# Patient Record
Sex: MALE | Race: WHITE | URBAN - METROPOLITAN AREA
[De-identification: names, ages, dates, MRNs, and addresses within clinical notes are randomized per-mention and may not be internally consistent; named-entity substitution may affect disease eponyms.]

---

## 2017-09-08 ENCOUNTER — APPOINTMENT (RX ONLY)
Dept: URBAN - METROPOLITAN AREA CLINIC 23 | Facility: CLINIC | Age: 72
Setting detail: DERMATOLOGY
End: 2017-09-08

## 2017-09-08 DIAGNOSIS — L81.4 OTHER MELANIN HYPERPIGMENTATION: ICD-10-CM

## 2017-09-08 DIAGNOSIS — L82.1 OTHER SEBORRHEIC KERATOSIS: ICD-10-CM

## 2017-09-08 DIAGNOSIS — L57.0 ACTINIC KERATOSIS: ICD-10-CM

## 2017-09-08 DIAGNOSIS — D22 MELANOCYTIC NEVI: ICD-10-CM

## 2017-09-08 PROBLEM — D22.5 MELANOCYTIC NEVI OF TRUNK: Status: ACTIVE | Noted: 2017-09-08

## 2017-09-08 PROCEDURE — ? LIQUID NITROGEN

## 2017-09-08 PROCEDURE — 17003 DESTRUCT PREMALG LES 2-14: CPT

## 2017-09-08 PROCEDURE — ? COUNSELING

## 2017-09-08 PROCEDURE — 99213 OFFICE O/P EST LOW 20 MIN: CPT | Mod: 25

## 2017-09-08 PROCEDURE — 17000 DESTRUCT PREMALG LESION: CPT

## 2017-09-08 ASSESSMENT — LOCATION SIMPLE DESCRIPTION DERM
LOCATION SIMPLE: RIGHT UPPER BACK
LOCATION SIMPLE: RIGHT EAR
LOCATION SIMPLE: RIGHT HAND
LOCATION SIMPLE: CHEST
LOCATION SIMPLE: ABDOMEN
LOCATION SIMPLE: LEFT HAND
LOCATION SIMPLE: RIGHT ANTERIOR NECK
LOCATION SIMPLE: LEFT UPPER BACK

## 2017-09-08 ASSESSMENT — LOCATION ZONE DERM
LOCATION ZONE: NECK
LOCATION ZONE: HAND
LOCATION ZONE: EAR
LOCATION ZONE: TRUNK

## 2017-09-08 ASSESSMENT — LOCATION DETAILED DESCRIPTION DERM
LOCATION DETAILED: LEFT ULNAR DORSAL HAND
LOCATION DETAILED: RIGHT MEDIAL INFERIOR CHEST
LOCATION DETAILED: PERIUMBILICAL SKIN
LOCATION DETAILED: RIGHT CLAVICULAR NECK
LOCATION DETAILED: RIGHT MID-UPPER BACK
LOCATION DETAILED: RIGHT SCAPHA
LOCATION DETAILED: LEFT SUPERIOR UPPER BACK
LOCATION DETAILED: RIGHT RADIAL DORSAL HAND
LOCATION DETAILED: RIGHT SUPERIOR UPPER BACK
LOCATION DETAILED: LEFT RADIAL DORSAL HAND

## 2018-04-22 PROBLEM — I35.0 NONRHEUMATIC AORTIC VALVE STENOSIS: Status: ACTIVE | Noted: 2018-04-22

## 2018-07-18 ENCOUNTER — APPOINTMENT (RX ONLY)
Dept: URBAN - METROPOLITAN AREA CLINIC 23 | Facility: CLINIC | Age: 73
Setting detail: DERMATOLOGY
End: 2018-07-18

## 2018-07-18 DIAGNOSIS — D485 NEOPLASM OF UNCERTAIN BEHAVIOR OF SKIN: ICD-10-CM

## 2018-07-18 PROBLEM — D48.5 NEOPLASM OF UNCERTAIN BEHAVIOR OF SKIN: Status: ACTIVE | Noted: 2018-07-18

## 2018-07-18 PROCEDURE — ? BIOPSY BY SHAVE METHOD

## 2018-07-18 PROCEDURE — ? OTHER

## 2018-07-18 PROCEDURE — 11100: CPT

## 2018-07-18 ASSESSMENT — LOCATION ZONE DERM: LOCATION ZONE: HAND

## 2018-07-18 ASSESSMENT — LOCATION DETAILED DESCRIPTION DERM: LOCATION DETAILED: RIGHT RADIAL DORSAL HAND

## 2018-07-18 ASSESSMENT — LOCATION SIMPLE DESCRIPTION DERM: LOCATION SIMPLE: RIGHT HAND

## 2018-07-18 NOTE — PROCEDURE: BIOPSY BY SHAVE METHOD
Was A Bandage Applied: Yes
Depth Of Biopsy: dermis
Electrodesiccation Text: The wound bed was treated with electrodesiccation after the biopsy was performed.
Biopsy Method: Dermablade
Wound Care: Vaseline
Bill For Surgical Tray: no
Electrodesiccation And Curettage Text: The wound bed was treated with electrodesiccation and curettage after the biopsy was performed.
Anesthesia Type: 1% lidocaine with epinephrine
Accession #: [id number]
Notification Instructions: Patient will be notified of biopsy results. However, patient instructed to call the office if not contacted within 2 weeks.
Type Of Destruction Used: Curettage
Size Of Lesion In Cm: 0
Detail Level: Detailed
Anesthesia Volume In Cc: 0.3
Hemostasis: Aluminum Chloride
Biopsy Type: H and E
Consent: Written consent was obtained and risks were reviewed including but not limited to scarring, infection, bleeding, scabbing, incomplete removal, and allergy to anesthesia.
Path Notes (To The Dermatopathologist): Excise if positive
Billing Type: Third-Party Bill
Cryotherapy Text: The wound bed was treated with cryotherapy after the biopsy was performed.
Dressing: bandage
Silver Nitrate Text: The wound bed was treated with silver nitrate after the biopsy was performed.
Post-care instructions were reviewed in detail and written instructions are provided. Patient is to keep the biopsy site dry overnight, and then apply bacitracin twice daily until healed. Patient may apply hydrogen peroxide soaks to remove any crusting.

## 2018-07-18 NOTE — PROCEDURE: OTHER
Note Text (......Xxx Chief Complaint.): This diagnosis correlates with the
Other (Free Text): Specimen(s) sent to [facility] for testing.
Detail Level: Simple

## 2018-08-06 ENCOUNTER — RX ONLY (OUTPATIENT)
Age: 73
Setting detail: RX ONLY
End: 2018-08-06

## 2018-08-06 RX ORDER — AMOXICILLIN 500 MG/1
CAPSULE ORAL
Qty: 6 | Refills: 0 | Status: ERX | COMMUNITY
Start: 2018-08-06 | End: 2023-05-24

## 2018-08-15 ENCOUNTER — APPOINTMENT (RX ONLY)
Dept: URBAN - METROPOLITAN AREA CLINIC 23 | Facility: CLINIC | Age: 73
Setting detail: DERMATOLOGY
End: 2018-08-15

## 2018-08-15 PROBLEM — C44.622 SQUAMOUS CELL CARCINOMA OF SKIN OF RIGHT UPPER LIMB, INCLUDING SHOULDER: Status: ACTIVE | Noted: 2018-08-15

## 2018-08-15 PROCEDURE — 11622 EXC S/N/H/F/G MAL+MRG 1.1-2: CPT

## 2018-08-15 PROCEDURE — ? EXCISION

## 2018-08-15 PROCEDURE — 13132 CMPLX RPR F/C/C/M/N/AX/G/H/F: CPT

## 2018-08-15 PROCEDURE — ? OTHER

## 2018-08-15 NOTE — PROCEDURE: EXCISION
Lip Wedge Excision Repair Text: Given the location of the defect and the proximity to free margins a full thickness wedge repair was deemed most appropriate.  Using a sterile surgical marker, the appropriate repair was drawn incorporating the defect and placing the expected incisions perpendicular to the vermillion border.  The vermillion border was also meticulously outlined to ensure appropriate reapproximation during the repair.  The area thus outlined was incised through and through with a #15 scalpel blade.  The muscularis and dermis were reaproximated with deep sutures following hemostasis. Care was taken to realign the vermillion border before proceeding with the superficial closure.  Once the vermillion was realigned the superfical and mucosal closure was finished.
Show Date Of Previous Biopsy Variable: Yes
Cheek-To-Nose Interpolation Flap Text: A decision was made to reconstruct the defect utilizing an interpolation axial flap and a staged reconstruction.  A telfa template was made of the defect.  This telfa template was then used to outline the Cheek-To-Nose Interpolation flap.  The donor area for the pedicle flap was then injected with anesthesia.  The flap was excised through the skin and subcutaneous tissue down to the layer of the underlying musculature.  The interpolation flap was carefully excised within this deep plane to maintain its blood supply.  The edges of the donor site were undermined.   The donor site was closed in a primary fashion.  The pedicle was then rotated into position and sutured.  Once the tube was sutured into place, adequate blood supply was confirmed with blanching and refill.  The pedicle was then wrapped with xeroform gauze and dressed appropriately with a telfa and gauze bandage to ensure continued blood supply and protect the attached pedicle.
Z Plasty Text: The lesion was extirpated to the level of the fat with a #15 scalpel blade.  Given the location of the defect, shape of the defect and the proximity to free margins a Z-plasty was deemed most appropriate for repair.  Using a sterile surgical marker, the appropriate transposition arms of the Z-plasty were drawn incorporating the defect and placing the expected incisions within the relaxed skin tension lines where possible.    The area thus outlined was incised deep to adipose tissue with a #15 scalpel blade.  The skin margins were undermined to an appropriate distance in all directions utilizing iris scissors.  The opposing transposition arms were then transposed into place in opposite direction and anchored with interrupted buried subcutaneous sutures.
Dermal Autograft Text: The defect edges were debeveled with a #15 scalpel blade.  Given the location of the defect, shape of the defect and the proximity to free margins a dermal autograft was deemed most appropriate.  Using a sterile surgical marker, the primary defect shape was transferred to the donor site. The area thus outlined was incised deep to adipose tissue with a #15 scalpel blade.  The harvested graft was then trimmed of adipose and epidermal tissue until only dermis was left.  The skin graft was then placed in the primary defect and oriented appropriately.
Star Wedge Flap Text: The defect edges were debeveled with a #15 scalpel blade.  Given the location of the defect, shape of the defect and the proximity to free margins a star wedge flap was deemed most appropriate.  Using a sterile surgical marker, an appropriate rotation flap was drawn incorporating the defect and placing the expected incisions within the relaxed skin tension lines where possible. The area thus outlined was incised deep to adipose tissue with a #15 scalpel blade.  The skin margins were undermined to an appropriate distance in all directions utilizing iris scissors.
W Plasty Text: The lesion was extirpated to the level of the fat with a #15 scalpel blade.  Given the location of the defect, shape of the defect and the proximity to free margins a W-plasty was deemed most appropriate for repair.  Using a sterile surgical marker, the appropriate transposition arms of the W-plasty were drawn incorporating the defect and placing the expected incisions within the relaxed skin tension lines where possible.    The area thus outlined was incised deep to adipose tissue with a #15 scalpel blade.  The skin margins were undermined to an appropriate distance in all directions utilizing iris scissors.  The opposing transposition arms were then transposed into place in opposite direction and anchored with interrupted buried subcutaneous sutures.
Paramedian Forehead Flap Text: A decision was made to reconstruct the defect utilizing an interpolation axial flap and a staged reconstruction.  A telfa template was made of the defect.  This telfa template was then used to outline the paramedian forehead pedicle flap.  The donor area for the pedicle flap was then injected with anesthesia.  The flap was excised through the skin and subcutaneous tissue down to the layer of the underlying musculature.  The pedicle flap was carefully excised within this deep plane to maintain its blood supply.  The edges of the donor site were undermined.   The donor site was closed in a primary fashion.  The pedicle was then rotated into position and sutured.  Once the tube was sutured into place, adequate blood supply was confirmed with blanching and refill.  The pedicle was then wrapped with xeroform gauze and dressed appropriately with a telfa and gauze bandage to ensure continued blood supply and protect the attached pedicle.
Bill For Surgical Tray: no
O-T Advancement Flap Text: The defect edges were debeveled with a #15 scalpel blade.  Given the location of the defect, shape of the defect and the proximity to free margins an O-T advancement flap was deemed most appropriate.  Using a sterile surgical marker, an appropriate advancement flap was drawn incorporating the defect and placing the expected incisions within the relaxed skin tension lines where possible.    The area thus outlined was incised deep to adipose tissue with a #15 scalpel blade.  The skin margins were undermined to an appropriate distance in all directions utilizing iris scissors.
Complex Repair Preamble Text (Leave Blank If You Do Not Want): Extensive wide undermining was performed.
Complex Repair And Melolabial Flap Text: The defect edges were debeveled with a #15 scalpel blade.  The primary defect was closed partially with a complex linear closure.  Given the location of the remaining defect, shape of the defect and the proximity to free margins a melolabial flap was deemed most appropriate for complete closure of the defect.  Using a sterile surgical marker, an appropriate advancement flap was drawn incorporating the defect and placing the expected incisions within the relaxed skin tension lines where possible.    The area thus outlined was incised deep to adipose tissue with a #15 scalpel blade.  The skin margins were undermined to an appropriate distance in all directions utilizing iris scissors.
Partial Purse String (Simple) Text: Given the location of the defect and the characteristics of the surrounding skin a simple purse string closure was deemed most appropriate.  Undermining was performed circumferentially around the surgical defect.  A purse string suture was then placed and tightened. Wound tension of the circular defect prevented complete closure of the wound.
Complex Repair And Transposition Flap Text: The defect edges were debeveled with a #15 scalpel blade.  The primary defect was closed partially with a complex linear closure.  Given the location of the remaining defect, shape of the defect and the proximity to free margins a transposition flap was deemed most appropriate for complete closure of the defect.  Using a sterile surgical marker, an appropriate advancement flap was drawn incorporating the defect and placing the expected incisions within the relaxed skin tension lines where possible.    The area thus outlined was incised deep to adipose tissue with a #15 scalpel blade.  The skin margins were undermined to an appropriate distance in all directions utilizing iris scissors.
Complex Repair And Xenograft Text: The defect edges were debeveled with a #15 scalpel blade.  The primary defect was closed partially with a complex linear closure.  Given the location of the defect, shape of the defect and the proximity to free margins an tissue cultured epidermal autograft was deemed most appropriate to repair the remaining defect.  The graft was trimmed to fit the size of the remaining defect.  The graft was then placed in the primary defect, oriented appropriately, and sutured into place.
Cheek Interpolation Flap Text: A decision was made to reconstruct the defect utilizing an interpolation axial flap and a staged reconstruction.  A telfa template was made of the defect.  This telfa template was then used to outline the Cheek Interpolation flap.  The donor area for the pedicle flap was then injected with anesthesia.  The flap was excised through the skin and subcutaneous tissue down to the layer of the underlying musculature.  The interpolation flap was carefully excised within this deep plane to maintain its blood supply.  The edges of the donor site were undermined.   The donor site was closed in a primary fashion.  The pedicle was then rotated into position and sutured.  Once the tube was sutured into place, adequate blood supply was confirmed with blanching and refill.  The pedicle was then wrapped with xeroform gauze and dressed appropriately with a telfa and gauze bandage to ensure continued blood supply and protect the attached pedicle.
Anesthesia Volume In Cc: 5
Graft Donor Site Bandage (Optional-Leave Blank If You Don't Want In Note): Steri-strips and a pressure bandage were applied to the donor site.
Split-Thickness Skin Graft Text: The defect edges were debeveled with a #15 scalpel blade.  Given the location of the defect, shape of the defect and the proximity to free margins a split thickness skin graft was deemed most appropriate.  Using a sterile surgical marker, the primary defect shape was transferred to the donor site. The split thickness graft was then harvested.  The skin graft was then placed in the primary defect and oriented appropriately.
Deep Sutures: 4-0 Vicryl
Scalpel Size: 15 blade
Perilesional Excision Additional Text (Leave Blank If You Do Not Want): The margin was drawn around the clinically apparent lesion. Incisions were then made along these lines to the appropriate tissue plane and the lesion was extirpated.
Skin Substitute Text: The defect edges were debeveled with a #15 scalpel blade.  Given the location of the defect, shape of the defect and the proximity to free margins a skin substitute graft was deemed most appropriate.  The graft material was trimmed to fit the size of the defect. The graft was then placed in the primary defect and oriented appropriately.
Consent was obtained from the patient. The risks and benefits to therapy were discussed in detail. Specifically, the risks of infection, scarring, bleeding, prolonged wound healing, incomplete removal, allergy to anesthesia, nerve injury and recurrence were addressed. Prior to the procedure, the treatment site was clearly identified and confirmed by the patient. All components of Universal Protocol/PAUSE Rule completed.
Complex Repair And Modified Advancement Flap Text: The defect edges were debeveled with a #15 scalpel blade.  The primary defect was closed partially with a complex linear closure.  Given the location of the remaining defect, shape of the defect and the proximity to free margins a modified advancement flap was deemed most appropriate for complete closure of the defect.  Using a sterile surgical marker, an appropriate advancement flap was drawn incorporating the defect and placing the expected incisions within the relaxed skin tension lines where possible.    The area thus outlined was incised deep to adipose tissue with a #15 scalpel blade.  The skin margins were undermined to an appropriate distance in all directions utilizing iris scissors.
Complex Repair And O-T Advancement Flap Text: The defect edges were debeveled with a #15 scalpel blade.  The primary defect was closed partially with a complex linear closure.  Given the location of the remaining defect, shape of the defect and the proximity to free margins an O-T advancement flap was deemed most appropriate for complete closure of the defect.  Using a sterile surgical marker, an appropriate advancement flap was drawn incorporating the defect and placing the expected incisions within the relaxed skin tension lines where possible.    The area thus outlined was incised deep to adipose tissue with a #15 scalpel blade.  The skin margins were undermined to an appropriate distance in all directions utilizing iris scissors.
Transposition Flap Text: The defect edges were debeveled with a #15 scalpel blade.  Given the location of the defect and the proximity to free margins a transposition flap was deemed most appropriate.  Using a sterile surgical marker, an appropriate transposition flap was drawn incorporating the defect.    The area thus outlined was incised deep to adipose tissue with a #15 scalpel blade.  The skin margins were undermined to an appropriate distance in all directions utilizing iris scissors.
Epidermal Closure Graft Donor Site (Optional): simple interrupted
Fusiform Excision Additional Text (Leave Blank If You Do Not Want): The margin was drawn around the clinically apparent lesion.  A fusiform shape was then drawn on the skin incorporating the lesion and margins.  Incisions were then made along these lines to the appropriate tissue plane and the lesion was extirpated.
Rhombic Flap Text: The defect edges were debeveled with a #15 scalpel blade.  Given the location of the defect and the proximity to free margins a rhombic flap was deemed most appropriate.  Using a sterile surgical marker, an appropriate rhombic flap was drawn incorporating the defect.    The area thus outlined was incised deep to adipose tissue with a #15 scalpel blade.  The skin margins were undermined to an appropriate distance in all directions utilizing iris scissors.
Complex Repair And Split-Thickness Skin Graft Text: The defect edges were debeveled with a #15 scalpel blade.  The primary defect was closed partially with a complex linear closure.  Given the location of the defect, shape of the defect and the proximity to free margins a split thickness skin graft was deemed most appropriate to repair the remaining defect.  The graft was trimmed to fit the size of the remaining defect.  The graft was then placed in the primary defect, oriented appropriately, and sutured into place.
Complex Repair And A-T Advancement Flap Text: The defect edges were debeveled with a #15 scalpel blade.  The primary defect was closed partially with a complex linear closure.  Given the location of the remaining defect, shape of the defect and the proximity to free margins an A-T advancement flap was deemed most appropriate for complete closure of the defect.  Using a sterile surgical marker, an appropriate advancement flap was drawn incorporating the defect and placing the expected incisions within the relaxed skin tension lines where possible.    The area thus outlined was incised deep to adipose tissue with a #15 scalpel blade.  The skin margins were undermined to an appropriate distance in all directions utilizing iris scissors.
Curvilinear Excision Additional Text (Leave Blank If You Do Not Want): The margin was drawn around the clinically apparent lesion.  A curvilinear shape was then drawn on the skin incorporating the lesion and margins.  Incisions were then made along these lines to the appropriate tissue plane and the lesion was extirpated.
Ear Star Wedge Flap Text: The defect edges were debeveled with a #15 blade scalpel.  Given the location of the defect and the proximity to free margins (helical rim) an ear star wedge flap was deemed most appropriate.  Using a sterile surgical marker, the appropriate flap was drawn incorporating the defect and placing the expected incisions between the helical rim and antihelix where possible.  The area thus outlined was incised through and through with a #15 scalpel blade.
Complex Repair And Double Advancement Flap Text: The defect edges were debeveled with a #15 scalpel blade.  The primary defect was closed partially with a complex linear closure.  Given the location of the remaining defect, shape of the defect and the proximity to free margins a double advancement flap was deemed most appropriate for complete closure of the defect.  Using a sterile surgical marker, an appropriate advancement flap was drawn incorporating the defect and placing the expected incisions within the relaxed skin tension lines where possible.    The area thus outlined was incised deep to adipose tissue with a #15 scalpel blade.  The skin margins were undermined to an appropriate distance in all directions utilizing iris scissors.
Advancement Flap (Single) Text: The defect edges were debeveled with a #15 scalpel blade.  Given the location of the defect and the proximity to free margins a single advancement flap was deemed most appropriate.  Using a sterile surgical marker, an appropriate advancement flap was drawn incorporating the defect and placing the expected incisions within the relaxed skin tension lines where possible.    The area thus outlined was incised deep to adipose tissue with a #15 scalpel blade.  The skin margins were undermined to an appropriate distance in all directions utilizing iris scissors.
V-Y Plasty Text: The defect edges were debeveled with a #15 scalpel blade.  Given the location of the defect, shape of the defect and the proximity to free margins an V-Y advancement flap was deemed most appropriate.  Using a sterile surgical marker, an appropriate advancement flap was drawn incorporating the defect and placing the expected incisions within the relaxed skin tension lines where possible.    The area thus outlined was incised deep to adipose tissue with a #15 scalpel blade.  The skin margins were undermined to an appropriate distance in all directions utilizing iris scissors.
Accession #: [id number]
Interpolation Flap Text: A decision was made to reconstruct the defect utilizing an interpolation axial flap and a staged reconstruction.  A telfa template was made of the defect.  This telfa template was then used to outline the interpolation flap.  The donor area for the pedicle flap was then injected with anesthesia.  The flap was excised through the skin and subcutaneous tissue down to the layer of the underlying musculature.  The interpolation flap was carefully excised within this deep plane to maintain its blood supply.  The edges of the donor site were undermined.   The donor site was closed in a primary fashion.  The pedicle was then rotated into position and sutured.  Once the tube was sutured into place, adequate blood supply was confirmed with blanching and refill.  The pedicle was then wrapped with xeroform gauze and dressed appropriately with a telfa and gauze bandage to ensure continued blood supply and protect the attached pedicle.
O-T Plasty Text: The defect edges were debeveled with a #15 scalpel blade.  Given the location of the defect, shape of the defect and the proximity to free margins an O-T plasty was deemed most appropriate.  Using a sterile surgical marker, an appropriate O-T plasty was drawn incorporating the defect and placing the expected incisions within the relaxed skin tension lines where possible.    The area thus outlined was incised deep to adipose tissue with a #15 scalpel blade.  The skin margins were undermined to an appropriate distance in all directions utilizing iris scissors.
Trilobed Flap Text: The defect edges were debeveled with a #15 scalpel blade.  Given the location of the defect and the proximity to free margins a trilobed flap was deemed most appropriate.  Using a sterile surgical marker, an appropriate trilobed flap drawn around the defect.    The area thus outlined was incised deep to adipose tissue with a #15 scalpel blade.  The skin margins were undermined to an appropriate distance in all directions utilizing iris scissors.
Detail Level: Detailed
Rotation Flap Text: The defect edges were debeveled with a #15 scalpel blade.  Given the location of the defect, shape of the defect and the proximity to free margins a rotation flap was deemed most appropriate.  Using a sterile surgical marker, an appropriate rotation flap was drawn incorporating the defect and placing the expected incisions within the relaxed skin tension lines where possible.    The area thus outlined was incised deep to adipose tissue with a #15 scalpel blade.  The skin margins were undermined to an appropriate distance in all directions utilizing iris scissors.
Intermediate Repair Preamble Text (Leave Blank If You Do Not Want): Undermining was performed with blunt dissection.
Island Pedicle Flap Text: The defect edges were debeveled with a #15 scalpel blade.  Given the location of the defect, shape of the defect and the proximity to free margins an island pedicle advancement flap was deemed most appropriate.  Using a sterile surgical marker, an appropriate advancement flap was drawn incorporating the defect, outlining the appropriate donor tissue and placing the expected incisions within the relaxed skin tension lines where possible.    The area thus outlined was incised deep to adipose tissue with a #15 scalpel blade.  The skin margins were undermined to an appropriate distance in all directions around the primary defect and laterally outward around the island pedicle utilizing iris scissors.  There was minimal undermining beneath the pedicle flap.
Hatchet Flap Text: The defect edges were debeveled with a #15 scalpel blade.  Given the location of the defect, shape of the defect and the proximity to free margins a hatchet flap was deemed most appropriate.  Using a sterile surgical marker, an appropriate hatchet flap was drawn incorporating the defect and placing the expected incisions within the relaxed skin tension lines where possible.    The area thus outlined was incised deep to adipose tissue with a #15 scalpel blade.  The skin margins were undermined to an appropriate distance in all directions utilizing iris scissors.
Melolabial Transposition Flap Text: The defect edges were debeveled with a #15 scalpel blade.  Given the location of the defect and the proximity to free margins a melolabial flap was deemed most appropriate.  Using a sterile surgical marker, an appropriate melolabial transposition flap was drawn incorporating the defect.    The area thus outlined was incised deep to adipose tissue with a #15 scalpel blade.  The skin margins were undermined to an appropriate distance in all directions utilizing iris scissors.
Second Skin Substitute Units (Will Override Primary Defect Units If Greater Than 0): 0
Complex Repair And Single Advancement Flap Text: The defect edges were debeveled with a #15 scalpel blade.  The primary defect was closed partially with a complex linear closure.  Given the location of the remaining defect, shape of the defect and the proximity to free margins a single advancement flap was deemed most appropriate for complete closure of the defect.  Using a sterile surgical marker, an appropriate advancement flap was drawn incorporating the defect and placing the expected incisions within the relaxed skin tension lines where possible.    The area thus outlined was incised deep to adipose tissue with a #15 scalpel blade.  The skin margins were undermined to an appropriate distance in all directions utilizing iris scissors.
Estimated Blood Loss (Cc): minimal
Muscle Hinge Flap Text: The defect edges were debeveled with a #15 scalpel blade.  Given the size, depth and location of the defect and the proximity to free margins a muscle hinge flap was deemed most appropriate.  Using a sterile surgical marker, an appropriate hinge flap was drawn incorporating the defect. The area thus outlined was incised with a #15 scalpel blade.  The skin margins were undermined to an appropriate distance in all directions utilizing iris scissors.
Helical Rim Advancement Flap Text: The defect edges were debeveled with a #15 blade scalpel.  Given the location of the defect and the proximity to free margins (helical rim) a double helical rim advancement flap was deemed most appropriate.  Using a sterile surgical marker, the appropriate advancement flaps were drawn incorporating the defect and placing the expected incisions between the helical rim and antihelix where possible.  The area thus outlined was incised through and through with a #15 scalpel blade.  With a skin hook and iris scissors, the flaps were gently and sharply undermined and freed up.
Posterior Auricular Interpolation Flap Text: A decision was made to reconstruct the defect utilizing an interpolation axial flap and a staged reconstruction.  A telfa template was made of the defect.  This telfa template was then used to outline the posterior auricular interpolation flap.  The donor area for the pedicle flap was then injected with anesthesia.  The flap was excised through the skin and subcutaneous tissue down to the layer of the underlying musculature.  The pedicle flap was carefully excised within this deep plane to maintain its blood supply.  The edges of the donor site were undermined.   The donor site was closed in a primary fashion.  The pedicle was then rotated into position and sutured.  Once the tube was sutured into place, adequate blood supply was confirmed with blanching and refill.  The pedicle was then wrapped with xeroform gauze and dressed appropriately with a telfa and gauze bandage to ensure continued blood supply and protect the attached pedicle.
Path Notes (To The Dermatopathologist): Please check margins
H Plasty Text: Given the location of the defect, shape of the defect and the proximity to free margins a H-plasty was deemed most appropriate for repair.  Using a sterile surgical marker, the appropriate advancement arms of the H-plasty were drawn incorporating the defect and placing the expected incisions within the relaxed skin tension lines where possible. The area thus outlined was incised deep to adipose tissue with a #15 scalpel blade. The skin margins were undermined to an appropriate distance in all directions utilizing iris scissors.  The opposing advancement arms were then advanced into place in opposite direction and anchored with interrupted buried subcutaneous sutures.
Epidermal Autograft Text: The defect edges were debeveled with a #15 scalpel blade.  Given the location of the defect, shape of the defect and the proximity to free margins an epidermal autograft was deemed most appropriate.  Using a sterile surgical marker, the primary defect shape was transferred to the donor site. The epidermal graft was then harvested.  The skin graft was then placed in the primary defect and oriented appropriately.
Pre-Excision Curettage Text (Leave Blank If You Do Not Want): Prior to drawing the surgical margin the visible lesion was removed with electrodesiccation and curettage to clearly define the lesion size.
Melolabial Interpolation Flap Text: A decision was made to reconstruct the defect utilizing an interpolation axial flap and a staged reconstruction.  A telfa template was made of the defect.  This telfa template was then used to outline the melolabial interpolation flap.  The donor area for the pedicle flap was then injected with anesthesia.  The flap was excised through the skin and subcutaneous tissue down to the layer of the underlying musculature.  The pedicle flap was carefully excised within this deep plane to maintain its blood supply.  The edges of the donor site were undermined.   The donor site was closed in a primary fashion.  The pedicle was then rotated into position and sutured.  Once the tube was sutured into place, adequate blood supply was confirmed with blanching and refill.  The pedicle was then wrapped with xeroform gauze and dressed appropriately with a telfa and gauze bandage to ensure continued blood supply and protect the attached pedicle.
Excision Method: Elliptical
Post-Care Instructions: I reviewed with the patient in detail post-care instructions. Patient is not to engage in any heavy lifting, exercise, or swimming for the next 7 days. Should the patient develop any fevers, chills, bleeding, severe pain patient will contact the office immediately.
Dorsal Nasal Flap Text: The defect edges were debeveled with a #15 scalpel blade.  Given the location of the defect and the proximity to free margins a dorsal nasal flap was deemed most appropriate.  Using a sterile surgical marker, an appropriate dorsal nasal flap was drawn around the defect.    The area thus outlined was incised deep to adipose tissue with a #15 scalpel blade.  The skin margins were undermined to an appropriate distance in all directions utilizing iris scissors.
Xenograft Text: The defect edges were debeveled with a #15 scalpel blade.  Given the location of the defect, shape of the defect and the proximity to free margins a xenograft was deemed most appropriate.  The graft was then trimmed to fit the size of the defect.  The graft was then placed in the primary defect and oriented appropriately.
Island Pedicle Flap With Canthal Suspension Text: The defect edges were debeveled with a #15 scalpel blade.  Given the location of the defect, shape of the defect and the proximity to free margins an island pedicle advancement flap was deemed most appropriate.  Using a sterile surgical marker, an appropriate advancement flap was drawn incorporating the defect, outlining the appropriate donor tissue and placing the expected incisions within the relaxed skin tension lines where possible. The area thus outlined was incised deep to adipose tissue with a #15 scalpel blade.  The skin margins were undermined to an appropriate distance in all directions around the primary defect and laterally outward around the island pedicle utilizing iris scissors.  There was minimal undermining beneath the pedicle flap. A suspension suture was placed in the canthal tendon to prevent tension and prevent ectropion.
Complex Repair And Skin Substitute Graft Text: The defect edges were debeveled with a #15 scalpel blade.  The primary defect was closed partially with a complex linear closure.  Given the location of the remaining defect, shape of the defect and the proximity to free margins a skin substitute graft was deemed most appropriate to repair the remaining defect.  The graft was trimmed to fit the size of the remaining defect.  The graft was then placed in the primary defect, oriented appropriately, and sutured into place.
Burow's Advancement Flap Text: The defect edges were debeveled with a #15 scalpel blade.  Given the location of the defect and the proximity to free margins a Burow's advancement flap was deemed most appropriate.  Using a sterile surgical marker, the appropriate advancement flap was drawn incorporating the defect and placing the expected incisions within the relaxed skin tension lines where possible.    The area thus outlined was incised deep to adipose tissue with a #15 scalpel blade.  The skin margins were undermined to an appropriate distance in all directions utilizing iris scissors.
No Repair - Repaired With Adjacent Surgical Defect Text (Leave Blank If You Do Not Want): After the excision the defect was repaired concurrently with another surgical defect which was in close approximation.
Anesthesia Type: 1% lidocaine with epinephrine
Bi-Rhombic Flap Text: The defect edges were debeveled with a #15 scalpel blade.  Given the location of the defect and the proximity to free margins a bi-rhombic flap was deemed most appropriate.  Using a sterile surgical marker, an appropriate rhombic flap was drawn incorporating the defect. The area thus outlined was incised deep to adipose tissue with a #15 scalpel blade.  The skin margins were undermined to an appropriate distance in all directions utilizing iris scissors.
Positioning (Leave Blank If You Do Not Want): The patient was placed in a comfortable position exposing the surgical site.
Elliptical Excision Additional Text (Leave Blank If You Do Not Want): The margin was drawn around the clinically apparent lesion.  An elliptical shape was then drawn on the skin incorporating the lesion and margins.  Incisions were then made along these lines to the appropriate tissue plane and the lesion was extirpated.
Complex Repair And W Plasty Text: The defect edges were debeveled with a #15 scalpel blade.  The primary defect was closed partially with a complex linear closure.  Given the location of the remaining defect, shape of the defect and the proximity to free margins a W plasty was deemed most appropriate for complete closure of the defect.  Using a sterile surgical marker, an appropriate advancement flap was drawn incorporating the defect and placing the expected incisions within the relaxed skin tension lines where possible.    The area thus outlined was incised deep to adipose tissue with a #15 scalpel blade.  The skin margins were undermined to an appropriate distance in all directions utilizing iris scissors.
Size Of Margin In Cm: 0.3
Complex Repair And M Plasty Text: The defect edges were debeveled with a #15 scalpel blade.  The primary defect was closed partially with a complex linear closure.  Given the location of the remaining defect, shape of the defect and the proximity to free margins an M plasty was deemed most appropriate for complete closure of the defect.  Using a sterile surgical marker, an appropriate advancement flap was drawn incorporating the defect and placing the expected incisions within the relaxed skin tension lines where possible.    The area thus outlined was incised deep to adipose tissue with a #15 scalpel blade.  The skin margins were undermined to an appropriate distance in all directions utilizing iris scissors.
Complex Repair And Dermal Autograft Text: The defect edges were debeveled with a #15 scalpel blade.  The primary defect was closed partially with a complex linear closure.  Given the location of the defect, shape of the defect and the proximity to free margins an dermal autograft was deemed most appropriate to repair the remaining defect.  The graft was trimmed to fit the size of the remaining defect.  The graft was then placed in the primary defect, oriented appropriately, and sutured into place.
Composite Graft Text: The defect edges were debeveled with a #15 scalpel blade.  Given the location of the defect, shape of the defect, the proximity to free margins and the fact the defect was full thickness a composite graft was deemed most appropriate.  The defect was outline and then transferred to the donor site.  A full thickness graft was then excised from the donor site. The graft was then placed in the primary defect, oriented appropriately and then sutured into place.  The secondary defect was then repaired using a primary closure.
Banner Transposition Flap Text: The defect edges were debeveled with a #15 scalpel blade.  Given the location of the defect and the proximity to free margins a Banner transposition flap was deemed most appropriate.  Using a sterile surgical marker, an appropriate flap drawn around the defect. The area thus outlined was incised deep to adipose tissue with a #15 scalpel blade.  The skin margins were undermined to an appropriate distance in all directions utilizing iris scissors.
Intermediate / Complex Repair - Final Wound Length In Cm: 5.2
O-Z Plasty Text: The defect edges were debeveled with a #15 scalpel blade.  Given the location of the defect, shape of the defect and the proximity to free margins an O-Z plasty (double transposition flap) was deemed most appropriate.  Using a sterile surgical marker, the appropriate transposition flaps were drawn incorporating the defect and placing the expected incisions within the relaxed skin tension lines where possible.    The area thus outlined was incised deep to adipose tissue with a #15 scalpel blade.  The skin margins were undermined to an appropriate distance in all directions utilizing iris scissors.  Hemostasis was achieved with electrocautery.  The flaps were then transposed into place, one clockwise and the other counterclockwise, and anchored with interrupted buried subcutaneous sutures.
Mucosal Advancement Flap Text: Given the location of the defect, shape of the defect and the proximity to free margins a mucosal advancement flap was deemed most appropriate. Incisions were made with a 15 blade scalpel in the appropriate fashion along the cutaneous vermillion border and the mucosal lip. The remaining actinically damaged mucosal tissue was excised.  The mucosal advancement flap was then elevated to the gingival sulcus with care taken to preserve the neurovascular structures and advanced into the primary defect. Care was taken to ensure that precise realignment of the vermillion border was achieved.
Hemostasis: Electrocautery
Double Island Pedicle Flap Text: The defect edges were debeveled with a #15 scalpel blade.  Given the location of the defect, shape of the defect and the proximity to free margins a double island pedicle advancement flap was deemed most appropriate.  Using a sterile surgical marker, an appropriate advancement flap was drawn incorporating the defect, outlining the appropriate donor tissue and placing the expected incisions within the relaxed skin tension lines where possible.    The area thus outlined was incised deep to adipose tissue with a #15 scalpel blade.  The skin margins were undermined to an appropriate distance in all directions around the primary defect and laterally outward around the island pedicle utilizing iris scissors.  There was minimal undermining beneath the pedicle flap.
V-Y Flap Text: The defect edges were debeveled with a #15 scalpel blade.  Given the location of the defect, shape of the defect and the proximity to free margins a V-Y flap was deemed most appropriate.  Using a sterile surgical marker, an appropriate advancement flap was drawn incorporating the defect and placing the expected incisions within the relaxed skin tension lines where possible.    The area thus outlined was incised deep to adipose tissue with a #15 scalpel blade.  The skin margins were undermined to an appropriate distance in all directions utilizing iris scissors.
Home Suture Removal Text: Patient was provided a home suture removal kit and will remove their sutures at home.  If they have any questions or difficulties they will call the office.
Wound Care: Vaseline
Epidermal Closure: running
Purse String (Simple) Text: Given the location of the defect and the characteristics of the surrounding skin a purse string simple closure was deemed most appropriate.  Undermining was performed circumferentially around the surgical defect.  A purse string suture was then placed and tightened.
Epidermal Sutures: 4-0 Prolene
Ftsg Text: The defect edges were debeveled with a #15 scalpel blade.  Given the location of the defect, shape of the defect and the proximity to free margins a full thickness skin graft was deemed most appropriate.  Using a sterile surgical marker, the primary defect shape was transferred to the donor site. The area thus outlined was incised deep to adipose tissue with a #15 scalpel blade.  The harvested graft was then trimmed of adipose tissue until only dermis and epidermis was left.  The skin margins of the secondary defect were undermined to an appropriate distance in all directions utilizing iris scissors.  The secondary defect was closed with interrupted buried subcutaneous sutures.  The skin edges were then re-apposed with running  sutures.  The skin graft was then placed in the primary defect and oriented appropriately.
Advancement Flap (Double) Text: The defect edges were debeveled with a #15 scalpel blade.  Given the location of the defect and the proximity to free margins a double advancement flap was deemed most appropriate.  Using a sterile surgical marker, the appropriate advancement flaps were drawn incorporating the defect and placing the expected incisions within the relaxed skin tension lines where possible.    The area thus outlined was incised deep to adipose tissue with a #15 scalpel blade.  The skin margins were undermined to an appropriate distance in all directions utilizing iris scissors.
Size Of Lesion In Cm: 1.2
Complex Repair And Dorsal Nasal Flap Text: The defect edges were debeveled with a #15 scalpel blade.  The primary defect was closed partially with a complex linear closure.  Given the location of the remaining defect, shape of the defect and the proximity to free margins a dorsal nasal flap was deemed most appropriate for complete closure of the defect.  Using a sterile surgical marker, an appropriate flap was drawn incorporating the defect and placing the expected incisions within the relaxed skin tension lines where possible.    The area thus outlined was incised deep to adipose tissue with a #15 scalpel blade.  The skin margins were undermined to an appropriate distance in all directions utilizing iris scissors.
Complex Repair And Epidermal Autograft Text: The defect edges were debeveled with a #15 scalpel blade.  The primary defect was closed partially with a complex linear closure.  Given the location of the defect, shape of the defect and the proximity to free margins an epidermal autograft was deemed most appropriate to repair the remaining defect.  The graft was trimmed to fit the size of the remaining defect.  The graft was then placed in the primary defect, oriented appropriately, and sutured into place.
Complex Repair And Double M Plasty Text: The defect edges were debeveled with a #15 scalpel blade.  The primary defect was closed partially with a complex linear closure.  Given the location of the remaining defect, shape of the defect and the proximity to free margins a double M plasty was deemed most appropriate for complete closure of the defect.  Using a sterile surgical marker, an appropriate advancement flap was drawn incorporating the defect and placing the expected incisions within the relaxed skin tension lines where possible.    The area thus outlined was incised deep to adipose tissue with a #15 scalpel blade.  The skin margins were undermined to an appropriate distance in all directions utilizing iris scissors.
Repair Type: Complex
Complex Repair And V-Y Plasty Text: The defect edges were debeveled with a #15 scalpel blade.  The primary defect was closed partially with a complex linear closure.  Given the location of the remaining defect, shape of the defect and the proximity to free margins a V-Y plasty was deemed most appropriate for complete closure of the defect.  Using a sterile surgical marker, an appropriate advancement flap was drawn incorporating the defect and placing the expected incisions within the relaxed skin tension lines where possible.    The area thus outlined was incised deep to adipose tissue with a #15 scalpel blade.  The skin margins were undermined to an appropriate distance in all directions utilizing iris scissors.
Advancement-Rotation Flap Text: The defect edges were debeveled with a #15 scalpel blade.  Given the location of the defect, shape of the defect and the proximity to free margins an advancement-rotation flap was deemed most appropriate.  Using a sterile surgical marker, an appropriate flap was drawn incorporating the defect and placing the expected incisions within the relaxed skin tension lines where possible. The area thus outlined was incised deep to adipose tissue with a #15 scalpel blade.  The skin margins were undermined to an appropriate distance in all directions utilizing iris scissors.
Complex Repair And Z Plasty Text: The defect edges were debeveled with a #15 scalpel blade.  The primary defect was closed partially with a complex linear closure.  Given the location of the remaining defect, shape of the defect and the proximity to free margins a Z plasty was deemed most appropriate for complete closure of the defect.  Using a sterile surgical marker, an appropriate advancement flap was drawn incorporating the defect and placing the expected incisions within the relaxed skin tension lines where possible.    The area thus outlined was incised deep to adipose tissue with a #15 scalpel blade.  The skin margins were undermined to an appropriate distance in all directions utilizing iris scissors.
Mastoid Interpolation Flap Text: A decision was made to reconstruct the defect utilizing an interpolation axial flap and a staged reconstruction.  A telfa template was made of the defect.  This telfa template was then used to outline the mastoid interpolation flap.  The donor area for the pedicle flap was then injected with anesthesia.  The flap was excised through the skin and subcutaneous tissue down to the layer of the underlying musculature.  The pedicle flap was carefully excised within this deep plane to maintain its blood supply.  The edges of the donor site were undermined.   The donor site was closed in a primary fashion.  The pedicle was then rotated into position and sutured.  Once the tube was sutured into place, adequate blood supply was confirmed with blanching and refill.  The pedicle was then wrapped with xeroform gauze and dressed appropriately with a telfa and gauze bandage to ensure continued blood supply and protect the attached pedicle.
Complex Repair And O-L Flap Text: The defect edges were debeveled with a #15 scalpel blade.  The primary defect was closed partially with a complex linear closure.  Given the location of the remaining defect, shape of the defect and the proximity to free margins an O-L flap was deemed most appropriate for complete closure of the defect.  Using a sterile surgical marker, an appropriate flap was drawn incorporating the defect and placing the expected incisions within the relaxed skin tension lines where possible.    The area thus outlined was incised deep to adipose tissue with a #15 scalpel blade.  The skin margins were undermined to an appropriate distance in all directions utilizing iris scissors.
Dressing: pressure dressing
Bilateral Helical Rim Advancement Flap Text: The defect edges were debeveled with a #15 blade scalpel.  Given the location of the defect and the proximity to free margins (helical rim) a bilateral helical rim advancement flap was deemed most appropriate.  Using a sterile surgical marker, the appropriate advancement flaps were drawn incorporating the defect and placing the expected incisions between the helical rim and antihelix where possible.  The area thus outlined was incised through and through with a #15 scalpel blade.  With a skin hook and iris scissors, the flaps were gently and sharply undermined and freed up.
Island Pedicle Flap-Requiring Vessel Identification Text: The defect edges were debeveled with a #15 scalpel blade.  Given the location of the defect, shape of the defect and the proximity to free margins an island pedicle advancement flap was deemed most appropriate.  Using a sterile surgical marker, an appropriate advancement flap was drawn, based on the axial vessel mentioned above, incorporating the defect, outlining the appropriate donor tissue and placing the expected incisions within the relaxed skin tension lines where possible.    The area thus outlined was incised deep to adipose tissue with a #15 scalpel blade.  The skin margins were undermined to an appropriate distance in all directions around the primary defect and laterally outward around the island pedicle utilizing iris scissors.  There was minimal undermining beneath the pedicle flap.
Bilobed Flap Text: The defect edges were debeveled with a #15 scalpel blade.  Given the location of the defect and the proximity to free margins a bilobe flap was deemed most appropriate.  Using a sterile surgical marker, an appropriate bilobe flap drawn around the defect.    The area thus outlined was incised deep to adipose tissue with a #15 scalpel blade.  The skin margins were undermined to an appropriate distance in all directions utilizing iris scissors.
Billing Type: Third-Party Bill
Spiral Flap Text: The defect edges were debeveled with a #15 scalpel blade.  Given the location of the defect, shape of the defect and the proximity to free margins a spiral flap was deemed most appropriate.  Using a sterile surgical marker, an appropriate rotation flap was drawn incorporating the defect and placing the expected incisions within the relaxed skin tension lines where possible. The area thus outlined was incised deep to adipose tissue with a #15 scalpel blade.  The skin margins were undermined to an appropriate distance in all directions utilizing iris scissors.
Complex Repair And Bilobe Flap Text: The defect edges were debeveled with a #15 scalpel blade.  The primary defect was closed partially with a complex linear closure.  Given the location of the remaining defect, shape of the defect and the proximity to free margins a bilobe flap was deemed most appropriate for complete closure of the defect.  Using a sterile surgical marker, an appropriate advancement flap was drawn incorporating the defect and placing the expected incisions within the relaxed skin tension lines where possible.    The area thus outlined was incised deep to adipose tissue with a #15 scalpel blade.  The skin margins were undermined to an appropriate distance in all directions utilizing iris scissors.
Saucerization Excision Additional Text (Leave Blank If You Do Not Want): The margin was drawn around the clinically apparent lesion.  Incisions were then made along these lines, in a tangential fashion, to the appropriate tissue plane and the lesion was extirpated.
Complex Repair And Rhombic Flap Text: The defect edges were debeveled with a #15 scalpel blade.  The primary defect was closed partially with a complex linear closure.  Given the location of the remaining defect, shape of the defect and the proximity to free margins a rhombic flap was deemed most appropriate for complete closure of the defect.  Using a sterile surgical marker, an appropriate advancement flap was drawn incorporating the defect and placing the expected incisions within the relaxed skin tension lines where possible.    The area thus outlined was incised deep to adipose tissue with a #15 scalpel blade.  The skin margins were undermined to an appropriate distance in all directions utilizing iris scissors.
Alar Island Pedicle Flap Text: The defect edges were debeveled with a #15 scalpel blade.  Given the location of the defect, shape of the defect and the proximity to the alar rim an island pedicle advancement flap was deemed most appropriate.  Using a sterile surgical marker, an appropriate advancement flap was drawn incorporating the defect, outlining the appropriate donor tissue and placing the expected incisions within the nasal ala running parallel to the alar rim. The area thus outlined was incised with a #15 scalpel blade.  The skin margins were undermined minimally to an appropriate distance in all directions around the primary defect and laterally outward around the island pedicle utilizing iris scissors.  There was minimal undermining beneath the pedicle flap.
Modified Advancement Flap Text: The defect edges were debeveled with a #15 scalpel blade.  Given the location of the defect, shape of the defect and the proximity to free margins a modified advancement flap was deemed most appropriate.  Using a sterile surgical marker, an appropriate advancement flap was drawn incorporating the defect and placing the expected incisions within the relaxed skin tension lines where possible.    The area thus outlined was incised deep to adipose tissue with a #15 scalpel blade.  The skin margins were undermined to an appropriate distance in all directions utilizing iris scissors.
Slit Excision Additional Text (Leave Blank If You Do Not Want): A linear line was drawn on the skin overlying the lesion. An incision was made slowly until the lesion was visualized.  Once visualized, the lesion was removed with blunt dissection.
Crescentic Advancement Flap Text: The defect edges were debeveled with a #15 scalpel blade.  Given the location of the defect and the proximity to free margins a crescentic advancement flap was deemed most appropriate.  Using a sterile surgical marker, the appropriate advancement flap was drawn incorporating the defect and placing the expected incisions within the relaxed skin tension lines where possible.    The area thus outlined was incised deep to adipose tissue with a #15 scalpel blade.  The skin margins were undermined to an appropriate distance in all directions utilizing iris scissors.
S Plasty Text: Given the location and shape of the defect, and the orientation of relaxed skin tension lines, an S-plasty was deemed most appropriate for repair.  Using a sterile surgical marker, the appropriate outline of the S-plasty was drawn, incorporating the defect and placing the expected incisions within the relaxed skin tension lines where possible.  The area thus outlined was incised deep to adipose tissue with a #15 scalpel blade.  The skin margins were undermined to an appropriate distance in all directions utilizing iris scissors. The skin flaps were advanced over the defect.  The opposing margins were then approximated with interrupted buried subcutaneous sutures.
Cartilage Graft Text: The defect edges were debeveled with a #15 scalpel blade.  Given the location of the defect, shape of the defect, the fact the defect involved a full thickness cartilage defect a cartilage graft was deemed most appropriate.  An appropriate donor site was identified, cleansed, and anesthetized. The cartilage graft was then harvested and transferred to the recipient site, oriented appropriately and then sutured into place.  The secondary defect was then repaired using a primary closure.
Complex Repair And Ftsg Text: The defect edges were debeveled with a #15 scalpel blade.  The primary defect was closed partially with a complex linear closure.  Given the location of the defect, shape of the defect and the proximity to free margins a full thickness skin graft was deemed most appropriate to repair the remaining defect.  The graft was trimmed to fit the size of the remaining defect.  The graft was then placed in the primary defect, oriented appropriately, and sutured into place.
Tissue Cultured Epidermal Autograft Text: The defect edges were debeveled with a #15 scalpel blade.  Given the location of the defect, shape of the defect and the proximity to free margins a tissue cultured epidermal autograft was deemed most appropriate.  The graft was then trimmed to fit the size of the defect.  The graft was then placed in the primary defect and oriented appropriately.
Bilobed Transposition Flap Text: The defect edges were debeveled with a #15 scalpel blade.  Given the location of the defect and the proximity to free margins a bilobed transposition flap was deemed most appropriate.  Using a sterile surgical marker, an appropriate bilobe flap drawn around the defect.    The area thus outlined was incised deep to adipose tissue with a #15 scalpel blade.  The skin margins were undermined to an appropriate distance in all directions utilizing iris scissors.
Keystone Flap Text: The defect edges were debeveled with a #15 scalpel blade.  Given the location of the defect, shape of the defect a keystone flap was deemed most appropriate.  Using a sterile surgical marker, an appropriate keystone flap was drawn incorporating the defect, outlining the appropriate donor tissue and placing the expected incisions within the relaxed skin tension lines where possible. The area thus outlined was incised deep to adipose tissue with a #15 scalpel blade.  The skin margins were undermined to an appropriate distance in all directions around the primary defect and laterally outward around the flap utilizing iris scissors.
Complex Repair And Rotation Flap Text: The defect edges were debeveled with a #15 scalpel blade.  The primary defect was closed partially with a complex linear closure.  Given the location of the remaining defect, shape of the defect and the proximity to free margins a rotation flap was deemed most appropriate for complete closure of the defect.  Using a sterile surgical marker, an appropriate advancement flap was drawn incorporating the defect and placing the expected incisions within the relaxed skin tension lines where possible.    The area thus outlined was incised deep to adipose tissue with a #15 scalpel blade.  The skin margins were undermined to an appropriate distance in all directions utilizing iris scissors.
Suture Removal: 14 days
Purse String (Intermediate) Text: Given the location of the defect and the characteristics of the surrounding skin a pursestring intermediate closure was deemed most appropriate.  Undermining was performed circumfirentially around the surgical defect.  A purstring suture was then placed and tightened.
Repair Performed By Another Provider Text (Leave Blank If You Do Not Want): After the tissue was excised the defect was repaired by another provider.
Mercedes Flap Text: The defect edges were debeveled with a #15 scalpel blade.  Given the location of the defect, shape of the defect and the proximity to free margins a Mercedes flap was deemed most appropriate.  Using a sterile surgical marker, an appropriate advancement flap was drawn incorporating the defect and placing the expected incisions within the relaxed skin tension lines where possible. The area thus outlined was incised deep to adipose tissue with a #15 scalpel blade.  The skin margins were undermined to an appropriate distance in all directions utilizing iris scissors.
Partial Purse String (Intermediate) Text: Given the location of the defect and the characteristics of the surrounding skin an intermediate purse string closure was deemed most appropriate.  Undermining was performed circumferentially around the surgical defect.  A purse string suture was then placed and tightened. Wound tension of the circular defect prevented complete closure of the wound.
Excision Depth: adipose tissue
A-T Advancement Flap Text: The defect edges were debeveled with a #15 scalpel blade.  Given the location of the defect, shape of the defect and the proximity to free margins an A-T advancement flap was deemed most appropriate.  Using a sterile surgical marker, an appropriate advancement flap was drawn incorporating the defect and placing the expected incisions within the relaxed skin tension lines where possible.    The area thus outlined was incised deep to adipose tissue with a #15 scalpel blade.  The skin margins were undermined to an appropriate distance in all directions utilizing iris scissors.
O-L Flap Text: The defect edges were debeveled with a #15 scalpel blade.  Given the location of the defect, shape of the defect and the proximity to free margins an O-L flap was deemed most appropriate.  Using a sterile surgical marker, an appropriate advancement flap was drawn incorporating the defect and placing the expected incisions within the relaxed skin tension lines where possible.    The area thus outlined was incised deep to adipose tissue with a #15 scalpel blade.  The skin margins were undermined to an appropriate distance in all directions utilizing iris scissors.

## 2018-08-15 NOTE — PROCEDURE: OTHER
Detail Level: Simple
Other (Free Text): Diagnosis and biopsy site were confirmed pre-operatively by both physician and medical assisitant by using pathology report and photograph taken at time of biopsy.\\n\\nSpecimen(s) sent to [facility] for testing.
Note Text (......Xxx Chief Complaint.): This diagnosis correlates with the

## 2018-08-21 ENCOUNTER — APPOINTMENT (RX ONLY)
Dept: URBAN - METROPOLITAN AREA CLINIC 24 | Facility: CLINIC | Age: 73
Setting detail: DERMATOLOGY
End: 2018-08-21

## 2018-08-21 DIAGNOSIS — Z48.817 ENCOUNTER FOR SURGICAL AFTERCARE FOLLOWING SURGERY ON THE SKIN AND SUBCUTANEOUS TISSUE: ICD-10-CM

## 2018-08-21 PROCEDURE — ? POST-OP WOUND CHECK

## 2018-08-21 PROCEDURE — ? PRESCRIPTION

## 2018-08-21 RX ORDER — CEPHALEXIN 500 MG/1
CAPSULE ORAL
Qty: 20 | Refills: 0 | Status: ERX | COMMUNITY
Start: 2018-08-21 | End: 2023-05-24

## 2018-08-21 RX ADMIN — CEPHALEXIN: 500 CAPSULE ORAL at 00:00

## 2018-08-21 ASSESSMENT — LOCATION DETAILED DESCRIPTION DERM: LOCATION DETAILED: RIGHT RADIAL DORSAL HAND

## 2018-08-21 ASSESSMENT — LOCATION ZONE DERM: LOCATION ZONE: HAND

## 2018-08-21 ASSESSMENT — LOCATION SIMPLE DESCRIPTION DERM: LOCATION SIMPLE: RIGHT HAND

## 2018-08-21 NOTE — PROCEDURE: POST-OP WOUND CHECK
Wound Evaluated By: Dr. Brandin Crawford
Detail Level: Detailed
Add 84696 Cpt? (Important Note: In 2017 The Use Of 06400 Is Being Tracked By Cms To Determine Future Global Period Reimbursement For Global Periods): no

## 2018-08-29 ENCOUNTER — APPOINTMENT (RX ONLY)
Dept: URBAN - METROPOLITAN AREA CLINIC 23 | Facility: CLINIC | Age: 73
Setting detail: DERMATOLOGY
End: 2018-08-29

## 2018-08-29 DIAGNOSIS — Z48.01 ENCOUNTER FOR CHANGE OR REMOVAL OF SURGICAL WOUND DRESSING: ICD-10-CM

## 2018-08-29 PROCEDURE — ? SUTURE REMOVAL (GLOBAL PERIOD)

## 2018-08-29 ASSESSMENT — LOCATION ZONE DERM: LOCATION ZONE: HAND

## 2018-08-29 ASSESSMENT — LOCATION DETAILED DESCRIPTION DERM: LOCATION DETAILED: RIGHT RADIAL DORSAL HAND

## 2018-08-29 ASSESSMENT — LOCATION SIMPLE DESCRIPTION DERM: LOCATION SIMPLE: RIGHT HAND

## 2018-08-29 NOTE — PROCEDURE: SUTURE REMOVAL (GLOBAL PERIOD)
Detail Level: Simple
Add 66281 Cpt? (Important Note: In 2017 The Use Of 78452 Is Being Tracked By Cms To Determine Future Global Period Reimbursement For Global Periods): no

## 2018-09-06 ENCOUNTER — APPOINTMENT (RX ONLY)
Dept: URBAN - METROPOLITAN AREA CLINIC 23 | Facility: CLINIC | Age: 73
Setting detail: DERMATOLOGY
End: 2018-09-06

## 2018-09-06 DIAGNOSIS — Z48.02 ENCOUNTER FOR REMOVAL OF SUTURES: ICD-10-CM

## 2018-09-06 PROCEDURE — ? COUNSELING

## 2018-09-06 PROCEDURE — ? OTHER

## 2018-09-06 ASSESSMENT — LOCATION SIMPLE DESCRIPTION DERM: LOCATION SIMPLE: RIGHT HAND

## 2018-09-06 ASSESSMENT — LOCATION DETAILED DESCRIPTION DERM: LOCATION DETAILED: RIGHT RADIAL DORSAL HAND

## 2018-09-06 ASSESSMENT — LOCATION ZONE DERM: LOCATION ZONE: HAND

## 2018-09-06 NOTE — PROCEDURE: OTHER
Detail Level: Simple
Other (Free Text): Continue wound care. Will eventually stop draining and flatten out
Note Text (......Xxx Chief Complaint.): This diagnosis correlates with the

## 2018-11-14 ENCOUNTER — APPOINTMENT (RX ONLY)
Dept: URBAN - METROPOLITAN AREA CLINIC 23 | Facility: CLINIC | Age: 73
Setting detail: DERMATOLOGY
End: 2018-11-14

## 2018-11-14 DIAGNOSIS — Z85.828 PERSONAL HISTORY OF OTHER MALIGNANT NEOPLASM OF SKIN: ICD-10-CM

## 2018-11-14 DIAGNOSIS — L82.1 OTHER SEBORRHEIC KERATOSIS: ICD-10-CM

## 2018-11-14 DIAGNOSIS — D18.0 HEMANGIOMA: ICD-10-CM

## 2018-11-14 DIAGNOSIS — L81.4 OTHER MELANIN HYPERPIGMENTATION: ICD-10-CM

## 2018-11-14 DIAGNOSIS — L57.0 ACTINIC KERATOSIS: ICD-10-CM

## 2018-11-14 PROBLEM — I10 ESSENTIAL (PRIMARY) HYPERTENSION: Status: ACTIVE | Noted: 2018-11-14

## 2018-11-14 PROBLEM — J30.1 ALLERGIC RHINITIS DUE TO POLLEN: Status: ACTIVE | Noted: 2018-11-14

## 2018-11-14 PROBLEM — L85.3 XEROSIS CUTIS: Status: ACTIVE | Noted: 2018-11-14

## 2018-11-14 PROBLEM — D18.01 HEMANGIOMA OF SKIN AND SUBCUTANEOUS TISSUE: Status: ACTIVE | Noted: 2018-11-14

## 2018-11-14 PROCEDURE — ? COUNSELING

## 2018-11-14 PROCEDURE — ? LIQUID NITROGEN

## 2018-11-14 PROCEDURE — 99213 OFFICE O/P EST LOW 20 MIN: CPT | Mod: 25

## 2018-11-14 PROCEDURE — 17003 DESTRUCT PREMALG LES 2-14: CPT

## 2018-11-14 PROCEDURE — 17000 DESTRUCT PREMALG LESION: CPT

## 2018-11-14 ASSESSMENT — LOCATION DETAILED DESCRIPTION DERM
LOCATION DETAILED: RIGHT MEDIAL UPPER BACK
LOCATION DETAILED: INFERIOR THORACIC SPINE
LOCATION DETAILED: LEFT CENTRAL ZYGOMA
LOCATION DETAILED: EPIGASTRIC SKIN
LOCATION DETAILED: LEFT POSTERIOR SHOULDER
LOCATION DETAILED: RIGHT POSTERIOR SHOULDER
LOCATION DETAILED: RIGHT RADIAL DORSAL HAND
LOCATION DETAILED: RIGHT INFERIOR FOREHEAD
LOCATION DETAILED: LEFT SUPERIOR HELIX
LOCATION DETAILED: LEFT INFERIOR FOREHEAD

## 2018-11-14 ASSESSMENT — LOCATION ZONE DERM
LOCATION ZONE: FACE
LOCATION ZONE: TRUNK
LOCATION ZONE: EAR
LOCATION ZONE: HAND
LOCATION ZONE: ARM

## 2018-11-14 ASSESSMENT — LOCATION SIMPLE DESCRIPTION DERM
LOCATION SIMPLE: LEFT SHOULDER
LOCATION SIMPLE: ABDOMEN
LOCATION SIMPLE: RIGHT UPPER BACK
LOCATION SIMPLE: LEFT FOREHEAD
LOCATION SIMPLE: LEFT EAR
LOCATION SIMPLE: UPPER BACK
LOCATION SIMPLE: RIGHT SHOULDER
LOCATION SIMPLE: RIGHT HAND
LOCATION SIMPLE: LEFT ZYGOMA
LOCATION SIMPLE: RIGHT FOREHEAD

## 2019-05-29 ENCOUNTER — APPOINTMENT (RX ONLY)
Dept: URBAN - METROPOLITAN AREA CLINIC 23 | Facility: CLINIC | Age: 74
Setting detail: DERMATOLOGY
End: 2019-05-29

## 2019-05-29 DIAGNOSIS — D18.0 HEMANGIOMA: ICD-10-CM

## 2019-05-29 DIAGNOSIS — L82.1 OTHER SEBORRHEIC KERATOSIS: ICD-10-CM

## 2019-05-29 DIAGNOSIS — L57.0 ACTINIC KERATOSIS: ICD-10-CM

## 2019-05-29 DIAGNOSIS — L81.4 OTHER MELANIN HYPERPIGMENTATION: ICD-10-CM

## 2019-05-29 DIAGNOSIS — Z85.828 PERSONAL HISTORY OF OTHER MALIGNANT NEOPLASM OF SKIN: ICD-10-CM

## 2019-05-29 PROBLEM — L29.8 OTHER PRURITUS: Status: ACTIVE | Noted: 2019-05-29

## 2019-05-29 PROBLEM — D18.01 HEMANGIOMA OF SKIN AND SUBCUTANEOUS TISSUE: Status: ACTIVE | Noted: 2019-05-29

## 2019-05-29 PROBLEM — L20.84 INTRINSIC (ALLERGIC) ECZEMA: Status: ACTIVE | Noted: 2019-05-29

## 2019-05-29 PROCEDURE — 17003 DESTRUCT PREMALG LES 2-14: CPT

## 2019-05-29 PROCEDURE — ? LIQUID NITROGEN

## 2019-05-29 PROCEDURE — 99213 OFFICE O/P EST LOW 20 MIN: CPT | Mod: 25

## 2019-05-29 PROCEDURE — 17000 DESTRUCT PREMALG LESION: CPT

## 2019-05-29 PROCEDURE — ? COUNSELING

## 2019-05-29 ASSESSMENT — LOCATION DETAILED DESCRIPTION DERM
LOCATION DETAILED: RIGHT PROXIMAL DORSAL FOREARM
LOCATION DETAILED: LEFT FOREHEAD
LOCATION DETAILED: LEFT CENTRAL TEMPLE
LOCATION DETAILED: EPIGASTRIC SKIN
LOCATION DETAILED: LEFT POSTERIOR SHOULDER
LOCATION DETAILED: LEFT DISTAL DORSAL FOREARM
LOCATION DETAILED: RIGHT MEDIAL UPPER BACK
LOCATION DETAILED: RIGHT POSTERIOR SHOULDER
LOCATION DETAILED: LEFT INFERIOR UPPER BACK
LOCATION DETAILED: LEFT PROXIMAL RADIAL DORSAL FOREARM
LOCATION DETAILED: LEFT ULNAR DORSAL HAND
LOCATION DETAILED: LEFT RADIAL DORSAL HAND
LOCATION DETAILED: LEFT SUPERIOR MEDIAL MALAR CHEEK
LOCATION DETAILED: RIGHT RADIAL DORSAL HAND
LOCATION DETAILED: RIGHT SUPERIOR CRUS OF ANTIHELIX

## 2019-05-29 ASSESSMENT — LOCATION ZONE DERM
LOCATION ZONE: ARM
LOCATION ZONE: FACE
LOCATION ZONE: HAND
LOCATION ZONE: TRUNK
LOCATION ZONE: EAR

## 2019-05-29 ASSESSMENT — LOCATION SIMPLE DESCRIPTION DERM
LOCATION SIMPLE: RIGHT EAR
LOCATION SIMPLE: RIGHT UPPER BACK
LOCATION SIMPLE: LEFT HAND
LOCATION SIMPLE: LEFT UPPER BACK
LOCATION SIMPLE: LEFT FOREHEAD
LOCATION SIMPLE: LEFT TEMPLE
LOCATION SIMPLE: ABDOMEN
LOCATION SIMPLE: RIGHT HAND
LOCATION SIMPLE: LEFT CHEEK
LOCATION SIMPLE: LEFT SHOULDER
LOCATION SIMPLE: RIGHT FOREARM
LOCATION SIMPLE: RIGHT SHOULDER
LOCATION SIMPLE: LEFT FOREARM

## 2019-11-15 ENCOUNTER — APPOINTMENT (RX ONLY)
Dept: URBAN - METROPOLITAN AREA CLINIC 23 | Facility: CLINIC | Age: 74
Setting detail: DERMATOLOGY
End: 2019-11-15

## 2019-11-15 DIAGNOSIS — D18.0 HEMANGIOMA: ICD-10-CM

## 2019-11-15 DIAGNOSIS — L57.0 ACTINIC KERATOSIS: ICD-10-CM

## 2019-11-15 DIAGNOSIS — Z85.828 PERSONAL HISTORY OF OTHER MALIGNANT NEOPLASM OF SKIN: ICD-10-CM

## 2019-11-15 DIAGNOSIS — L82.1 OTHER SEBORRHEIC KERATOSIS: ICD-10-CM

## 2019-11-15 DIAGNOSIS — L81.4 OTHER MELANIN HYPERPIGMENTATION: ICD-10-CM

## 2019-11-15 DIAGNOSIS — D485 NEOPLASM OF UNCERTAIN BEHAVIOR OF SKIN: ICD-10-CM

## 2019-11-15 PROBLEM — E78.5 HYPERLIPIDEMIA, UNSPECIFIED: Status: ACTIVE | Noted: 2019-11-15

## 2019-11-15 PROBLEM — D48.5 NEOPLASM OF UNCERTAIN BEHAVIOR OF SKIN: Status: ACTIVE | Noted: 2019-11-15

## 2019-11-15 PROBLEM — L20.84 INTRINSIC (ALLERGIC) ECZEMA: Status: ACTIVE | Noted: 2019-11-15

## 2019-11-15 PROBLEM — D18.01 HEMANGIOMA OF SKIN AND SUBCUTANEOUS TISSUE: Status: ACTIVE | Noted: 2019-11-15

## 2019-11-15 PROCEDURE — ? COUNSELING

## 2019-11-15 PROCEDURE — 11102 TANGNTL BX SKIN SINGLE LES: CPT

## 2019-11-15 PROCEDURE — 17000 DESTRUCT PREMALG LESION: CPT | Mod: 59

## 2019-11-15 PROCEDURE — ? BIOPSY BY SHAVE METHOD

## 2019-11-15 PROCEDURE — 17003 DESTRUCT PREMALG LES 2-14: CPT

## 2019-11-15 PROCEDURE — ? LIQUID NITROGEN

## 2019-11-15 PROCEDURE — 99214 OFFICE O/P EST MOD 30 MIN: CPT | Mod: 25

## 2019-11-15 ASSESSMENT — LOCATION SIMPLE DESCRIPTION DERM
LOCATION SIMPLE: RIGHT HAND
LOCATION SIMPLE: LEFT SHOULDER
LOCATION SIMPLE: LEFT HAND
LOCATION SIMPLE: LEFT UPPER BACK
LOCATION SIMPLE: RIGHT UPPER BACK
LOCATION SIMPLE: ABDOMEN
LOCATION SIMPLE: RIGHT SHOULDER
LOCATION SIMPLE: RIGHT FOREHEAD

## 2019-11-15 ASSESSMENT — LOCATION ZONE DERM
LOCATION ZONE: FACE
LOCATION ZONE: HAND
LOCATION ZONE: ARM
LOCATION ZONE: TRUNK

## 2019-11-15 ASSESSMENT — LOCATION DETAILED DESCRIPTION DERM
LOCATION DETAILED: LEFT POSTERIOR SHOULDER
LOCATION DETAILED: RIGHT SUPERIOR FOREHEAD
LOCATION DETAILED: RIGHT RADIAL DORSAL HAND
LOCATION DETAILED: LEFT RADIAL DORSAL HAND
LOCATION DETAILED: LEFT INFERIOR UPPER BACK
LOCATION DETAILED: EPIGASTRIC SKIN
LOCATION DETAILED: RIGHT POSTERIOR SHOULDER
LOCATION DETAILED: RIGHT SUPERIOR MEDIAL FOREHEAD
LOCATION DETAILED: RIGHT MEDIAL UPPER BACK

## 2019-11-15 NOTE — PROCEDURE: BIOPSY BY SHAVE METHOD
Silver Nitrate Text: The wound bed was treated with silver nitrate after the biopsy was performed.
X Size Of Lesion In Cm: 0
Bill For Surgical Tray: no
Biopsy Method: Dermablade
Accession #: [id number]
Wound Care: Vaseline
Type Of Destruction Used: Curettage
Dressing: bandage
Billing Type: Third-Party Bill
Post-care instructions were reviewed in detail and written instructions are provided. Patient is to keep the biopsy site dry overnight, and then apply bacitracin twice daily until healed. Patient may apply hydrogen peroxide soaks to remove any crusting.
Electrodesiccation Text: The wound bed was treated with electrodesiccation after the biopsy was performed.
Anesthesia Volume In Cc: 0.3
Biopsy Type: H and E
Cryotherapy Text: The wound bed was treated with cryotherapy after the biopsy was performed.
Electrodesiccation And Curettage Text: The wound bed was treated with electrodesiccation and curettage after the biopsy was performed.
Notification Instructions: Patient will be notified of biopsy results. However, patient instructed to call the office if not contacted within 2 weeks.
Depth Of Biopsy: dermis
Detail Level: Detailed
Hemostasis: Aluminum Chloride
Consent: Written consent was obtained and risks were reviewed including but not limited to scarring, infection, bleeding, scabbing, incomplete removal, and allergy to anesthesia.
Was A Bandage Applied: Yes
Anesthesia Type: 1% lidocaine with epinephrine

## 2019-12-18 ENCOUNTER — APPOINTMENT (RX ONLY)
Dept: URBAN - METROPOLITAN AREA CLINIC 23 | Facility: CLINIC | Age: 74
Setting detail: DERMATOLOGY
End: 2019-12-18

## 2019-12-18 PROBLEM — L57.0 ACTINIC KERATOSIS: Status: ACTIVE | Noted: 2019-12-18

## 2019-12-18 PROBLEM — D04.62 CARCINOMA IN SITU OF SKIN OF LEFT UPPER LIMB, INCLUDING SHOULDER: Status: ACTIVE | Noted: 2019-12-18

## 2019-12-18 PROBLEM — J30.1 ALLERGIC RHINITIS DUE TO POLLEN: Status: ACTIVE | Noted: 2019-12-18

## 2019-12-18 PROCEDURE — ? CURETTAGE AND DESTRUCTION

## 2019-12-18 PROCEDURE — 17272 DSTR MAL LES S/N/H/F/G 1.1-2: CPT

## 2020-05-21 ENCOUNTER — APPOINTMENT (RX ONLY)
Dept: URBAN - METROPOLITAN AREA CLINIC 23 | Facility: CLINIC | Age: 75
Setting detail: DERMATOLOGY
End: 2020-05-21

## 2020-05-21 DIAGNOSIS — L57.0 ACTINIC KERATOSIS: ICD-10-CM

## 2020-05-21 DIAGNOSIS — D18.0 HEMANGIOMA: ICD-10-CM

## 2020-05-21 DIAGNOSIS — L81.4 OTHER MELANIN HYPERPIGMENTATION: ICD-10-CM

## 2020-05-21 DIAGNOSIS — Z85.828 PERSONAL HISTORY OF OTHER MALIGNANT NEOPLASM OF SKIN: ICD-10-CM

## 2020-05-21 DIAGNOSIS — L82.1 OTHER SEBORRHEIC KERATOSIS: ICD-10-CM

## 2020-05-21 PROBLEM — D18.01 HEMANGIOMA OF SKIN AND SUBCUTANEOUS TISSUE: Status: ACTIVE | Noted: 2020-05-21

## 2020-05-21 PROCEDURE — ? LIQUID NITROGEN

## 2020-05-21 PROCEDURE — 17003 DESTRUCT PREMALG LES 2-14: CPT

## 2020-05-21 PROCEDURE — ? COUNSELING

## 2020-05-21 PROCEDURE — 99214 OFFICE O/P EST MOD 30 MIN: CPT | Mod: 25

## 2020-05-21 PROCEDURE — 17000 DESTRUCT PREMALG LESION: CPT

## 2020-05-21 ASSESSMENT — LOCATION SIMPLE DESCRIPTION DERM
LOCATION SIMPLE: ABDOMEN
LOCATION SIMPLE: LEFT ZYGOMA
LOCATION SIMPLE: LEFT SHOULDER
LOCATION SIMPLE: LEFT UPPER BACK
LOCATION SIMPLE: RIGHT SHOULDER
LOCATION SIMPLE: RIGHT UPPER BACK
LOCATION SIMPLE: LEFT FOREHEAD
LOCATION SIMPLE: LEFT HAND
LOCATION SIMPLE: RIGHT HAND
LOCATION SIMPLE: RIGHT FOREHEAD

## 2020-05-21 ASSESSMENT — LOCATION DETAILED DESCRIPTION DERM
LOCATION DETAILED: RIGHT POSTERIOR SHOULDER
LOCATION DETAILED: RIGHT FOREHEAD
LOCATION DETAILED: RIGHT MEDIAL UPPER BACK
LOCATION DETAILED: LEFT POSTERIOR SHOULDER
LOCATION DETAILED: LEFT SUPERIOR MEDIAL FOREHEAD
LOCATION DETAILED: RIGHT RADIAL DORSAL HAND
LOCATION DETAILED: EPIGASTRIC SKIN
LOCATION DETAILED: RIGHT LATERAL FOREHEAD
LOCATION DETAILED: LEFT RADIAL DORSAL HAND
LOCATION DETAILED: LEFT LATERAL ZYGOMA
LOCATION DETAILED: LEFT INFERIOR UPPER BACK

## 2020-05-21 ASSESSMENT — LOCATION ZONE DERM
LOCATION ZONE: HAND
LOCATION ZONE: ARM
LOCATION ZONE: FACE
LOCATION ZONE: TRUNK

## 2020-05-29 ENCOUNTER — HOSPITAL ENCOUNTER (OUTPATIENT)
Dept: MRI IMAGING | Age: 75
Discharge: HOME | End: 2020-05-29
Attending: UROLOGY
Payer: MEDICARE

## 2020-05-29 DIAGNOSIS — R97.20 PSA ELEVATION: ICD-10-CM

## 2020-05-29 PROCEDURE — 72197 MRI PELVIS W/O & W/DYE: CPT

## 2020-05-29 PROCEDURE — 74011000258 HC RX REV CODE- 258: Performed by: UROLOGY

## 2020-05-29 PROCEDURE — 74011250636 HC RX REV CODE- 250/636: Performed by: UROLOGY

## 2020-05-29 PROCEDURE — A9575 INJ GADOTERATE MEGLUMI 0.1ML: HCPCS | Performed by: UROLOGY

## 2020-05-29 RX ORDER — SODIUM CHLORIDE 0.9 % (FLUSH) 0.9 %
10 SYRINGE (ML) INJECTION
Status: COMPLETED | OUTPATIENT
Start: 2020-05-29 | End: 2020-05-29

## 2020-05-29 RX ORDER — GADOTERATE MEGLUMINE 376.9 MG/ML
19 INJECTION INTRAVENOUS
Status: COMPLETED | OUTPATIENT
Start: 2020-05-29 | End: 2020-05-29

## 2020-05-29 RX ADMIN — GADOTERATE MEGLUMINE 19 ML: 376.9 INJECTION INTRAVENOUS at 15:49

## 2020-05-29 RX ADMIN — Medication 10 ML: at 15:49

## 2020-05-29 RX ADMIN — SODIUM CHLORIDE 100 ML: 900 INJECTION, SOLUTION INTRAVENOUS at 15:49

## 2020-09-14 ENCOUNTER — ANESTHESIA EVENT (OUTPATIENT)
Dept: SURGERY | Age: 75
End: 2020-09-14
Payer: MEDICARE

## 2020-09-15 ENCOUNTER — ANESTHESIA (OUTPATIENT)
Dept: SURGERY | Age: 75
End: 2020-09-15
Payer: MEDICARE

## 2020-09-15 ENCOUNTER — HOSPITAL ENCOUNTER (OUTPATIENT)
Age: 75
Setting detail: OUTPATIENT SURGERY
Discharge: HOME | End: 2020-09-15
Attending: UROLOGY | Admitting: UROLOGY
Payer: MEDICARE

## 2020-09-15 VITALS
HEART RATE: 68 BPM | TEMPERATURE: 97.5 F | OXYGEN SATURATION: 98 % | RESPIRATION RATE: 16 BRPM | BODY MASS INDEX: 30.68 KG/M2 | WEIGHT: 220 LBS | DIASTOLIC BLOOD PRESSURE: 70 MMHG | SYSTOLIC BLOOD PRESSURE: 108 MMHG

## 2020-09-15 LAB
APPEARANCE UR: CLEAR
BILIRUB UR QL: ABNORMAL
COLOR UR: ABNORMAL
GLUCOSE UR STRIP.AUTO-MCNC: NEGATIVE MG/DL
HGB UR QL STRIP: NEGATIVE
KETONES UR QL STRIP.AUTO: ABNORMAL MG/DL
LEUKOCYTE ESTERASE UR QL STRIP.AUTO: NEGATIVE
NITRITE UR QL STRIP.AUTO: NEGATIVE
PH UR STRIP: 5.5 [PH] (ref 5–9)
POTASSIUM BLD-SCNC: 3.7 MMOL/L (ref 3.5–5.1)
PROT UR STRIP-MCNC: NEGATIVE MG/DL
SP GR UR REFRACTOMETRY: 1.02 (ref 1–1.02)
UROBILINOGEN UR QL STRIP.AUTO: 1 EU/DL (ref 0.2–1)

## 2020-09-15 PROCEDURE — 74011250636 HC RX REV CODE- 250/636: Performed by: NURSE ANESTHETIST, CERTIFIED REGISTERED

## 2020-09-15 PROCEDURE — 74011250637 HC RX REV CODE- 250/637: Performed by: ANESTHESIOLOGY

## 2020-09-15 PROCEDURE — 76060000032 HC ANESTHESIA 0.5 TO 1 HR: Performed by: UROLOGY

## 2020-09-15 PROCEDURE — 76210000063 HC OR PH I REC FIRST 0.5 HR: Performed by: UROLOGY

## 2020-09-15 PROCEDURE — 74011000258 HC RX REV CODE- 258: Performed by: UROLOGY

## 2020-09-15 PROCEDURE — 84132 ASSAY OF SERUM POTASSIUM: CPT

## 2020-09-15 PROCEDURE — 2709999900 HC NON-CHARGEABLE SUPPLY: Performed by: UROLOGY

## 2020-09-15 PROCEDURE — 81003 URINALYSIS AUTO W/O SCOPE: CPT

## 2020-09-15 PROCEDURE — 74011250636 HC RX REV CODE- 250/636: Performed by: ANESTHESIOLOGY

## 2020-09-15 PROCEDURE — 76210000020 HC REC RM PH II FIRST 0.5 HR: Performed by: UROLOGY

## 2020-09-15 PROCEDURE — 76010000138 HC OR TIME 0.5 TO 1 HR: Performed by: UROLOGY

## 2020-09-15 PROCEDURE — G0416 PROSTATE BIOPSY, ANY MTHD: HCPCS

## 2020-09-15 PROCEDURE — 77030003481 HC NDL BIOP GUN BARD -B: Performed by: UROLOGY

## 2020-09-15 PROCEDURE — 88305 TISSUE EXAM BY PATHOLOGIST: CPT

## 2020-09-15 PROCEDURE — 74011250636 HC RX REV CODE- 250/636: Performed by: UROLOGY

## 2020-09-15 PROCEDURE — 74011000250 HC RX REV CODE- 250: Performed by: NURSE ANESTHETIST, CERTIFIED REGISTERED

## 2020-09-15 RX ORDER — FENTANYL CITRATE 50 UG/ML
100 INJECTION, SOLUTION INTRAMUSCULAR; INTRAVENOUS ONCE
Status: DISCONTINUED | OUTPATIENT
Start: 2020-09-15 | End: 2020-09-15 | Stop reason: HOSPADM

## 2020-09-15 RX ORDER — HYDROMORPHONE HYDROCHLORIDE 2 MG/ML
0.5 INJECTION, SOLUTION INTRAMUSCULAR; INTRAVENOUS; SUBCUTANEOUS
Status: DISCONTINUED | OUTPATIENT
Start: 2020-09-15 | End: 2020-09-15 | Stop reason: HOSPADM

## 2020-09-15 RX ORDER — LIDOCAINE HYDROCHLORIDE 20 MG/ML
INJECTION, SOLUTION EPIDURAL; INFILTRATION; INTRACAUDAL; PERINEURAL AS NEEDED
Status: DISCONTINUED | OUTPATIENT
Start: 2020-09-15 | End: 2020-09-15 | Stop reason: HOSPADM

## 2020-09-15 RX ORDER — LIDOCAINE HYDROCHLORIDE 10 MG/ML
0.1 INJECTION INFILTRATION; PERINEURAL AS NEEDED
Status: DISCONTINUED | OUTPATIENT
Start: 2020-09-15 | End: 2020-09-15 | Stop reason: HOSPADM

## 2020-09-15 RX ORDER — MIDAZOLAM HYDROCHLORIDE 1 MG/ML
2 INJECTION, SOLUTION INTRAMUSCULAR; INTRAVENOUS ONCE
Status: DISCONTINUED | OUTPATIENT
Start: 2020-09-15 | End: 2020-09-15 | Stop reason: HOSPADM

## 2020-09-15 RX ORDER — PROPOFOL 10 MG/ML
INJECTION, EMULSION INTRAVENOUS AS NEEDED
Status: DISCONTINUED | OUTPATIENT
Start: 2020-09-15 | End: 2020-09-15 | Stop reason: HOSPADM

## 2020-09-15 RX ORDER — OXYCODONE HYDROCHLORIDE 5 MG/1
5 TABLET ORAL
Status: DISCONTINUED | OUTPATIENT
Start: 2020-09-15 | End: 2020-09-15 | Stop reason: HOSPADM

## 2020-09-15 RX ORDER — SODIUM CHLORIDE, SODIUM LACTATE, POTASSIUM CHLORIDE, CALCIUM CHLORIDE 600; 310; 30; 20 MG/100ML; MG/100ML; MG/100ML; MG/100ML
50 INJECTION, SOLUTION INTRAVENOUS CONTINUOUS
Status: DISCONTINUED | OUTPATIENT
Start: 2020-09-15 | End: 2020-09-15 | Stop reason: HOSPADM

## 2020-09-15 RX ORDER — PROPOFOL 10 MG/ML
INJECTION, EMULSION INTRAVENOUS
Status: DISCONTINUED | OUTPATIENT
Start: 2020-09-15 | End: 2020-09-15 | Stop reason: HOSPADM

## 2020-09-15 RX ORDER — PHENAZOPYRIDINE HYDROCHLORIDE 200 MG/1
200 TABLET, FILM COATED ORAL
Qty: 12 TAB | Refills: 1 | Status: SHIPPED | OUTPATIENT
Start: 2020-09-15 | End: 2020-09-18

## 2020-09-15 RX ORDER — CELECOXIB 200 MG/1
200 CAPSULE ORAL AS NEEDED
Status: DISCONTINUED | OUTPATIENT
Start: 2020-09-15 | End: 2020-09-15 | Stop reason: HOSPADM

## 2020-09-15 RX ORDER — ACETAMINOPHEN 500 MG
1000 TABLET ORAL ONCE
Status: COMPLETED | OUTPATIENT
Start: 2020-09-15 | End: 2020-09-15

## 2020-09-15 RX ORDER — MIDAZOLAM HYDROCHLORIDE 1 MG/ML
2 INJECTION, SOLUTION INTRAMUSCULAR; INTRAVENOUS
Status: DISCONTINUED | OUTPATIENT
Start: 2020-09-15 | End: 2020-09-15 | Stop reason: HOSPADM

## 2020-09-15 RX ORDER — ALBUTEROL SULFATE 0.83 MG/ML
2.5 SOLUTION RESPIRATORY (INHALATION) AS NEEDED
Status: DISCONTINUED | OUTPATIENT
Start: 2020-09-15 | End: 2020-09-15 | Stop reason: HOSPADM

## 2020-09-15 RX ORDER — SODIUM CHLORIDE, SODIUM LACTATE, POTASSIUM CHLORIDE, CALCIUM CHLORIDE 600; 310; 30; 20 MG/100ML; MG/100ML; MG/100ML; MG/100ML
75 INJECTION, SOLUTION INTRAVENOUS CONTINUOUS
Status: DISCONTINUED | OUTPATIENT
Start: 2020-09-15 | End: 2020-09-15 | Stop reason: HOSPADM

## 2020-09-15 RX ADMIN — CEFTRIAXONE 1 G: 1 INJECTION, POWDER, FOR SOLUTION INTRAMUSCULAR; INTRAVENOUS at 09:46

## 2020-09-15 RX ADMIN — PROPOFOL 160 MCG/KG/MIN: 10 INJECTION, EMULSION INTRAVENOUS at 09:56

## 2020-09-15 RX ADMIN — SODIUM CHLORIDE, SODIUM LACTATE, POTASSIUM CHLORIDE, AND CALCIUM CHLORIDE 75 ML/HR: 600; 310; 30; 20 INJECTION, SOLUTION INTRAVENOUS at 08:00

## 2020-09-15 RX ADMIN — PHENYLEPHRINE HYDROCHLORIDE 200 MCG: 10 INJECTION INTRAVENOUS at 10:29

## 2020-09-15 RX ADMIN — PHENYLEPHRINE HYDROCHLORIDE 100 MCG: 10 INJECTION INTRAVENOUS at 10:13

## 2020-09-15 RX ADMIN — ACETAMINOPHEN 1000 MG: 500 TABLET, FILM COATED ORAL at 09:22

## 2020-09-15 RX ADMIN — PROPOFOL 50 MG: 10 INJECTION, EMULSION INTRAVENOUS at 09:58

## 2020-09-15 RX ADMIN — LIDOCAINE HYDROCHLORIDE 100 MG: 20 INJECTION, SOLUTION EPIDURAL; INFILTRATION; INTRACAUDAL; PERINEURAL at 09:56

## 2020-09-15 RX ADMIN — PHENYLEPHRINE HYDROCHLORIDE 100 MCG: 10 INJECTION INTRAVENOUS at 10:23

## 2020-09-15 RX ADMIN — PHENYLEPHRINE HYDROCHLORIDE 100 MCG: 10 INJECTION INTRAVENOUS at 10:16

## 2020-09-15 NOTE — ANESTHESIA PREPROCEDURE EVALUATION
Relevant Problems   No relevant active problems       Anesthetic History   No history of anesthetic complications            Review of Systems / Medical History  Patient summary reviewed, nursing notes reviewed and pertinent labs reviewed    Pulmonary              Pertinent negatives: No shortness of breath     Neuro/Psych   Within defined limits           Cardiovascular    Hypertension: well controlled  Valvular problems/murmurs: aortic stenosis          Pertinent negatives: No angina  Exercise tolerance: >4 METS  Comments: TTE 2019- Normal EF, LVH, mod-sev AS    Denies CP, NUNN, syncope, or pre-syncopal events   GI/Hepatic/Renal  Within defined limits              Endo/Other  Within defined limits           Other Findings              Physical Exam    Airway  Mallampati: II      Mouth opening: Normal     Cardiovascular    Rhythm: regular      Murmur     Dental  No notable dental hx       Pulmonary  Breath sounds clear to auscultation               Abdominal         Other Findings            Anesthetic Plan    ASA: 2  Anesthesia type: total IV anesthesia          Induction: Intravenous  Anesthetic plan and risks discussed with: Patient and Spouse      Discussed TIVA with its benefits (lower risk of nausea and sore throat, etc.) and risks including possible awareness, patient understands and elects to proceed

## 2020-09-15 NOTE — DISCHARGE INSTRUCTIONS
Prostate Biopsy: About This Test  What is it? A prostate biopsy is a type of test. Your doctor takes about 10 to 12 small tissue samples from your prostate. Then another doctor looks at the tissue under a microscope to see if there are cancer cells. Why is this test done? You may need a prostate biopsy if your doctor found something of concern in your lab work or during your exam. A biopsy can help find out if you have prostate cancer. It may also be done for other reasons, such as monitoring the growth of prostate cancer for men on active surveillance. How do you prepare for the test?  Before you have a prostate biopsy, tell your doctor if you are taking any medicines or using any herbal supplements, or if you are allergic to any medicines. And tell your doctor if you have had bleeding problems, or you take aspirin or some other blood thinner. How is the test done? Some men have a prostate imaging test, such as an MRI or a CT scan, before their biopsy. The test results are used during the biopsy to select the areas of the prostate to sample. Before your biopsy, you may be given antibiotics to prevent infection. You may be asked to take off all of your clothes and put on a hospital gown. You may be given a sedative through a vein (IV) in your arm. The sedative will help you relax and stay still. If you have a general anesthetic, you will be in a recovery room for a few hours after the biopsy. Through the rectum  · You may be asked to kneel, lie on your side, or lie on your back. · Your doctor may inject an anesthetic around and into the prostate to numb the area before samples are taken. · An ultrasound probe will be gently inserted into your rectum. · A thin tool with a spring-loaded needle will be inserted next to the ultrasound probe. The ultrasound helps to locate the areas on the prostate where the samples will be taken.  If you had an MRI or CT scan, the test results will also be used to guide the sampling. · The needle enters the prostate and removes a sample. About 10 to 12 samples are usually taken. Through the perineum  · You will lie on an exam table either on your side or on your back with your knees bent. You will get anesthesia. The anesthesia may make you sleep. Or it may just numb the area being worked on.  · Your doctor will make a small cut in your perineum. Then he or she will collect samples from the prostate through the cut with a special tool. · An ultrasound probe inserted into the rectum may be used to help find the locations in the prostate where samples need to be taken. Sometimes other imaging, such as MRI, is used instead of or along with ultrasound. Or the test results from other imaging, such as an MRI or a CT scan, may be used to guide the sampling. How long will the test take? This test will take from 15 to 45 minutes, depending on how it's done. What happens after the test?  You will probably be able to go home right away, and you may feel tired the rest of the day. Your muscles may ache. Don't do heavy work or exercise for 4 hours after the test.  You may have mild pain in your pelvic area and blood in your urine for up to 5 days. You may have some blood in your semen for a week or longer. You may also have a change in color of your semen for up to 1 month after the biopsy. If the prostate samples are taken through the rectal wall (transrectal biopsy), you may have a small amount of bleeding from your rectum for 2 to 3 days after the biopsy. Follow-up care is a key part of your treatment and safety. Be sure to make and go to all appointments, and call your doctor if you are having problems. It's also a good idea to keep a list of the medicines you take. Ask your doctor when you can expect to have your test results. Where can you learn more?   Go to http://www.gray.com/  Enter Y504 in the search box to learn more about \"Prostate Biopsy: About This Test.\"  Current as of: April 29, 2020               Content Version: 12.6  © 4315-6907 [employer]. Care instructions adapted under license by [employer] (which disclaims liability or warranty for this information). If you have questions about a medical condition or this instruction, always ask your healthcare professional. [facility] [address] any warranty or liability for your use of this information. ACTIVITY  · As tolerated and as directed by your doctor. · Bathe or shower as directed by your doctor. DIET  · Clear liquids until no nausea or vomiting; then light diet for the first day. · Advance to regular diet on second day, unless your doctor orders otherwise. · If nausea and vomiting continues, call your doctor. PAIN  · Take pain medication as directed by your doctor. · Call your doctor if pain is NOT relieved by medication. · DO NOT take aspirin of blood thinners unless directed by your doctor. CALL YOUR DOCTOR IF   · Excessive bleeding that does not stop after holding pressure over the area  · Temperature of 101 degrees F or above  · Excessive redness, swelling or bruising, and/ or green or yellow, smelly discharge from incision    AFTER ANESTHESIA   · For the first 24 hours: DO NOT Drive, Drink alcoholic beverages, or Make important decisions. · Be aware of dizziness following anesthesia and while taking pain medication. DISCHARGE SUMMARY from Nurse    PATIENT INSTRUCTIONS:    After general anesthesia or intravenous sedation, for 24 hours or while taking prescription Narcotics:  · Limit your activities  · Do not drive and operate hazardous machinery  · Do not make important personal or business decisions  · Do  not drink alcoholic beverages  · If you have not urinated within 8 hours after discharge, please contact your surgeon on call.     *  Please give a list of your current medications to your Primary Care Provider. *  Please update this list whenever your medications are discontinued, doses are      changed, or new medications (including over-the-counter products) are added. *  Please carry medication information at all times in case of emergency situations. These are general instructions for a healthy lifestyle:    No smoking/ No tobacco products/ Avoid exposure to second hand smoke    Surgeon General's Warning:  Quitting smoking now greatly reduces serious risk to your health. Obesity, smoking, and sedentary lifestyle greatly increases your risk for illness    A healthy diet, regular physical exercise & weight monitoring are important for maintaining a healthy lifestyle    You may be retaining fluid if you have a history of heart failure or if you experience any of the following symptoms:  Weight gain of 3 pounds or more overnight or 5 pounds in a week, increased swelling in our hands or feet or shortness of breath while lying flat in bed. Please call your doctor as soon as you notice any of these symptoms; do not wait until your next office visit. Recognize signs and symptoms of STROKE:    F-face looks uneven    A-arms unable to move or move unevenly    S-speech slurred or non-existent    T-time-call 911 as soon as signs and symptoms begin-DO NOT go       Back to bed or wait to see if you get better-TIME IS BRAIN.

## 2020-09-15 NOTE — H&P
History and Physical    Patient: Ashley Glover  MRN: [id number]  SSN: [id number]   YOB: 1945  Age: 66 y.o. Sex: male     The patient's PSA is now up to 8.2. MRI of the prostate showed a 2.3 x 1.9 cm right anterior and apical lesion in the central gland characterizes PI-RADS 5.   There is a PI-RADS 3 lesion measuring 16 mm in the left mid peripheral zone    Past Medical History:   Diagnosis Date    Aortic stenosis, moderate     MOD TO SEVERE per echo 11/2019-- followed by dr Yo Love High cholesterol     no current medications    Hypertension     controlled with med    Prostate nodule     followed by dr Wayne Alaniz allergic rhinitis      Past Surgical History:   Procedure Laterality Date    HX COLONOSCOPY  2016    polyps--due in 2021    HX CORNEAL TRANSPLANT Bilateral last one 2017    followed by dr Jarad Santiago  2004    HX ORTHOPAEDIC Left 2016    great toe surg     Allergies   Allergen Reactions    Latex, Natural Rubber Rash     \"tears skin off\"       Current Facility-Administered Medications   Medication Dose Route Frequency Provider Last Rate Last Dose    lidocaine (XYLOCAINE) 10 mg/mL (1 %) injection 0.1 mL  0.1 mL SubCUTAneous PRN Emile Bryan MD        lactated Ringers infusion  75 mL/hr IntraVENous CONTINUOUS Emile Bryan MD 75 mL/hr at 09/15/20 0800 75 mL/hr at 09/15/20 0800    celecoxib (CELEBREX) capsule 200 mg  200 mg Oral PRN Emile Bryan MD        fentaNYL citrate (PF) injection 100 mcg  100 mcg IntraVENous ONCE Emile Bryan MD        midazolam (VERSED) injection 2 mg  2 mg IntraVENous ONCE PRN Emile Bryan MD        midazolam (VERSED) injection 2 mg  2 mg IntraVENous ONCE Emile Bryan MD             Physical Examination    Visit Vitals  /73 (BP 1 Location: Left arm, BP Patient Position: Supine)   Pulse 77   Temp 97.8 °F (36.6 °C)   Resp 18   Wt 220 lb (99.8 kg)   SpO2 95%   BMI 30.68 kg/m²     Gen: Well developed, well nourished 66 y.o. male in no acute distress  Head: normocephalic, atraumatic  Mouth: Clear, no overt lesions, oral mucosa pink and moist  Neck: supple, no masses, no adenopathy or carotid bruits, trachea midline  Resp: clear to auscultation bilaterally, no wheeze, rhonchi or rales, excursions normal and symmetrical  Cardio: Regular rate and rhythm, no murmurs, clicks, gallops or rubs, no edema or varicosities  Abdomen: soft, nontender, nondistended, normoactive bowel sounds, no hernias, no hepatosplenomegaly   Extremeties: warm, well-perfused, no tenderness or swelling, normal gait/station  Neuro: sensation and strength grossly intact and symmetrical  Psych: alert and oriented to person, place and time      Impression: Elevated PSA  Plan: Fusion biopsy

## 2020-09-19 NOTE — OP NOTES
[address] [place] [address]  OPERATIVE REPORT    Name:  Calista Fischer  MR#:  [id number]  :  1945  ACCOUNT #:  [de-identified]  DATE OF SERVICE:  09/15/2020    PREOPERATIVE DIAGNOSIS:  Elevated prostate-specific antigen with PI-RADS 5 and PI-RADS 3 lesions noted on MRI. POSTOPERATIVE DIAGNOSIS:  Same    PROCEDURE PERFORMED:  Fusion biopsy. SURGEON:  Greg Starr MD    ASSISTANT:None    ANESTHESIA:  Conscious sedation. COMPLICATIONS:  None. SPECIMENS REMOVED:  A number of specimens were taken from the prostate. It is delineated in the operative note. IMPLANTS: None    ESTIMATED BLOOD LOSS:  Minimal.    DRAINS:  None. PROCEDURE:  The patient was taken to the OR and placed in the lateral position with the right side up. The transrectal ultrasound probe was inserted into the rectum and the prostate was imaged in real time. We did a volume study starting from the bladder neck down to the apex and our technician used the [address] [place] [address] system to create a fusion image. We used this first to take biopsies from a PI-RADS 3 lesion in the right anterior mid and apical central gland. Six samples were taken from this area. There was a PI-RADS 3 lesion in the mid gland and six biopsies were taken from this lesion as well. I then did sextant biopsies taking three specimens from either apex, four specimens from either mid gland and three specimens from either base. That concluded our biopsies. The patient was awakened and taken from the OR without any further incident or complaint.       Heena Cabrales MD      [id number]  D:  2020 12:07  T:  2020 23:05  JOB #:  [id number]

## 2020-11-02 ENCOUNTER — APPOINTMENT (OUTPATIENT)
Dept: RADIATION ONCOLOGY | Age: 75
End: 2020-11-02

## 2020-11-19 ENCOUNTER — APPOINTMENT (RX ONLY)
Dept: URBAN - METROPOLITAN AREA CLINIC 23 | Facility: CLINIC | Age: 75
Setting detail: DERMATOLOGY
End: 2020-11-19

## 2020-11-19 DIAGNOSIS — L81.4 OTHER MELANIN HYPERPIGMENTATION: ICD-10-CM

## 2020-11-19 DIAGNOSIS — D18.0 HEMANGIOMA: ICD-10-CM

## 2020-11-19 DIAGNOSIS — L82.1 OTHER SEBORRHEIC KERATOSIS: ICD-10-CM

## 2020-11-19 DIAGNOSIS — Z85.828 PERSONAL HISTORY OF OTHER MALIGNANT NEOPLASM OF SKIN: ICD-10-CM

## 2020-11-19 PROBLEM — D18.01 HEMANGIOMA OF SKIN AND SUBCUTANEOUS TISSUE: Status: ACTIVE | Noted: 2020-11-19

## 2020-11-19 PROCEDURE — ? COUNSELING

## 2020-11-19 PROCEDURE — 99213 OFFICE O/P EST LOW 20 MIN: CPT

## 2020-11-19 ASSESSMENT — LOCATION SIMPLE DESCRIPTION DERM
LOCATION SIMPLE: LEFT SHOULDER
LOCATION SIMPLE: LEFT HAND
LOCATION SIMPLE: RIGHT UPPER BACK
LOCATION SIMPLE: ABDOMEN
LOCATION SIMPLE: RIGHT HAND
LOCATION SIMPLE: RIGHT SHOULDER
LOCATION SIMPLE: LEFT UPPER BACK

## 2020-11-19 ASSESSMENT — LOCATION DETAILED DESCRIPTION DERM
LOCATION DETAILED: LEFT RADIAL DORSAL HAND
LOCATION DETAILED: LEFT POSTERIOR SHOULDER
LOCATION DETAILED: RIGHT MEDIAL UPPER BACK
LOCATION DETAILED: RIGHT RADIAL DORSAL HAND
LOCATION DETAILED: LEFT INFERIOR UPPER BACK
LOCATION DETAILED: RIGHT POSTERIOR SHOULDER
LOCATION DETAILED: EPIGASTRIC SKIN

## 2020-11-19 ASSESSMENT — LOCATION ZONE DERM
LOCATION ZONE: TRUNK
LOCATION ZONE: HAND
LOCATION ZONE: ARM

## 2021-04-19 ENCOUNTER — TELEPHONE (OUTPATIENT)
Dept: CASE MANAGEMENT | Age: 76
End: 2021-04-19

## 2021-04-19 DIAGNOSIS — I35.0 AORTIC VALVE STENOSIS, ETIOLOGY OF CARDIAC VALVE DISEASE UNSPECIFIED: Primary | ICD-10-CM

## 2021-04-19 DIAGNOSIS — R06.02 SOB (SHORTNESS OF BREATH): ICD-10-CM

## 2021-04-19 NOTE — TELEPHONE ENCOUNTER
Spoke with pt regarding TAVR CT and carotid US plans for 4/21 @0945 with arrival 30 minutes prior. Instructed pt to enter hospital on the outpatient side, go to 2nd floor, and check in at radiology. No food 4 hr prior and nothing to drink 2 hr prior except for sips of water with medications. Pt verbalized understanding and contact information ([phone]) provided for any further questions.     Esther Finder, Structural Heart Navigator

## 2021-04-21 ENCOUNTER — HOSPITAL ENCOUNTER (OUTPATIENT)
Dept: ULTRASOUND IMAGING | Age: 76
Discharge: HOME | End: 2021-04-21
Attending: INTERNAL MEDICINE
Payer: MEDICARE

## 2021-04-21 ENCOUNTER — HOSPITAL ENCOUNTER (OUTPATIENT)
Dept: CT IMAGING | Age: 76
Discharge: HOME | End: 2021-04-21
Attending: INTERNAL MEDICINE
Payer: MEDICARE

## 2021-04-21 DIAGNOSIS — R06.02 SOB (SHORTNESS OF BREATH): ICD-10-CM

## 2021-04-21 DIAGNOSIS — I35.0 AORTIC VALVE STENOSIS, ETIOLOGY OF CARDIAC VALVE DISEASE UNSPECIFIED: ICD-10-CM

## 2021-04-21 DIAGNOSIS — I35.0 NONRHEUMATIC AORTIC VALVE STENOSIS: ICD-10-CM

## 2021-04-21 PROBLEM — I77.9 BILATERAL CAROTID ARTERY DISEASE (HCC): Status: ACTIVE | Noted: 2021-04-21

## 2021-04-21 LAB — CREAT BLD-MCNC: 1.4 MG/DL (ref 0.8–1.5)

## 2021-04-21 PROCEDURE — 75574 CT ANGIO HRT W/3D IMAGE: CPT

## 2021-04-21 PROCEDURE — 71275 CT ANGIOGRAPHY CHEST: CPT

## 2021-04-21 PROCEDURE — 93880 EXTRACRANIAL BILAT STUDY: CPT

## 2021-04-21 PROCEDURE — 82565 ASSAY OF CREATININE: CPT

## 2021-04-21 PROCEDURE — 74011000636 HC RX REV CODE- 636: Performed by: INTERNAL MEDICINE

## 2021-04-21 PROCEDURE — 74011000258 HC RX REV CODE- 258: Performed by: INTERNAL MEDICINE

## 2021-04-21 RX ORDER — SODIUM CHLORIDE 0.9 % (FLUSH) 0.9 %
10 SYRINGE (ML) INJECTION
Status: COMPLETED | OUTPATIENT
Start: 2021-04-21 | End: 2021-04-21

## 2021-04-21 RX ADMIN — Medication 10 ML: at 09:59

## 2021-04-21 RX ADMIN — IOPAMIDOL 75 ML: 755 INJECTION, SOLUTION INTRAVENOUS at 09:59

## 2021-04-21 RX ADMIN — SODIUM CHLORIDE 100 ML: 900 INJECTION, SOLUTION INTRAVENOUS at 09:59

## 2021-04-27 NOTE — PROGRESS NOTES
Patient pre-assessment complete for [facility] scheduled for 21 at 1100, arrival time 0900. Patient verified using . Patient instructed to bring all home medications in labeled bottles on the day of procedure. NPO status reinforced. Patient informed to take a full dose aspirin 325mg  or 81 mg x 4 on the day of procedure. Patient instructed to HOLD Diovan. Instructed they can take all other medications excluding vitamins & supplements. Patient verbalizes understanding of all instructions & denies any questions at this time.

## 2021-04-28 ENCOUNTER — HOSPITAL ENCOUNTER (OUTPATIENT)
Dept: CARDIAC CATH/INVASIVE PROCEDURES | Age: 76
Discharge: HOME | End: 2021-04-28
Attending: INTERNAL MEDICINE | Admitting: INTERNAL MEDICINE
Payer: MEDICARE

## 2021-04-28 VITALS
RESPIRATION RATE: 16 BRPM | DIASTOLIC BLOOD PRESSURE: 65 MMHG | WEIGHT: 209 LBS | OXYGEN SATURATION: 95 % | HEIGHT: 71 IN | HEART RATE: 72 BPM | BODY MASS INDEX: 29.26 KG/M2 | SYSTOLIC BLOOD PRESSURE: 116 MMHG

## 2021-04-28 DIAGNOSIS — I35.0 NONRHEUMATIC AORTIC VALVE STENOSIS: ICD-10-CM

## 2021-04-28 LAB
ANION GAP SERPL CALC-SCNC: 5 MMOL/L (ref 7–16)
ATRIAL RATE: 69 BPM
BUN SERPL-MCNC: 18 MG/DL (ref 8–23)
CALCIUM SERPL-MCNC: 9.6 MG/DL (ref 8.3–10.4)
CALCULATED P AXIS, ECG09: 28 DEGREES
CALCULATED R AXIS, ECG10: 18 DEGREES
CALCULATED T AXIS, ECG11: 61 DEGREES
CHLORIDE SERPL-SCNC: 103 MMOL/L (ref 98–107)
CO2 SERPL-SCNC: 31 MMOL/L (ref 21–32)
CREAT SERPL-MCNC: 1.16 MG/DL (ref 0.8–1.5)
DIAGNOSIS, 93000: NORMAL
DIFFERENTIAL METHOD BLD: NORMAL
ERYTHROCYTE [DISTWIDTH] IN BLOOD BY AUTOMATED COUNT: 43 %
GLUCOSE SERPL-MCNC: 105 MG/DL (ref 65–100)
HCT VFR BLD AUTO: 43 % (ref 41.1–50.3)
HGB BLD-MCNC: 14.3 G/DL (ref 13.6–17.2)
INR PPP: 0.9
MAGNESIUM SERPL-MCNC: 2.1 MG/DL (ref 1.8–2.4)
MCH RBC QN AUTO: 30.2 PG (ref 26.1–32.9)
MCHC RBC AUTO-ENTMCNC: 33.3 G/DL (ref 31.4–35)
MCV RBC AUTO: 90.9 FL (ref 79.6–97.8)
NRBC # BLD: 0 K/UL
P-R INTERVAL, ECG05: 184 MS
PLATELET # BLD AUTO: 144 K/UL
PMV BLD AUTO: 11.8 FL (ref 9.4–12.3)
POTASSIUM SERPL-SCNC: 4.2 MMOL/L (ref 3.5–5.1)
PROTHROMBIN TIME: 12.7 SEC (ref 12.5–14.7)
Q-T INTERVAL, ECG07: 392 MS
QRS DURATION, ECG06: 86 MS
QTC CALCULATION (BEZET), ECG08: 420 MS
RBC # BLD AUTO: 4.73 M/UL
SODIUM SERPL-SCNC: 139 MMOL/L (ref 138–145)
VENTRICULAR RATE, ECG03: 69 BPM
WBC # BLD AUTO: 6.2 K/UL (ref 4.3–11.1)

## 2021-04-28 PROCEDURE — 74011000636 HC RX REV CODE- 636: Performed by: INTERNAL MEDICINE

## 2021-04-28 PROCEDURE — 74011000250 HC RX REV CODE- 250: Performed by: INTERNAL MEDICINE

## 2021-04-28 PROCEDURE — 74011250636 HC RX REV CODE- 250/636: Performed by: INTERNAL MEDICINE

## 2021-04-28 PROCEDURE — 77030016699 HC CATH ANGI DX INFN1 CARD -A

## 2021-04-28 PROCEDURE — 77030042317 HC BND COMPR HEMSTAT -B

## 2021-04-28 PROCEDURE — 93454 CORONARY ARTERY ANGIO S&I: CPT | Performed by: INTERNAL MEDICINE

## 2021-04-28 PROCEDURE — C1769 GUIDE WIRE: HCPCS

## 2021-04-28 PROCEDURE — 93454 CORONARY ARTERY ANGIO S&I: CPT

## 2021-04-28 PROCEDURE — 85025 COMPLETE CBC W/AUTO DIFF WBC: CPT

## 2021-04-28 PROCEDURE — C1894 INTRO/SHEATH, NON-LASER: HCPCS

## 2021-04-28 PROCEDURE — 80048 BASIC METABOLIC PNL TOTAL CA: CPT

## 2021-04-28 PROCEDURE — 77030015766

## 2021-04-28 PROCEDURE — 74011250637 HC RX REV CODE- 250/637: Performed by: INTERNAL MEDICINE

## 2021-04-28 PROCEDURE — 99152 MOD SED SAME PHYS/QHP 5/>YRS: CPT

## 2021-04-28 PROCEDURE — 85610 PROTHROMBIN TIME: CPT

## 2021-04-28 PROCEDURE — 99152 MOD SED SAME PHYS/QHP 5/>YRS: CPT | Performed by: INTERNAL MEDICINE

## 2021-04-28 PROCEDURE — 83735 ASSAY OF MAGNESIUM: CPT

## 2021-04-28 PROCEDURE — 93005 ELECTROCARDIOGRAM TRACING: CPT | Performed by: INTERNAL MEDICINE

## 2021-04-28 RX ORDER — GUAIFENESIN 100 MG/5ML
324 LIQUID (ML) ORAL ONCE
Status: COMPLETED | OUTPATIENT
Start: 2021-04-28 | End: 2021-04-28

## 2021-04-28 RX ORDER — SODIUM CHLORIDE 9 MG/ML
75 INJECTION, SOLUTION INTRAVENOUS CONTINUOUS
Status: DISCONTINUED | OUTPATIENT
Start: 2021-04-28 | End: 2021-04-28 | Stop reason: HOSPADM

## 2021-04-28 RX ORDER — AMIODARONE HYDROCHLORIDE 200 MG/1
600 TABLET ORAL ONCE
Status: CANCELLED | OUTPATIENT
Start: 2021-05-04 | End: 2021-05-04

## 2021-04-28 RX ORDER — LIDOCAINE HYDROCHLORIDE 10 MG/ML
1-30 INJECTION, SOLUTION EPIDURAL; INFILTRATION; INTRACAUDAL; PERINEURAL ONCE
Status: COMPLETED | OUTPATIENT
Start: 2021-04-28 | End: 2021-04-28

## 2021-04-28 RX ORDER — MIDAZOLAM HYDROCHLORIDE 1 MG/ML
.5-2 INJECTION, SOLUTION INTRAMUSCULAR; INTRAVENOUS
Status: DISCONTINUED | OUTPATIENT
Start: 2021-04-28 | End: 2021-04-28 | Stop reason: HOSPADM

## 2021-04-28 RX ORDER — FENTANYL CITRATE 50 UG/ML
25-50 INJECTION, SOLUTION INTRAMUSCULAR; INTRAVENOUS
Status: DISCONTINUED | OUTPATIENT
Start: 2021-04-28 | End: 2021-04-28 | Stop reason: HOSPADM

## 2021-04-28 RX ORDER — HEPARIN SODIUM 200 [USP'U]/100ML
3 INJECTION, SOLUTION INTRAVENOUS CONTINUOUS
Status: DISCONTINUED | OUTPATIENT
Start: 2021-04-28 | End: 2021-04-28 | Stop reason: HOSPADM

## 2021-04-28 RX ADMIN — FENTANYL CITRATE 25 MCG: 50 INJECTION, SOLUTION INTRAMUSCULAR; INTRAVENOUS at 12:52

## 2021-04-28 RX ADMIN — SODIUM CHLORIDE 75 ML/HR: 900 INJECTION, SOLUTION INTRAVENOUS at 09:47

## 2021-04-28 RX ADMIN — ASPIRIN 81 MG 324 MG: 81 TABLET ORAL at 07:30

## 2021-04-28 RX ADMIN — LIDOCAINE HYDROCHLORIDE 4 ML: 10 INJECTION, SOLUTION EPIDURAL; INFILTRATION; INTRACAUDAL; PERINEURAL at 12:54

## 2021-04-28 RX ADMIN — HEPARIN SODIUM 3 UNITS/HR: 5000 INJECTION, SOLUTION INTRAVENOUS; SUBCUTANEOUS at 12:57

## 2021-04-28 RX ADMIN — MIDAZOLAM 2 MG: 1 INJECTION INTRAMUSCULAR; INTRAVENOUS at 12:52

## 2021-04-28 RX ADMIN — IOPAMIDOL 50 ML: 755 INJECTION, SOLUTION INTRAVENOUS at 13:06

## 2021-04-28 RX ADMIN — HEPARIN SODIUM 2 ML: 10000 INJECTION, SOLUTION INTRAVENOUS; SUBCUTANEOUS at 12:56

## 2021-04-28 NOTE — PROGRESS NOTES
Discharge instructions given. Wife at bedside. TR band removed from right wrist with 4 x 4 gauze and tegaderm applied to site. No bleeding or swelling noted. No complaints voiced.

## 2021-04-28 NOTE — CONSULTS
[address] [facility]  Anny Santoyo. MD Pat Koehler. MD Eduarda Sinclair, S, PAGREGG Do       [id number]  4/28/2021 1945        CHIEF COMPLAINT:  AS/CAD    HISTORY OF PRESENT ILLNESS:  The patient is a 66 y.o. male who is seen for evaluation of severe AS with LM/PDA CAD, he is followed by cardiology with serial ECHO's, most recent WILMER 0.6 cm2 with a 56 mm MG, LHC today shows very significant LM and PDA disease, he denies any symptoms.     Past Medical History:   Diagnosis Date    Aortic stenosis, moderate     MOD TO SEVERE per echo 11/2019-- followed by dr Estelita Ramos [facility])     prostate radiation in 12/2020    High cholesterol     no current medications    Hypertension     controlled with med    Prostate nodule     followed by dr Romelia Marquez allergic rhinitis        Past Surgical History:   Procedure Laterality Date    HX COLONOSCOPY  2016    polyps--due in 2021    HX CORNEAL TRANSPLANT Bilateral last one 2017    followed by dr Barrera Spore  2004    HX ORTHOPAEDIC Left 2016    great toe surg       Family History   Problem Relation Age of Onset    No Known Problems Mother     Stroke Father     Heart Disease Father     Hypertension Father     Hypertension Brother     No Known Problems Brother        Social History     Socioeconomic History    Marital status:      Spouse name: Not on file    Number of children: Not on file    Years of education: Not on file    Highest education level: Not on file   Occupational History    Not on file   Social Needs    Financial resource strain: Not on file    Food insecurity     Worry: Not on file     Inability: Not on file    Transportation needs     Medical: Not on file     Non-medical: Not on file   Tobacco Use    Smoking status: Never Smoker    Smokeless tobacco: Never Used   Substance and Sexual Activity    Alcohol use: No    Drug use: No    Sexual activity: Not on file   Lifestyle    Physical activity     Days per week: Not on file     Minutes per session: Not on file    Stress: Not on file   Relationships    Social connections     Talks on phone: Not on file     Gets together: Not on file     Attends Pentecostalism service: Not on file     Active member of club or organization: Not on file     Attends meetings of clubs or organizations: Not on file     Relationship status: Not on file    Intimate partner violence     Fear of current or ex partner: Not on file     Emotionally abused: Not on file     Physically abused: Not on file     Forced sexual activity: Not on file   Other Topics Concern    Not on file   Social History Narrative    Not on file       Allergies   Allergen Reactions    Latex, Natural Rubber Rash     \"tears skin off\"         No current facility-administered medications on file prior to encounter. Current Outpatient Medications on File Prior to Encounter   Medication Sig Dispense Refill    valsartan-hydroCHLOROthiazide (DIOVAN-HCT) 320-25 mg per tablet Take 1 Tab by mouth daily. 90 Tab 3    prednisoLONE acetate (PRED FORTE) 1 % ophthalmic suspension Administer 1 Drop to both eyes daily. 5    aspirin delayed-release 81 mg tablet Take  by mouth daily.  fexofenadine (ALLEGRA) 180 mg tablet Take 180 mg by mouth as needed. REVIEW OF SYSTEMS:  GENERAL:  No weight loss. No fever. EYES:  No diplopia. No vision loss. ENTM:  No hearing loss. No trouble swallowing. No hoarseness. CARDIAC:  See present illness. PULMONARY:  Denies asthma or chronic pulmonary disease. GI:  No change in bowel habits. No bleeding. :  No dysuria. No history of renal stones or renal insufficiency. MS:  Denies osteoarthritis. NEURO:  No stroke or seizure disorder  HEME/LYMPHATIC:  No history of bleeding tendencies. PSYCHIATRIC:  No history of depression. PHYSICAL EXAMINATION:  GENERAL APPEARANCE:  Well developed. Well nourished. Alert, cooperative and oriented times three. HEENT:  Normocephalic. Extraocular muscles are intact. No scleral icterus. NECK/LYMPHATIC:  Supple with no carotic bruits. No jugular venous distention. LUNGS: Lungs are clear to auscultation. HEART:  Heart is regular rate and rhythm with  A IV/VI murmur. ABDOMEN:  Soft and non-tender. SKIN:  No rashes, cyanosis, jaundice, ecchymoses or evidence of skin breakdown present. EXTREMITIES:  Full range of motion. No deformity. No peripheral edema. VASCULAR:  Pulses are full and equal at the radial arteries and the popliteal arteries bilaterally. There is no venous stasis disease. NEURO:  Grossly intact. IMPRESSION:  Encounter Diagnoses   Name Primary?  Nonrheumatic aortic valve stenosis        PLAN:  I discussed in detail AS/CAD, both the alternatives to and risks and benefits of AVR/CABG. Patient saw our teaching video. Risk  include but are not limited to:  1. Bleeding  2. Infection including mediastinitis  3. Myocardial infarction  4. Stroke  5.  Potential death  Needs AVR/CABG, all questions answered    I have personally viewed cardiac catheterization and reviewed echocardiogram.    [facility] CATH 1 ALL EVENTS

## 2021-04-28 NOTE — PROCEDURES
[address] [facility]  CARDIAC CATH    Name:  Hank Kohli  MR#:  [id number]  :  1945  ACCOUNT #:  [de-identified]  DATE OF SERVICE:  2021    PROCEDURES PERFORMED:  Coronary angiography. PREOPERATIVE DIAGNOSIS:  Severe aortic stenosis. POSTOPERATIVE DIAGNOSIS:  Multivessel coronary artery disease. SURGEON:  Moo Villalobos. MD Myesha    ASSISTANT:  None. ESTIMATED BLOOD LOSS:  Less than 5 mL. SPECIMENS REMOVED:  None. COMPLICATIONS:  None. IMPLANTS:  None. ANESTHESIA:  The patient received moderate supervised conscious sedation with a total of 2 mg of Versed, 25 mcg of fentanyl. Sedation start time was 12:52 with a procedure completion time of 1305. Sedation was administered by Chepe Kemp RN, under my supervision. The patient was monitored in regards to level of consciousness, hemodynamic status and pulse oximetry and remained stable. FINDINGS:  As below. TECHNICAL FACTORS:  After informed consent was obtained, the patient was brought to the cardiac catheterization lab. Right radial artery was prepped and draped in the usual sterile fashion. Utilizing modified Seldinger technique and micropuncture needle, the right radial artery was entered. A 6-Estonian Terumo slender sheath was placed without difficulty. A radial cocktail consisting of 2000 units of heparin, 2 mg of verapamil and 200 mcg of nitroglycerin was administered. A 5-Estonian Tiger 4.0 catheter was used to select and engage the ostium of the right coronary artery and a JL3.5 catheter was used to select and engage the ostium of the left main coronary artery. Selective injection verification performed. At this point, the sheath was removed and a pneumatic band was placed with good hemostasis. No complications were encountered. CONTRAST:  Isovue 50. HEMODYNAMIC RESULTS:  Aortic pressure 116/72. ANGIOGRAPHIC RESULTS:  1. Left main coronary artery:  High-grade 80% proximal left main.   2. LAD:  Medium caliber vessel. 20% proximal, 30% mid and 30% mid to distal stenosis. 3.  First diagonal:  Small to medium caliber vessel. Patent. 4.  Second diagonal artery:  Small to medium caliber vessel. Patent. 5.  Left circumflex:  Medium caliber vessel. Nondominant. 20% proximal stenosis. 6.  First obtuse marginal artery:  Medium caliber vessel. 30-40% ostial stenosis. 7.  RCA:  Medium caliber vessel. 20% proximal stenosis. 30% distal stenosis. 8.  Right PDA:  80% proximal stenosis. Small caliber vessel. 9.  Right posterolateral branch:  Medium caliber vessel. Patent. 10.  Left ventriculogram:  Not performed. Aortic valve is heavily calcified. CONCLUSIONS:  High-grade left main coronary artery stenosis. The patient also has ostial PDA stenosis. PLAN:  CT Surgery evaluation for coronary artery bypass grafting and surgical aortic valve replacement.       MD LINDA Adler/[email or web address]  D:  04/28/2021 13:12  T:  04/28/2021 14:03  JOB #:  [id number]

## 2021-04-28 NOTE — ROUTINE PROCESS
TRANSFER - OUT REPORT:    [facility] for TAVR   Dr. Jerrod SOMERS w/ hemoband \"10\"    Diagnostic cath- left main stenosis      Versed 2mg, Fentanyl 25mcg  Heparin 2,000 units     Verbal report given to [facility] NOHEMI ACUNA RN(name) on Brian Lugo  being transferred to CPRU(unit) for routine progression of care       Report consisted of patients Situation, Background, Assessment and   Recommendations(SBAR). Information from the following report(s) SBAR and Procedure Summary was reviewed with the receiving nurse. Lines:   Peripheral IV 04/28/21 Right Antecubital (Active)       Peripheral IV 04/28/21 Left;Posterior Hand (Active)        Opportunity for questions and clarification was provided.       Patient transported with:   Registered Nurse

## 2021-04-28 NOTE — PROGRESS NOTES
Patient received to [address] [place] [address] room # 12  Ambulatory from [facility]. Patient scheduled for [facility] today with Dr Aline Becker. Procedure reviewed & questions answered, voiced good understanding consent obtained & placed on chart. All medications and medical history reviewed. Will prep patient per orders. Patient & family updated on plan of care.       The patient has a fraility score of 3-MANAGING WELL, based on independent of adl's

## 2021-04-28 NOTE — PROCEDURES
Brief Cardiac Procedure Note    Patient: Humberto Cantrell MRN: [id number]  SSN: [id number]    YOB: 1945  Age: 66 y.o. Sex: male      Date of Procedure: 4/28/2021     Pre-procedure Diagnosis: Aortic Stenosis    Post-procedure Diagnosis: Aortic Stenosis and Coronary Artery Disease    Procedure: Left Heart Catheterization    Brief Description of Procedure: As above    Performed By: Marciano Blanco MD     Assistants: None    Anesthesia: Moderate Sedation    Estimated Blood Loss: Less than 10 mL      Specimens: None    Implants: None    Findings: Severe LMCA and PDA disease. Needs CABG and AVR. Complications: None    Recommendations: Continue medical therapy.     Signed By: Marciano Blanco MD     April 28, 2021

## 2021-04-28 NOTE — MANAGEMENT PLAN
Yanci Cruz. MD Antionette Beth. Bigg Hawkins MD           [address] [facility]         4/28/2021 1945    REFERRING PHYSICIAN:  Dr. Amando Roberts:  The patient is a 66 y.o. male who is seen for evaluation of aortic stenosis and CAD. He has been followed by cardiology for AS for years. He stated they first noted his murmur in the [employer]. Recent echo showed progression of AS with WILMER of 0.63cm2 with mean gradient of 56.4mmHg. LHC was planned. He underwent cardiac catheterization today that showed severe LM and PDA disease. He denies any chest pain or discomfort. He denies any significant NUNN. 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      Risk factors HTN, Dyslipidemia    ** No history of stroke, TIA, prior cardiac surgery, prior vascular surgery, anesthetic complication, lung disease, DVT or PE, GI bleeding       Past Medical History:   Diagnosis Date    Aortic stenosis, moderate     MOD TO SEVERE per echo 11/2019-- followed by dr Alejandra Curz [facility])     prostate radiation in 12/2020    High cholesterol     no current medications    Hypertension     controlled with med    Prostate nodule     followed by dr Douglas Smaller allergic rhinitis        Past Surgical History:   Procedure Laterality Date    HX COLONOSCOPY  2016    polyps--due in 2021    HX CORNEAL TRANSPLANT Bilateral last one 2017    followed by dr Muriel Willett  2004    HX ORTHOPAEDIC Left 2016    great toe surg       Family History   Problem Relation Age of Onset    No Known Problems Mother     Stroke Father     Heart Disease Father     Hypertension Father     Hypertension Brother     No Known Problems Brother        Social History     Socioeconomic History    Marital status:      Spouse name: Not on file    Number of children: Not on file    Years of education: Not on file    Highest education level: Not on file   Occupational History    Not on file   Social Needs    Financial resource strain: Not on file    Food insecurity     Worry: Not on file     Inability: Not on file    Transportation needs     Medical: Not on file     Non-medical: Not on file   Tobacco Use    Smoking status: Never Smoker    Smokeless tobacco: Never Used   Substance and Sexual Activity    Alcohol use: No    Drug use: No    Sexual activity: Not on file   Lifestyle    Physical activity     Days per week: Not on file     Minutes per session: Not on file    Stress: Not on file   Relationships    Social connections     Talks on phone: Not on file     Gets together: Not on file     Attends Mandaen service: Not on file     Active member of club or organization: Not on file     Attends meetings of clubs or organizations: Not on file     Relationship status: Not on file    Intimate partner violence     Fear of current or ex partner: Not on file     Emotionally abused: Not on file     Physically abused: Not on file     Forced sexual activity: Not on file   Other Topics Concern    Not on file   Social History Narrative    Not on file       Allergies   Allergen Reactions    Latex, Natural Rubber Rash     \"tears skin off\"         No current facility-administered medications on file prior to encounter. Current Outpatient Medications on File Prior to Encounter   Medication Sig Dispense Refill    valsartan-hydroCHLOROthiazide (DIOVAN-HCT) 320-25 mg per tablet Take 1 Tab by mouth daily. 90 Tab 3    prednisoLONE acetate (PRED FORTE) 1 % ophthalmic suspension Administer 1 Drop to both eyes daily. 5    aspirin delayed-release 81 mg tablet Take  by mouth daily.  fexofenadine (ALLEGRA) 180 mg tablet Take 180 mg by mouth as needed. REVIEW OF SYSTEMS:  Review of Systems   Constitution: Negative for chills, fever, malaise/fatigue, weight gain and weight loss.    HENT: Negative for ear pain, hearing loss, nosebleeds, sore throat and tinnitus. Eyes: Negative for blurred vision, vision loss in left eye and vision loss in right eye. Cardiovascular: Positive for dyspnea on exertion. Negative for chest pain, leg swelling, near-syncope, orthopnea, palpitations, paroxysmal nocturnal dyspnea and syncope. Respiratory: Negative for cough, hemoptysis, shortness of breath, sputum production and wheezing. Endocrine: Negative for cold intolerance, heat intolerance and polydipsia. Hematologic/Lymphatic: Does not bruise/bleed easily. Skin: Negative for color change and rash. Musculoskeletal: Negative for back pain, joint pain, joint swelling and myalgias. Gastrointestinal: Negative for abdominal pain, constipation, diarrhea, dysphagia, heartburn, hematemesis, melena, nausea and vomiting. Genitourinary: Negative for dysuria, frequency, hematuria and urgency. Neurological: Negative for difficulty with concentration, dizziness, headaches, light-headedness, numbness, paresthesias, seizures, vertigo and weakness. Psychiatric/Behavioral: Negative for altered mental status and depression. Physical Exam  Vitals:    04/27/21 1231 04/28/21 0929 04/28/21 1307 04/28/21 1317   BP:  135/75 124/66 123/70   Pulse:  77 85    Resp:   20    SpO2:  96% 96% 96%   Weight: 209 lb (94.8 kg)      Height: 5' 11\" (1.803 m)          Physical Exam:  General: Well Developed, Well Nourished, No Acute Distress  HEENT: Normocephalic, pupils equal and round, no scleral icterus  Neck: supple, no JVD  Chest wall: No deformity  Heart: S1S2 with RRR with grade III/VI YARON   Lungs: Clear throughout auscultation bilaterally without adventitious sounds  Vascular: Pulses are full and equal. There is no venous stasis disease.   Abd: soft, nontender, nondistended, with good bowel sounds, no pulsatile masses  Ext: warm, no edema, calves supple/nontender, pulses 2+ bilaterally  Skin: warm and dry, no rashes, cyanosis, jaundice, ecchymoses or evidence of skin breakdown  Psychiatric: Normal mood and affect  Neurologic: Alert and oriented X 3, no focal deficit noted    Labs:   Recent Labs     04/28/21  0917      K 4.2   MG 2.1   BUN 18   CREA 1.16   *   WBC 6.2   HGB 14.3   HCT 43.0      INR 0.9       Lab Results   Component Value Date/Time    Cholesterol, total 200 (H) 03/09/2020 08:45 AM    HDL Cholesterol 39 (L) 03/09/2020 08:45 AM    LDL, calculated 120 (H) 03/09/2020 08:45 AM    VLDL, calculated 41 (H) 03/09/2020 08:45 AM    Triglyceride 206 (H) 03/09/2020 08:45 AM    CHOL/HDL Ratio 4.7 04/21/2011 08:18 AM       Assessment:     Active Problems:    * Aortic Stenosis  CAD  Hypertension  Dyslipidemia      Plan:     Brian Lugo is to see preoperative teaching film that thoroughly discusses procedure, risks, and possible complications. Risks, benefits, and alternatives were discussed to include, but not limited to:     1. Bleeding  2. Arrhythmia   3. Infection including mediastinitis  4. Myocardial infarction  5. Need for reoperation  6. Renal failure  7. Respiratory failure  8. Stroke  9. Potential death      Dr. Canelo De Paz will personally viewed the cardiac catheterization films, echocardiogram, and labs. The mortality risk for AVR/CABG is 1.919%    He agrees to proceed with AVR/CABG surgery.        Maral Mcneil PA-C

## 2021-04-28 NOTE — PROGRESS NOTES
Report received from [address] [place] [address]. Procedural findings communicated. Intra procedural  medication administration reviewed. Progression of care discussed.      Patient received into [id number] [facility] 1 post sheath removal.     Access site without bleeding or swelling yes    Dressing dry and intact yes    Patient instructed to limit movement to right upper extremity    Routine post procedural vital signs and site assessment initiated yes

## 2021-05-03 ENCOUNTER — HOSPITAL ENCOUNTER (OUTPATIENT)
Dept: GENERAL RADIOLOGY | Age: 76
Discharge: HOME | DRG: 267 | End: 2021-05-03
Attending: PHYSICIAN ASSISTANT
Payer: MEDICARE

## 2021-05-03 ENCOUNTER — HOSPITAL ENCOUNTER (OUTPATIENT)
Dept: SURGERY | Age: 76
Discharge: HOME | DRG: 267 | End: 2021-05-03
Payer: MEDICARE

## 2021-05-03 ENCOUNTER — HOSPITAL ENCOUNTER (OUTPATIENT)
Dept: ULTRASOUND IMAGING | Age: 76
Discharge: HOME | DRG: 267 | End: 2021-05-03
Attending: PHYSICIAN ASSISTANT
Payer: MEDICARE

## 2021-05-03 ENCOUNTER — ANESTHESIA EVENT (OUTPATIENT)
Dept: SURGERY | Age: 76
DRG: 267 | End: 2021-05-03
Payer: MEDICARE

## 2021-05-03 VITALS
RESPIRATION RATE: 20 BRPM | DIASTOLIC BLOOD PRESSURE: 73 MMHG | WEIGHT: 213.4 LBS | HEART RATE: 87 BPM | BODY MASS INDEX: 29.88 KG/M2 | OXYGEN SATURATION: 96 % | TEMPERATURE: 97.5 F | SYSTOLIC BLOOD PRESSURE: 119 MMHG | HEIGHT: 71 IN

## 2021-05-03 LAB
ALBUMIN SERPL-MCNC: 3.9 G/DL (ref 3.2–4.6)
ALBUMIN/GLOB SERPL: 0.9 {RATIO} (ref 1.2–3.5)
ALP SERPL-CCNC: 93 U/L (ref 50–136)
ALT SERPL-CCNC: 24 U/L (ref 12–65)
ANION GAP SERPL CALC-SCNC: 6 MMOL/L (ref 7–16)
APPEARANCE UR: CLEAR
AST SERPL-CCNC: 18 U/L (ref 15–37)
BACTERIA SPEC CULT: ABNORMAL
BILIRUB SERPL-MCNC: 0.6 MG/DL (ref 0.2–1.1)
BILIRUB UR QL: NEGATIVE
BUN SERPL-MCNC: 17 MG/DL (ref 8–23)
CALCIUM SERPL-MCNC: 9.8 MG/DL (ref 8.3–10.4)
CHLORIDE SERPL-SCNC: 101 MMOL/L (ref 98–107)
CO2 SERPL-SCNC: 30 MMOL/L (ref 21–32)
COLOR UR: YELLOW
COVID-19 RAPID TEST, COVR: NOT DETECTED
CREAT SERPL-MCNC: 1.17 MG/DL (ref 0.8–1.5)
EST. AVERAGE GLUCOSE BLD GHB EST-MCNC: 114 MG/DL
GLOBULIN SER CALC-MCNC: 4.2 G/DL (ref 2.3–3.5)
GLUCOSE SERPL-MCNC: 96 MG/DL (ref 65–100)
GLUCOSE UR STRIP.AUTO-MCNC: NEGATIVE MG/DL
HBA1C MFR BLD: 5.6 % (ref 4.2–6.3)
HGB UR QL STRIP: NEGATIVE
HISTORY CHECKED?,CKHIST: NORMAL
KETONES UR QL STRIP.AUTO: NEGATIVE MG/DL
LEUKOCYTE ESTERASE UR QL STRIP.AUTO: NEGATIVE
NITRITE UR QL STRIP.AUTO: NEGATIVE
PH UR STRIP: 5.5 [PH] (ref 5–9)
POTASSIUM SERPL-SCNC: 3.5 MMOL/L (ref 3.5–5.1)
PROT SERPL-MCNC: 8.1 G/DL (ref 6.3–8.2)
PROT UR STRIP-MCNC: NEGATIVE MG/DL
SARS-COV-2, COV2: NORMAL
SERVICE CMNT-IMP: ABNORMAL
SODIUM SERPL-SCNC: 137 MMOL/L (ref 138–145)
SOURCE, COVRS: NORMAL
SP GR UR REFRACTOMETRY: 1.02 (ref 1–1.02)
UROBILINOGEN UR QL STRIP.AUTO: 0.2 EU/DL (ref 0.2–1)

## 2021-05-03 PROCEDURE — 77030027138 HC INCENT SPIROMETER -A

## 2021-05-03 PROCEDURE — 87635 SARS-COV-2 COVID-19 AMP PRB: CPT

## 2021-05-03 PROCEDURE — 94010 BREATHING CAPACITY TEST: CPT

## 2021-05-03 PROCEDURE — 87641 MR-STAPH DNA AMP PROBE: CPT

## 2021-05-03 PROCEDURE — 86923 COMPATIBILITY TEST ELECTRIC: CPT

## 2021-05-03 PROCEDURE — U0003 INFECTIOUS AGENT DETECTION BY NUCLEIC ACID (DNA OR RNA); SEVERE ACUTE RESPIRATORY SYNDROME CORONAVIRUS 2 (SARS-COV-2) (CORONAVIRUS DISEASE [COVID-19]), AMPLIFIED PROBE TECHNIQUE, MAKING USE OF HIGH THROUGHPUT TECHNOLOGIES AS DESCRIBED BY CMS-2020-01-R: HCPCS

## 2021-05-03 PROCEDURE — 81003 URINALYSIS AUTO W/O SCOPE: CPT

## 2021-05-03 PROCEDURE — 71046 X-RAY EXAM CHEST 2 VIEWS: CPT

## 2021-05-03 PROCEDURE — 83036 HEMOGLOBIN GLYCOSYLATED A1C: CPT

## 2021-05-03 PROCEDURE — 93880 EXTRACRANIAL BILAT STUDY: CPT

## 2021-05-03 PROCEDURE — 86901 BLOOD TYPING SEROLOGIC RH(D): CPT

## 2021-05-03 PROCEDURE — 80053 COMPREHEN METABOLIC PANEL: CPT

## 2021-05-03 RX ORDER — AMIODARONE HYDROCHLORIDE 200 MG/1
600 TABLET ORAL ONCE
Status: DISCONTINUED | OUTPATIENT
Start: 2021-05-04 | End: 2021-05-04 | Stop reason: HOSPADM

## 2021-05-03 RX ORDER — FLUTICASONE PROPIONATE 50 MCG
2 SPRAY, SUSPENSION (ML) NASAL DAILY
COMMUNITY

## 2021-05-03 NOTE — ANESTHESIA PREPROCEDURE EVALUATION
Relevant Problems   No relevant active problems       Anesthetic History   No history of anesthetic complications            Review of Systems / Medical History  Patient summary reviewed, nursing notes reviewed and pertinent labs reviewed    Pulmonary              Pertinent negatives: No shortness of breath     Neuro/Psych   Within defined limits           Cardiovascular    Hypertension: well controlled  Valvular problems/murmurs (severe AS, WILMER 0.63): aortic stenosis          Pertinent negatives: No angina and cardiac stents  Exercise tolerance: >4 METS  Comments: Left main 80% proximal, LAD 20-30%,  LCX nondominant 20%, RCA 20-30%, PDA 80% proximal   GI/Hepatic/Renal  Within defined limits              Endo/Other  Within defined limits           Other Findings              Physical Exam    Airway  Mallampati: III  TM Distance: 4 - 6 cm  Neck ROM: normal range of motion   Mouth opening: Normal     Cardiovascular    Rhythm: regular      Murmur: Grade 3     Dental  No notable dental hx       Pulmonary  Breath sounds clear to auscultation               Abdominal         Other Findings            Anesthetic Plan    ASA: 4  Anesthesia type: general    Monitoring Plan: Arterial line, CVP, Ezel-Jeanie, LULÚ and BIS    Post procedure ventilation   Induction: Intravenous  Anesthetic plan and risks discussed with: Patient and Spouse

## 2021-05-03 NOTE — PERIOP NOTES
Patient verified name and . Patient provided medical/health information and PTA medications to the best of their ability. TYPE  CASE: 3  Order for consent found in EHR and matches case posting. Labs per surgeon: MRSA/MSSA, UA, A1C, CMP; patient had CBC, BMP, Mag, PT/INR done 2021  Labs per anesthesia protocol: SARS 2 Covid testing  EK2021 - reviewed per Dr. KEYES [employer] along with Echo from 21 and cath 21. No new orders received. A rapid COVID swab was performed prior to PFT. Result negative. Patient provided with and instructed on educaitonal handouts including Heart Guide to Surgery, blood transfusions, pain management, central line infection prevention, being on a ventilator and hand hygiene for the family and community, and [facility] brochure. Instructed that family must be present in building at all times. Incentive spirometry instruction completed along with handout given for instructions on incentive spirometry. FEV1 completed as ordered. Patient viewed pre-operative DVD after catheterization on 2021. Instructed on chest-xray procedure and carotid ultrasound. Instructed on type and cross match procedure and not to remove green blood bank bracelet. Instructed on medications- Amiodarone, Lopressor with Rx called into  [facility], [address] [place] [address]. Message left on [facility] prescriber voicemail, phone number: [phone]. Surgical skin cleanser provided and instructions given per hospital policy. Original medication prescription medications reconciled with patient and wife during patient PAT appointment. Patient teach back successful and patient demonstrates knowledge of instruction. Patient taken to elevators and instructed on how to find radiology for CXR and Carotid ultrasound. No further questions voiced about procedure or instructions from patient or spouse.

## 2021-05-03 NOTE — PERIOP NOTES
Recent Results (from the past 12 hour(s))   TYPE & SCREEN    Collection Time: 05/03/21  7:53 AM   Result Value Ref Range    Crossmatch Expiration 05/06/2021,2359     ABO/Rh(D) O POSITIVE     Antibody screen NEG     Unit number [id number]     Blood component type RC LR,2     Unit division 00     Status of unit ALLOCATED     Crossmatch result Compatible     Unit number [id number]     Blood component type RC LR,2     Unit division 00     Status of unit ALLOCATED     Crossmatch result Compatible     Unit number [id number]     Blood component type RC LR     Unit division 00     Status of unit ALLOCATED     Crossmatch result Compatible     Unit number [id number]     Blood component type RC LR     Unit division 00     Status of unit ALLOCATED     Crossmatch result Compatible    HEMOGLOBIN A1C WITH EAG    Collection Time: 05/03/21  8:01 AM   Result Value Ref Range    Hemoglobin A1c 5.6 4.20 - 6.30 %    Est. average glucose 441 mg/dL   METABOLIC PANEL, COMPREHENSIVE    Collection Time: 05/03/21  8:01 AM   Result Value Ref Range    Sodium 137 (L) 138 - 145 mmol/L    Potassium 3.5 3.5 - 5.1 mmol/L    Chloride 101 98 - 107 mmol/L    CO2 30 21 - 32 mmol/L    Anion gap 6 (L) 7 - 16 mmol/L    Glucose 96 65 - 100 mg/dL    BUN 17 8 - 23 MG/DL    Creatinine 1.17 0.8 - 1.5 MG/DL    GFR est AA >60 >60 ml/min/1.73m2    GFR est non-AA >60 >60 ml/min/1.73m2    Calcium 9.8 8.3 - 10.4 MG/DL    Bilirubin, total 0.6 0.2 - 1.1 MG/DL    ALT (SGPT) 24 12 - 65 U/L    AST (SGOT) 18 15 - 37 U/L    Alk.  phosphatase 93 50 - 136 U/L    Protein, total 8.1 6.3 - 8.2 g/dL    Albumin 3.9 3.2 - 4.6 g/dL    Globulin 4.2 (H) 2.3 - 3.5 g/dL    A-G Ratio 0.9 (L) 1.2 - 3.5     URINALYSIS W/ RFLX MICROSCOPIC    Collection Time: 05/03/21  8:01 AM   Result Value Ref Range    Color YELLOW      Appearance CLEAR      Specific gravity 1.023 1.001 - 1.023      pH (UA) 5.5 5.0 - 9.0      Protein Negative NEG mg/dL    Glucose Negative mg/dL    Ketone Negative NEG mg/dL    Bilirubin Negative NEG      Blood Negative NEG      Urobilinogen 0.2 0.2 - 1.0 EU/dL    Nitrites Negative NEG      Leukocyte Esterase Negative NEG     MSSA/MRSA SC BY PCR, NASAL SWAB    Collection Time: 05/03/21  8:01 AM    Specimen: Nasal   Result Value Ref Range    Special Requests: NO SPECIAL REQUESTS      Culture result: (A)       MRSA target DNA not detected, SA target DNA detected. A MRSA negative, SA positive test result does not preclude MRSA nasal colonization. SARS-COV-2    Collection Time: 05/03/21  8:04 AM   Result Value Ref Range    SARS-CoV-2 Please find results under separate order     COVID-19 RAPID TEST    Collection Time: 05/03/21  8:04 AM   Result Value Ref Range    Specimen source Nasopharyngeal      COVID-19 rapid test Not detected NOTD     RBC, ALLOCATE    Collection Time: 05/03/21  9:16 AM   Result Value Ref Range    HISTORY CHECKED? Historical check performed      Labs reviewed. Routing to surgeon. Please see labs drawn on 4/28/2021 as well.

## 2021-05-03 NOTE — PERIOP NOTES
PLEASE CONTINUE TAKING ALL PRESCRIPTION MEDICATIONS UP TO THE DAY OF SURGERY UNLESS OTHERWISE DIRECTED BELOW. DISCONTINUE all vitamins and supplements 7 days prior to surgery. DISCONTINUE Non-Steriodal Anti-Inflammatory (NSAIDS) such as Advil and Aleve 5 days prior to surgery. Home Medications to take  the day of surgery   Aspirin 81 mg  Allegra if needed   flonase if needed      ** Take Amiodarone 600mg by mouth after 4 pm the day before surgery       Take Metoprolol 12.5mg by mouth after 4 pm the day before surgery**     Home Medications   to Hold   DISCONTINUE all vitamins and supplements 7 days prior to surgery. DISCONTINUE Non-Steriodal Anti-Inflammatory (NSAIDS) such as Advil and Aleve 5 days prior to surgery. Comments         *Visitor policy of 2 visitor per patient discussed. Please do not bring home medications with you on the day of surgery unless otherwise directed by your nurse. If you are instructed to bring home medications, please give them to your nurse as they will be administered by the nursing staff. If you have any questions, please call [facility] [phone] or [facility] [phone]. A copy of this note was provided to the patient for reference. How to Use Your Incentive Spirometer       About Your Incentive Spirometer  An incentive spirometer is a device that will expand your lungs by helping you to breathe more deeply and fully. The parts of your incentive spirometer are labeled in Figure 1. Using your incentive spirometer  When youre using your incentive spirometer, make sure to breathe through your mouth. If you breathe through your nose, the incentive spirometer wont work properly. You can hold your nose if you have trouble. DO NOT BLOW INTO THE DEVICE. If you feel dizzy at any time, stop and rest. Try again at a later time. 1. Sit upright in a chair or in bed. Hold the incentive spirometer at eye level.    2. Put the mouthpiece in your mouth and close your lips tightly around it. Slowly breathe out (exhale) completely. 3. Breathe in (inhale) slowly through your mouth as deeply as you can. As you take the breath, you will see the piston rise inside the large column. While the piston rises, the indicator on the right should move upwards. It should stay in between the 2 arrows (see Figure 1). 4. Try to get the piston as high as you can, while keeping the indicator between the arrows. If the indicator doesnt stay between the arrows, youre breathing either too fast or too slow. 5. When you get it as high as you can, hold your breath for 10 seconds, or as long as possible. While youre holding your breath, the piston will slowly fall to the base of the spirometer. 6. Once the piston reaches the bottom of the spirometer, breathe out slowly through your mouth. Rest for a few seconds. 7. Repeat 10 times. Try to get the piston to the same level with each breath. 8. After each set of 10 breaths, try to cough as coughing will help loosen or clear any mucus in your lungs. 9. Put the marker at the level the piston reached on your incentive spirometer. This will be your goal next time. Repeat these steps every hour that youre awake.   Cover the mouthpiece of the incentive spirometer when you arent using it

## 2021-05-04 ENCOUNTER — HOSPITAL ENCOUNTER (INPATIENT)
Age: 76
LOS: 6 days | Discharge: HOME HEALTH | DRG: 267 | End: 2021-05-10
Attending: THORACIC SURGERY (CARDIOTHORACIC VASCULAR SURGERY) | Admitting: THORACIC SURGERY (CARDIOTHORACIC VASCULAR SURGERY)
Payer: MEDICARE

## 2021-05-04 ENCOUNTER — ANESTHESIA (OUTPATIENT)
Dept: SURGERY | Age: 76
DRG: 267 | End: 2021-05-04
Payer: MEDICARE

## 2021-05-04 ENCOUNTER — APPOINTMENT (OUTPATIENT)
Dept: GENERAL RADIOLOGY | Age: 76
DRG: 267 | End: 2021-05-04
Attending: THORACIC SURGERY (CARDIOTHORACIC VASCULAR SURGERY)
Payer: MEDICARE

## 2021-05-04 DIAGNOSIS — J98.11 ATELECTASIS: ICD-10-CM

## 2021-05-04 DIAGNOSIS — Z99.11 ENCOUNTER FOR WEANING FROM VENTILATOR (HCC): ICD-10-CM

## 2021-05-04 DIAGNOSIS — R09.02 HYPOXIC: ICD-10-CM

## 2021-05-04 DIAGNOSIS — Z95.1 S/P CABG X 3: ICD-10-CM

## 2021-05-04 DIAGNOSIS — I35.0 NONRHEUMATIC AORTIC VALVE STENOSIS: ICD-10-CM

## 2021-05-04 DIAGNOSIS — Z95.2 S/P AVR: ICD-10-CM

## 2021-05-04 DIAGNOSIS — R09.02 HYPOXIA: ICD-10-CM

## 2021-05-04 DIAGNOSIS — I25.810 CORONARY ARTERY DISEASE INVOLVING CORONARY BYPASS GRAFT OF NATIVE HEART WITHOUT ANGINA PECTORIS: ICD-10-CM

## 2021-05-04 PROBLEM — I25.10 CAD (CORONARY ARTERY DISEASE): Status: ACTIVE | Noted: 2021-05-04

## 2021-05-04 PROBLEM — I25.110 ATHEROSCLEROSIS OF NATIVE CORONARY ARTERY OF NATIVE HEART WITH UNSTABLE ANGINA PECTORIS (HCC): Status: ACTIVE | Noted: 2021-05-04

## 2021-05-04 LAB
ANION GAP SERPL CALC-SCNC: 9 MMOL/L (ref 7–16)
APTT PPP: 65 SEC (ref 24.1–35.1)
ARTERIAL PATENCY WRIST A: ABNORMAL
ATRIAL RATE: 69 BPM
BASE DEFICIT BLD-SCNC: 0.9 MMOL/L
BASE DEFICIT BLD-SCNC: 1.7 MMOL/L
BASE DEFICIT BLD-SCNC: 3 MMOL/L
BASE DEFICIT BLD-SCNC: 4.3 MMOL/L
BASE DEFICIT BLD-SCNC: 5.8 MMOL/L
BASE DEFICIT BLD-SCNC: 5.8 MMOL/L
BASE DEFICIT BLD-SCNC: 7.6 MMOL/L
BASE DEFICIT BLD-SCNC: 7.7 MMOL/L
BASE DEFICIT BLD-SCNC: 7.8 MMOL/L
BASE DEFICIT BLD-SCNC: 8.7 MMOL/L
BASE EXCESS BLD CALC-SCNC: 0.1 MMOL/L
BASE EXCESS BLD CALC-SCNC: 1.2 MMOL/L
BASE EXCESS BLD CALC-SCNC: 3.2 MMOL/L
BDY SITE: ABNORMAL
BUN SERPL-MCNC: 13 MG/DL (ref 8–23)
CA-I BLD-MCNC: 1.04 MMOL/L (ref 1.12–1.32)
CA-I BLD-MCNC: 1.06 MMOL/L (ref 1.12–1.32)
CA-I BLD-MCNC: 1.08 MMOL/L (ref 1.12–1.32)
CA-I BLD-MCNC: 1.11 MMOL/L (ref 1.12–1.32)
CA-I BLD-MCNC: 1.13 MMOL/L (ref 1.12–1.32)
CA-I BLD-MCNC: 1.19 MMOL/L (ref 1.12–1.32)
CA-I BLD-MCNC: 1.28 MMOL/L (ref 1.12–1.32)
CALCIUM SERPL-MCNC: 7 MG/DL (ref 8.3–10.4)
CALCULATED P AXIS, ECG09: 14 DEGREES
CALCULATED R AXIS, ECG10: -10 DEGREES
CALCULATED T AXIS, ECG11: 135 DEGREES
CHLORIDE SERPL-SCNC: 116 MMOL/L (ref 98–107)
CITRATED FUNCTIONAL FIBRINOGEN MAXIMUM AMPLITUDE: 14.1 MM (ref 15–32)
CITRATED FUNCTIONAL FIBRINOGEN MAXIMUM AMPLITUDE: 20.3 MM (ref 15–32)
CITRATED KAOLIN ANGLE: 64.4 DEG (ref 63–78)
CITRATED KAOLIN ANGLE: 74.3 DEG (ref 63–78)
CITRATED KAOLIN K-TIME: 1.2 MINS (ref 0.8–2.1)
CITRATED KAOLIN K-TIME: 2.3 MINS (ref 0.8–2.1)
CITRATED KAOLIN MAXIMUM AMPLITUDE: 48.1 MM (ref 52–69)
CITRATED KAOLIN MAXIMUM AMPLITUDE: 62.8 MM (ref 52–69)
CITRATED KAOLIN R-TIME: 6.1 MINS (ref 4.6–9.1)
CITRATED KAOLIN R-TIME: 6.1 MINS (ref 4.6–9.1)
CITRATED KAOLIN/HEPARINASE R-TIME: 5.6 MINS (ref 4.3–8.3)
CITRATED KAOLIN/HEPARINASE R-TIME: 5.7 MINS (ref 4.3–8.3)
CITRATED RAPIDTEG MAXIMUM AMPLITUDE: 47.4 MM (ref 52–70)
CITRATED RAPIDTEG MAXIMUM AMPLITUDE: 61.4 MM (ref 52–70)
CO2 BLD-SCNC: 19 MMOL/L (ref 13–23)
CO2 BLD-SCNC: 21 MMOL/L (ref 13–23)
CO2 BLD-SCNC: 22 MMOL/L (ref 13–23)
CO2 BLD-SCNC: 24 MMOL/L (ref 13–23)
CO2 BLD-SCNC: 26 MMOL/L (ref 13–23)
CO2 BLD-SCNC: 26 MMOL/L (ref 13–23)
CO2 BLD-SCNC: 27 MMOL/L (ref 13–23)
CO2 SERPL-SCNC: 20 MMOL/L (ref 21–32)
CREAT SERPL-MCNC: 0.79 MG/DL (ref 0.8–1.5)
DIAGNOSIS, 93000: NORMAL
ERYTHROCYTE [DISTWIDTH] IN BLOOD BY AUTOMATED COUNT: 13.5 % (ref 11.9–14.6)
FIBRINOGEN PPP-MCNC: 151 MG/DL (ref 190–501)
FUNCTIONAL FIBRINOGEN LEVEL: 257.3 MG/DL (ref 278–581)
FUNCTIONAL FIBRINOGEN LEVEL: 370.4 MG/DL (ref 278–581)
GAS FLOW.O2 O2 DELIVERY SYS: ABNORMAL L/MIN
GLUCOSE BLD STRIP.AUTO-MCNC: 106 MG/DL (ref 65–100)
GLUCOSE BLD STRIP.AUTO-MCNC: 109 MG/DL (ref 65–100)
GLUCOSE BLD STRIP.AUTO-MCNC: 111 MG/DL (ref 65–100)
GLUCOSE BLD STRIP.AUTO-MCNC: 117 MG/DL (ref 65–100)
GLUCOSE BLD STRIP.AUTO-MCNC: 118 MG/DL (ref 65–100)
GLUCOSE BLD STRIP.AUTO-MCNC: 137 MG/DL (ref 65–100)
GLUCOSE BLD STRIP.AUTO-MCNC: 137 MG/DL (ref 65–100)
GLUCOSE BLD STRIP.AUTO-MCNC: 138 MG/DL (ref 65–100)
GLUCOSE BLD STRIP.AUTO-MCNC: 146 MG/DL (ref 65–100)
GLUCOSE BLD STRIP.AUTO-MCNC: 147 MG/DL (ref 65–100)
GLUCOSE BLD STRIP.AUTO-MCNC: 156 MG/DL (ref 65–100)
GLUCOSE BLD STRIP.AUTO-MCNC: 168 MG/DL (ref 65–100)
GLUCOSE BLD STRIP.AUTO-MCNC: 175 MG/DL (ref 65–100)
GLUCOSE BLD STRIP.AUTO-MCNC: 92 MG/DL (ref 65–100)
GLUCOSE SERPL-MCNC: 151 MG/DL (ref 65–100)
HCO3 BLD-SCNC: 18.3 MMOL/L (ref 22–26)
HCO3 BLD-SCNC: 19 MMOL/L (ref 22–26)
HCO3 BLD-SCNC: 19.2 MMOL/L (ref 22–26)
HCO3 BLD-SCNC: 19.5 MMOL/L (ref 22–26)
HCO3 BLD-SCNC: 20.2 MMOL/L (ref 22–26)
HCO3 BLD-SCNC: 20.5 MMOL/L (ref 22–26)
HCO3 BLD-SCNC: 20.8 MMOL/L (ref 22–26)
HCO3 BLD-SCNC: 22.1 MMOL/L (ref 22–26)
HCO3 BLD-SCNC: 22.4 MMOL/L (ref 22–26)
HCO3 BLD-SCNC: 24.5 MMOL/L (ref 22–26)
HCO3 BLD-SCNC: 26.3 MMOL/L (ref 22–26)
HCO3 BLD-SCNC: 26.5 MMOL/L (ref 22–26)
HCO3 BLD-SCNC: 27.2 MMOL/L (ref 22–26)
HCT VFR BLD AUTO: 26.3 % (ref 41.1–50.3)
HCT VFR BLD AUTO: 28.1 % (ref 41.1–50.3)
HCT VFR BLD AUTO: 28.2 % (ref 41.1–50.3)
HCT VFR BLD AUTO: 32.3 % (ref 41.1–50.3)
HGB BLD-MCNC: 10.5 G/DL (ref 13.6–17.2)
HGB BLD-MCNC: 8.6 G/DL (ref 13.6–17.2)
HGB BLD-MCNC: 9.1 G/DL (ref 13.6–17.2)
HGB BLD-MCNC: 9.2 G/DL (ref 13.6–17.2)
HISTORY CHECKED?,CKHIST: NORMAL
INR PPP: 1.8
MAGNESIUM SERPL-MCNC: 2.3 MG/DL (ref 1.8–2.4)
MAGNESIUM SERPL-MCNC: 2.4 MG/DL (ref 1.8–2.4)
MAGNESIUM SERPL-MCNC: 2.5 MG/DL (ref 1.8–2.4)
MCH RBC QN AUTO: 30.4 PG (ref 26.1–32.9)
MCHC RBC AUTO-ENTMCNC: 32.5 G/DL (ref 31.4–35)
MCV RBC AUTO: 93.6 FL (ref 79.6–97.8)
NRBC # BLD: 0 K/UL (ref 0–0.2)
O2/TOTAL GAS SETTING VFR VENT: 30 %
O2/TOTAL GAS SETTING VFR VENT: 40 %
O2/TOTAL GAS SETTING VFR VENT: 60 %
O2/TOTAL GAS SETTING VFR VENT: 60 %
P-R INTERVAL, ECG05: 192 MS
PCO2 BLD: 34.3 MMHG (ref 35–45)
PCO2 BLD: 37.5 MMHG (ref 35–45)
PCO2 BLD: 38.5 MMHG (ref 35–45)
PCO2 BLD: 38.8 MMHG (ref 35–45)
PCO2 BLD: 41.1 MMHG (ref 35–45)
PCO2 BLD: 41.5 MMHG (ref 35–45)
PCO2 BLD: 42.6 MMHG (ref 35–45)
PCO2 BLD: 43.3 MMHG (ref 35–45)
PCO2 BLD: 43.8 MMHG (ref 35–45)
PCO2 BLD: 43.9 MMHG (ref 35–45)
PCO2 BLD: 45 MMHG (ref 35–45)
PCO2 BLD: 46.6 MMHG (ref 35–45)
PCO2 BLD: 49.7 MMHG (ref 35–45)
PEEP RESPIRATORY: 8 CMH2O
PH BLD: 7.23 [PH] (ref 7.35–7.45)
PH BLD: 7.23 [PH] (ref 7.35–7.45)
PH BLD: 7.24 [PH] (ref 7.35–7.45)
PH BLD: 7.27 [PH] (ref 7.35–7.45)
PH BLD: 7.28 [PH] (ref 7.35–7.45)
PH BLD: 7.3 [PH] (ref 7.35–7.45)
PH BLD: 7.33 [PH] (ref 7.35–7.45)
PH BLD: 7.35 [PH] (ref 7.35–7.45)
PH BLD: 7.36 [PH] (ref 7.35–7.45)
PH BLD: 7.37 [PH] (ref 7.35–7.45)
PH BLD: 7.39 [PH] (ref 7.35–7.45)
PH BLD: 7.42 [PH] (ref 7.35–7.45)
PH BLD: 7.46 [PH] (ref 7.35–7.45)
PLATELET # BLD AUTO: 130 K/UL (ref 150–450)
PLATELET # BLD AUTO: 51 K/UL (ref 150–450)
PMV BLD AUTO: 12.2 FL (ref 9.4–12.3)
PO2 BLD: 106 MMHG (ref 75–100)
PO2 BLD: 109 MMHG (ref 75–100)
PO2 BLD: 115 MMHG (ref 75–100)
PO2 BLD: 128 MMHG (ref 75–100)
PO2 BLD: 144 MMHG (ref 75–100)
PO2 BLD: 235 MMHG (ref 75–100)
PO2 BLD: 250 MMHG (ref 75–100)
PO2 BLD: 256 MMHG (ref 75–100)
PO2 BLD: 303 MMHG (ref 75–100)
PO2 BLD: 327 MMHG (ref 75–100)
PO2 BLD: 411 MMHG (ref 75–100)
PO2 BLD: 523 MMHG (ref 75–100)
PO2 BLD: 528 MMHG (ref 75–100)
POTASSIUM BLD-SCNC: 3.6 MMOL/L (ref 3.5–5.1)
POTASSIUM BLD-SCNC: 3.7 MMOL/L (ref 3.5–5.1)
POTASSIUM BLD-SCNC: 3.7 MMOL/L (ref 3.5–5.1)
POTASSIUM BLD-SCNC: 3.8 MMOL/L (ref 3.5–5.1)
POTASSIUM BLD-SCNC: 3.8 MMOL/L (ref 3.5–5.1)
POTASSIUM BLD-SCNC: 4.6 MMOL/L (ref 3.5–5.1)
POTASSIUM BLD-SCNC: 4.8 MMOL/L (ref 3.5–5.1)
POTASSIUM BLD-SCNC: 4.8 MMOL/L (ref 3.5–5.1)
POTASSIUM SERPL-SCNC: 3.6 MMOL/L (ref 3.5–5.1)
POTASSIUM SERPL-SCNC: 3.6 MMOL/L (ref 3.5–5.1)
POTASSIUM SERPL-SCNC: 4 MMOL/L (ref 3.5–5.1)
PRESSURE SUPPORT SETTING VENT: 5 CMH2O
PRESSURE SUPPORT SETTING VENT: 5 CMH2O
PROTHROMBIN TIME: 21.1 SEC (ref 12.5–14.7)
Q-T INTERVAL, ECG07: 470 MS
QRS DURATION, ECG06: 146 MS
QTC CALCULATION (BEZET), ECG08: 503 MS
RBC # BLD AUTO: 3.45 M/UL (ref 4.23–5.6)
SAO2 % BLD: 100 %
SAO2 % BLD: 97.5 % (ref 95–98)
SAO2 % BLD: 97.9 % (ref 95–98)
SAO2 % BLD: 98.1 % (ref 95–98)
SAO2 % BLD: 98.2 % (ref 95–98)
SAO2 % BLD: 99 %
SAO2 % BLD: 99.7 % (ref 95–98)
SAO2 % BLD: 99.8 % (ref 95–98)
SARS-COV-2, COV2: NOT DETECTED
SERVICE CMNT-IMP: ABNORMAL
SERVICE CMNT-IMP: NORMAL
SODIUM BLD-SCNC: 131 MMOL/L (ref 136–145)
SODIUM BLD-SCNC: 136 MMOL/L (ref 136–145)
SODIUM BLD-SCNC: 136 MMOL/L (ref 136–145)
SODIUM BLD-SCNC: 139 MMOL/L (ref 136–145)
SODIUM BLD-SCNC: 140 MMOL/L (ref 136–145)
SODIUM BLD-SCNC: 140 MMOL/L (ref 136–145)
SODIUM BLD-SCNC: 144 MMOL/L (ref 136–145)
SODIUM SERPL-SCNC: 145 MMOL/L (ref 138–145)
SPECIMEN SITE: ABNORMAL
SPECIMEN SOURCE, FCOV2M: NORMAL
SPECIMEN TYPE: ABNORMAL
VENTILATION MODE VENT: ABNORMAL
VENTRICULAR RATE, ECG03: 69 BPM
VT SETTING VENT: 450 ML
WBC # BLD AUTO: 14.9 K/UL (ref 4.3–11.1)

## 2021-05-04 PROCEDURE — 021109W BYPASS CORONARY ARTERY, TWO ARTERIES FROM AORTA WITH AUTOLOGOUS VENOUS TISSUE, OPEN APPROACH: ICD-10-PCS | Performed by: THORACIC SURGERY (CARDIOTHORACIC VASCULAR SURGERY)

## 2021-05-04 PROCEDURE — P9047 ALBUMIN (HUMAN), 25%, 50ML: HCPCS

## 2021-05-04 PROCEDURE — 74011250636 HC RX REV CODE- 250/636: Performed by: THORACIC SURGERY (CARDIOTHORACIC VASCULAR SURGERY)

## 2021-05-04 PROCEDURE — 77030032994 HC SOL INJ SOD CL 0.9% LFCR 500ML

## 2021-05-04 PROCEDURE — 77030018547 HC SUT ETHBND1 J&J -B: Performed by: THORACIC SURGERY (CARDIOTHORACIC VASCULAR SURGERY)

## 2021-05-04 PROCEDURE — 85384 FIBRINOGEN ACTIVITY: CPT

## 2021-05-04 PROCEDURE — 77030013861 HC PNCH AORT CLNCUT QUES -B: Performed by: THORACIC SURGERY (CARDIOTHORACIC VASCULAR SURGERY)

## 2021-05-04 PROCEDURE — 77030018571 HC SUT PROL1 J&J -B: Performed by: THORACIC SURGERY (CARDIOTHORACIC VASCULAR SURGERY)

## 2021-05-04 PROCEDURE — 33508 ENDOSCOPIC VEIN HARVEST: CPT | Performed by: THORACIC SURGERY (CARDIOTHORACIC VASCULAR SURGERY)

## 2021-05-04 PROCEDURE — 3E080GC INTRODUCTION OF OTHER THERAPEUTIC SUBSTANCE INTO HEART, OPEN APPROACH: ICD-10-PCS | Performed by: THORACIC SURGERY (CARDIOTHORACIC VASCULAR SURGERY)

## 2021-05-04 PROCEDURE — 77030013064 HC TRNSF BLD FENW -A

## 2021-05-04 PROCEDURE — 77030002996 HC SUT SLK J&J -A: Performed by: THORACIC SURGERY (CARDIOTHORACIC VASCULAR SURGERY)

## 2021-05-04 PROCEDURE — 33405 REPLACEMENT AORTIC VALVE OPN: CPT | Performed by: THORACIC SURGERY (CARDIOTHORACIC VASCULAR SURGERY)

## 2021-05-04 PROCEDURE — 77030002986 HC SUT PROL J&J -A: Performed by: THORACIC SURGERY (CARDIOTHORACIC VASCULAR SURGERY)

## 2021-05-04 PROCEDURE — P9045 ALBUMIN (HUMAN), 5%, 250 ML: HCPCS | Performed by: REGISTERED NURSE

## 2021-05-04 PROCEDURE — 77030008771 HC TU NG SALEM SUMP -A: Performed by: ANESTHESIOLOGY

## 2021-05-04 PROCEDURE — 77030016687: Performed by: THORACIC SURGERY (CARDIOTHORACIC VASCULAR SURGERY)

## 2021-05-04 PROCEDURE — 74011636637 HC RX REV CODE- 636/637: Performed by: THORACIC SURGERY (CARDIOTHORACIC VASCULAR SURGERY)

## 2021-05-04 PROCEDURE — 77030005401 HC CATH RAD ARRO -A: Performed by: ANESTHESIOLOGY

## 2021-05-04 PROCEDURE — 77030031139 HC SUT VCRL2 J&J -A: Performed by: THORACIC SURGERY (CARDIOTHORACIC VASCULAR SURGERY)

## 2021-05-04 PROCEDURE — 77030003010 HC SUT SURG STL J&J -B: Performed by: THORACIC SURGERY (CARDIOTHORACIC VASCULAR SURGERY)

## 2021-05-04 PROCEDURE — 77030033069 HC RLD QLC SGL COR-KNOT LSIS -B: Performed by: THORACIC SURGERY (CARDIOTHORACIC VASCULAR SURGERY)

## 2021-05-04 PROCEDURE — 65610000006 HC RM INTENSIVE CARE

## 2021-05-04 PROCEDURE — 77030021177: Performed by: THORACIC SURGERY (CARDIOTHORACIC VASCULAR SURGERY)

## 2021-05-04 PROCEDURE — 74011250637 HC RX REV CODE- 250/637: Performed by: ANESTHESIOLOGY

## 2021-05-04 PROCEDURE — 74011000250 HC RX REV CODE- 250: Performed by: THORACIC SURGERY (CARDIOTHORACIC VASCULAR SURGERY)

## 2021-05-04 PROCEDURE — 82803 BLOOD GASES ANY COMBINATION: CPT

## 2021-05-04 PROCEDURE — 77030012390 HC DRN CHST BTL GTNG -B: Performed by: THORACIC SURGERY (CARDIOTHORACIC VASCULAR SURGERY)

## 2021-05-04 PROCEDURE — 77030025827 HC BG BLD DNR AUTLG MEDT -A: Performed by: THORACIC SURGERY (CARDIOTHORACIC VASCULAR SURGERY)

## 2021-05-04 PROCEDURE — 85730 THROMBOPLASTIN TIME PARTIAL: CPT

## 2021-05-04 PROCEDURE — 88305 TISSUE EXAM BY PATHOLOGIST: CPT

## 2021-05-04 PROCEDURE — 77030020782 HC GWN BAIR PAWS FLX 3M -B: Performed by: ANESTHESIOLOGY

## 2021-05-04 PROCEDURE — 77030002987 HC SUT PROL J&J -B: Performed by: THORACIC SURGERY (CARDIOTHORACIC VASCULAR SURGERY)

## 2021-05-04 PROCEDURE — C1769 GUIDE WIRE: HCPCS | Performed by: THORACIC SURGERY (CARDIOTHORACIC VASCULAR SURGERY)

## 2021-05-04 PROCEDURE — 77030003422 HC NDL ASPIR NOVO -A: Performed by: THORACIC SURGERY (CARDIOTHORACIC VASCULAR SURGERY)

## 2021-05-04 PROCEDURE — 85347 COAGULATION TIME ACTIVATED: CPT

## 2021-05-04 PROCEDURE — 77030002520 HC INSRT CLMP LATIS STLTH AMR -B: Performed by: THORACIC SURGERY (CARDIOTHORACIC VASCULAR SURGERY)

## 2021-05-04 PROCEDURE — 36600 WITHDRAWAL OF ARTERIAL BLOOD: CPT

## 2021-05-04 PROCEDURE — 77030002966 HC SUT PDS J&J -A: Performed by: THORACIC SURGERY (CARDIOTHORACIC VASCULAR SURGERY)

## 2021-05-04 PROCEDURE — 85049 AUTOMATED PLATELET COUNT: CPT

## 2021-05-04 PROCEDURE — C1729 CATH, DRAINAGE: HCPCS | Performed by: THORACIC SURGERY (CARDIOTHORACIC VASCULAR SURGERY)

## 2021-05-04 PROCEDURE — 74011250636 HC RX REV CODE- 250/636: Performed by: ANESTHESIOLOGY

## 2021-05-04 PROCEDURE — 30233M1 TRANSFUSION OF NONAUTOLOGOUS PLASMA CRYOPRECIPITATE INTO PERIPHERAL VEIN, PERCUTANEOUS APPROACH: ICD-10-PCS | Performed by: THORACIC SURGERY (CARDIOTHORACIC VASCULAR SURGERY)

## 2021-05-04 PROCEDURE — 77030039425 HC BLD LARYNG TRULITE DISP TELE -A: Performed by: ANESTHESIOLOGY

## 2021-05-04 PROCEDURE — 77030019908 HC STETH ESOPH SIMS -A: Performed by: ANESTHESIOLOGY

## 2021-05-04 PROCEDURE — 36430 TRANSFUSION BLD/BLD COMPNT: CPT

## 2021-05-04 PROCEDURE — 74011000258 HC RX REV CODE- 258: Performed by: REGISTERED NURSE

## 2021-05-04 PROCEDURE — 77030033068 HC DEV COR-KNOT SUT KT LSIS -E: Performed by: THORACIC SURGERY (CARDIOTHORACIC VASCULAR SURGERY)

## 2021-05-04 PROCEDURE — 77030005537 HC CATH URETH BARD -A: Performed by: THORACIC SURGERY (CARDIOTHORACIC VASCULAR SURGERY)

## 2021-05-04 PROCEDURE — 77030016564 HC BLD STRNL SAW4 CNMD -B: Performed by: THORACIC SURGERY (CARDIOTHORACIC VASCULAR SURGERY)

## 2021-05-04 PROCEDURE — 77030018793 HC ORG SUT GAB FRAT TELE -B: Performed by: THORACIC SURGERY (CARDIOTHORACIC VASCULAR SURGERY)

## 2021-05-04 PROCEDURE — 80048 BASIC METABOLIC PNL TOTAL CA: CPT

## 2021-05-04 PROCEDURE — 2709999900 HC NON-CHARGEABLE SUPPLY: Performed by: THORACIC SURGERY (CARDIOTHORACIC VASCULAR SURGERY)

## 2021-05-04 PROCEDURE — 02100Z9 BYPASS CORONARY ARTERY, ONE ARTERY FROM LEFT INTERNAL MAMMARY, OPEN APPROACH: ICD-10-PCS | Performed by: THORACIC SURGERY (CARDIOTHORACIC VASCULAR SURGERY)

## 2021-05-04 PROCEDURE — 33518 CABG ARTERY-VEIN TWO: CPT | Performed by: THORACIC SURGERY (CARDIOTHORACIC VASCULAR SURGERY)

## 2021-05-04 PROCEDURE — 76060000043 HC ANESTHESIA 6 TO 6.5 HR: Performed by: THORACIC SURGERY (CARDIOTHORACIC VASCULAR SURGERY)

## 2021-05-04 PROCEDURE — 77030041244 HC CBL PACE EXT TEMP REMG -B: Performed by: THORACIC SURGERY (CARDIOTHORACIC VASCULAR SURGERY)

## 2021-05-04 PROCEDURE — 77030013292 HC BOWL MX PRSM J&J -A: Performed by: ANESTHESIOLOGY

## 2021-05-04 PROCEDURE — 74011000302 HC RX REV CODE- 302: Performed by: THORACIC SURGERY (CARDIOTHORACIC VASCULAR SURGERY)

## 2021-05-04 PROCEDURE — C1751 CATH, INF, PER/CENT/MIDLINE: HCPCS | Performed by: ANESTHESIOLOGY

## 2021-05-04 PROCEDURE — 77030009355 HC CRDPLG DEL SET QUES -C: Performed by: THORACIC SURGERY (CARDIOTHORACIC VASCULAR SURGERY)

## 2021-05-04 PROCEDURE — 77030013794 HC KT TRNSDUC BLD EDWD -B: Performed by: ANESTHESIOLOGY

## 2021-05-04 PROCEDURE — 77030037088 HC TUBE ENDOTRACH ORAL NSL COVD-A: Performed by: ANESTHESIOLOGY

## 2021-05-04 PROCEDURE — 99223 1ST HOSP IP/OBS HIGH 75: CPT | Performed by: INTERNAL MEDICINE

## 2021-05-04 PROCEDURE — 82947 ASSAY GLUCOSE BLOOD QUANT: CPT

## 2021-05-04 PROCEDURE — 77030022199 HC SYS HARV VESL GTNG -G1: Performed by: THORACIC SURGERY (CARDIOTHORACIC VASCULAR SURGERY)

## 2021-05-04 PROCEDURE — 77030002970 HC SUT PLEDG TELE -A: Performed by: THORACIC SURGERY (CARDIOTHORACIC VASCULAR SURGERY)

## 2021-05-04 PROCEDURE — 74011250636 HC RX REV CODE- 250/636

## 2021-05-04 PROCEDURE — 85027 COMPLETE CBC AUTOMATED: CPT

## 2021-05-04 PROCEDURE — 30233N1 TRANSFUSION OF NONAUTOLOGOUS RED BLOOD CELLS INTO PERIPHERAL VEIN, PERCUTANEOUS APPROACH: ICD-10-PCS | Performed by: THORACIC SURGERY (CARDIOTHORACIC VASCULAR SURGERY)

## 2021-05-04 PROCEDURE — 33533 CABG ARTERIAL SINGLE: CPT | Performed by: THORACIC SURGERY (CARDIOTHORACIC VASCULAR SURGERY)

## 2021-05-04 PROCEDURE — 84132 ASSAY OF SERUM POTASSIUM: CPT

## 2021-05-04 PROCEDURE — 77030005518 HC CATH URETH FOL 2W BARD -B: Performed by: THORACIC SURGERY (CARDIOTHORACIC VASCULAR SURGERY)

## 2021-05-04 PROCEDURE — 77030014007 HC SPNG HEMSTAT J&J -B: Performed by: THORACIC SURGERY (CARDIOTHORACIC VASCULAR SURGERY)

## 2021-05-04 PROCEDURE — 77030012890

## 2021-05-04 PROCEDURE — 74011250636 HC RX REV CODE- 250/636: Performed by: REGISTERED NURSE

## 2021-05-04 PROCEDURE — 77030020751 HC FLTR TBNG TRNSFUS HAEM -A: Performed by: ANESTHESIOLOGY

## 2021-05-04 PROCEDURE — 85576 BLOOD PLATELET AGGREGATION: CPT

## 2021-05-04 PROCEDURE — 77030003037 HC SUT WRE STRNOTMY AEMC -B: Performed by: THORACIC SURGERY (CARDIOTHORACIC VASCULAR SURGERY)

## 2021-05-04 PROCEDURE — 5A1223Z PERFORMANCE OF CARDIAC PACING, CONTINUOUS: ICD-10-PCS | Performed by: THORACIC SURGERY (CARDIOTHORACIC VASCULAR SURGERY)

## 2021-05-04 PROCEDURE — 74011250636 HC RX REV CODE- 250/636: Performed by: PHYSICIAN ASSISTANT

## 2021-05-04 PROCEDURE — 2709999900 HC NON-CHARGEABLE SUPPLY

## 2021-05-04 PROCEDURE — 85014 HEMATOCRIT: CPT

## 2021-05-04 PROCEDURE — 77030018548 HC SUT ETHBND2 J&J -B: Performed by: THORACIC SURGERY (CARDIOTHORACIC VASCULAR SURGERY)

## 2021-05-04 PROCEDURE — 82962 GLUCOSE BLOOD TEST: CPT

## 2021-05-04 PROCEDURE — 77030002888 HC SUT CHRMC J&J -A: Performed by: THORACIC SURGERY (CARDIOTHORACIC VASCULAR SURGERY)

## 2021-05-04 PROCEDURE — 86580 TB INTRADERMAL TEST: CPT | Performed by: THORACIC SURGERY (CARDIOTHORACIC VASCULAR SURGERY)

## 2021-05-04 PROCEDURE — 77030020751 HC FLTR TBNG TRNSFUS HAEM -A: Performed by: THORACIC SURGERY (CARDIOTHORACIC VASCULAR SURGERY)

## 2021-05-04 PROCEDURE — 74011000250 HC RX REV CODE- 250: Performed by: REGISTERED NURSE

## 2021-05-04 PROCEDURE — 71045 X-RAY EXAM CHEST 1 VIEW: CPT

## 2021-05-04 PROCEDURE — 93005 ELECTROCARDIOGRAM TRACING: CPT | Performed by: THORACIC SURGERY (CARDIOTHORACIC VASCULAR SURGERY)

## 2021-05-04 PROCEDURE — 77030039023: Performed by: THORACIC SURGERY (CARDIOTHORACIC VASCULAR SURGERY)

## 2021-05-04 PROCEDURE — P9045 ALBUMIN (HUMAN), 5%, 250 ML: HCPCS | Performed by: THORACIC SURGERY (CARDIOTHORACIC VASCULAR SURGERY)

## 2021-05-04 PROCEDURE — 76010000203 HC CV SURG 5.5 TO 6 HR INTENSV-TIER 1: Performed by: THORACIC SURGERY (CARDIOTHORACIC VASCULAR SURGERY)

## 2021-05-04 PROCEDURE — 77030009995: Performed by: THORACIC SURGERY (CARDIOTHORACIC VASCULAR SURGERY)

## 2021-05-04 PROCEDURE — 82330 ASSAY OF CALCIUM: CPT

## 2021-05-04 PROCEDURE — 02RF38Z REPLACEMENT OF AORTIC VALVE WITH ZOOPLASTIC TISSUE, PERCUTANEOUS APPROACH: ICD-10-PCS | Performed by: THORACIC SURGERY (CARDIOTHORACIC VASCULAR SURGERY)

## 2021-05-04 PROCEDURE — 77030040922 HC BLNKT HYPOTHRM STRY -A

## 2021-05-04 PROCEDURE — 77030010827: Performed by: THORACIC SURGERY (CARDIOTHORACIC VASCULAR SURGERY)

## 2021-05-04 PROCEDURE — 77030018729 HC ELECTRD DEFIB PAD CARD -B: Performed by: THORACIC SURGERY (CARDIOTHORACIC VASCULAR SURGERY)

## 2021-05-04 PROCEDURE — 77030034936 HC DEV MIN COR-KNOT KT LSIS -F: Performed by: THORACIC SURGERY (CARDIOTHORACIC VASCULAR SURGERY)

## 2021-05-04 PROCEDURE — 30233R1 TRANSFUSION OF NONAUTOLOGOUS PLATELETS INTO PERIPHERAL VEIN, PERCUTANEOUS APPROACH: ICD-10-PCS | Performed by: THORACIC SURGERY (CARDIOTHORACIC VASCULAR SURGERY)

## 2021-05-04 PROCEDURE — 74011000250 HC RX REV CODE- 250

## 2021-05-04 PROCEDURE — 77030006994: Performed by: THORACIC SURGERY (CARDIOTHORACIC VASCULAR SURGERY)

## 2021-05-04 PROCEDURE — P9035 PLATELET PHERES LEUKOREDUCED: HCPCS

## 2021-05-04 PROCEDURE — P9012 CRYOPRECIPITATE EACH UNIT: HCPCS

## 2021-05-04 PROCEDURE — 06BQ0ZZ EXCISION OF LEFT SAPHENOUS VEIN, OPEN APPROACH: ICD-10-PCS | Performed by: THORACIC SURGERY (CARDIOTHORACIC VASCULAR SURGERY)

## 2021-05-04 PROCEDURE — 84295 ASSAY OF SERUM SODIUM: CPT

## 2021-05-04 PROCEDURE — 5A1221Z PERFORMANCE OF CARDIAC OUTPUT, CONTINUOUS: ICD-10-PCS | Performed by: THORACIC SURGERY (CARDIOTHORACIC VASCULAR SURGERY)

## 2021-05-04 PROCEDURE — 77030020407 HC IV BLD WRMR ST 3M -A: Performed by: ANESTHESIOLOGY

## 2021-05-04 PROCEDURE — 77030010813: Performed by: THORACIC SURGERY (CARDIOTHORACIC VASCULAR SURGERY)

## 2021-05-04 PROCEDURE — P9016 RBC LEUKOCYTES REDUCED: HCPCS

## 2021-05-04 PROCEDURE — 77030002912 HC SUT ETHBND J&J -A: Performed by: THORACIC SURGERY (CARDIOTHORACIC VASCULAR SURGERY)

## 2021-05-04 PROCEDURE — 85610 PROTHROMBIN TIME: CPT

## 2021-05-04 PROCEDURE — 77030025646 HC AUTOTRNSFUS KT TERU -C: Performed by: THORACIC SURGERY (CARDIOTHORACIC VASCULAR SURGERY)

## 2021-05-04 PROCEDURE — 74011250637 HC RX REV CODE- 250/637: Performed by: PHYSICIAN ASSISTANT

## 2021-05-04 PROCEDURE — 83735 ASSAY OF MAGNESIUM: CPT

## 2021-05-04 PROCEDURE — 94002 VENT MGMT INPAT INIT DAY: CPT

## 2021-05-04 PROCEDURE — 74011250637 HC RX REV CODE- 250/637: Performed by: THORACIC SURGERY (CARDIOTHORACIC VASCULAR SURGERY)

## 2021-05-04 PROCEDURE — 77030013797 HC KT TRNSDUC PRSSR EDWD -A: Performed by: THORACIC SURGERY (CARDIOTHORACIC VASCULAR SURGERY)

## 2021-05-04 PROCEDURE — 74011000258 HC RX REV CODE- 258: Performed by: THORACIC SURGERY (CARDIOTHORACIC VASCULAR SURGERY)

## 2021-05-04 PROCEDURE — 77030034888 HC SUT PROL 2 J&J -B: Performed by: THORACIC SURGERY (CARDIOTHORACIC VASCULAR SURGERY)

## 2021-05-04 PROCEDURE — 77030020230: Performed by: ANESTHESIOLOGY

## 2021-05-04 DEVICE — IMPLANTABLE DEVICE: Type: IMPLANTABLE DEVICE | Site: AORTIC VALVE | Status: FUNCTIONAL

## 2021-05-04 RX ORDER — SODIUM CHLORIDE 9 MG/ML
INJECTION, SOLUTION INTRAVENOUS
Status: DISCONTINUED | OUTPATIENT
Start: 2021-05-04 | End: 2021-05-04 | Stop reason: HOSPADM

## 2021-05-04 RX ORDER — VECURONIUM BROMIDE FOR INJECTION 1 MG/ML
INJECTION, POWDER, LYOPHILIZED, FOR SOLUTION INTRAVENOUS AS NEEDED
Status: DISCONTINUED | OUTPATIENT
Start: 2021-05-04 | End: 2021-05-04 | Stop reason: HOSPADM

## 2021-05-04 RX ORDER — ROCURONIUM BROMIDE 10 MG/ML
INJECTION, SOLUTION INTRAVENOUS AS NEEDED
Status: DISCONTINUED | OUTPATIENT
Start: 2021-05-04 | End: 2021-05-04 | Stop reason: HOSPADM

## 2021-05-04 RX ORDER — PREDNISOLONE ACETATE 10 MG/ML
1 SUSPENSION/ DROPS OPHTHALMIC DAILY
Status: DISCONTINUED | OUTPATIENT
Start: 2021-05-05 | End: 2021-05-10 | Stop reason: HOSPADM

## 2021-05-04 RX ORDER — SODIUM BICARBONATE 84 MG/ML
50 INJECTION, SOLUTION INTRAVENOUS AS NEEDED
Status: DISCONTINUED | OUTPATIENT
Start: 2021-05-04 | End: 2021-05-07

## 2021-05-04 RX ORDER — ETOMIDATE 2 MG/ML
INJECTION INTRAVENOUS AS NEEDED
Status: DISCONTINUED | OUTPATIENT
Start: 2021-05-04 | End: 2021-05-04 | Stop reason: HOSPADM

## 2021-05-04 RX ORDER — DEXTROSE, SODIUM CHLORIDE, AND POTASSIUM CHLORIDE 5; .45; .15 G/100ML; G/100ML; G/100ML
25 INJECTION INTRAVENOUS CONTINUOUS
Status: DISCONTINUED | OUTPATIENT
Start: 2021-05-04 | End: 2021-05-07

## 2021-05-04 RX ORDER — DEXMEDETOMIDINE HYDROCHLORIDE 4 UG/ML
0.5 INJECTION, SOLUTION INTRAVENOUS
Status: DISCONTINUED | OUTPATIENT
Start: 2021-05-04 | End: 2021-05-05

## 2021-05-04 RX ORDER — MIDAZOLAM HYDROCHLORIDE 1 MG/ML
2 INJECTION, SOLUTION INTRAMUSCULAR; INTRAVENOUS ONCE
Status: COMPLETED | OUTPATIENT
Start: 2021-05-04 | End: 2021-05-04

## 2021-05-04 RX ORDER — MORPHINE SULFATE 4 MG/ML
3-5 INJECTION INTRAVENOUS
Status: DISCONTINUED | OUTPATIENT
Start: 2021-05-04 | End: 2021-05-06

## 2021-05-04 RX ORDER — NALOXONE HYDROCHLORIDE 0.4 MG/ML
0.4 INJECTION, SOLUTION INTRAMUSCULAR; INTRAVENOUS; SUBCUTANEOUS AS NEEDED
Status: DISCONTINUED | OUTPATIENT
Start: 2021-05-04 | End: 2021-05-07

## 2021-05-04 RX ORDER — ATORVASTATIN CALCIUM 80 MG/1
80 TABLET, FILM COATED ORAL
Status: DISCONTINUED | OUTPATIENT
Start: 2021-05-04 | End: 2021-05-10 | Stop reason: HOSPADM

## 2021-05-04 RX ORDER — PAPAVERINE HYDROCHLORIDE 30 MG/ML
INJECTION INTRAMUSCULAR; INTRAVENOUS AS NEEDED
Status: DISCONTINUED | OUTPATIENT
Start: 2021-05-04 | End: 2021-05-04 | Stop reason: HOSPADM

## 2021-05-04 RX ORDER — ALBUMIN HUMAN 50 G/1000ML
SOLUTION INTRAVENOUS
Status: COMPLETED | OUTPATIENT
Start: 2021-05-04 | End: 2021-05-04

## 2021-05-04 RX ORDER — ALBUMIN HUMAN 50 G/1000ML
25 SOLUTION INTRAVENOUS AS NEEDED
Status: COMPLETED | OUTPATIENT
Start: 2021-05-04 | End: 2021-05-06

## 2021-05-04 RX ORDER — SODIUM CHLORIDE 9 MG/ML
250 INJECTION, SOLUTION INTRAVENOUS AS NEEDED
Status: DISCONTINUED | OUTPATIENT
Start: 2021-05-04 | End: 2021-05-05

## 2021-05-04 RX ORDER — SODIUM CHLORIDE, SODIUM LACTATE, POTASSIUM CHLORIDE, CALCIUM CHLORIDE 600; 310; 30; 20 MG/100ML; MG/100ML; MG/100ML; MG/100ML
INJECTION, SOLUTION INTRAVENOUS
Status: DISCONTINUED | OUTPATIENT
Start: 2021-05-04 | End: 2021-05-04 | Stop reason: HOSPADM

## 2021-05-04 RX ORDER — CEFAZOLIN SODIUM/WATER 2 G/20 ML
2 SYRINGE (ML) INTRAVENOUS EVERY 8 HOURS
Status: COMPLETED | OUTPATIENT
Start: 2021-05-04 | End: 2021-05-05

## 2021-05-04 RX ORDER — LIDOCAINE HCL/PF 100 MG/5ML
50-100 SYRINGE (ML) INTRAVENOUS
Status: ACTIVE | OUTPATIENT
Start: 2021-05-04 | End: 2021-05-05

## 2021-05-04 RX ORDER — FENTANYL CITRATE 50 UG/ML
25 INJECTION, SOLUTION INTRAMUSCULAR; INTRAVENOUS ONCE
Status: DISCONTINUED | OUTPATIENT
Start: 2021-05-04 | End: 2021-05-04 | Stop reason: HOSPADM

## 2021-05-04 RX ORDER — MAGNESIUM SULFATE 1 G/100ML
1 INJECTION INTRAVENOUS AS NEEDED
Status: DISCONTINUED | OUTPATIENT
Start: 2021-05-04 | End: 2021-05-07

## 2021-05-04 RX ORDER — ALBUMIN HUMAN 50 G/1000ML
SOLUTION INTRAVENOUS AS NEEDED
Status: DISCONTINUED | OUTPATIENT
Start: 2021-05-04 | End: 2021-05-04 | Stop reason: HOSPADM

## 2021-05-04 RX ORDER — MUPIROCIN 20 MG/G
OINTMENT TOPICAL 2 TIMES DAILY
Status: DISCONTINUED | OUTPATIENT
Start: 2021-05-04 | End: 2021-05-04

## 2021-05-04 RX ORDER — POTASSIUM CHLORIDE 14.9 MG/ML
10 INJECTION INTRAVENOUS AS NEEDED
Status: DISPENSED | OUTPATIENT
Start: 2021-05-04 | End: 2021-05-05

## 2021-05-04 RX ORDER — FAMOTIDINE 20 MG/1
20 TABLET, FILM COATED ORAL ONCE
Status: COMPLETED | OUTPATIENT
Start: 2021-05-04 | End: 2021-05-04

## 2021-05-04 RX ORDER — SUFENTANIL CITRATE 50 UG/ML
INJECTION EPIDURAL; INTRAVENOUS AS NEEDED
Status: DISCONTINUED | OUTPATIENT
Start: 2021-05-04 | End: 2021-05-04 | Stop reason: HOSPADM

## 2021-05-04 RX ORDER — MIDAZOLAM HYDROCHLORIDE 1 MG/ML
2 INJECTION, SOLUTION INTRAMUSCULAR; INTRAVENOUS
Status: DISCONTINUED | OUTPATIENT
Start: 2021-05-04 | End: 2021-05-04 | Stop reason: HOSPADM

## 2021-05-04 RX ORDER — CEFAZOLIN SODIUM/WATER 2 G/20 ML
2 SYRINGE (ML) INTRAVENOUS ONCE
Status: COMPLETED | OUTPATIENT
Start: 2021-05-04 | End: 2021-05-04

## 2021-05-04 RX ORDER — AMIODARONE HYDROCHLORIDE 200 MG/1
200 TABLET ORAL EVERY 12 HOURS
Status: DISCONTINUED | OUTPATIENT
Start: 2021-05-04 | End: 2021-05-10 | Stop reason: HOSPADM

## 2021-05-04 RX ORDER — AMIODARONE HYDROCHLORIDE 200 MG/1
600 TABLET ORAL
Status: COMPLETED | OUTPATIENT
Start: 2021-05-04 | End: 2021-05-04

## 2021-05-04 RX ORDER — SODIUM CHLORIDE 0.9 % (FLUSH) 0.9 %
5-40 SYRINGE (ML) INJECTION AS NEEDED
Status: DISCONTINUED | OUTPATIENT
Start: 2021-05-04 | End: 2021-05-04 | Stop reason: HOSPADM

## 2021-05-04 RX ORDER — METOPROLOL TARTRATE 25 MG/1
25 TABLET, FILM COATED ORAL EVERY 12 HOURS
Status: DISCONTINUED | OUTPATIENT
Start: 2021-05-05 | End: 2021-05-10

## 2021-05-04 RX ORDER — MIDAZOLAM HYDROCHLORIDE 1 MG/ML
INJECTION, SOLUTION INTRAMUSCULAR; INTRAVENOUS AS NEEDED
Status: DISCONTINUED | OUTPATIENT
Start: 2021-05-04 | End: 2021-05-04 | Stop reason: HOSPADM

## 2021-05-04 RX ORDER — SODIUM CHLORIDE 0.9 % (FLUSH) 0.9 %
5-40 SYRINGE (ML) INJECTION AS NEEDED
Status: DISCONTINUED | OUTPATIENT
Start: 2021-05-04 | End: 2021-05-10 | Stop reason: HOSPADM

## 2021-05-04 RX ORDER — NITROGLYCERIN 20 MG/100ML
INJECTION INTRAVENOUS
Status: DISCONTINUED | OUTPATIENT
Start: 2021-05-04 | End: 2021-05-04 | Stop reason: HOSPADM

## 2021-05-04 RX ORDER — NOREPINEPHRINE BITARTRATE/D5W 4MG/250ML
.01-.2 PLASTIC BAG, INJECTION (ML) INTRAVENOUS
Status: DISCONTINUED | OUTPATIENT
Start: 2021-05-04 | End: 2021-05-07

## 2021-05-04 RX ORDER — DEXTROSE 50 % IN WATER (D50W) INTRAVENOUS SYRINGE
25 AS NEEDED
Status: DISCONTINUED | OUTPATIENT
Start: 2021-05-04 | End: 2021-05-07

## 2021-05-04 RX ORDER — MIDAZOLAM HYDROCHLORIDE 1 MG/ML
1 INJECTION, SOLUTION INTRAMUSCULAR; INTRAVENOUS
Status: DISCONTINUED | OUTPATIENT
Start: 2021-05-04 | End: 2021-05-05

## 2021-05-04 RX ORDER — LIDOCAINE HYDROCHLORIDE 10 MG/ML
0.1 INJECTION INFILTRATION; PERINEURAL AS NEEDED
Status: DISCONTINUED | OUTPATIENT
Start: 2021-05-04 | End: 2021-05-04 | Stop reason: HOSPADM

## 2021-05-04 RX ORDER — CEFAZOLIN SODIUM 1 G/3ML
INJECTION, POWDER, FOR SOLUTION INTRAMUSCULAR; INTRAVENOUS AS NEEDED
Status: DISCONTINUED | OUTPATIENT
Start: 2021-05-04 | End: 2021-05-04 | Stop reason: HOSPADM

## 2021-05-04 RX ORDER — LIDOCAINE HYDROCHLORIDE 20 MG/ML
INJECTION, SOLUTION EPIDURAL; INFILTRATION; INTRACAUDAL; PERINEURAL AS NEEDED
Status: DISCONTINUED | OUTPATIENT
Start: 2021-05-04 | End: 2021-05-04 | Stop reason: HOSPADM

## 2021-05-04 RX ORDER — SODIUM CHLORIDE 0.9 % (FLUSH) 0.9 %
5-40 SYRINGE (ML) INJECTION EVERY 8 HOURS
Status: DISCONTINUED | OUTPATIENT
Start: 2021-05-04 | End: 2021-05-10 | Stop reason: HOSPADM

## 2021-05-04 RX ORDER — PROTAMINE SULFATE 10 MG/ML
INJECTION, SOLUTION INTRAVENOUS AS NEEDED
Status: DISCONTINUED | OUTPATIENT
Start: 2021-05-04 | End: 2021-05-04 | Stop reason: HOSPADM

## 2021-05-04 RX ORDER — SODIUM CHLORIDE, SODIUM LACTATE, POTASSIUM CHLORIDE, CALCIUM CHLORIDE 600; 310; 30; 20 MG/100ML; MG/100ML; MG/100ML; MG/100ML
75 INJECTION, SOLUTION INTRAVENOUS CONTINUOUS
Status: DISCONTINUED | OUTPATIENT
Start: 2021-05-04 | End: 2021-05-04 | Stop reason: HOSPADM

## 2021-05-04 RX ORDER — GUAIFENESIN 100 MG/5ML
81 LIQUID (ML) ORAL DAILY
Status: DISCONTINUED | OUTPATIENT
Start: 2021-05-05 | End: 2021-05-10 | Stop reason: HOSPADM

## 2021-05-04 RX ORDER — HEPARIN SODIUM 1000 [USP'U]/ML
INJECTION, SOLUTION INTRAVENOUS; SUBCUTANEOUS AS NEEDED
Status: DISCONTINUED | OUTPATIENT
Start: 2021-05-04 | End: 2021-05-04 | Stop reason: HOSPADM

## 2021-05-04 RX ORDER — SODIUM CHLORIDE 0.9 % (FLUSH) 0.9 %
5-40 SYRINGE (ML) INJECTION EVERY 8 HOURS
Status: DISCONTINUED | OUTPATIENT
Start: 2021-05-04 | End: 2021-05-04 | Stop reason: HOSPADM

## 2021-05-04 RX ORDER — OXYCODONE AND ACETAMINOPHEN 5; 325 MG/1; MG/1
1 TABLET ORAL
Status: DISCONTINUED | OUTPATIENT
Start: 2021-05-04 | End: 2021-05-06

## 2021-05-04 RX ORDER — SODIUM CHLORIDE 9 MG/ML
25 INJECTION, SOLUTION INTRAVENOUS CONTINUOUS
Status: DISCONTINUED | OUTPATIENT
Start: 2021-05-04 | End: 2021-05-05

## 2021-05-04 RX ORDER — SODIUM CHLORIDE 9 MG/ML
250 INJECTION, SOLUTION INTRAVENOUS AS NEEDED
Status: CANCELLED | OUTPATIENT
Start: 2021-05-04

## 2021-05-04 RX ORDER — NITROGLYCERIN 20 MG/100ML
0-20 INJECTION INTRAVENOUS
Status: DISCONTINUED | OUTPATIENT
Start: 2021-05-04 | End: 2021-05-07

## 2021-05-04 RX ORDER — SODIUM CHLORIDE 9 MG/ML
50 INJECTION, SOLUTION INTRAVENOUS CONTINUOUS
Status: DISCONTINUED | OUTPATIENT
Start: 2021-05-04 | End: 2021-05-04 | Stop reason: HOSPADM

## 2021-05-04 RX ORDER — CHLORHEXIDINE GLUCONATE 1.2 MG/ML
10 RINSE ORAL 2 TIMES DAILY
Status: DISCONTINUED | OUTPATIENT
Start: 2021-05-04 | End: 2021-05-05

## 2021-05-04 RX ADMIN — DEXMEDETOMIDINE 0.5 MCG/KG/HR: 100 INJECTION, SOLUTION, CONCENTRATE INTRAVENOUS at 12:18

## 2021-05-04 RX ADMIN — Medication 10 ML: at 21:47

## 2021-05-04 RX ADMIN — SODIUM CHLORIDE, SODIUM LACTATE, POTASSIUM CHLORIDE, AND CALCIUM CHLORIDE: 600; 310; 30; 20 INJECTION, SOLUTION INTRAVENOUS at 08:07

## 2021-05-04 RX ADMIN — AMIODARONE HYDROCHLORIDE 600 MG: 200 TABLET ORAL at 06:14

## 2021-05-04 RX ADMIN — SODIUM CHLORIDE 1 UNITS/HR: 900 INJECTION, SOLUTION INTRAVENOUS at 13:30

## 2021-05-04 RX ADMIN — LIDOCAINE HYDROCHLORIDE 100 MG: 20 INJECTION, SOLUTION EPIDURAL; INFILTRATION; INTRACAUDAL; PERINEURAL at 07:45

## 2021-05-04 RX ADMIN — MIDAZOLAM 2 MG: 1 INJECTION INTRAMUSCULAR; INTRAVENOUS at 06:58

## 2021-05-04 RX ADMIN — SODIUM BICARBONATE 50 MEQ: 84 INJECTION, SOLUTION INTRAVENOUS at 13:50

## 2021-05-04 RX ADMIN — DEXTROSE MONOHYDRATE 10 G: 5 INJECTION, SOLUTION INTRAVENOUS at 08:33

## 2021-05-04 RX ADMIN — DEXTROSE MONOHYDRATE 0.04 MCG/KG/MIN: 50 INJECTION, SOLUTION INTRAVENOUS at 13:28

## 2021-05-04 RX ADMIN — CEFAZOLIN SODIUM 2 G: 100 INJECTION, POWDER, LYOPHILIZED, FOR SOLUTION INTRAVENOUS at 21:45

## 2021-05-04 RX ADMIN — SODIUM CHLORIDE, SODIUM LACTATE, POTASSIUM CHLORIDE, AND CALCIUM CHLORIDE 75 ML/HR: 600; 310; 30; 20 INJECTION, SOLUTION INTRAVENOUS at 06:14

## 2021-05-04 RX ADMIN — MIDAZOLAM 1 MG: 1 INJECTION INTRAMUSCULAR; INTRAVENOUS at 20:31

## 2021-05-04 RX ADMIN — SODIUM CHLORIDE 1 G/HR: 900 INJECTION, SOLUTION INTRAVENOUS at 09:05

## 2021-05-04 RX ADMIN — SODIUM CHLORIDE: 900 INJECTION, SOLUTION INTRAVENOUS at 12:49

## 2021-05-04 RX ADMIN — SODIUM CHLORIDE: 900 INJECTION, SOLUTION INTRAVENOUS at 08:07

## 2021-05-04 RX ADMIN — SODIUM CHLORIDE 0.5 MCG/KG/HR: 9 INJECTION, SOLUTION INTRAVENOUS at 19:54

## 2021-05-04 RX ADMIN — VECURONIUM BROMIDE 4 MG: 1 INJECTION, POWDER, LYOPHILIZED, FOR SOLUTION INTRAVENOUS at 08:15

## 2021-05-04 RX ADMIN — PHENYLEPHRINE HYDROCHLORIDE 120 MCG: 10 INJECTION INTRAVENOUS at 09:47

## 2021-05-04 RX ADMIN — SODIUM CHLORIDE, SODIUM LACTATE, POTASSIUM CHLORIDE, AND CALCIUM CHLORIDE: 600; 310; 30; 20 INJECTION, SOLUTION INTRAVENOUS at 07:28

## 2021-05-04 RX ADMIN — PHENYLEPHRINE HYDROCHLORIDE 45 MCG/MIN: 10 INJECTION INTRAVENOUS at 14:50

## 2021-05-04 RX ADMIN — SODIUM BICARBONATE 50 MEQ: 84 INJECTION, SOLUTION INTRAVENOUS at 21:15

## 2021-05-04 RX ADMIN — TUBERCULIN PURIFIED PROTEIN DERIVATIVE 5 UNITS: 5 INJECTION, SOLUTION INTRADERMAL at 21:52

## 2021-05-04 RX ADMIN — SUFENTANIL CITRATE 50 MCG: 50 INJECTION EPIDURAL; INTRAVENOUS at 09:42

## 2021-05-04 RX ADMIN — CHLORHEXIDINE GLUCONATE 10 ML: 1.2 RINSE ORAL at 17:47

## 2021-05-04 RX ADMIN — PHENYLEPHRINE HYDROCHLORIDE 120 MCG: 10 INJECTION INTRAVENOUS at 08:26

## 2021-05-04 RX ADMIN — EPINEPHRINE 0.02 MCG/KG/MIN: 1 INJECTION INTRAMUSCULAR; INTRAVENOUS; SUBCUTANEOUS at 11:59

## 2021-05-04 RX ADMIN — Medication 1 AMPULE: at 21:46

## 2021-05-04 RX ADMIN — MIDAZOLAM 1 MG: 1 INJECTION INTRAMUSCULAR; INTRAVENOUS at 07:40

## 2021-05-04 RX ADMIN — MIDAZOLAM 2 MG: 1 INJECTION INTRAMUSCULAR; INTRAVENOUS at 07:33

## 2021-05-04 RX ADMIN — DEXTROSE MONOHYDRATE 0.1 MCG/KG/MIN: 50 INJECTION, SOLUTION INTRAVENOUS at 22:20

## 2021-05-04 RX ADMIN — AMIODARONE HYDROCHLORIDE 200 MG: 200 TABLET ORAL at 21:47

## 2021-05-04 RX ADMIN — ALBUMIN HUMAN 250 ML: 0.05 INJECTION, SOLUTION INTRAVENOUS at 12:29

## 2021-05-04 RX ADMIN — FAMOTIDINE 20 MG: 20 TABLET, FILM COATED ORAL at 06:14

## 2021-05-04 RX ADMIN — CEFAZOLIN 2 G: 1 INJECTION, POWDER, FOR SOLUTION INTRAVENOUS at 08:10

## 2021-05-04 RX ADMIN — PHENYLEPHRINE HYDROCHLORIDE 120 MCG: 10 INJECTION INTRAVENOUS at 08:39

## 2021-05-04 RX ADMIN — VECURONIUM BROMIDE 2 MG: 1 INJECTION, POWDER, LYOPHILIZED, FOR SOLUTION INTRAVENOUS at 11:26

## 2021-05-04 RX ADMIN — ALBUMIN (HUMAN) 25 G: 12.5 INJECTION, SOLUTION INTRAVENOUS at 14:01

## 2021-05-04 RX ADMIN — SUFENTANIL CITRATE 50 MCG: 50 INJECTION EPIDURAL; INTRAVENOUS at 09:02

## 2021-05-04 RX ADMIN — SUFENTANIL CITRATE 25 MCG: 50 INJECTION EPIDURAL; INTRAVENOUS at 08:55

## 2021-05-04 RX ADMIN — MIDAZOLAM 1 MG: 1 INJECTION INTRAMUSCULAR; INTRAVENOUS at 13:50

## 2021-05-04 RX ADMIN — Medication 40 ML: at 13:54

## 2021-05-04 RX ADMIN — SODIUM CHLORIDE 25 ML/HR: 900 INJECTION, SOLUTION INTRAVENOUS at 13:30

## 2021-05-04 RX ADMIN — SODIUM BICARBONATE 50 MEQ: 84 INJECTION, SOLUTION INTRAVENOUS at 16:51

## 2021-05-04 RX ADMIN — MIDAZOLAM 1 MG: 1 INJECTION INTRAMUSCULAR; INTRAVENOUS at 09:03

## 2021-05-04 RX ADMIN — PHENYLEPHRINE HYDROCHLORIDE 120 MCG: 10 INJECTION INTRAVENOUS at 09:44

## 2021-05-04 RX ADMIN — HEPARIN SODIUM 25000 UNITS: 1000 INJECTION, SOLUTION INTRAVENOUS; SUBCUTANEOUS at 09:33

## 2021-05-04 RX ADMIN — SODIUM CHLORIDE 2 UNITS/HR: 900 INJECTION, SOLUTION INTRAVENOUS at 13:45

## 2021-05-04 RX ADMIN — DEXTROSE MONOHYDRATE, SODIUM CHLORIDE, AND POTASSIUM CHLORIDE 25 ML/HR: 50; 4.5; 1.49 INJECTION, SOLUTION INTRAVENOUS at 13:50

## 2021-05-04 RX ADMIN — ROCURONIUM BROMIDE 50 MG: 10 INJECTION, SOLUTION INTRAVENOUS at 07:45

## 2021-05-04 RX ADMIN — NITROGLYCERIN 10 MCG/MIN: 200 INJECTION, SOLUTION INTRAVENOUS at 08:33

## 2021-05-04 RX ADMIN — PHENYLEPHRINE HYDROCHLORIDE 30 MCG/MIN: 10 INJECTION INTRAVENOUS at 21:00

## 2021-05-04 RX ADMIN — ATORVASTATIN CALCIUM 80 MG: 80 TABLET, FILM COATED ORAL at 21:47

## 2021-05-04 RX ADMIN — MIDAZOLAM 5 MG: 1 INJECTION INTRAMUSCULAR; INTRAVENOUS at 11:26

## 2021-05-04 RX ADMIN — Medication 3 AMPULE: at 06:14

## 2021-05-04 RX ADMIN — SUFENTANIL CITRATE 25 MCG: 50 INJECTION EPIDURAL; INTRAVENOUS at 07:45

## 2021-05-04 RX ADMIN — CEFAZOLIN 2 G: 1 INJECTION, POWDER, FOR SOLUTION INTRAVENOUS at 11:45

## 2021-05-04 RX ADMIN — ETOMIDATE 36 MG: 2 INJECTION, SOLUTION INTRAVENOUS at 07:45

## 2021-05-04 RX ADMIN — PROTAMINE SULFATE 220 MG: 10 INJECTION, SOLUTION INTRAVENOUS at 12:15

## 2021-05-04 RX ADMIN — VECURONIUM BROMIDE 4 MG: 1 INJECTION, POWDER, LYOPHILIZED, FOR SOLUTION INTRAVENOUS at 10:56

## 2021-05-04 NOTE — PROGRESS NOTES
Staff requested 's visit with patient's spouse Heather Luo in [facility]. I received the request while I was visiting in [facility]. Mr. Donavon Ferrer was intubated. Upon my arrival to the [facility] Dove was quietly reading. I visited with Heather Luo in the [address] [place] [address] and offered spiritual intervention. She was tearful and expressed Mr. Donavon Ferrer has been in great health for the majority of his life. She informed me that Mr. Donavon Ferrer may have to return to surgery. She is prayerful for patient's healing. I offered prayer as well, praying for patient, family, and staff. Heather Luo expressed appreciation for my care and care of all the staff. Chaplains remain available for support.      Jeanmarie Hardwick 66  Board Certified

## 2021-05-04 NOTE — ANESTHESIA PROCEDURE NOTES
Arterial Line Placement    Start time: 5/4/2021 7:33 AM  End time: 5/4/2021 7:40 AM  Performed by: Yemi Moreno CRNA  Authorized by: Marley Braun MD     Pre-Procedure  Indications:  Arterial pressure monitoring and blood sampling  Preanesthetic Checklist: patient identified, risks and benefits discussed, anesthesia consent, site marked, patient being monitored, timeout performed and patient being monitored    Timeout Time: 07:33        Procedure:   Prep:  Chlorhexidine  Seldinger Technique?: Yes    Orientation:  Left  Location:  Radial artery  Catheter size:  20 G  Number of attempts:  1  Cont Cardiac Output Sensor: Yes      Assessment:   Post-procedure:  Line secured and sterile dressing applied  Patient Tolerance:  Patient tolerated the procedure well with no immediate complications

## 2021-05-04 NOTE — BRIEF OP NOTE
Brief Postoperative Note    Patient: Karen Tipton  YOB: 1945  MRN: [id number]    Date of Procedure: 5/4/2021     Pre-Op Diagnosis: Atherosclerosis of native coronary artery of native heart with unstable angina pectoris ([address] [I25.110]  Aortic valve stenosis, etiology of cardiac valve disease unspecified [I35.0]    Post-Op Diagnosis: Same as preoperative diagnosis. Procedure(s):  CORONARY ARTERY BYPASS GRAFT (CABG X3) WITH AVR, LIMA  LULÚ/ANES PROBE  VEIN HARVEST ENDOSCOPIC, GREATER SAPHENOUS    Surgeon(s): Yamileth Sam MD    Surgical Assistant: None    Anesthesia: General     Estimated Blood Loss (mL): Minimal    Complications: None    Specimens:   ID Type Source Tests Collected by Time Destination   1 : Aortic Valve Preservative Aortic Valve  Yamileth Sam MD 5/4/2021 1142 Pathology        Implants:   Implant Name Type Inv.  Item Serial No.  Lot No. LRB No. Used Action   VALVE AORT SZ 25MM BOV PERICARD TRANSCATHETER STENTED N ROT - [id number]  VALVE AORT SZ 25MM BOV PERICARD TRANSCATHETER STENTED N ROT [id number] [id number]  N/A 1 Implanted       Drains:   Orogastric Tube 05/04/21 (Active)       Findings: as, cad    Electronically Signed by Chris Walton MD on 5/4/2021 at 1:23 PM

## 2021-05-04 NOTE — ANESTHESIA POSTPROCEDURE EVALUATION
Procedure(s):  CORONARY ARTERY BYPASS GRAFT (CABG X3) WITH AVR, LIMA  LULÚ/ANES PROBE  VEIN HARVEST ENDOSCOPIC, GREATER SAPHENOUS.    general    Anesthesia Post Evaluation      Multimodal analgesia: multimodal analgesia used between 6 hours prior to anesthesia start to PACU discharge  Patient location during evaluation: ICU  Patient participation: complete - patient cannot participate  Post-procedure mental status: sedated. Pain management: adequate  Airway patency: patent (OETT)  Anesthetic complications: no  Cardiovascular status: acceptable and stable  Respiratory status: acceptable, ETT, intubated and ventilator  Hydration status: stable  Post anesthesia nausea and vomiting:  none  Final Post Anesthesia Temperature Assessment:  Normothermia (36.0-37.5 degrees C)      INITIAL Post-op Vital signs:   Vitals Value Taken Time   /47 05/04/21 1335   Temp 36.1 °C (96.9 °F) 05/04/21 1356   Pulse 61 05/04/21 1356   Resp 20 05/04/21 1356   SpO2 100 % 05/04/21 1356   Vitals shown include unvalidated device data.

## 2021-05-04 NOTE — PROGRESS NOTES
Patient out from operating room and placed on the ventilator on documented settings. Patient is orally intubated with a # 8.0 ET Tube secured at the 23 cm kirit at the lip. Breath sounds are diminished. Trachea is midline. Negative for subcutaneous air, chest excursion is symmetric. Negative for pitting edema. Patient is also Negative for cyanosis. Patient has a Left Radial arterial line. Patient has 2 Chest tubes. All alarms are set and audible. Resuscitation bag is at the head of the bed. Ventilator Settings  Mode FIO2 Rate Tidal Volume Pressure PEEP I:E Ratio                           Peak airway pressure:     Minute ventilation:       ABG: No results for input(s): PH, PCO2, PO2, HCO3 in the last 72 hours.       Abhinav Chase

## 2021-05-04 NOTE — PROGRESS NOTES
Patient with increased CT output, H&H, plt resulted. Notified Dr. Jeff Torres and new orders received to [id number] pack cryo and 1 unit of platelets.

## 2021-05-04 NOTE — PROGRESS NOTES
TRANSFER - IN REPORT:    Verbal report received from Ban WILSON(name) on [facility]  being received from OR(unit) for routine post - op      Report consisted of patients Situation, Background, Assessment and   Recommendations(SBAR). Information from the following report(s) SBAR, OR Summary, Intake/Output, MAR, Recent Results and Cardiac Rhythm SR was reviewed with the receiving nurse. Per anesthesia on admission patient intubation Normal    Collaborative team agrees of surgeon, anesthesia, RT, and RN agrees to proceed with CV weaning protocol        Opportunity for questions and clarification was provided. Assessment completed upon patients arrival to unit and care assumed.     Received to Rachael/David IRIZARRY

## 2021-05-04 NOTE — PROGRESS NOTES
Pt placed on 3 L O2 per NC per protocol. Allevyn dsg placed on chart per protocol. Warming blanket applied. Hair clipped chin to toe per protocol. Bathed with CHG. #18g IV placed in R hand. Amio given this AM at 06:14. Pt took lopressor yesterday at 16:11. 81mg Aspirin was taking this AM prior to arrival. Pt watched pre-op teaching video. All necessary studies in pt's chart, reviewed. On heart monitor. Call light given. Family at bedside.

## 2021-05-04 NOTE — CONSULTS
Cardiovascular ICU Consult Note: 2021  Chery Paul  Admission Date: 2021     The patient's chart is reviewed and the patient is discussed with the staff. Subjective:     Patient is seen at the request of Dr. Gilford Heady for respiratory management status post cardiac surgery. Patient had CABG x 3 and AV Repair. Currently is sedated in CV-ICU and orally intubated receiving  mechanical ventilation. PMH of HTN, HLD, prostate cancer with radiation, and Aortic stenosis. He was seen by Cardiology for symptomatic AS and underwent LHC for workup for valve replacement. LHC showed MVCAD and patient was referred for CT Surgery. We have been asked to see in the CV-ICU for mechanical ventilation management and weaning. Currently patient is coagulopathic and bleeding from chest drains requiring blood products, he is not ready to safely wean off ventilator. Prior to Admission Medications   Prescriptions Last Dose Informant Patient Reported? Taking? ZINC PO 2021 at Unknown time  Yes Yes   Sig: Take  by mouth. aspirin delayed-release 81 mg tablet 2021 at 0430  Yes Yes   Sig: Take  by mouth Every morning. cholecalciferol, vitamin D3, (VITAMIN D3 PO) 2021 at Unknown time  Yes Yes   Sig: Take  by mouth. fexofenadine (ALLEGRA) 180 mg tablet Not Taking at Unknown time  Yes No   Sig: Take 180 mg by mouth as needed. fluticasone propionate (Flonase Allergy Relief) 50 mcg/actuation nasal spray Not Taking at Unknown time  Yes No   Si Sprays by Both Nostrils route daily. prednisoLONE acetate (PRED FORTE) 1 % ophthalmic suspension 2021 at Unknown time  Yes Yes   Sig: Administer 1 Drop to both eyes Every morning. valsartan-hydroCHLOROthiazide (DIOVAN-HCT) 320-25 mg per tablet 5/3/2021 at Unknown time  No Yes   Sig: Take 1 Tab by mouth daily. Patient taking differently: Take 1 Tab by mouth Every morning.       Facility-Administered Medications: None Review of Systems  Review of systems not obtained due to patient factors.     Past Medical History:   Diagnosis Date    Aortic stenosis, moderate     MOD TO SEVERE per echo 11/2019-- followed by dr Tanner Barajas [facility])     prostate radiation in 12/2020    GERD (gastroesophageal reflux disease)     with certain foods - no meds     High cholesterol     no current medications    Hypertension     controlled with med    Prostate nodule     followed by dr Jenna Willard allergic rhinitis      Past Surgical History:   Procedure Laterality Date    HX COLONOSCOPY  2016    polyps--due in 2021    HX CORNEAL TRANSPLANT Bilateral last one 2017    followed by dr Marialuisa Sandhu  2004    HX ORTHOPAEDIC Left 2016    great toe surg     Social History     Socioeconomic History    Marital status:      Spouse name: Not on file    Number of children: Not on file    Years of education: Not on file    Highest education level: Not on file   Occupational History    Not on file   Social Needs    Financial resource strain: Not on file    Food insecurity     Worry: Not on file     Inability: Not on file    Transportation needs     Medical: Not on file     Non-medical: Not on file   Tobacco Use    Smoking status: Never Smoker    Smokeless tobacco: Never Used   Substance and Sexual Activity    Alcohol use: No    Drug use: No    Sexual activity: Not on file   Lifestyle    Physical activity     Days per week: Not on file     Minutes per session: Not on file    Stress: Not on file   Relationships    Social connections     Talks on phone: Not on file     Gets together: Not on file     Attends [employer] service: Not on file     Active member of club or organization: Not on file     Attends meetings of clubs or organizations: Not on file     Relationship status: Not on file    Intimate partner violence     Fear of current or ex partner: Not on file     Emotionally abused: Not on file Physically abused: Not on file     Forced sexual activity: Not on file   Other Topics Concern    Not on file   Social History Narrative    Not on file     Family History   Problem Relation Age of Onset    No Known Problems Mother     Stroke Father     Heart Disease Father     Hypertension Father     Hypertension Brother     No Known Problems Brother      Allergies   Allergen Reactions    Latex, Natural Rubber Rash     \"tears skin off\"         Current Facility-Administered Medications   Medication Dose Route Frequency    alcohol 62% (NOZIN) nasal  1 Ampule  1 Ampule Topical Q12H    0.9% sodium chloride infusion 250 mL  250 mL IntraVENous PRN    0.9% sodium chloride infusion  25 mL/hr IntraVENous CONTINUOUS    dextrose 5% - 0.45% NaCl with KCl 20 mEq/L infusion  25 mL/hr IntraVENous CONTINUOUS    sodium chloride (NS) flush 5-40 mL  5-40 mL IntraVENous Q8H    sodium chloride (NS) flush 5-40 mL  5-40 mL IntraVENous PRN    oxyCODONE-acetaminophen (PERCOCET) 5-325 mg per tablet 1 Tab  1 Tab Oral Q4H PRN    morphine injection 3-5 mg  3-5 mg IntraVENous Q1H PRN    naloxone (NARCAN) injection 0.4 mg  0.4 mg IntraVENous PRN    mupirocin (BACTROBAN) 2 % ointment   Both Nostrils BID    ceFAZolin (ANCEF) 2 g/20 mL in sterile water IV syringe  2 g IntraVENous Q8H    sodium bicarbonate (8.4%) injection 50 mEq  50 mEq IntraVENous PRN    nitroglycerin (Tridil) 200 mcg/ml infusion  0-20 mcg/min IntraVENous TITRATE    lidocaine (PF) (XYLOCAINE) 100 mg/5 mL (2 %) injection syringe  mg   mg IntraVENous ONCE PRN    amiodarone (CORDARONE) tablet 200 mg  200 mg Oral Q12H    [START ON 5/5/2021] metoprolol tartrate (LOPRESSOR) tablet 25 mg  25 mg Oral Q12H    atorvastatin (LIPITOR) tablet 80 mg  80 mg Oral QHS    insulin regular (NOVOLIN R, HUMULIN R) 100 Units in 0.9% sodium chloride 100 mL infusion  1 Units/hr IntraVENous TITRATE    dextrose (D50W) injection syrg 12.5 g  25 mL IntraVENous PRN    [START ON 5/5/2021] aspirin chewable tablet 81 mg  81 mg Oral DAILY    magnesium sulfate 1 g/100 ml IVPB (premix or compounded)  1 g IntraVENous PRN    potassium chloride 10 mEq in 50 ml IVPB  10 mEq IntraVENous PRN    midazolam (VERSED) injection 1 mg  1 mg IntraVENous Q1H PRN    meperidine (DEMEROL) injection 25 mg  25 mg IntraVENous Q1H PRN    chlorhexidine (PERIDEX) 0.12 % mouthwash 10 mL  10 mL Oral BID    tuberculin injection 5 Units  5 Units IntraDERMal ONCE    albumin human 5% (BUMINATE) solution 25 g  25 g IntraVENous PRN    PHENYLephrine (REI-SYNEPHRINE) 30 mg in 0.9% sodium chloride 250 mL infusion   mcg/min IntraVENous TITRATE    EPINEPHrine (ADRENALIN) 4 mg in 0.9% sodium chloride 250 mL infusion  0.01-0.2 mcg/kg/min IntraVENous TITRATE    NOREPINephrine (LEVOPHED) 4 mg in 5% dextrose 250 mL infusion  0.01-0.2 mcg/kg/min IntraVENous TITRATE     Facility-Administered Medications Ordered in Other Encounters   Medication Dose Route Frequency    amiodarone (CORDARONE) tablet 600 mg  600 mg Oral ONCE         Objective:     Vitals:    05/04/21 1455 05/04/21 1456 05/04/21 1457 05/04/21 1458   BP:       Pulse: 72 72 72 73   Resp: 21 20 20 20   Temp: 96.8 °F (36 °C) 96.8 °F (36 °C) 96.8 °F (36 °C) 96.8 °F (36 °C)   SpO2: 100% 100% 100% 100%   Weight:       Height:           Intake and Output:   No intake/output data recorded. 05/04 0701 - 05/04 1900  In: 6974 [I.V.:2800]  Out: 5503 [Urine:645]    Physical Exam:          Constitutional:  Sedated, orally intubated and mechanically ventilated. EENMT:  Sclera clear, pupils equal, oral mucosa moist and orally intubated  Respiratory: fairly clear, on ventilator  Cardiovascular:  RRR with no M,G,R;  Gastrointestinal:  soft; positive bowel sounds present  Musculoskeletal:  warm with no cyanosis, no lower extremity edema. [place] site leftleg with ace wrap. Sedated with no movements.   SKIN:  no jaundice or ecchymosis   Neurologic:  sedated but no gross neuro deficits  Psychiatric:  sedated and unable to assess at this time    CXR:           LINES:  ETT, alexis, swan yosef, arterial line, chest tubes times 2 in epigastric area without air leak. DRIPS:  Epi, levo, claudia, nitro, amicar, precedex    CI:  2.3    Ventilator Settings  Mode FIO2 Rate Tidal Volume Pressure PEEP   Assist control  59 %(60 decreased to 50 post abg)    0.43 ml  10 cm H2O  8 cm H20      Peak airway pressure: 20 cm H2O   Minute ventilation: 8.85 l/min     ABG:   Recent Labs     05/04/21  1401 05/04/21  1346 05/04/21  1239   PHI 7.27* 7.24* 7.35   PCO2I 41.5 45.0 37.5   PO2I 235* 256* 528*   HCO3I 19.0* 19.2* 20.8*        LAB    Recent Labs     05/04/21  1335 05/03/21  0801    137*   K 3.6 3.5   * 101   CO2 20* 30   * 96   BUN 13 17   CREA 0.79* 1.17   MG 2.5*  --    CA 7.0* 9.8   ALB  --  3.9     PFT 5/3      Assessment and Plan :  (Medical Decision Making)     Hospital Problems  Date Reviewed: 9/15/2020          Codes Class Noted POA    Aortic valve stenosis ICD-10-CM: I35.0  ICD-9-CM: 424.1  5/4/2021 Unknown        CAD (coronary artery disease) ICD-10-CM: I25.10  ICD-9-CM: 414.00  5/4/2021 Unknown        Atherosclerosis of native coronary artery of native heart with unstable angina pectoris (HCC) ICD-10-CM: I25.110  ICD-9-CM: 414.01, 411.1  5/4/2021 Unknown        Encounter for weaning from ventilator [facility]) ICD-10-CM: Z99.11  ICD-9-CM: V46.13  5/4/2021 Unknown        Hypoxia ICD-10-CM: R09.02  ICD-9-CM: 799.02  5/4/2021 Unknown            64yo with PMH of HTN, HLD AS and prostates cancer. Underwent C for AV replacement. LHC showed MVCAD. Underwent CABG x 3 and AVR on 5/4. Plan:   --Wean mechanical ventilation per protocol once more stable he is not ready at present. He is coagulopathic and thrombocytopenic requiring blood products and seems to be bleeding from CT- looks like herminia blood- 800cc in the atrium.   He may need to go back to the OR  --Bronchodilators per protocol once safely extubated. --Incentive spirometry every hour post extubation. More than 50% of the time documented was spent in face-to-face contact with the patient and in the care of the patient on the floor/unit where the patient is located. Thank you for this referral.  We appreciate the opportunity to participate in this patient's care. Will follow along with you.     Rai Barron MD

## 2021-05-04 NOTE — OP NOTES
[facility]  OPERATIVE REPORT    Name:  Leda Do  MR#:  [id number]  :  1945  ACCOUNT #:  [de-identified]  DATE OF SERVICE:  2021    PREOPERATIVE DIAGNOSES:  1. Severe aortic stenosis. 2.  Multivessel atherosclerotic coronary artery disease including significant left main coronary artery disease. POSTOPERATIVE DIAGNOSES:  1. Severe aortic stenosis. 2.  Multivessel atherosclerotic coronary artery disease including significant left main coronary artery disease. PROCEDURES PERFORMED:  1. Endoscopic vein harvest from left leg. 2.  Aortic valve replacement with a 25-mm Intuity Mathew-Baker pericardial valve. 3.  Coronary artery bypass grafting x3. Grafts consisting of:  a. Left internal mammary artery to the LAD. b.  Reverse saphenous vein graft to the OM. c.  Reverse saphenous vein graft to the PDA. SURGEON:  Dena Renee. Jagdeep Reyes MD    ASSISTANT:      ANESTHESIA:  General endotracheal with LULÚ. COMPLICATIONS:  None. SPECIMENS REMOVED:  .    IMPLANTS:  .    ESTIMATED BLOOD LOSS:  Minimal.    OPERATIVE FINDINGS:  Saphenous vein 3-4 mm. LIMA 3 mm. Aorta was soft. No palpable plaque. He had a strongly palpable systolic thrill. Aortic valve was trileaflet, heavy calcifications on all three leaflets. LAD is 1.4 mm, severe distal disease. OM is 1.3 mm, severe distal disease. PDA is 1.3 mm, severe distal disease. INDICATIONS:  The patient is a very pleasant 47-year-old gentleman with a long known history of aortic stenosis whose most recent echo was consistent with severe AS. He had a 0.6 cm2 aortic valve area with a 56-mm mean gradient across this valve. Left heart cath showed significant left main disease with ostial PDA disease. His LV function was preserved. DESCRIPTION OF PROCEDURE:  After informed consent was obtained, the patient was brought to the operating room suite and placed in supine position.   General endotracheal anesthesia was induced without difficulty. He was prepped and draped in usual sterile fashion. First using the [employer] system, two segments of greater saphenous vein were taken from his left thigh. These incisions were closed with 3-0 and 4-0 Vicryl. Next, a standard median sternotomy incision was then made and sternum carefully divided. Rultract was placed and the left internal mammary artery was taken down in pedicle fashion. This was injected with papaverine and systemic heparinization was given. This was disconnected from the chest wall and noted to bleed briskly. A left pleural tube was placed through a separate stab and affixed to the skin. Rultract was removed. Sal retractor was placed. Pericardium was opened and tacked into a pericardial well. Aorta was palpated and noted to be soft. He did have a strong thrill. Pursestrings were placed in the aorta, right atrial appendage, right atrium and right pulmonary vein. After adequate ACT was obtained, he was cannulated through these pursestrings and connected to cardiopulmonary bypass. A retrograde cannula was placed in the coronary sinus. Cardiopulmonary bypass was instituted without difficulty with good decompression of the heart. Target vessels were identified and marked. Aorta was then crossclamped. He received both antegrade and retrograde cold blood cardioplegia. He did receive intermittent doses of cold blood cardioplegia throughout the remainder of the case. A left ventricular vent was placed through the pulmonary vein. The heart was positioned for grafting of the PDA. This was a 1.3-mm vessel with significant disease throughout. Using a running 7-0 Prolene, end-to-side anastomosis was completed. Reverse saphenous vein was measured back the aorta and divided. Next, the heart was positioned for grafting of the OM. This was a severely diseased vessel 1.3 mm in size and again using a running 7-0 Prolene an end-to-side anastomosis was completed. Next, the heart was positioned for grafting of the  LAD. This was a 1.4-mm vessel mostly intramyocardial and the LIMA was then fashioned for grafting and using a running 8-0 Prolene, end-to-side anastomosis was completed to the LAD. The pedicle was tacked to the epicardium with 6-0 Prolenes. Next, the aorta was opened in hockey-stick fashion revealing a very heavily calcified three leaflet aortic valve. The leaflets were excised without difficulty. Annulus was debrided. It measured 25 mm Intuity. The left ventricle was then copiously irrigated with iced saline. The three cheli stitches were then placed around the annulus. The valve was brought up into the field. Sutures were passed through the sewing ring. The valve was seated down without difficulty and was sutured into place with 2-0 Ethibond. At this point, the valve stent was inflated to 5 atmospheres for 10 seconds and the valve  was removed. The valve did appear to be seated well. The aortotomy was then closed in double layer fashion with pledgeted 4-0 Prolene. Punch aortotomies were then created for the two proximal anastomosis, which were then completed with running 7-0 Prolenes. At this point, he was warmed to 37 degrees centigrade, given a hot shot dose of cardioplegia. Meticulous de-airing maneuvers were taken and noted to be adequate by transesophageal echo. Crossclamp was removed. He had slow heart rate. Ventricular pacing wires were placed upon the heart, brought out through separate stab incision and affixed to the skin. He was VVI paced remainder of the case. He was weaned from cardiopulmonary bypass without difficulty and decannulated. All pursestrings were tied and was hemostatic. Protamine sulfate was given. Meticulous hemostasis was achieved. The pericardium was copiously irrigated with warm saline. A single 32-[place]  mediastinal tube was placed through a separate stab and affixed to the skin.   The sternum was reapproximated with stainless steel wire. The fascia closed with #1 PDS, subcu closed with 3-0 Vicryl, and the skin was closed with 4-0 Vicryl subcuticular. Complications were none. All instruments and sponge counts were correct  at the end of the case.   Blood loss was minimal.      Lizz Virgen MD      [id number]  D:  05/04/2021 13:38  T:  05/04/2021 15:28  JOB #:  [id number]

## 2021-05-04 NOTE — PERIOP NOTES
TRANSFER - OUT REPORT:    Verbal report given to JUAN C Nunez on Saeed Murphy  being transferred to CVICU for routine progression of care       Report consisted of patients Situation, Background, Assessment and   Recommendations(SBAR). Information from the following report(s) OR Summary was reviewed with the receiving nurse. Lines:   Double Lumen 05/04/21 Right Internal jugular (Active)       Peripheral IV 05/04/21 Right Hand (Active)   Site Assessment Clean, dry, & intact 05/04/21 0700   Phlebitis Assessment 0 05/04/21 0700   Infiltration Assessment 0 05/04/21 0700   Dressing Status Clean, dry, & intact 05/04/21 0700   Dressing Type Tape;Transparent 05/04/21 0700   Hub Color/Line Status Pink; Infusing 05/04/21 0700   Alcohol Cap Used No 05/04/21 0700       Arterial Line 05/04/21 Left Radial artery (Active)        Opportunity for questions and clarification was provided.       Patient transported with:   Monitor  O2 @ 15 liters  Registered Nurse   CRNA  MDA

## 2021-05-04 NOTE — ANESTHESIA PROCEDURE NOTES
Central Line Placement    Start time: 5/4/2021 7:50 AM  End time: 5/4/2021 8:01 AM  Performed by: Jess Ratliff MD  Authorized by: Jess Ratliff MD     Indications: vascular access and need for vasopressors  Preanesthetic Checklist: patient identified, risks and benefits discussed, anesthesia consent, site marked, patient being monitored and timeout performed    Timeout Time: 07:50       Pre-procedure: All elements of maximal sterile barrier technique followed? Yes    2% Chlorhexidine for cutaneous antisepsis, Hand hygiene performed prior to catheter insertion and Ultrasound guidance    Sterile Ultrasound Technique followed?: Yes        Ultrasound Image Stored? Image stored    Procedure:   Prep:  Chlorhexidine    Orientation:  Right  Patient position:  Flat    Catheter size:  8.5 Fr  Caudal catheter size: introducer.   Number of attempts:  1  Successful placement: Yes      Assessment:   Post-procedure:  Catheter secured and sterile dressing applied  Assessment:  Blood return through all ports, free fluid flow and guidewire removal verified  Insertion:  Uncomplicated  Patient tolerance:  Patient tolerated the procedure well with no immediate complications

## 2021-05-04 NOTE — ANESTHESIA PROCEDURE NOTES
LULÚ  Date/Time: 5/4/2021 8:25 AM  Ordering Provider: Adarsh Heredia MD    Procedure Details: probe placement, image aquisition & interpretation    Timeout performed, 08:25  Risks and benefits discussed with the patient and plans are to proceed    Procedure Note    Performed by: Enma Clark MD  Authorized by: Enma Clark MD       Indications: assessment of ascending aorta  Modalities: 2D  Probe Type: biplane  Insertion: atraumatic  Patient Status: intubated and sedated    Echocardiographic and Doppler Measurements   Aorta  Size  Diam(cm)  Dissection PlaqueThick(mm)  Plaque Mobile    Ascending normal 3.6 No 0-3     Arch         Descending normal              Valves  Annulus  Stenosis  Area/Grad  Regurg  Leaflet   Morph  Leaflet   Motion    Aortic calcified severe . 78 1+ calcified, thickened restricted    Mitral normal none   normal normal    Tricuspid normal none   normal           Atria  Size  SEC (smoke)  Thrombus  Tumor  Device    Rt Atrium normal No No No Yes    Lt Atrium normal No No No      Interatrial Septum Morphology: normal    Interventricular Septum Morphology: normal    Ventricle  Cavity Size  Cavity Dimension Hypertrophy  Thrombus  Gloal FXN  EF    RV normal  No no normal     LV normal   No normal          Post Intervention Follow-up Study  Ventricular Global Function: unchanged  Ventricular Regional Function: unchanged     Valve  Function  Regurgitation  Area    Aortic improved 1+     Mitral no change      Tricuspid no change      Prosthetic normal       Complications: None

## 2021-05-04 NOTE — PROGRESS NOTES
All transfusions administered through blood warmer at 41C. Mistral Air forced warming device on patient since admission.

## 2021-05-04 NOTE — ADDENDUM NOTE
Addendum  created 05/04/21 1353 by Jalen Sharpe MD    Clinical Note Signed, Intraprocedure Blocks edited

## 2021-05-05 ENCOUNTER — APPOINTMENT (OUTPATIENT)
Dept: GENERAL RADIOLOGY | Age: 76
DRG: 267 | End: 2021-05-05
Attending: THORACIC SURGERY (CARDIOTHORACIC VASCULAR SURGERY)
Payer: MEDICARE

## 2021-05-05 PROBLEM — J98.11 ATELECTASIS: Status: ACTIVE | Noted: 2021-05-05

## 2021-05-05 PROBLEM — Z95.2 S/P AVR: Status: ACTIVE | Noted: 2021-05-05

## 2021-05-05 PROBLEM — Z95.1 S/P CABG X 3: Status: ACTIVE | Noted: 2021-05-05

## 2021-05-05 LAB
ANION GAP SERPL CALC-SCNC: 6 MMOL/L (ref 7–16)
ATRIAL RATE: 69 BPM
BLD PROD TYP BPU: NORMAL
BPU ID: NORMAL
BUN SERPL-MCNC: 15 MG/DL (ref 8–23)
CALCIUM SERPL-MCNC: 7.1 MG/DL (ref 8.3–10.4)
CALCULATED P AXIS, ECG09: 45 DEGREES
CALCULATED R AXIS, ECG10: -63 DEGREES
CALCULATED T AXIS, ECG11: 103 DEGREES
CHLORIDE SERPL-SCNC: 116 MMOL/L (ref 98–107)
CO2 SERPL-SCNC: 24 MMOL/L (ref 21–32)
CREAT SERPL-MCNC: 1.05 MG/DL (ref 0.8–1.5)
DIAGNOSIS, 93000: NORMAL
ERYTHROCYTE [DISTWIDTH] IN BLOOD BY AUTOMATED COUNT: 13.7 % (ref 11.9–14.6)
GLUCOSE BLD STRIP.AUTO-MCNC: 101 MG/DL (ref 65–100)
GLUCOSE BLD STRIP.AUTO-MCNC: 103 MG/DL (ref 65–100)
GLUCOSE BLD STRIP.AUTO-MCNC: 115 MG/DL (ref 65–100)
GLUCOSE BLD STRIP.AUTO-MCNC: 130 MG/DL (ref 65–100)
GLUCOSE BLD STRIP.AUTO-MCNC: 150 MG/DL (ref 65–100)
GLUCOSE BLD STRIP.AUTO-MCNC: 154 MG/DL (ref 65–100)
GLUCOSE BLD STRIP.AUTO-MCNC: 88 MG/DL (ref 65–100)
GLUCOSE BLD STRIP.AUTO-MCNC: 93 MG/DL (ref 65–100)
GLUCOSE SERPL-MCNC: 97 MG/DL (ref 65–100)
HCT VFR BLD AUTO: 27.1 % (ref 41.1–50.3)
HGB BLD-MCNC: 9.2 G/DL (ref 13.6–17.2)
MAGNESIUM SERPL-MCNC: 2.2 MG/DL (ref 1.8–2.4)
MCH RBC QN AUTO: 30.7 PG (ref 26.1–32.9)
MCHC RBC AUTO-ENTMCNC: 33.9 G/DL (ref 31.4–35)
MCV RBC AUTO: 90.3 FL (ref 79.6–97.8)
MM INDURATION POC: 0 MM (ref 0–5)
NRBC # BLD: 0 K/UL (ref 0–0.2)
P-R INTERVAL, ECG05: 196 MS
PLATELET # BLD AUTO: 113 K/UL (ref 150–450)
PMV BLD AUTO: 11.4 FL (ref 9.4–12.3)
POTASSIUM SERPL-SCNC: 3.7 MMOL/L (ref 3.5–5.1)
PPD POC: NEGATIVE NEGATIVE
Q-T INTERVAL, ECG07: 462 MS
QRS DURATION, ECG06: 142 MS
QTC CALCULATION (BEZET), ECG08: 495 MS
RBC # BLD AUTO: 3 M/UL (ref 4.23–5.6)
SERVICE CMNT-IMP: ABNORMAL
SERVICE CMNT-IMP: NORMAL
SERVICE CMNT-IMP: NORMAL
SODIUM SERPL-SCNC: 146 MMOL/L (ref 138–145)
STATUS OF UNIT,%ST: NORMAL
UNIT DIVISION, %UDIV: 0
VENTRICULAR RATE, ECG03: 69 BPM
WBC # BLD AUTO: 11.8 K/UL (ref 4.3–11.1)

## 2021-05-05 PROCEDURE — 82962 GLUCOSE BLOOD TEST: CPT

## 2021-05-05 PROCEDURE — 77030040361 HC SLV COMPR DVT MDII -B

## 2021-05-05 PROCEDURE — 77030013064 HC TRNSF BLD FENW -A

## 2021-05-05 PROCEDURE — 36430 TRANSFUSION BLD/BLD COMPNT: CPT

## 2021-05-05 PROCEDURE — 2709999900 HC NON-CHARGEABLE SUPPLY

## 2021-05-05 PROCEDURE — 71045 X-RAY EXAM CHEST 1 VIEW: CPT

## 2021-05-05 PROCEDURE — 74011250637 HC RX REV CODE- 250/637: Performed by: PHYSICIAN ASSISTANT

## 2021-05-05 PROCEDURE — 85027 COMPLETE CBC AUTOMATED: CPT

## 2021-05-05 PROCEDURE — 65610000006 HC RM INTENSIVE CARE

## 2021-05-05 PROCEDURE — 77030040393 HC DRSG OPTIFOAM GENT MDII -B

## 2021-05-05 PROCEDURE — 83735 ASSAY OF MAGNESIUM: CPT

## 2021-05-05 PROCEDURE — 74011636637 HC RX REV CODE- 636/637: Performed by: THORACIC SURGERY (CARDIOTHORACIC VASCULAR SURGERY)

## 2021-05-05 PROCEDURE — 36600 WITHDRAWAL OF ARTERIAL BLOOD: CPT

## 2021-05-05 PROCEDURE — 74011000250 HC RX REV CODE- 250: Performed by: THORACIC SURGERY (CARDIOTHORACIC VASCULAR SURGERY)

## 2021-05-05 PROCEDURE — 80048 BASIC METABOLIC PNL TOTAL CA: CPT

## 2021-05-05 PROCEDURE — 74011250637 HC RX REV CODE- 250/637: Performed by: THORACIC SURGERY (CARDIOTHORACIC VASCULAR SURGERY)

## 2021-05-05 PROCEDURE — 99233 SBSQ HOSP IP/OBS HIGH 50: CPT | Performed by: INTERNAL MEDICINE

## 2021-05-05 PROCEDURE — 77030027138 HC INCENT SPIROMETER -A

## 2021-05-05 PROCEDURE — 77010033711 HC HIGH FLOW OXYGEN

## 2021-05-05 PROCEDURE — 74011250636 HC RX REV CODE- 250/636: Performed by: THORACIC SURGERY (CARDIOTHORACIC VASCULAR SURGERY)

## 2021-05-05 PROCEDURE — 93005 ELECTROCARDIOGRAM TRACING: CPT | Performed by: THORACIC SURGERY (CARDIOTHORACIC VASCULAR SURGERY)

## 2021-05-05 PROCEDURE — 74011000258 HC RX REV CODE- 258: Performed by: THORACIC SURGERY (CARDIOTHORACIC VASCULAR SURGERY)

## 2021-05-05 PROCEDURE — P9016 RBC LEUKOCYTES REDUCED: HCPCS

## 2021-05-05 RX ORDER — INSULIN LISPRO 100 [IU]/ML
INJECTION, SOLUTION INTRAVENOUS; SUBCUTANEOUS
Status: DISCONTINUED | OUTPATIENT
Start: 2021-05-05 | End: 2021-05-07

## 2021-05-05 RX ORDER — ACETAMINOPHEN 325 MG/1
650 TABLET ORAL
Status: DISCONTINUED | OUTPATIENT
Start: 2021-05-05 | End: 2021-05-10 | Stop reason: HOSPADM

## 2021-05-05 RX ORDER — TRAMADOL HYDROCHLORIDE 50 MG/1
50 TABLET ORAL
Status: DISCONTINUED | OUTPATIENT
Start: 2021-05-05 | End: 2021-05-06

## 2021-05-05 RX ORDER — FUROSEMIDE 10 MG/ML
20 INJECTION INTRAMUSCULAR; INTRAVENOUS ONCE
Status: COMPLETED | OUTPATIENT
Start: 2021-05-05 | End: 2021-05-05

## 2021-05-05 RX ADMIN — DEXTROSE MONOHYDRATE, SODIUM CHLORIDE, AND POTASSIUM CHLORIDE 25 ML/HR: 50; 4.5; 1.49 INJECTION, SOLUTION INTRAVENOUS at 19:59

## 2021-05-05 RX ADMIN — ACETAMINOPHEN 650 MG: 325 TABLET ORAL at 17:02

## 2021-05-05 RX ADMIN — AMIODARONE HYDROCHLORIDE 200 MG: 200 TABLET ORAL at 08:31

## 2021-05-05 RX ADMIN — SODIUM CHLORIDE 3 UNITS/HR: 900 INJECTION, SOLUTION INTRAVENOUS at 07:17

## 2021-05-05 RX ADMIN — ATORVASTATIN CALCIUM 80 MG: 80 TABLET, FILM COATED ORAL at 20:35

## 2021-05-05 RX ADMIN — PREDNISOLONE ACETATE 1 DROP: 10 SUSPENSION/ DROPS OPHTHALMIC at 08:34

## 2021-05-05 RX ADMIN — AMIODARONE HYDROCHLORIDE 200 MG: 200 TABLET ORAL at 20:35

## 2021-05-05 RX ADMIN — INSULIN LISPRO 2 UNITS: 100 INJECTION, SOLUTION INTRAVENOUS; SUBCUTANEOUS at 20:40

## 2021-05-05 RX ADMIN — CEFAZOLIN SODIUM 2 G: 100 INJECTION, POWDER, LYOPHILIZED, FOR SOLUTION INTRAVENOUS at 04:30

## 2021-05-05 RX ADMIN — ACETAMINOPHEN 650 MG: 325 TABLET ORAL at 08:31

## 2021-05-05 RX ADMIN — Medication 10 ML: at 05:43

## 2021-05-05 RX ADMIN — Medication 10 ML: at 20:32

## 2021-05-05 RX ADMIN — Medication 1 AMPULE: at 08:33

## 2021-05-05 RX ADMIN — EPINEPHRINE 0.02 MCG/KG/MIN: 1 INJECTION INTRAMUSCULAR; INTRAVENOUS; SUBCUTANEOUS at 11:32

## 2021-05-05 RX ADMIN — TRAMADOL HYDROCHLORIDE 50 MG: 50 TABLET, FILM COATED ORAL at 22:50

## 2021-05-05 RX ADMIN — POTASSIUM CHLORIDE 10 MEQ: 14.9 INJECTION, SOLUTION INTRAVENOUS at 05:06

## 2021-05-05 RX ADMIN — CHLORHEXIDINE GLUCONATE: 1.2 RINSE ORAL at 19:58

## 2021-05-05 RX ADMIN — DEXTROSE MONOHYDRATE 0.1 MCG/KG/MIN: 50 INJECTION, SOLUTION INTRAVENOUS at 12:52

## 2021-05-05 RX ADMIN — DEXTROSE MONOHYDRATE 0.1 MCG/KG/MIN: 50 INJECTION, SOLUTION INTRAVENOUS at 19:59

## 2021-05-05 RX ADMIN — ACETAMINOPHEN 650 MG: 325 TABLET ORAL at 13:04

## 2021-05-05 RX ADMIN — Medication 10 ML: at 13:44

## 2021-05-05 RX ADMIN — ASPIRIN 81 MG: 81 TABLET, CHEWABLE ORAL at 08:31

## 2021-05-05 RX ADMIN — Medication 1 AMPULE: at 20:35

## 2021-05-05 RX ADMIN — PHENYLEPHRINE HYDROCHLORIDE 60 MCG/MIN: 10 INJECTION INTRAVENOUS at 06:54

## 2021-05-05 RX ADMIN — EPINEPHRINE 0.1 MCG/KG/MIN: 1 INJECTION INTRAMUSCULAR; INTRAVENOUS; SUBCUTANEOUS at 18:02

## 2021-05-05 RX ADMIN — CHLORHEXIDINE GLUCONATE 10 ML: 1.2 RINSE ORAL at 17:02

## 2021-05-05 RX ADMIN — EPINEPHRINE 0.02 MCG/KG/MIN: 1 INJECTION INTRAMUSCULAR; INTRAVENOUS; SUBCUTANEOUS at 14:35

## 2021-05-05 RX ADMIN — FUROSEMIDE 20 MG: 10 INJECTION, SOLUTION INTRAMUSCULAR; INTRAVENOUS at 12:32

## 2021-05-05 RX ADMIN — EPINEPHRINE 0.01 MCG/KG/MIN: 1 INJECTION INTRAMUSCULAR; INTRAVENOUS; SUBCUTANEOUS at 16:56

## 2021-05-05 NOTE — PROGRESS NOTES
Ventilator check complete; patient has a #8. 0 ET tube secured at the 23 at the lip. Patient is not sedated. Patient is able to follow commands. Breath sounds are clear. Trachea is midline, Negative for subcutaneous air, and chest excursion is symmetric. Patient is also Negative for cyanosis and is Negative for pitting edema. All alarms are set and audible. Resuscitation bag is at the head of the bed.       Ventilator Settings  Mode FIO2 Rate Tidal Volume Pressure PEEP I:E Ratio   Pressure support  30 %      5 cm H2O  8 cm H20         Peak airway pressure: 13 cm H2O   Minute ventilation: 8.77 l/min         Yenifer Candelario RT

## 2021-05-05 NOTE — PROGRESS NOTES
Respiratory Mechanics completed and are as follows:  Weaning Parameters  Spontaneous Breathing Trial Complete: Yes  Resp Rate Observed: 18  Ve: 8.8  VT: 500  RSBI: 36  NIF: -40  VC: 602  [place] Agitation Sedation Scale (RASS): Alert and calm  Patient extubated to Airvo with 40L flow and 30% FiO2. Patient is able to communicate and is negative for stridor. Breath sounds are clear. No complications with extubation.      Lory Giles, RT

## 2021-05-05 NOTE — PROGRESS NOTES
Cardiovascular ICU Consult Note: 5/5/2021  Jenna Prieto     Patient is seen at the request of Dr. Sarah Abbott for respiratory management status post cardiac surgery. Patient had CABG x 3 and AV Repair. Currently is sedated in CV-ICU and orally intubated receiving  mechanical ventilation. PMH of HTN, HLD, prostate cancer with radiation, and Aortic stenosis. He was seen by Cardiology for symptomatic AS and underwent LHC for workup for valve replacement. LHC showed MVCAD and patient was referred for CT Surgery. We have been asked to see in the CV-ICU for mechanical ventilation management and weaning. Currently patient is coagulopathic and bleeding from chest drains requiring blood products, he is not ready to safely wean off ventilator. Admission Date: 5/4/2021     The patient's chart is reviewed and the patient is discussed with the staff. Subjective:     Patient off vent late last night about 11:30 pm. Today is on Airvo. Thirsty and still slightly groggy. sTill on pressors.      Review of Systems:  -Fever +weakness +fatigue  -Headaches  -Chest pain  -Dyspnea, -wheezing, -cough  -Abdominal pain, -constipation  -Leg swelling  All other organ systems grossly normal.      Allergies   Allergen Reactions    Latex, Natural Rubber Rash     \"tears skin off\"         Current Facility-Administered Medications   Medication Dose Route Frequency    acetaminophen (TYLENOL) tablet 650 mg  650 mg Oral Q4H PRN    alcohol 62% (NOZIN) nasal  1 Ampule  1 Ampule Topical Q12H    0.9% sodium chloride infusion 250 mL  250 mL IntraVENous PRN    0.9% sodium chloride infusion  25 mL/hr IntraVENous CONTINUOUS    dextrose 5% - 0.45% NaCl with KCl 20 mEq/L infusion  25 mL/hr IntraVENous CONTINUOUS    sodium chloride (NS) flush 5-40 mL  5-40 mL IntraVENous Q8H    sodium chloride (NS) flush 5-40 mL  5-40 mL IntraVENous PRN    oxyCODONE-acetaminophen (PERCOCET) 5-325 mg per tablet 1 Tab  1 Tab Oral Q4H PRN    morphine injection 3-5 mg  3-5 mg IntraVENous Q1H PRN    naloxone (NARCAN) injection 0.4 mg  0.4 mg IntraVENous PRN    sodium bicarbonate (8.4%) injection 50 mEq  50 mEq IntraVENous PRN    nitroglycerin (Tridil) 200 mcg/ml infusion  0-20 mcg/min IntraVENous TITRATE    lidocaine (PF) (XYLOCAINE) 100 mg/5 mL (2 %) injection syringe  mg   mg IntraVENous ONCE PRN    amiodarone (CORDARONE) tablet 200 mg  200 mg Oral Q12H    metoprolol tartrate (LOPRESSOR) tablet 25 mg  25 mg Oral Q12H    atorvastatin (LIPITOR) tablet 80 mg  80 mg Oral QHS    insulin regular (NOVOLIN R, HUMULIN R) 100 Units in 0.9% sodium chloride 100 mL infusion  1 Units/hr IntraVENous TITRATE    dextrose (D50W) injection syrg 12.5 g  25 mL IntraVENous PRN    aspirin chewable tablet 81 mg  81 mg Oral DAILY    magnesium sulfate 1 g/100 ml IVPB (premix or compounded)  1 g IntraVENous PRN    potassium chloride 10 mEq in 50 ml IVPB  10 mEq IntraVENous PRN    midazolam (VERSED) injection 1 mg  1 mg IntraVENous Q1H PRN    meperidine (DEMEROL) injection 25 mg  25 mg IntraVENous Q1H PRN    chlorhexidine (PERIDEX) 0.12 % mouthwash 10 mL  10 mL Oral BID    tuberculin injection 5 Units  5 Units IntraDERMal ONCE    albumin human 5% (BUMINATE) solution 25 g  25 g IntraVENous PRN    PHENYLephrine (REI-SYNEPHRINE) 30 mg in 0.9% sodium chloride 250 mL infusion   mcg/min IntraVENous TITRATE    EPINEPHrine (ADRENALIN) 4 mg in 0.9% sodium chloride 250 mL infusion  0.01-0.2 mcg/kg/min IntraVENous TITRATE    NOREPINephrine (LEVOPHED) 4 mg in 5% dextrose 250 mL infusion  0.01-0.2 mcg/kg/min IntraVENous TITRATE    dexmedeTOMidine in 0.9 % NaCl (PRECEDEX) 400 mcg/100 mL (4 mcg/mL) infusion soln  0.5 mcg/kg/hr IntraVENous TITRATE    prednisoLONE acetate (PRED FORTE) 1 % ophthalmic suspension 1 Drop  1 Drop Both Eyes DAILY    lip protectant (BLISTEX) ointment 1 Each  1 Each Topical PRN         Objective:     Vitals: 05/05/21 0452 05/05/21 0501 05/05/21 0515 05/05/21 0531   BP: (!) 97/53      Pulse: 73 73 69 69   Resp: 17 20 23 20   Temp: 100.2 °F (37.9 °C) 100.2 °F (37.9 °C) 100.2 °F (37.9 °C) 100 °F (37.8 °C)   SpO2: 99% 99% 100% 99%   Weight:       Height:           Intake and Output:   05/03 0701 - 05/04 1900  In: 6485.1 [I.V.:4566.1]  Out: 2115 [Urine:855]  05/04 1901 - 05/05 0700  In: 1000 [I.V.:1000]  Out: 875 [Urine:635]    Physical Exam:          Constitutional:  Obese WM in bed and not in distress  EENMT:  Sclera clear, pupils equal, oral mucosa moist and on Airvo josé miguel 40 L and 29%  Respiratory: CTA b/l. No wheezing. Cardiovascular:  RRR with no M,G,R;  Gastrointestinal:  soft; positive bowel sounds present  Musculoskeletal:  warm with no cyanosis, no lower extremity edema. [place] site leftleg with ace wrap. [place] SKIN:  no jaundice or ecchymosis   Neurologic:   no gross neuro deficits  Psychiatric:  Calm and alert. CXR: minimum left base atelectasis           LINES:    alexis, flowtrack, arterial line, chest tubes times 2 in epigastric area without air leak. DRIPS:  Epi, levo, claudia, insulin    CI:  2.3         LABS    Recent Labs     05/05/21 0313 05/04/21 2210 05/04/21 1734 05/04/21  1500 05/04/21  1335   WBC 11.8*  --   --   --  14.9*   HGB 9.2* 9.2* 9.1* 8.6* 10.5*   HCT 27.1* 28.1* 28.2* 26.3* 32.3*   *  --   --  130* 51*     Recent Labs     05/05/21 0313 05/04/21 2210 05/04/21  1734 05/04/21  1335 05/03/21  0801 05/03/21  0801   *  --   --  145  --  137*   K 3.7 3.6 4.0 3.6  --  3.5   *  --   --  116*  --  101   GLU 97  --   --  151*  --  96   CO2 24  --   --  20*  --  30   BUN 15  --   --  13  --  17   CREA 1.05  --   --  0.79*  --  1.17   MG 2.2 2.3 2.4 2.5*   < >  --    INR  --   --   --  1.8  --   --     < > = values in this interval not displayed.      Recent Labs     05/04/21  2224 05/04/21  1929 05/04/21  1738   PHI 7.36 7.28* 7.30*   PCO2I 38.8 43.3 41.1   PO2I 109* 115* 106* HCO3I 22.1 20.5* 20.2*     No results for input(s): LCAD, LAC in the last 72 hours. PFT 5/3      Assessment and Plan :  (Medical Decision Making)     Hospital Problems  Date Reviewed: 9/15/2020          Codes Class Noted POA    S/P AVR ICD-10-CM: Z95.2  ICD-9-CM: V43.3  5/5/2021 Unknown        S/P CABG x 3 ICD-10-CM: Z95.1  ICD-9-CM: V45.81  5/5/2021 Unknown        Atelectasis ICD-10-CM: J98.11  ICD-9-CM: 518.0  5/5/2021 Unknown        Aortic valve stenosis ICD-10-CM: I35.0  ICD-9-CM: 424.1  5/4/2021 Unknown        CAD (coronary artery disease) ICD-10-CM: I25.10  ICD-9-CM: 414.00  5/4/2021 Unknown        Atherosclerosis of native coronary artery of native heart with unstable angina pectoris (HCC) ICD-10-CM: I25.110  ICD-9-CM: 414.01, 411.1  5/4/2021 Unknown        Encounter for weaning from ventilator [facility]) ICD-10-CM: Z99.11  ICD-9-CM: V46.13  5/4/2021 Unknown        Hypoxia ICD-10-CM: R09.02  ICD-9-CM: 799.02  5/4/2021 Unknown            64yo with PMH of HTN, HLD AS and prostates cancer. Underwent [facility] for AV replacement. LHC showed MVCAD. Underwent CABG x 3 and AVR on 5/4. Plan:   --off vent and wean airvo  --still with a lot of drip and management per CV surgery  --chest tubes per CV surgery  --Incentive spirometry every hour post extubation. --mobilize when appropriate  --WBC is down today. --continue remaining treatment. More than 50% of the time documented was spent in face-to-face contact with the patient and in the care of the patient on the floor/unit where the patient is located.    Brooklyn Mckinley MD

## 2021-05-05 NOTE — PROGRESS NOTES
A follow up visit was made to the patient. Emotional support, spiritual presence and   prayer were provided for the patient. A  contact card was left for the patient.       Tomas Merino, [address] [facility], [facility]

## 2021-05-05 NOTE — PROGRESS NOTES
POD 1 Day Post-Op    Procedure:  Procedure(s):  CORONARY ARTERY BYPASS GRAFT (CABG X3) WITH AVR, LIMA  LULÚ/ANES PROBE  VEIN HARVEST ENDOSCOPIC, GREATER SAPHENOUS      Subjective:     Patient has No significant medical complaints      Objective:     Patient Vitals for the past 8 hrs:   BP Temp Pulse Resp SpO2 Weight   05/05/21 0645 -- 99.8 °F (37.7 °C) 66 19 99 % --   05/05/21 0630 -- 99.8 °F (37.7 °C) 67 20 99 % --   05/05/21 0615 -- 100 °F (37.8 °C) 68 17 99 % --   05/05/21 0600 -- 100 °F (37.8 °C) 68 17 99 % --   05/05/21 0545 -- 100 °F (37.8 °C) 68 25 99 % --   05/05/21 0531 -- 100 °F (37.8 °C) 69 20 99 % --   05/05/21 0515 -- 100.2 °F (37.9 °C) 69 23 100 % --   05/05/21 0501 -- 100.2 °F (37.9 °C) 73 20 99 % --   05/05/21 0452 (!) 97/53 100.2 °F (37.9 °C) 73 17 99 % --   05/05/21 0445 -- 100.4 °F (38 °C) 70 17 99 % --   05/05/21 0430 -- 100.4 °F (38 °C) 71 18 99 % 239 lb 6.7 oz (108.6 kg)   05/05/21 0412 -- 100.4 °F (38 °C) 72 19 99 % --   05/05/21 0400 -- 100.4 °F (38 °C) 72 19 99 % --   05/05/21 0345 -- 100.4 °F (38 °C) 73 18 99 % --   05/05/21 0330 -- (!) 100.5 °F (38.1 °C) 72 17 100 % --   05/05/21 0318 (!) 100/57 (!) 100.5 °F (38.1 °C) 73 26 99 % --   05/05/21 0300 -- (!) 100.5 °F (38.1 °C) 74 18 99 % --   05/05/21 0245 -- (!) 100.5 °F (38.1 °C) 74 18 99 % --   05/05/21 0230 -- (!) 100.5 °F (38.1 °C) 75 19 99 % --   05/05/21 0215 -- (!) 100.5 °F (38.1 °C) 75 19 99 % --   05/05/21 0200 -- (!) 100.5 °F (38.1 °C) 76 19 99 % --   05/05/21 0145 -- (!) 100.5 °F (38.1 °C) 76 19 98 % --   05/05/21 0130 -- (!) 100.5 °F (38.1 °C) 76 18 98 % --   05/05/21 0115 -- (!) 100.5 °F (38.1 °C) 77 19 98 % --   05/05/21 0100 -- 100.4 °F (38 °C) 76 17 98 % --   05/05/21 0045 -- 100.4 °F (38 °C) 77 18 98 % --   05/05/21 0030 -- 100.4 °F (38 °C) 78 17 99 % --   05/05/21 0015 -- 100.4 °F (38 °C) 79 18 99 % --   05/05/21 0000 -- 100.4 °F (38 °C) 78 16 99 % --   05/04/21 2345 -- 100.4 °F (38 °C) 76 18 99 % --   05/04/21 2334 -- 100.4 °F (38 °C) 78 19 100 % --   21 2330 (!) 111/53 100.4 °F (38 °C) 80 22 99 % --     Temp (24hrs), Av °F (36.7 °C), Min:91.7 °F (33.2 °C), Max:100.5 °F (38.1 °C)        Hemodynamics    PAP  CO (l/min): 5 l/min CO  CI (l/min/m2): 2.3 l/min/m2 CI    701 - 1900  In: -   Out: 185 [Urine:75]  1901 -  07  In: 7485.1 [I.V.:5566.1]  Out: 0407 [Urine:1490]    CT Drainage              total of all CT's    Heart:  regular rate and rhythm, S1, S2 normal, no murmur, click, rub or gallop  Lung:  clear to auscultation bilaterally  Neuro: Grossly non focal  Incisions: Clean, dry, and intact    Labs:  Recent Results (from the past 24 hour(s))   BLOOD GAS & LYTES, ARTERIAL    Collection Time: 21  8:11 AM   Result Value Ref Range    pH (POC) 7.46 (H) 7.35 - 7.45      pCO2 (POC) 38.5 35 - 45 MMHG    pO2 (POC) 523 (H) 75 - 100 MMHG    Sodium,  136 - 145 MMOL/L    Potassium, POC 3.7 3.5 - 5.1 MMOL/L    Calcium, ionized (POC) 1.19 1.12 - 1.32 mmol/L    Glucose, POC 92 65 - 100 MG/DL    Base excess (POC) 3.2 mmol/L    HCO3 (POC) 27.2 (H) 22 - 26 MMOL/L    CO2, POC 27 (H) 13 - 23 MMOL/L    O2  %    Sample source ARTERIAL      Performed by [address] [place] [address], ARTERIAL    Collection Time: 21  9:41 AM   Result Value Ref Range    pH (POC) 7.39 7.35 - 7.45      pCO2 (POC) 43.8 35 - 45 MMHG    pO2 (POC) 303 (H) 75 - 100 MMHG    Sodium,  136 - 145 MMOL/L    Potassium, POC 3.8 3.5 - 5.1 MMOL/L    Calcium, ionized (POC) 1.13 1.12 - 1.32 mmol/L    Glucose,  (H) 65 - 100 MG/DL    Base excess (POC) 1.2 mmol/L    HCO3 (POC) 26.5 (H) 22 - 26 MMOL/L    CO2, POC 26 (H) 13 - 23 MMOL/L    O2  %    Sample source ARTERIAL      Performed by [address] [place] [address], ARTERIAL    Collection Time: 21 10:32 AM   Result Value Ref Range    pH (POC) 7.42 7.35 - 7.45      pCO2 (POC) 34.3 (L) 35 - 45 MMHG    pO2 (POC) 411 (H) 75 - 100 MMHG    Sodium, POC 131 (L) 136 - 145 MMOL/L    Potassium, POC 4.8 3.5 - 5.1 MMOL/L    Calcium, ionized (POC) 1.04 (L) 1.12 - 1.32 mmol/L    Glucose,  (H) 65 - 100 MG/DL    Base deficit (POC) 1.7 mmol/L    HCO3 (POC) 22.4 22 - 26 MMOL/L    CO2, POC 22 13 - 23 MMOL/L    O2  %    Sample source ARTERIAL      Performed by [address] [place] [address], ARTERIAL    Collection Time: 05/04/21 11:00 AM   Result Value Ref Range    pH (POC) 7.33 (L) 7.35 - 7.45      pCO2 (POC) 49.7 (H) 35 - 45 MMHG    pO2 (POC) 327 (H) 75 - 100 MMHG    Sodium,  136 - 145 MMOL/L    Potassium, POC 4.6 3.5 - 5.1 MMOL/L    Calcium, ionized (POC) 1.11 (L) 1.12 - 1.32 mmol/L    Glucose,  (H) 65 - 100 MG/DL    Base excess (POC) 0.1 mmol/L    HCO3 (POC) 26.3 (H) 22 - 26 MMOL/L    CO2, POC 26 (H) 13 - 23 MMOL/L    O2  %    Sample source ARTERIAL      Performed by [address] [place] [address], ARTERIAL    Collection Time: 05/04/21 11:29 AM   Result Value Ref Range    pH (POC) 7.37 7.35 - 7.45      pCO2 (POC) 42.6 35 - 45 MMHG    pO2 (POC) 250 (H) 75 - 100 MMHG    Sodium,  136 - 145 MMOL/L    Potassium, POC 4.8 3.5 - 5.1 MMOL/L    Calcium, ionized (POC) 1.06 (L) 1.12 - 1.32 mmol/L    Glucose,  (H) 65 - 100 MG/DL    Base deficit (POC) 0.9 mmol/L    HCO3 (POC) 24.5 22 - 26 MMOL/L    CO2, POC 24 (H) 13 - 23 MMOL/L    O2  %    Sample source ARTERIAL      Performed by [address] [place] [address], ARTERIAL    Collection Time: 05/04/21 12:39 PM   Result Value Ref Range    pH (POC) 7.35 7.35 - 7.45      pCO2 (POC) 37.5 35 - 45 MMHG    pO2 (POC) 528 (H) 75 - 100 MMHG    Sodium,  136 - 145 MMOL/L    Potassium, POC 3.8 3.5 - 5.1 MMOL/L    Calcium, ionized (POC) 1.28 1.12 - 1.32 mmol/L    Glucose,  (H) 65 - 100 MG/DL    Base deficit (POC) 4.3 mmol/L    HCO3 (POC) 20.8 (L) 22 - 26 MMOL/L    CO2, POC 21 13 - 23 MMOL/L    O2  %    Sample source ARTERIAL      Performed by [employer] TEG GLOBAL HEMOSTASIS    Collection Time: 05/04/21  1:31 PM   Result Value Ref Range    Citrated Kaolin R-Time 6.1 4.6 - 9.1 MINS    [employer] Kaolin K-Time 2.3 (H) 0.8 - 2.1 MINS    Citrated Kaolin Angle 64.4 63 - 78 deg    Citrated Kaolin Maximum Amplitude 48.1 (L) 52 - 69 mm    Citrated RapidTEG Maximum Amplitude 47.4 (L) 52 - 70 mm    Citrated Kaolin/Heparinase R-Time 5.6 4.3 - 8.3 MINS    Citrated Functional Fibrinogen Maximum Amplitude 14.1 (L) 15 - 32 mm    Functional Fibrinogen Level 257.3 (L) 278 - 581 mg/dL   GLUCOSE, POC    Collection Time: 05/04/21  1:31 PM   Result Value Ref Range    Glucose (POC) 147 (H) 65 - 100 mg/dL    Performed by [facility]    METABOLIC PANEL, BASIC    Collection Time: 05/04/21  1:35 PM   Result Value Ref Range    Sodium 145 138 - 145 mmol/L    Potassium 3.6 3.5 - 5.1 mmol/L    Chloride 116 (H) 98 - 107 mmol/L    CO2 20 (L) 21 - 32 mmol/L    Anion gap 9 7 - 16 mmol/L    Glucose 151 (H) 65 - 100 mg/dL    BUN 13 8 - 23 MG/DL    Creatinine 0.79 (L) 0.8 - 1.5 MG/DL    GFR est AA >60 >60 ml/min/1.73m2    GFR est non-AA >60 >60 ml/min/1.73m2    Calcium 7.0 (L) 8.3 - 10.4 MG/DL   MAGNESIUM    Collection Time: 05/04/21  1:35 PM   Result Value Ref Range    Magnesium 2.5 (H) 1.8 - 2.4 mg/dL   CBC W/O DIFF    Collection Time: 05/04/21  1:35 PM   Result Value Ref Range    WBC 14.9 (H) 4.3 - 11.1 K/uL    RBC 3.45 (L) 4.23 - 5.6 M/uL    HGB 10.5 (L) 13.6 - 17.2 g/dL    HCT 32.3 (L) 41.1 - 50.3 %    MCV 93.6 79.6 - 97.8 FL    MCH 30.4 26.1 - 32.9 PG    MCHC 32.5 31.4 - 35.0 g/dL    RDW 13.5 11.9 - 14.6 %    PLATELET 51 (L) 655 - 450 K/uL    MPV 12.2 9.4 - 12.3 FL    ABSOLUTE NRBC 0.00 0.0 - 0.2 K/uL   PTT    Collection Time: 05/04/21  1:35 PM   Result Value Ref Range    aPTT 65.0 (H) 24.1 - 35.1 SEC   PROTHROMBIN TIME + INR    Collection Time: 05/04/21  1:35 PM   Result Value Ref Range    Prothrombin time 21.1 (H) 12.5 - 14.7 sec    INR 1.8     FIBRINOGEN    Collection Time: 05/04/21  1:35 PM   Result Value Ref Range    Fibrinogen 151 (L) 190 - 501 mg/dL   BLOOD GAS, ARTERIAL POC    Collection Time: 05/04/21  1:46 PM   Result Value Ref Range    Device: ADULT VENT      FIO2 (POC) 60 %    pH (POC) 7.24 (LL) 7.35 - 7.45      pCO2 (POC) 45.0 35 - 45 MMHG    pO2 (POC) 256 (H) 75 - 100 MMHG    HCO3 (POC) 19.2 (L) 22 - 26 MMOL/L    sO2 (POC) 99.8 (H) 95 - 98 %    Base deficit (POC) 7.8 mmol/L    Mode SIMV      Tidal volume 450 ml    PEEP/CPAP (POC) 8 cmH2O    Allens test (POC) NOT APPLICABLE      Site DRAWN FROM ARTERIAL LINE      Specimen type (POC) ARTERIAL      Performed by Quincy Sharif     Critical value read back GERMÁN     Respiratory comment: ve9.24    BLOOD GAS, ARTERIAL POC    Collection Time: 05/04/21  2:01 PM   Result Value Ref Range    Device: ADULT VENT      FIO2 (POC) 60 %    pH (POC) 7.27 (L) 7.35 - 7.45      pCO2 (POC) 41.5 35 - 45 MMHG    pO2 (POC) 235 (H) 75 - 100 MMHG    HCO3 (POC) 19.0 (L) 22 - 26 MMOL/L    sO2 (POC) 99.7 (H) 95 - 98 %    Base deficit (POC) 7.6 mmol/L    Mode SIMV      Tidal volume 450 ml    PEEP/CPAP (POC) 8 cmH2O    Allens test (POC) NOT APPLICABLE      Site DRAWN FROM ARTERIAL LINE      Specimen type (POC) ARTERIAL      Performed by Ricardo     Critical value read back JIM     Respiratory comment: ve9.14    EKG, 12 LEAD, INITIAL    Collection Time: 05/04/21  2:09 PM   Result Value Ref Range    Ventricular Rate 69 BPM    Atrial Rate 69 BPM    P-R Interval 192 ms    QRS Duration 146 ms    Q-T Interval 470 ms    QTC Calculation (Bezet) 503 ms    Calculated P Axis 14 degrees    Calculated R Axis -10 degrees    Calculated T Axis 135 degrees    Diagnosis       Normal sinus rhythm  Left bundle branch block  Abnormal ECG  No previous ECGs available  Confirmed by Lee Can ([id number]) on 5/4/2021 3:55:33 PM     CRYOPRECIPITATE, ALLOCATE    Collection Time: 05/04/21  2:30 PM   Result Value Ref Range    Unit number [id number]     Blood component type CRYO,5U Thaw Unit division 00     Status of unit TRANSFUSED     Unit number [id number]     Blood component type CRYO,5U Thaw     Unit division 00     Status of unit TRANSFUSED    BLOOD GAS, ARTERIAL POC    Collection Time: 05/04/21  2:49 PM   Result Value Ref Range    Performed by Elissa    TEG GLOBAL HEMOSTASIS    Collection Time: 05/04/21  3:00 PM   Result Value Ref Range    Citrated Kaolin R-Time 6.1 4.6 - 9.1 MINS    Citrated Kaolin K-Time 1.2 0.8 - 2.1 MINS    Citrated Kaolin Angle 74.3 63 - 78 deg    Citrated Kaolin Maximum Amplitude 62.8 52 - 69 mm    Citrated RapidTEG Maximum Amplitude 61.4 52 - 70 mm    Citrated Kaolin/Heparinase R-Time 5.7 4.3 - 8.3 MINS    Citrated Functional Fibrinogen Maximum Amplitude 20.3 15 - 32 mm    Functional Fibrinogen Level 370.4 278 - 581 mg/dL   HGB & HCT    Collection Time: 05/04/21  3:00 PM   Result Value Ref Range    HGB 8.6 (L) 13.6 - 17.2 g/dL    HCT 26.3 (L) 41.1 - 50.3 %   PLATELET COUNT    Collection Time: 05/04/21  3:00 PM   Result Value Ref Range    PLATELET 107 (L) 650 - 450 K/uL   BLOOD GAS & LYTES, ARTERIAL    Collection Time: 05/04/21  3:28 PM   Result Value Ref Range    pH (POC) 7.23 (LL) 7.35 - 7.45      pCO2 (POC) 43.9 35 - 45 MMHG    pO2 (POC) 144 (H) 75 - 100 MMHG    Sodium,  136 - 145 MMOL/L    Potassium, POC 3.6 3.5 - 5.1 MMOL/L    Calcium, ionized (POC) 1.08 (L) 1.12 - 1.32 mmol/L    Glucose,  (H) 65 - 100 MG/DL    Base deficit (POC) 8.7 mmol/L    HCO3 (POC) 18.3 (L) 22 - 26 MMOL/L    CO2, POC 19 13 - 23 MMOL/L    O2 SAT 99 %    Sample source ARTERIAL      Performed by [employer] DOUGLAS ALLOCMARTINEZ    Collection Time: 05/04/21  3:30 PM   Result Value Ref Range    HISTORY CHECKED?  Historical check performed    GLUCOSE, POC    Collection Time: 05/04/21  3:48 PM   Result Value Ref Range    Glucose (POC) 175 (H) 65 - 100 mg/dL    Performed by Negron ([place]oeung)    GLUCOSE, POC    Collection Time: 05/04/21  4:23 PM   Result Value Ref Range Glucose (POC) 168 (H) 65 - 100 mg/dL    Performed by Radha)RNMallica    BLOOD GAS, ARTERIAL POC    Collection Time: 05/04/21  4:24 PM   Result Value Ref Range    Device: ADULT VENT      FIO2 (POC) 40 %    pH (POC) 7.23 (LL) 7.35 - 7.45      pCO2 (POC) 46.6 (H) 35 - 45 MMHG    pO2 (POC) 128 (H) 75 - 100 MMHG    HCO3 (POC) 19.5 (L) 22 - 26 MMOL/L    sO2 (POC) 98.2 (H) 95 - 98 %    Base deficit (POC) 7.7 mmol/L    Mode SIMV      Tidal volume 450 ml    PEEP/CPAP (POC) 8 cmH2O    Allens test (POC) NOT APPLICABLE      Site DRAWN FROM ARTERIAL LINE      Specimen type (POC) ARTERIAL      Performed by Ricardo     Critical value read back      Respiratory comment: ve9.19    GLUCOSE, POC    Collection Time: 05/04/21  5:33 PM   Result Value Ref Range    Glucose (POC) 146 (H) 65 - 100 mg/dL    Performed by Slava)RNMallica    MAGNESIUM    Collection Time: 05/04/21  5:34 PM   Result Value Ref Range    Magnesium 2.4 1.8 - 2.4 mg/dL   POTASSIUM    Collection Time: 05/04/21  5:34 PM   Result Value Ref Range    Potassium 4.0 3.5 - 5.1 mmol/L   HGB & HCT    Collection Time: 05/04/21  5:34 PM   Result Value Ref Range    HGB 9.1 (L) 13.6 - 17.2 g/dL    HCT 28.2 (L) 41.1 - 50.3 %   BLOOD GAS, ARTERIAL POC    Collection Time: 05/04/21  5:38 PM   Result Value Ref Range    Device: ADULT VENT      FIO2 (POC) 30 %    pH (POC) 7.30 (L) 7.35 - 7.45      pCO2 (POC) 41.1 35 - 45 MMHG    pO2 (POC) 106 (H) 75 - 100 MMHG    HCO3 (POC) 20.2 (L) 22 - 26 MMOL/L    sO2 (POC) 97.5 95 - 98 %    Base deficit (POC) 5.8 mmol/L    Mode SIMV      Tidal volume 450 ml    PEEP/CPAP (POC) 8 cmH2O    Allens test (POC) NOT APPLICABLE      Site DRAWN FROM ARTERIAL LINE      Specimen type (POC) ARTERIAL      Performed by Ricardo     Respiratory comment: ve8.97    GLUCOSE, POC    Collection Time: 05/04/21  6:08 PM   Result Value Ref Range    Glucose (POC) 138 (H) 65 - 100 mg/dL    Performed by Slava)RNMallica    BLOOD GAS, ARTERIAL POC    Collection Time: 05/04/21  7:29 PM   Result Value Ref Range    Device: ADULT VENT      FIO2 (POC) 30 %    pH (POC) 7.28 (L) 7.35 - 7.45      pCO2 (POC) 43.3 35 - 45 MMHG    pO2 (POC) 115 (H) 75 - 100 MMHG    HCO3 (POC) 20.5 (L) 22 - 26 MMOL/L    sO2 (POC) 97.9 95 - 98 %    Base deficit (POC) 5.8 mmol/L    Mode Pressure Support      PEEP/CPAP (POC) 8 cmH2O    Pressure support 5 cmH2O    Allens test (POC) NOT APPLICABLE      Site DRAWN FROM ARTERIAL LINE      Specimen type (POC) ARTERIAL      Performed by [email or web address]     Respiratory comment: VE8.8    GLUCOSE, POC    Collection Time: 05/04/21  8:38 PM   Result Value Ref Range    Glucose (POC) 137 (H) 65 - 100 mg/dL    Performed by [employer]    GLUCOSE, POC    Collection Time: 05/04/21 10:07 PM   Result Value Ref Range    Glucose (POC) 118 (H) 65 - 100 mg/dL    Performed by GeoPalz    HGB & HCT    Collection Time: 05/04/21 10:10 PM   Result Value Ref Range    HGB 9.2 (L) 13.6 - 17.2 g/dL    HCT 28.1 (L) 41.1 - 50.3 %   POTASSIUM    Collection Time: 05/04/21 10:10 PM   Result Value Ref Range    Potassium 3.6 3.5 - 5.1 mmol/L   MAGNESIUM    Collection Time: 05/04/21 10:10 PM   Result Value Ref Range    Magnesium 2.3 1.8 - 2.4 mg/dL   BLOOD GAS, ARTERIAL POC    Collection Time: 05/04/21 10:24 PM   Result Value Ref Range    Device: ADULT VENT      FIO2 (POC) 30 %    pH (POC) 7.36 7.35 - 7.45      pCO2 (POC) 38.8 35 - 45 MMHG    pO2 (POC) 109 (H) 75 - 100 MMHG    HCO3 (POC) 22.1 22 - 26 MMOL/L    sO2 (POC) 98.1 (H) 95 - 98 %    Base deficit (POC) 3.0 mmol/L    Mode Pressure Support      PEEP/CPAP (POC) 8 cmH2O    Pressure support 5 cmH2O    Allens test (POC) NOT APPLICABLE      Site DRAWN FROM ARTERIAL LINE      Specimen type (POC) ARTERIAL      Performed by [facility]     Respiratory comment: VE8    GLUCOSE, POC    Collection Time: 05/05/21  1:09 AM   Result Value Ref Range    Glucose (POC) 115 (H) 65 - 100 mg/dL    Performed by [email or web address]    MAGNESIUM    Collection Time: 05/05/21  3:13 AM   Result Value Ref Range    Magnesium 2.2 1.8 - 2.4 mg/dL   METABOLIC PANEL, BASIC    Collection Time: 05/05/21  3:13 AM   Result Value Ref Range    Sodium 146 (H) 138 - 145 mmol/L    Potassium 3.7 3.5 - 5.1 mmol/L    Chloride 116 (H) 98 - 107 mmol/L    CO2 24 21 - 32 mmol/L    Anion gap 6 (L) 7 - 16 mmol/L    Glucose 97 65 - 100 mg/dL    BUN 15 8 - 23 MG/DL    Creatinine 1.05 0.8 - 1.5 MG/DL    GFR est AA >60 >60 ml/min/1.73m2    GFR est non-AA >60 >60 ml/min/1.73m2    Calcium 7.1 (L) 8.3 - 10.4 MG/DL   CBC W/O DIFF    Collection Time: 05/05/21  3:13 AM   Result Value Ref Range    WBC 11.8 (H) 4.3 - 11.1 K/uL    RBC 3.00 (L) 4.23 - 5.6 M/uL    HGB 9.2 (L) 13.6 - 17.2 g/dL    HCT 27.1 (L) 41.1 - 50.3 %    MCV 90.3 79.6 - 97.8 FL    MCH 30.7 26.1 - 32.9 PG    MCHC 33.9 31.4 - 35.0 g/dL    RDW 13.7 11.9 - 14.6 %    PLATELET 055 (L) 514 - 450 K/uL    MPV 11.4 9.4 - 12.3 FL    ABSOLUTE NRBC 0.00 0.0 - 0.2 K/uL   GLUCOSE, POC    Collection Time: 05/05/21  3:15 AM   Result Value Ref Range    Glucose (POC) 103 (H) 65 - 100 mg/dL    Performed by [employer]    GLUCOSE, POC    Collection Time: 05/05/21  5:11 AM   Result Value Ref Range    Glucose (POC) 93 65 - 100 mg/dL    Performed by 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

## 2021-05-06 ENCOUNTER — APPOINTMENT (OUTPATIENT)
Dept: GENERAL RADIOLOGY | Age: 76
DRG: 267 | End: 2021-05-06
Attending: THORACIC SURGERY (CARDIOTHORACIC VASCULAR SURGERY)
Payer: MEDICARE

## 2021-05-06 LAB
ANION GAP SERPL CALC-SCNC: 9 MMOL/L (ref 7–16)
BUN SERPL-MCNC: 16 MG/DL (ref 8–23)
CALCIUM SERPL-MCNC: 5.6 MG/DL (ref 8.3–10.4)
CHLORIDE SERPL-SCNC: 119 MMOL/L (ref 98–107)
CO2 SERPL-SCNC: 18 MMOL/L (ref 21–32)
CREAT SERPL-MCNC: 0.67 MG/DL (ref 0.8–1.5)
ERYTHROCYTE [DISTWIDTH] IN BLOOD BY AUTOMATED COUNT: 15 % (ref 11.9–14.6)
ERYTHROCYTE [DISTWIDTH] IN BLOOD BY AUTOMATED COUNT: 15.2 % (ref 11.9–14.6)
GLUCOSE BLD STRIP.AUTO-MCNC: 132 MG/DL (ref 65–100)
GLUCOSE BLD STRIP.AUTO-MCNC: 151 MG/DL (ref 65–100)
GLUCOSE BLD STRIP.AUTO-MCNC: 151 MG/DL (ref 65–100)
GLUCOSE BLD STRIP.AUTO-MCNC: 156 MG/DL (ref 65–100)
GLUCOSE SERPL-MCNC: 137 MG/DL (ref 65–100)
HCT VFR BLD AUTO: 23.9 % (ref 41.1–50.3)
HCT VFR BLD AUTO: 24.8 % (ref 41.1–50.3)
HGB BLD-MCNC: 8 G/DL (ref 13.6–17.2)
HGB BLD-MCNC: 8.3 G/DL (ref 13.6–17.2)
HISTORY CHECKED?,CKHIST: NORMAL
HISTORY CHECKED?,CKHIST: NORMAL
MAGNESIUM SERPL-MCNC: 1.6 MG/DL (ref 1.8–2.4)
MCH RBC QN AUTO: 30.4 PG (ref 26.1–32.9)
MCH RBC QN AUTO: 30.5 PG (ref 26.1–32.9)
MCHC RBC AUTO-ENTMCNC: 33.5 G/DL (ref 31.4–35)
MCHC RBC AUTO-ENTMCNC: 33.5 G/DL (ref 31.4–35)
MCV RBC AUTO: 90.8 FL (ref 79.6–97.8)
MCV RBC AUTO: 91.2 FL (ref 79.6–97.8)
MM INDURATION POC: 0 MM (ref 0–5)
NRBC # BLD: 0 K/UL (ref 0–0.2)
NRBC # BLD: 0 K/UL (ref 0–0.2)
PLATELET # BLD AUTO: 68 K/UL (ref 150–450)
PLATELET # BLD AUTO: 75 K/UL (ref 150–450)
PMV BLD AUTO: 12.1 FL (ref 9.4–12.3)
PMV BLD AUTO: 12.3 FL (ref 9.4–12.3)
POTASSIUM SERPL-SCNC: 3.1 MMOL/L (ref 3.5–5.1)
PPD POC: NEGATIVE NEGATIVE
RBC # BLD AUTO: 2.62 M/UL (ref 4.23–5.6)
RBC # BLD AUTO: 2.73 M/UL (ref 4.23–5.6)
SERVICE CMNT-IMP: ABNORMAL
SODIUM SERPL-SCNC: 146 MMOL/L (ref 136–145)
WBC # BLD AUTO: 15.7 K/UL (ref 4.3–11.1)
WBC # BLD AUTO: 16.2 K/UL (ref 4.3–11.1)

## 2021-05-06 PROCEDURE — 85027 COMPLETE CBC AUTOMATED: CPT

## 2021-05-06 PROCEDURE — 74011250637 HC RX REV CODE- 250/637: Performed by: PHYSICIAN ASSISTANT

## 2021-05-06 PROCEDURE — 2709999900 HC NON-CHARGEABLE SUPPLY

## 2021-05-06 PROCEDURE — 36430 TRANSFUSION BLD/BLD COMPNT: CPT

## 2021-05-06 PROCEDURE — 77030012390 HC DRN CHST BTL GTNG -B

## 2021-05-06 PROCEDURE — 74011250637 HC RX REV CODE- 250/637: Performed by: THORACIC SURGERY (CARDIOTHORACIC VASCULAR SURGERY)

## 2021-05-06 PROCEDURE — 74011000250 HC RX REV CODE- 250: Performed by: THORACIC SURGERY (CARDIOTHORACIC VASCULAR SURGERY)

## 2021-05-06 PROCEDURE — 77030032994 HC SOL INJ SOD CL 0.9% LFCR 500ML

## 2021-05-06 PROCEDURE — P9045 ALBUMIN (HUMAN), 5%, 250 ML: HCPCS | Performed by: THORACIC SURGERY (CARDIOTHORACIC VASCULAR SURGERY)

## 2021-05-06 PROCEDURE — 83735 ASSAY OF MAGNESIUM: CPT

## 2021-05-06 PROCEDURE — 82962 GLUCOSE BLOOD TEST: CPT

## 2021-05-06 PROCEDURE — 99233 SBSQ HOSP IP/OBS HIGH 50: CPT | Performed by: INTERNAL MEDICINE

## 2021-05-06 PROCEDURE — 65610000006 HC RM INTENSIVE CARE

## 2021-05-06 PROCEDURE — P9016 RBC LEUKOCYTES REDUCED: HCPCS

## 2021-05-06 PROCEDURE — 36600 WITHDRAWAL OF ARTERIAL BLOOD: CPT

## 2021-05-06 PROCEDURE — 80048 BASIC METABOLIC PNL TOTAL CA: CPT

## 2021-05-06 PROCEDURE — 74011250636 HC RX REV CODE- 250/636: Performed by: THORACIC SURGERY (CARDIOTHORACIC VASCULAR SURGERY)

## 2021-05-06 PROCEDURE — 74011000258 HC RX REV CODE- 258: Performed by: THORACIC SURGERY (CARDIOTHORACIC VASCULAR SURGERY)

## 2021-05-06 PROCEDURE — 77010033678 HC OXYGEN DAILY

## 2021-05-06 PROCEDURE — 74011636637 HC RX REV CODE- 636/637: Performed by: THORACIC SURGERY (CARDIOTHORACIC VASCULAR SURGERY)

## 2021-05-06 PROCEDURE — 71045 X-RAY EXAM CHEST 1 VIEW: CPT

## 2021-05-06 RX ORDER — SODIUM CHLORIDE 9 MG/ML
250 INJECTION, SOLUTION INTRAVENOUS AS NEEDED
Status: DISCONTINUED | OUTPATIENT
Start: 2021-05-06 | End: 2021-05-07

## 2021-05-06 RX ORDER — FUROSEMIDE 10 MG/ML
20 INJECTION INTRAMUSCULAR; INTRAVENOUS ONCE
Status: COMPLETED | OUTPATIENT
Start: 2021-05-06 | End: 2021-05-06

## 2021-05-06 RX ORDER — TEMAZEPAM 15 MG/1
15 CAPSULE ORAL
Status: DISCONTINUED | OUTPATIENT
Start: 2021-05-06 | End: 2021-05-07

## 2021-05-06 RX ORDER — ALPRAZOLAM 0.25 MG/1
0.25 TABLET ORAL
Status: DISCONTINUED | OUTPATIENT
Start: 2021-05-06 | End: 2021-05-10 | Stop reason: HOSPADM

## 2021-05-06 RX ORDER — POTASSIUM CHLORIDE 14.9 MG/ML
20 INJECTION INTRAVENOUS
Status: COMPLETED | OUTPATIENT
Start: 2021-05-06 | End: 2021-05-06

## 2021-05-06 RX ADMIN — Medication 1 AMPULE: at 08:44

## 2021-05-06 RX ADMIN — AMIODARONE HYDROCHLORIDE 200 MG: 200 TABLET ORAL at 21:10

## 2021-05-06 RX ADMIN — ALPRAZOLAM 0.25 MG: 0.5 TABLET ORAL at 15:26

## 2021-05-06 RX ADMIN — POTASSIUM CHLORIDE 20 MEQ: 14.9 INJECTION, SOLUTION INTRAVENOUS at 05:02

## 2021-05-06 RX ADMIN — TEMAZEPAM 15 MG: 15 CAPSULE ORAL at 21:10

## 2021-05-06 RX ADMIN — FUROSEMIDE 20 MG: 10 INJECTION, SOLUTION INTRAMUSCULAR; INTRAVENOUS at 12:18

## 2021-05-06 RX ADMIN — ATORVASTATIN CALCIUM 80 MG: 80 TABLET, FILM COATED ORAL at 21:10

## 2021-05-06 RX ADMIN — ACETAMINOPHEN 650 MG: 325 TABLET ORAL at 02:13

## 2021-05-06 RX ADMIN — DEXTROSE MONOHYDRATE 0.1 MCG/KG/MIN: 50 INJECTION, SOLUTION INTRAVENOUS at 08:45

## 2021-05-06 RX ADMIN — INSULIN LISPRO 2 UNITS: 100 INJECTION, SOLUTION INTRAVENOUS; SUBCUTANEOUS at 11:38

## 2021-05-06 RX ADMIN — DEXTROSE MONOHYDRATE 0.11 MCG/KG/MIN: 50 INJECTION, SOLUTION INTRAVENOUS at 02:08

## 2021-05-06 RX ADMIN — TRAMADOL HYDROCHLORIDE 50 MG: 50 TABLET, FILM COATED ORAL at 11:55

## 2021-05-06 RX ADMIN — INSULIN LISPRO 2 UNITS: 100 INJECTION, SOLUTION INTRAVENOUS; SUBCUTANEOUS at 05:15

## 2021-05-06 RX ADMIN — AMIODARONE HYDROCHLORIDE 200 MG: 200 TABLET ORAL at 08:44

## 2021-05-06 RX ADMIN — Medication 1 AMPULE: at 21:10

## 2021-05-06 RX ADMIN — Medication 1 EACH: at 21:10

## 2021-05-06 RX ADMIN — DEXTROSE MONOHYDRATE 0.04 MCG/KG/MIN: 50 INJECTION, SOLUTION INTRAVENOUS at 21:12

## 2021-05-06 RX ADMIN — PREDNISOLONE ACETATE 1 DROP: 10 SUSPENSION/ DROPS OPHTHALMIC at 08:43

## 2021-05-06 RX ADMIN — ALBUMIN (HUMAN) 25 G: 12.5 INJECTION, SOLUTION INTRAVENOUS at 02:13

## 2021-05-06 RX ADMIN — Medication 10 ML: at 22:00

## 2021-05-06 RX ADMIN — ACETAMINOPHEN 650 MG: 325 TABLET ORAL at 15:26

## 2021-05-06 RX ADMIN — Medication 10 ML: at 15:00

## 2021-05-06 RX ADMIN — POTASSIUM CHLORIDE 20 MEQ: 14.9 INJECTION, SOLUTION INTRAVENOUS at 03:21

## 2021-05-06 RX ADMIN — Medication 10 ML: at 02:10

## 2021-05-06 RX ADMIN — MAGNESIUM SULFATE HEPTAHYDRATE 1 G: 1 INJECTION, SOLUTION INTRAVENOUS at 03:17

## 2021-05-06 NOTE — PROGRESS NOTES
POC provided to patient. Patient turned with sacrum benign and optifoam intact. 24 hour chart check done.

## 2021-05-06 NOTE — PROGRESS NOTES
Velma Thacker [place], here and updated orders received for pt's request for sleeping aide for tonight, \"I can't shut my brain off to sleep and this happens at home\"

## 2021-05-06 NOTE — PROGRESS NOTES
This is a follow-up visit to the patient, providing a spiritual presence, emotional support and prayer. His wife, Aramis Munoz, was present.     Dana Harris, [address] [facility], [facility]

## 2021-05-06 NOTE — PROGRESS NOTES
POD 2 Days Post-Op    Procedure:  Procedure(s):  CORONARY ARTERY BYPASS GRAFT (CABG X3) WITH AVR, LIMA  LULÚ/ANES PROBE  VEIN HARVEST ENDOSCOPIC, GREATER SAPHENOUS      Subjective:     Patient has No significant medical complaints      Objective:     Patient Vitals for the past 8 hrs:   Temp Pulse Resp SpO2 Weight   05/06/21 0629 98.6 °F (37 °C) 79 27 95 % --   05/06/21 0626 98.6 °F (37 °C) 80 21 95 % --   05/06/21 0615 98.6 °F (37 °C) 80 26 95 % --   05/06/21 0601 98.6 °F (37 °C) 81 27 96 % --   05/06/21 0600 98.6 °F (37 °C) 80 30 97 % --   05/06/21 0548 98.6 °F (37 °C) 79 22 96 % --   05/06/21 0545 98.6 °F (37 °C) 78 27 95 % --   05/06/21 0535 98.6 °F (37 °C) 78 24 97 % --   05/06/21 0530 98.6 °F (37 °C) 79 28 96 % --   05/06/21 0522 98.6 °F (37 °C) 79 26 96 % --   05/06/21 0515 98.6 °F (37 °C) 84 (!) 33 96 % --   05/06/21 0500 98.6 °F (37 °C) 80 27 95 % --   05/06/21 0445 98.6 °F (37 °C) 79 27 96 % --   05/06/21 0430 98.6 °F (37 °C) 83 28 96 % --   05/06/21 0415 98.6 °F (37 °C) 83 24 96 % --   05/06/21 0400 98.6 °F (37 °C) 82 28 98 % --   05/06/21 0345 98.7 °F (37.1 °C) 82 24 96 % --   05/06/21 0330 98.7 °F (37.1 °C) 82 28 96 % --   05/06/21 0315 98.7 °F (37.1 °C) 82 28 96 % --   05/06/21 0300 98.7 °F (37.1 °C) 84 28 95 % --   05/06/21 0245 98.9 °F (37.2 °C) 84 28 90 % --   05/06/21 0230 98.9 °F (37.2 °C) 86 (!) 31 91 % --   05/06/21 0216 -- -- -- -- 238 lb 12.1 oz (108.3 kg)   05/06/21 0215 98.9 °F (37.2 °C) 93 (!) 39 90 % --   05/06/21 0200 98.9 °F (37.2 °C) 85 24 91 % --   05/06/21 0145 98.9 °F (37.2 °C) 88 24 91 % --   05/06/21 0130 98.9 °F (37.2 °C) 86 25 91 % --   05/06/21 0115 98.9 °F (37.2 °C) 91 25 92 % --   05/06/21 0100 98.9 °F (37.2 °C) 87 29 91 % --   05/06/21 0045 99.1 °F (37.3 °C) 88 25 92 % --   05/06/21 0030 99.1 °F (37.3 °C) 87 27 91 % --   05/06/21 0015 99.1 °F (37.3 °C) 85 24 91 % --   05/06/21 0000 99.1 °F (37.3 °C) 86 24 92 % --   05/05/21 2345 99.3 °F (37.4 °C) 90 (!) 37 92 % --     Temp (24hrs), Av.2 °F (37.3 °C), Min:98.6 °F (37 °C), Max:99.8 °F (37.7 °C)        Hemodynamics    PAP  CO (l/min): 7.4 l/min CO  CI (l/min/m2): 3.4 l/min/m2 CI    No intake/output data recorded.  1901 -  0700  In: 6468.1 [P.O.:460; I.V.:5698.1]  Out: 2410 [Urine:1710]    CT Drainage              total of all CT's    Heart:  regular rate and rhythm, S1, S2 normal, no murmur, click, rub or gallop  Lung:  clear to auscultation bilaterally  Neuro: Grossly non focal  Incisions: Clean, dry, and intact    Labs:  Recent Results (from the past 24 hour(s))   EKG, 12 LEAD, SUBSEQUENT    Collection Time: 21  7:47 AM   Result Value Ref Range    Ventricular Rate 69 BPM    Atrial Rate 69 BPM    P-R Interval 196 ms    QRS Duration 142 ms    Q-T Interval 462 ms    QTC Calculation (Bezet) 495 ms    Calculated P Axis 45 degrees    Calculated R Axis -63 degrees    Calculated T Axis 103 degrees    Diagnosis       Normal sinus rhythm  Left axis deviation  Left bundle branch block  Abnormal ECG  When compared with ECG of 04-MAY-2021 14:09,  No significant change was found    Confirmed by Cash Yarbrough ([id number]) on 2021 4:53:19 PM     GLUCOSE, POC    Collection Time: 21  8:42 AM   Result Value Ref Range    Glucose (POC) 101 (H) 65 - 100 mg/dL    Performed by Radha)[facility]UAN [facility]    GLUCOSE, POC    Collection Time: 21 10:51 AM   Result Value Ref Range    Glucose (POC) 130 (H) 65 - 100 mg/dL    Performed by Radha)[facility]    GLUCOSE, POC    Collection Time: 21  4:11 PM   Result Value Ref Range    Glucose (POC) 150 (H) 65 - 100 mg/dL    Performed by Hernandez (Hoeung)[facility]    GLUCOSE, POC    Collection Time: 21  8:39 PM   Result Value Ref Range    Glucose (POC) 154 (H) 65 - 100 mg/dL    Performed by Luciana.     PLEASE READ & DOCUMENT PPD TEST IN 24 HRS    Collection Time: 21  9:35 PM   Result Value Ref Range    PPD Negative Negative    mm Induration 0 0 - 5 mm   MAGNESIUM Collection Time: 05/06/21  2:23 AM   Result Value Ref Range    Magnesium 1.6 (L) 1.8 - 2.4 mg/dL   CBC W/O DIFF    Collection Time: 05/06/21  2:23 AM   Result Value Ref Range    WBC 16.2 (H) 4.3 - 11.1 K/uL    RBC 2.73 (L) 4.23 - 5.6 M/uL    HGB 8.3 (L) 13.6 - 17.2 g/dL    HCT 24.8 (L) 41.1 - 50.3 %    MCV 90.8 79.6 - 97.8 FL    MCH 30.4 26.1 - 32.9 PG    MCHC 33.5 31.4 - 35.0 g/dL    RDW 15.0 (H) 11.9 - 14.6 %    PLATELET 75 (L) 160 - 450 K/uL    MPV 12.1 9.4 - 12.3 FL    ABSOLUTE NRBC 0.00 0.0 - 0.2 K/uL   METABOLIC PANEL, BASIC    Collection Time: 05/06/21  2:23 AM   Result Value Ref Range    Sodium 146 (H) 136 - 145 mmol/L    Potassium 3.1 (L) 3.5 - 5.1 mmol/L    Chloride 119 (H) 98 - 107 mmol/L    CO2 18 (L) 21 - 32 mmol/L    Anion gap 9 7 - 16 mmol/L    Glucose 137 (H) 65 - 100 mg/dL    BUN 16 8 - 23 MG/DL    Creatinine 0.67 (L) 0.8 - 1.5 MG/DL    GFR est AA >60 >60 ml/min/1.73m2    GFR est non-AA >60 >60 ml/min/1.73m2    Calcium 5.6 (LL) 8.3 - 10.4 MG/DL   GLUCOSE, POC    Collection Time: 05/06/21  5:13 AM   Result Value Ref Range    Glucose (POC) 151 (H) 65 - 100 mg/dL    Performed by Luciana. Assessment:     Active Problems:     Aortic valve stenosis (5/4/2021)      CAD (coronary artery disease) (5/4/2021)      Atherosclerosis of native coronary artery of native heart with unstable angina pectoris ([facility] [address] (5/4/2021)      Encounter for weaning from ventilator ([facility] [address] (5/4/2021)      Hypoxia (5/4/2021)      S/P AVR (5/5/2021)      S/P CABG x 3 (5/5/2021)      Atelectasis (5/5/2021)        Plan/Recommendations/Medical Decision Making:     Recheck Hct, transfuse as needed, wean Levo    See orders

## 2021-05-06 NOTE — PROGRESS NOTES
Pt stated I just feel worse. When try to get pt to describe how and what he's feeling, denies any pain but states that since surgery, when he closes his eyes to try and sleep, he sees\"scripts\" that he has read before in his eyes and now sees numbers on the ceiling in orange, he appears anxious and some what overwhelmed. Reassured and encouraged, magdalene blood and continued weaning of Levo gtt. Requesting wife, so she was called. Aware of the hallucinations and completely oriented.

## 2021-05-06 NOTE — PROGRESS NOTES
Pt stating I feel worn out, just used Bedpan for liquid stool, have noted resp in 30's throughout day and slightly more labored with more abd breathing, reassuring pt, encouraging, assess no change. Denies any symptoms from blood transfusion, will continue to monitor.

## 2021-05-06 NOTE — PROGRESS NOTES
Cardiovascular ICU Consult Note: 5/6/2021  Eugenia Callahan     Patient is seen at the request of Dr. Karen Hilliard for respiratory management status post cardiac surgery. Patient had CABG x 3 and AV Repair. Currently is sedated in CV-ICU and orally intubated receiving  mechanical ventilation. PMH of HTN, HLD, prostate cancer with radiation, and Aortic stenosis. He was seen by Cardiology for symptomatic AS and underwent LHC for workup for valve replacement. LHC showed MVCAD and patient was referred for CT Surgery. We have been asked to see in the CV-ICU for mechanical ventilation management and weaning. Currently patient is coagulopathic and bleeding from chest drains requiring blood products, he is not ready to safely wean off ventilator. Admission Date: 5/4/2021     The patient's chart is reviewed and the patient is discussed with the staff.     Subjective:     -extubated   on 3L NC  Still on lovephed    Review of Systems:  + mild incisional chest pain  -Headaches  -Chest pain  -Dyspnea, -wheezing, -cough  -Abdominal pain, -constipation  -Leg swelling  All other organ systems grossly normal.      Allergies   Allergen Reactions    Latex, Natural Rubber Rash     \"tears skin off\"         Current Facility-Administered Medications   Medication Dose Route Frequency    benzocaine-menthol (CEPACOL) lozenge  1 Lozenge Oral Q2H PRN    acetaminophen (TYLENOL) tablet 650 mg  650 mg Oral Q4H PRN    traMADoL (ULTRAM) tablet 50 mg  50 mg Oral Q6H PRN    insulin lispro (HUMALOG) injection   SubCUTAneous AC&HS    alcohol 62% (NOZIN) nasal  1 Ampule  1 Ampule Topical Q12H    dextrose 5% - 0.45% NaCl with KCl 20 mEq/L infusion  25 mL/hr IntraVENous CONTINUOUS    sodium chloride (NS) flush 5-40 mL  5-40 mL IntraVENous Q8H    sodium chloride (NS) flush 5-40 mL  5-40 mL IntraVENous PRN    oxyCODONE-acetaminophen (PERCOCET) 5-325 mg per tablet 1 Tab  1 Tab Oral Q4H PRN    morphine injection 3-5 mg  3-5 mg IntraVENous Q1H PRN    naloxone (NARCAN) injection 0.4 mg  0.4 mg IntraVENous PRN    sodium bicarbonate (8.4%) injection 50 mEq  50 mEq IntraVENous PRN    nitroglycerin (Tridil) 200 mcg/ml infusion  0-20 mcg/min IntraVENous TITRATE    amiodarone (CORDARONE) tablet 200 mg  200 mg Oral Q12H    [Held by provider] metoprolol tartrate (LOPRESSOR) tablet 25 mg  25 mg Oral Q12H    atorvastatin (LIPITOR) tablet 80 mg  80 mg Oral QHS    dextrose (D50W) injection syrg 12.5 g  25 mL IntraVENous PRN    aspirin chewable tablet 81 mg  81 mg Oral DAILY    magnesium sulfate 1 g/100 ml IVPB (premix or compounded)  1 g IntraVENous PRN    PHENYLephrine (REI-SYNEPHRINE) 30 mg in 0.9% sodium chloride 250 mL infusion   mcg/min IntraVENous TITRATE    NOREPINephrine (LEVOPHED) 4 mg in 5% dextrose 250 mL infusion  0.01-0.2 mcg/kg/min IntraVENous TITRATE    prednisoLONE acetate (PRED FORTE) 1 % ophthalmic suspension 1 Drop  1 Drop Both Eyes DAILY    lip protectant (BLISTEX) ointment 1 Each  1 Each Topical PRN         Objective:     Vitals:    05/06/21 0601 05/06/21 0615 05/06/21 0626 05/06/21 0629   BP:       Pulse: 81 80 80 79   Resp: 27 26 21 27   Temp: 98.6 °F (37 °C) 98.6 °F (37 °C) 98.6 °F (37 °C) 98.6 °F (37 °C)   SpO2: 96% 95% 95% 95%   Weight:       Height:           Intake and Output:   05/04 1901 - 05/06 0700  In: 6468.1 [P.O.:460; I.V.:5698.1]  Out: 2410 [Urine:1710]  No intake/output data recorded. Physical Exam:          Constitutional:  Obese WM in bed and not in distress  EENMT:  Sclera clear, pupils equal, oral mucosa moist and on Airvo josé miguel 40 L and 29%  Respiratory: CTA b/l. No wheezing. Cardiovascular:  RRR with no M,G,R;  Gastrointestinal:  soft; positive bowel sounds present  Musculoskeletal:  warm with no cyanosis, no lower extremity edema. [place] site leftleg with ace wrap. Ronnell Griggs   SKIN:  no jaundice or ecchymosis   Neurologic:   no gross neuro deficits  Psychiatric:  Calm and alert. CXR: minimum left base atelectasis           LINES:    alexis, flowtrack, arterial line, chest tubes times 2 in epigastric area without air leak. DRIPS:  levo,   CI:  2.3         LABS    Recent Labs     05/06/21 0223 05/05/21 0313 05/04/21 2210 05/04/21  1500 05/04/21  1500 05/04/21  1335   WBC 16.2* 11.8*  --   --   --  14.9*   HGB 8.3* 9.2* 9.2*   < > 8.6* 10.5*   HCT 24.8* 27.1* 28.1*   < > 26.3* 32.3*   PLT 75* 113*  --   --  130* 51*    < > = values in this interval not displayed. Recent Labs     05/06/21 0223 05/05/21 0313 05/04/21 2210 05/04/21  1335 05/04/21  1335   * 146*  --   --  145   K 3.1* 3.7 3.6   < > 3.6   * 116*  --   --  116*   * 97  --   --  151*   CO2 18* 24  --   --  20*   BUN 16 15  --   --  13   CREA 0.67* 1.05  --   --  0.79*   MG 1.6* 2.2 2.3   < > 2.5*   INR  --   --   --   --  1.8    < > = values in this interval not displayed. Recent Labs     05/04/21 2224 05/04/21  1929 05/04/21  1738   PHI 7.36 7.28* 7.30*   PCO2I 38.8 43.3 41.1   PO2I 109* 115* 106*   HCO3I 22.1 20.5* 20.2*     No results for input(s): LCAD, LAC in the last 72 hours.    PFT 5/3      Assessment and Plan :  (Medical Decision Making)     Hospital Problems  Date Reviewed: 9/15/2020          Codes Class Noted POA    S/P AVR ICD-10-CM: Z95.2  ICD-9-CM: V43.3  5/5/2021 Unknown        S/P CABG x 3 ICD-10-CM: Z95.1  ICD-9-CM: V45.81  5/5/2021 Unknown        Atelectasis ICD-10-CM: J98.11  ICD-9-CM: 518.0  5/5/2021 Unknown        Aortic valve stenosis ICD-10-CM: I35.0  ICD-9-CM: 424.1  5/4/2021 Unknown        CAD (coronary artery disease) ICD-10-CM: I25.10  ICD-9-CM: 414.00  5/4/2021 Unknown        Atherosclerosis of native coronary artery of native heart with unstable angina pectoris (HCC) ICD-10-CM: I25.110  ICD-9-CM: 414.01, 411.1  5/4/2021 Unknown        Encounter for weaning from ventilator [facility]) ICD-10-CM: Z99.11  ICD-9-CM: V46.13  5/4/2021 Unknown        Hypoxia ICD-10-CM: R09.02  ICD-9-CM: 799.02  5/4/2021 Unknown            66yo with PMH of HTN, HLD AS and prostates cancer. Underwent LHC for AV replacement. LHC showed MVCAD. Underwent CABG x 3 and AVR on 5/4. Plan:   -wean levophed as tolerated  - lytes replaced  --chest tubes per CV surgery  --Incentive spirometry, pulmonary toilet  - WBC is up, monitor for now        More than 50% of the time documented was spent in face-to-face contact with the patient and in the care of the patient on the floor/unit where the patient is located.    Jeannette Francois MD

## 2021-05-07 LAB
ABO + RH BLD: NORMAL
ANION GAP SERPL CALC-SCNC: 5 MMOL/L (ref 7–16)
BLD PROD TYP BPU: NORMAL
BLOOD GROUP ANTIBODIES SERPL: NORMAL
BPU ID: NORMAL
BUN SERPL-MCNC: 20 MG/DL (ref 8–23)
CALCIUM SERPL-MCNC: 7.9 MG/DL (ref 8.3–10.4)
CHLORIDE SERPL-SCNC: 109 MMOL/L (ref 98–107)
CO2 SERPL-SCNC: 26 MMOL/L (ref 21–32)
CREAT SERPL-MCNC: 0.92 MG/DL (ref 0.8–1.5)
CROSSMATCH RESULT,%XM: NORMAL
ERYTHROCYTE [DISTWIDTH] IN BLOOD BY AUTOMATED COUNT: 15.1 % (ref 11.9–14.6)
GLUCOSE BLD STRIP.AUTO-MCNC: 120 MG/DL (ref 65–100)
GLUCOSE SERPL-MCNC: 131 MG/DL (ref 65–100)
HCT VFR BLD AUTO: 31.6 % (ref 41.1–50.3)
HGB BLD-MCNC: 10.3 G/DL (ref 13.6–17.2)
MAGNESIUM SERPL-MCNC: 2.4 MG/DL (ref 1.8–2.4)
MCH RBC QN AUTO: 30.2 PG (ref 26.1–32.9)
MCHC RBC AUTO-ENTMCNC: 32.6 G/DL (ref 31.4–35)
MCV RBC AUTO: 92.7 FL (ref 79.6–97.8)
NRBC # BLD: 0 K/UL (ref 0–0.2)
PLATELET # BLD AUTO: 59 K/UL (ref 150–450)
PMV BLD AUTO: 11.9 FL (ref 9.4–12.3)
POTASSIUM SERPL-SCNC: 4 MMOL/L (ref 3.5–5.1)
RBC # BLD AUTO: 3.41 M/UL (ref 4.23–5.6)
SERVICE CMNT-IMP: ABNORMAL
SODIUM SERPL-SCNC: 140 MMOL/L (ref 136–145)
SPECIMEN EXP DATE BLD: NORMAL
STATUS OF UNIT,%ST: NORMAL
UNIT DIVISION, %UDIV: 0
WBC # BLD AUTO: 16.1 K/UL (ref 4.3–11.1)

## 2021-05-07 PROCEDURE — 97530 THERAPEUTIC ACTIVITIES: CPT

## 2021-05-07 PROCEDURE — 80048 BASIC METABOLIC PNL TOTAL CA: CPT

## 2021-05-07 PROCEDURE — 74011250636 HC RX REV CODE- 250/636: Performed by: THORACIC SURGERY (CARDIOTHORACIC VASCULAR SURGERY)

## 2021-05-07 PROCEDURE — 77010033678 HC OXYGEN DAILY

## 2021-05-07 PROCEDURE — 94664 DEMO&/EVAL PT USE INHALER: CPT

## 2021-05-07 PROCEDURE — 65660000004 HC RM CVT STEPDOWN

## 2021-05-07 PROCEDURE — 86022 PLATELET ANTIBODIES: CPT

## 2021-05-07 PROCEDURE — 36592 COLLECT BLOOD FROM PICC: CPT

## 2021-05-07 PROCEDURE — 94640 AIRWAY INHALATION TREATMENT: CPT

## 2021-05-07 PROCEDURE — 82962 GLUCOSE BLOOD TEST: CPT

## 2021-05-07 PROCEDURE — 74011250637 HC RX REV CODE- 250/637: Performed by: PHYSICIAN ASSISTANT

## 2021-05-07 PROCEDURE — 85027 COMPLETE CBC AUTOMATED: CPT

## 2021-05-07 PROCEDURE — 82542 COL CHROMOTOGRAPHY QUAL/QUAN: CPT

## 2021-05-07 PROCEDURE — 74011000250 HC RX REV CODE- 250: Performed by: THORACIC SURGERY (CARDIOTHORACIC VASCULAR SURGERY)

## 2021-05-07 PROCEDURE — 94760 N-INVAS EAR/PLS OXIMETRY 1: CPT

## 2021-05-07 PROCEDURE — 99232 SBSQ HOSP IP/OBS MODERATE 35: CPT | Performed by: INTERNAL MEDICINE

## 2021-05-07 PROCEDURE — 83735 ASSAY OF MAGNESIUM: CPT

## 2021-05-07 PROCEDURE — 2709999900 HC NON-CHARGEABLE SUPPLY

## 2021-05-07 PROCEDURE — 74011250637 HC RX REV CODE- 250/637: Performed by: THORACIC SURGERY (CARDIOTHORACIC VASCULAR SURGERY)

## 2021-05-07 PROCEDURE — 97163 PT EVAL HIGH COMPLEX 45 MIN: CPT

## 2021-05-07 RX ORDER — FAMOTIDINE 20 MG/1
20 TABLET, FILM COATED ORAL 2 TIMES DAILY
Status: DISCONTINUED | OUTPATIENT
Start: 2021-05-07 | End: 2021-05-10 | Stop reason: HOSPADM

## 2021-05-07 RX ORDER — FUROSEMIDE 10 MG/ML
20 INJECTION INTRAMUSCULAR; INTRAVENOUS ONCE
Status: COMPLETED | OUTPATIENT
Start: 2021-05-07 | End: 2021-05-07

## 2021-05-07 RX ORDER — MAG HYDROX/ALUMINUM HYD/SIMETH 200-200-20
30 SUSPENSION, ORAL (FINAL DOSE FORM) ORAL
Status: DISCONTINUED | OUTPATIENT
Start: 2021-05-07 | End: 2021-05-10 | Stop reason: HOSPADM

## 2021-05-07 RX ORDER — LANOLIN ALCOHOL/MO/W.PET/CERES
400 CREAM (GRAM) TOPICAL
Status: DISCONTINUED | OUTPATIENT
Start: 2021-05-07 | End: 2021-05-10 | Stop reason: HOSPADM

## 2021-05-07 RX ORDER — TRAMADOL HYDROCHLORIDE 50 MG/1
50 TABLET ORAL
Status: DISCONTINUED | OUTPATIENT
Start: 2021-05-07 | End: 2021-05-10 | Stop reason: HOSPADM

## 2021-05-07 RX ORDER — POTASSIUM CHLORIDE 20 MEQ/1
20 TABLET, EXTENDED RELEASE ORAL
Status: DISCONTINUED | OUTPATIENT
Start: 2021-05-07 | End: 2021-05-10 | Stop reason: HOSPADM

## 2021-05-07 RX ORDER — ADHESIVE BANDAGE
30 BANDAGE TOPICAL DAILY PRN
Status: DISCONTINUED | OUTPATIENT
Start: 2021-05-07 | End: 2021-05-10 | Stop reason: HOSPADM

## 2021-05-07 RX ORDER — FUROSEMIDE 40 MG/1
40 TABLET ORAL DAILY
Status: COMPLETED | OUTPATIENT
Start: 2021-05-08 | End: 2021-05-10

## 2021-05-07 RX ORDER — POTASSIUM CHLORIDE 750 MG/1
10 TABLET, EXTENDED RELEASE ORAL DAILY
Status: COMPLETED | OUTPATIENT
Start: 2021-05-08 | End: 2021-05-10

## 2021-05-07 RX ORDER — TRAMADOL HYDROCHLORIDE 50 MG/1
50 TABLET ORAL
Status: DISCONTINUED | OUTPATIENT
Start: 2021-05-07 | End: 2021-05-07

## 2021-05-07 RX ORDER — LEVALBUTEROL INHALATION SOLUTION 1.25 MG/3ML
1.25 SOLUTION RESPIRATORY (INHALATION)
Status: DISCONTINUED | OUTPATIENT
Start: 2021-05-07 | End: 2021-05-08

## 2021-05-07 RX ORDER — ONDANSETRON 2 MG/ML
4 INJECTION INTRAMUSCULAR; INTRAVENOUS
Status: DISCONTINUED | OUTPATIENT
Start: 2021-05-07 | End: 2021-05-10 | Stop reason: HOSPADM

## 2021-05-07 RX ORDER — POTASSIUM CHLORIDE 20 MEQ/1
40 TABLET, EXTENDED RELEASE ORAL
Status: DISCONTINUED | OUTPATIENT
Start: 2021-05-07 | End: 2021-05-10 | Stop reason: HOSPADM

## 2021-05-07 RX ADMIN — LEVALBUTEROL HYDROCHLORIDE 1.25 MG: 1.25 SOLUTION RESPIRATORY (INHALATION) at 21:17

## 2021-05-07 RX ADMIN — Medication 10 ML: at 21:48

## 2021-05-07 RX ADMIN — AMIODARONE HYDROCHLORIDE 200 MG: 200 TABLET ORAL at 21:47

## 2021-05-07 RX ADMIN — LEVALBUTEROL HYDROCHLORIDE 1.25 MG: 1.25 SOLUTION RESPIRATORY (INHALATION) at 18:16

## 2021-05-07 RX ADMIN — Medication 1 AMPULE: at 08:52

## 2021-05-07 RX ADMIN — LEVALBUTEROL HYDROCHLORIDE 1.25 MG: 1.25 SOLUTION RESPIRATORY (INHALATION) at 14:25

## 2021-05-07 RX ADMIN — ACETAMINOPHEN 650 MG: 325 TABLET ORAL at 05:53

## 2021-05-07 RX ADMIN — ATORVASTATIN CALCIUM 80 MG: 80 TABLET, FILM COATED ORAL at 21:47

## 2021-05-07 RX ADMIN — ALPRAZOLAM 0.25 MG: 0.5 TABLET ORAL at 05:53

## 2021-05-07 RX ADMIN — Medication 10 ML: at 05:55

## 2021-05-07 RX ADMIN — FUROSEMIDE 20 MG: 10 INJECTION, SOLUTION INTRAMUSCULAR; INTRAVENOUS at 08:52

## 2021-05-07 RX ADMIN — Medication 10 ML: at 18:20

## 2021-05-07 RX ADMIN — FAMOTIDINE 20 MG: 20 TABLET, FILM COATED ORAL at 21:47

## 2021-05-07 RX ADMIN — AMIODARONE HYDROCHLORIDE 200 MG: 200 TABLET ORAL at 08:52

## 2021-05-07 RX ADMIN — ACETAMINOPHEN 650 MG: 325 TABLET ORAL at 10:46

## 2021-05-07 RX ADMIN — PREDNISOLONE ACETATE 1 DROP: 10 SUSPENSION/ DROPS OPHTHALMIC at 08:54

## 2021-05-07 RX ADMIN — ACETAMINOPHEN 650 MG: 325 TABLET ORAL at 18:23

## 2021-05-07 RX ADMIN — Medication 1 AMPULE: at 21:47

## 2021-05-07 NOTE — PROGRESS NOTES
CM continues to follow for discharge planning and/or CM needs. None noted or voiced at this time. Will continue to monitor and update as needed.

## 2021-05-07 NOTE — PROGRESS NOTES
Pt attempted to get out of bed after settling patient and inquiring if he had any needs. Pt wife informed he was trying to get OOB. Bed alarm was engaged. Pt assisted to bathroom he voided in toilet unable to coordinate urinal. Passed gas and had very small liquid squirt of stool. Pt assisted back to bed where he then urinated on himself, gown and pad changed, he was positioned for comfort. Bed alarm engaged on medium setting.

## 2021-05-07 NOTE — PROGRESS NOTES
Pt Developed confusion, hallucinations, and restlessness following Restoril dose. Pt remained oriented to person, situation and time but not place. Pt was very impulsive and restless despite verbal redirection throughout the night. Symptoms somewhat resolving as of time of this note.

## 2021-05-07 NOTE — PROGRESS NOTES
Bedside report received from [facility]. Pt aware that he is in the hospital but also thinks there are some [facility] spies here. Reoriented. Ongoing monitoring.

## 2021-05-07 NOTE — PROGRESS NOTES
ACUTE PHYSICAL THERAPY GOALS:  (Developed with and agreed upon by patient and/or caregiver.)  STG:  (1.)Mr. Charisse Kerns will move from supine to sit and sit to supine , scoot up and down and roll side to side with MINIMAL ASSIST within 3 treatment day(s). (2.)Mr. Charisse Kerns will transfer from bed to chair and chair to bed with MINIMAL ASSIST using the least restrictive device within 3 treatment day(s). (3.)Mr. Charisse Kerns will ambulate with MINIMAL ASSIST for 25 feet with the least restrictive device within 3 treatment day(s).      LTG:  (1.)Mr. Charisse Kerns will move from supine to sit and sit to supine , scoot up and down and roll side to side in bed with SUPERVISION within 7 treatment day(s). (2.)Mr. Charisse Kerns will transfer from bed to chair and chair to bed with SUPERVISION using the least restrictive device within 7 treatment day(s). (3.)Mr. Charisse Kerns will ambulate with SUPERVISION for 200+ feet with the least restrictive device within 7 treatment day(s). (4.)Mr. Charisse Kerns will negotiate stairs up/down x14 with SUPERVISION with the least restrictive device within 7 treatment day(s).     PHYSICAL THERAPY: Daily Note and PM Treatment Day # 1    Lizzette Azevedo is a 66 y.o. male   PRIMARY DIAGNOSIS: S/P CABG x 3  Atherosclerosis of native coronary artery of native heart with unstable angina pectoris (HCC) [I25.110]  Aortic valve stenosis, etiology of cardiac valve disease unspecified [I35.0]  CAD (coronary artery disease) [I25.10]  Aortic valve stenosis [I35.0]  Procedure(s) (LRB):  CORONARY ARTERY BYPASS GRAFT (CABG X3) WITH AVR, LIMA (N/A)  LULÚ/ANES PROBE (N/A)  VEIN HARVEST ENDOSCOPIC, GREATER SAPHENOUS (Left)  3 Days Post-Op    ASSESSMENT:     REHAB RECOMMENDATIONS: CURRENT LEVEL OF FUNCTION:  (Most Recently Demonstrated)   Recommendation to date pending progress:  [address] [place] [address] vs. Short-term Rehab   TBD  Equipment:    None Bed Mobility:   Not tested  Sit to Stand:   Moderate Assistance  Transfers:   Moderate Assistance  Gait/Mobility:   Moderate Assistance     ASSESSMENT:  Mr. Jannet So pt sitting in chair at arrival agrees to treat and started disconnecting lines when getting close to pt. He is still slightly confused and saying random statements. Pt stood without asst and lost balance ending in chair. He stood again with Fran and started ambulating. Better with posture in RW this visit and better with safety prior to sitting rather than diving. returend to chair after 100ft. Main limitation is impulsiveness and safety. SUBJECTIVE:   Mr. Jannet So states, \"I'm. ... 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    Home Environment: Private residence  One/Two Story Residence: Two story  Living Alone: No  Support Systems: Spouse/Significant Other/Partner  OBJECTIVE:     PAIN: VITAL SIGNS: LINES/DRAINS:   Pre Treatment: Pain Screen  Pain Scale 1: Numeric (0 - 10)  Pain Intensity 1: 0  Pain Location 1: Chest;Incisional  Post Treatment: 0   Champion Catheter  O2 Device: None (Room air)     MOBILITY: I Mod I S SBA CGA Min Mod Max Total  NT x2 Comments:   Bed Mobility    Rolling [] [] [] [] [] [] [] [] [] [x] []    Supine to Sit [] [] [] [] [] [] [] [] [] [x] []    Scooting [] [] [] [] [] [] [] [] [] [x] []    Sit to Supine [] [] [] [] [] [] [] [] [] [x] []    Transfers    Sit to Stand [] [] [] [] [] [] [x] [] [] [] []    Bed to Chair [] [] [] [] [] [] [x] [] [] [] []    Stand to Sit [] [] [] [] [] [] [x] [] [] [] []    I=Independent, Mod I=Modified Independent, S=Supervision, SBA=Standby Assistance, CGA=Contact Guard Assistance,   Min=Minimal Assistance, Mod=Moderate Assistance, Max=Maximal Assistance, Total=Total Assistance, NT=Not Tested    BALANCE: Good Fair+ Fair Fair- Poor NT Comments   Sitting Static [] [] [x] [] [] []    Sitting Dynamic [] [] [x] [] [] [] Standing Static [] [] [x] [] [] []    Standing Dynamic [] [] [] [x] [] []      GAIT: I Mod I S SBA CGA Min Mod Max Total  NT x2 Comments:   Level of Assistance [] [] [] [] [] [] [x] [] [] [] []    Distance 100    DME Rolling Walker    Gait Quality Impulsive, distracted, unstaedy    Weightbearing  Status N/A     I=Independent, Mod I=Modified Independent, S=Supervision, SBA=Standby Assistance, CGA=Contact Guard Assistance,   Min=Minimal Assistance, Mod=Moderate Assistance, Max=Maximal Assistance, Total=Total Assistance, NT=Not Tested    PLAN:   FREQUENCY/DURATION: PT Plan of Care: BID for duration of hospital stay or until stated goals are met, whichever comes first.  TREATMENT:     TREATMENT:   ($$ Therapeutic Activity: 23-37 mins    )  Therapeutic Activity (23 Minutes): Therapeutic activity included Scooting, Lateral Scooting, Transfer Training, Ambulation on level ground, Sitting balance  and Standing balance to improve functional Mobility, Strength and Activity tolerance.     TREATMENT GRID:  N/A    AFTER TREATMENT POSITION/PRECAUTIONS:  Chair, Needs within reach, RN notified and Visitors at bedside    INTERDISCIPLINARY COLLABORATION:  RN/PCT and PT/PTA    TOTAL TREATMENT DURATION:  PT Patient Time In/Time Out  Time In: 1350  Time Out: Deo Pisano DPT

## 2021-05-07 NOTE — PROGRESS NOTES
A follow up visit was made to the patient. Emotional support, spiritual presence and   prayer were provided for the patient and wife. Kahs Reed, [address] [facility], [facility]

## 2021-05-07 NOTE — PROGRESS NOTES
TRANSFER - IN REPORT:    Verbal report received from Jone RN(name) on Chanda Filter  being received from CV ICU(unit) for routine progression of care      Report consisted of patients Situation, Background, Assessment and   Recommendations(SBAR). Information from the following report(s) SBAR, MAR and Cardiac Rhythm 1 Av block with BBB was reviewed with the receiving nurse. Opportunity for questions and clarification was provided. Assessment completed upon patients arrival to unit and care assumed. Dual skin assessment with RN completed no skin breakdown noted, Allevyn removed. MS and Endo vein harvest sites C/D/I.

## 2021-05-07 NOTE — PROGRESS NOTES
Cardiovascular ICU Consult Note: 5/7/2021  Manuela Daugherty     Patient is seen at the request of Dr. Radha Edmondson for respiratory management status post cardiac surgery. Patient had CABG x 3 and AV Repair. Currently is sedated in CV-ICU and orally intubated receiving  mechanical ventilation. PMH of HTN, HLD, prostate cancer with radiation, and Aortic stenosis. He was seen by Cardiology for symptomatic AS and underwent LHC for workup for valve replacement. LHC showed MVCAD and patient was referred for CT Surgery. We have been asked to see in the CV-ICU for mechanical ventilation management and weaning. Currently patient is coagulopathic and bleeding from chest drains requiring blood products, he is not ready to safely wean off ventilator. Admission Date: 5/4/2021     The patient's chart is reviewed and the patient is discussed with the staff. Subjective: On NC, off pressors. Was very confused, restless, had hallucinations following restoril. Says he feels pretty good today, doing well. Had some pain with chest tubes.      Review of Systems:  + mild incisional chest pain  -Headaches  -Chest pain  -Dyspnea, -wheezing, -cough  -Abdominal pain, -constipation  -Leg swelling  All other organ systems grossly normal.    Allergies   Allergen Reactions    Latex, Natural Rubber Rash     \"tears skin off\"       Current Facility-Administered Medications   Medication Dose Route Frequency    benzocaine-menthol (CEPACOL) lozenge  1 Lozenge Oral Q2H PRN    0.9% sodium chloride infusion 250 mL  250 mL IntraVENous PRN    ALPRAZolam (XANAX) tablet 0.25 mg  0.25 mg Oral QID PRN    temazepam (RESTORIL) capsule 15 mg  15 mg Oral QHS PRN    acetaminophen (TYLENOL) tablet 650 mg  650 mg Oral Q4H PRN    insulin lispro (HUMALOG) injection   SubCUTAneous AC&HS    alcohol 62% (NOZIN) nasal  1 Ampule  1 Ampule Topical Q12H    dextrose 5% - 0.45% NaCl with KCl 20 mEq/L infusion  25 mL/hr IntraVENous CONTINUOUS    sodium chloride (NS) flush 5-40 mL  5-40 mL IntraVENous Q8H    sodium chloride (NS) flush 5-40 mL  5-40 mL IntraVENous PRN    naloxone (NARCAN) injection 0.4 mg  0.4 mg IntraVENous PRN    sodium bicarbonate (8.4%) injection 50 mEq  50 mEq IntraVENous PRN    nitroglycerin (Tridil) 200 mcg/ml infusion  0-20 mcg/min IntraVENous TITRATE    amiodarone (CORDARONE) tablet 200 mg  200 mg Oral Q12H    [Held by provider] metoprolol tartrate (LOPRESSOR) tablet 25 mg  25 mg Oral Q12H    atorvastatin (LIPITOR) tablet 80 mg  80 mg Oral QHS    dextrose (D50W) injection syrg 12.5 g  25 mL IntraVENous PRN    aspirin chewable tablet 81 mg  81 mg Oral DAILY    magnesium sulfate 1 g/100 ml IVPB (premix or compounded)  1 g IntraVENous PRN    PHENYLephrine (REI-SYNEPHRINE) 30 mg in 0.9% sodium chloride 250 mL infusion   mcg/min IntraVENous TITRATE    NOREPINephrine (LEVOPHED) 4 mg in 5% dextrose 250 mL infusion  0.01-0.2 mcg/kg/min IntraVENous TITRATE    prednisoLONE acetate (PRED FORTE) 1 % ophthalmic suspension 1 Drop  1 Drop Both Eyes DAILY    lip protectant (BLISTEX) ointment 1 Each  1 Each Topical PRN         Objective:     Vitals:    05/07/21 0429 05/07/21 0500 05/07/21 0529 05/07/21 0601   BP: (!) 151/68 (!) 148/68 (!) 153/72 (!) 134/59   Pulse: 97 (!) 102 100 94   Resp: 25 (!) 39 25 (!) 31   Temp: 99.8 °F (37.7 °C) 99.8 °F (37.7 °C) 100 °F (37.8 °C) 100.2 °F (37.9 °C)   SpO2: 98% 96% 97% 97%   Weight:       Height:         Intake and Output:   05/05 0701 - 05/06 1900  In: 7626.1 [P.O.:1330; I.V.:5243.6]  Out: 5942 [Urine:2630]  05/06 1901 - 05/07 0700  In: -   Out: 36 [Urine:145]    Physical Exam:          Constitutional:  Obese WM in bed and not in distress  EENMT:  Sclera clear, pupils equal, oral mucosa moist  Respiratory: CTA b/l. No wheezing.    Cardiovascular:  RRR with no M,G,R;  Gastrointestinal:  soft; positive bowel sounds present  Musculoskeletal:  warm with no cyanosis, no lower extremity edema. [place] site leftleg with ace wrap. Juarez Eaton SKIN:  no jaundice or ecchymosis   Neurologic:   no gross neuro deficits  Psychiatric:  Calm and alert. CXR: minimum left base atelectasis       LINES:    alexis, flowtrack, arterial line, chest tubes times 2 in epigastric area without air leak. LABS    Recent Labs     05/07/21  0516 05/06/21  0737 05/06/21  0223   WBC 16.1* 15.7* 16.2*   HGB 10.3* 8.0* 8.3*   HCT 31.6* 23.9* 24.8*   PLT 59* 68* 75*     Recent Labs     05/07/21  0516 05/06/21  0223 05/05/21  0313 05/04/21  1335 05/04/21  1335    146* 146*  --  145   K 4.0 3.1* 3.7   < > 3.6   * 119* 116*  --  116*   * 137* 97  --  151*   CO2 26 18* 24  --  20*   BUN 20 16 15  --  13   CREA 0.92 0.67* 1.05  --  0.79*   MG 2.4 1.6* 2.2   < > 2.5*   INR  --   --   --   --  1.8    < > = values in this interval not displayed. Recent Labs     05/04/21  2224 05/04/21  1929 05/04/21  1738   PHI 7.36 7.28* 7.30*   PCO2I 38.8 43.3 41.1   PO2I 109* 115* 106*   HCO3I 22.1 20.5* 20.2*     No results for input(s): LCAD, LAC in the last 72 hours.    PFT 5/3      Assessment and Plan :  (Medical Decision Making)     Hospital Problems  Date Reviewed: 9/15/2020          Codes Class Noted POA    S/P AVR ICD-10-CM: Z95.2  ICD-9-CM: V43.3  5/5/2021 Unknown        S/P CABG x 3 ICD-10-CM: Z95.1  ICD-9-CM: V45.81  5/5/2021 Unknown        Atelectasis ICD-10-CM: J98.11  ICD-9-CM: 518.0  5/5/2021 Unknown        Aortic valve stenosis ICD-10-CM: I35.0  ICD-9-CM: 424.1  5/4/2021 Unknown        CAD (coronary artery disease) ICD-10-CM: I25.10  ICD-9-CM: 414.00  5/4/2021 Unknown        Atherosclerosis of native coronary artery of native heart with unstable angina pectoris (HCC) ICD-10-CM: I25.110  ICD-9-CM: 414.01, 411.1  5/4/2021 Unknown        Encounter for weaning from ventilator [facility]) ICD-10-CM: Z99.11  ICD-9-CM: V46.13  5/4/2021 Unknown        Hypoxia ICD-10-CM: R09.02  ICD-9-CM: 799.02  5/4/2021 Unknown            64yo with PMH of HTN, HLD AS and prostates cancer. Underwent LHC for AV replacement. LHC showed MVCAD. Underwent CABG x 3 and AVR on 5/4. Plan:   --Could try Zyprexa in place of restoril for delirium if needed, but perhaps attempt to avoid additional rxs  --lytes replaced  --chest tubes per CV surgery  --Incentive spirometry, pulmonary toilet  --WBC is up, stable, afebrile  --check HIT, not on anticoagulation now, but platelets dropping    More than 50% of the time documented was spent in face-to-face contact with the patient and in the care of the patient on the floor/unit where the patient is located.      Andrés Ricketts MD

## 2021-05-07 NOTE — PROGRESS NOTES
ACUTE PHYSICAL THERAPY GOALS:  (Developed with and agreed upon by patient and/or caregiver.)  STG:  (1.)Mr. Keny Adame will move from supine to sit and sit to supine , scoot up and down and roll side to side with MINIMAL ASSIST within 3 treatment day(s). (2.)Mr. Keny Adame will transfer from bed to chair and chair to bed with MINIMAL ASSIST using the least restrictive device within 3 treatment day(s). (3.)Mr. Kney Adame will ambulate with MINIMAL ASSIST for 25 feet with the least restrictive device within 3 treatment day(s). LTG:  (1.)Mr. Keny Adame will move from supine to sit and sit to supine , scoot up and down and roll side to side in bed with SUPERVISION within 7 treatment day(s). (2.)Mr. Keny Adame will transfer from bed to chair and chair to bed with SUPERVISION using the least restrictive device within 7 treatment day(s). (3.)Mr. Keny Adame will ambulate with SUPERVISION for 200+ feet with the least restrictive device within 7 treatment day(s). (4.)Mr. Keny Adame will negotiate stairs up/down x14 with SUPERVISION with the least restrictive device within 7 treatment day(s).   ________________________________________________________________________________________________        PHYSICAL THERAPY ASSESSMENT: Initial Assessment and AM PT Treatment Day # 1      Aaliyah Renae is a 66 y.o. male   PRIMARY DIAGNOSIS: <principal problem not specified>  Atherosclerosis of native coronary artery of native heart with unstable angina pectoris (HCC) [I25.110]  Aortic valve stenosis, etiology of cardiac valve disease unspecified [I35.0]  CAD (coronary artery disease) [I25.10]  Aortic valve stenosis [I35.0]  Procedure(s) (LRB):  CORONARY ARTERY BYPASS GRAFT (CABG X3) WITH AVR, LIMA (N/A)  LULÚ/ANES PROBE (N/A)  VEIN HARVEST ENDOSCOPIC, GREATER SAPHENOUS (Left)  3 Days Post-Op  Reason for Referral:    ICD-10: Treatment Diagnosis: Generalized Muscle Weakness (M62.81)  Difficulty in walking, Not elsewhere classified (R26.2)  [place] / [place] / Product Type: Medicare /     ASSESSMENT:     REHAB RECOMMENDATIONS:   Recommendation to date pending progress:  [address] [place] [address] vs. Short-term Rehab   TBD  Equipment:    None     PRIOR LEVEL OF FUNCTION:  (Prior to Hospitalization) INITIAL/CURRENT LEVEL OF FUNCTION:  (Most Recently Demonstrated)   Bed Mobility:   Independent  Sit to Stand:   Independent  Transfers:   Independent  Gait/Mobility:   Independent Bed Mobility:   Not tested  Sit to Stand:   Moderate Assistance  Transfers:   Moderate Assistance  Gait/Mobility:   Maximal Assistance     ASSESSMENT:  Mr. Charisse Kerns was sitting in chair at arrival. He was impulsive to get out of chair when discovering PT was present. He needed multiple cues to be slow and wait for cue to move to next task. RN present and asst with modA to SPT over to Unice  to stand again then pt immediately began walking 20ft in flexed posture needing maxA initially to control momentum followed by Arlyn Stewart for safety back to chair. His main limitation is chest tube incision pain. Pt returned to chair with RN present. Pt will benefit from skilled and sophisticated PT to help improve impairments. SUBJECTIVE:   Mr. Charisse Kerns states, [facility]. 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    Home Environment: Private residence  One/Two Story Residence: Two story  Living Alone: No  Support Systems: Spouse/Significant Other/Partner  OBJECTIVE:     PAIN: VITAL SIGNS: LINES/DRAINS:   Pre Treatment: Pain Screen  Pain Scale 1: Numeric (0 - 10)  Pain Intensity 1: 2  Pain Location 1: Chest;Incisional  Post Treatment: 2   Chest Tube and Champion Catheter  O2 Device: Nasal cannula     GROSS EVALUATION:   Within Functional Limits Abnormal/ Functional Abnormal/ Non-Functional (see comments) Not Tested Comments:   AROM [x] [] [] []    PROM [] [] [] []    Strength [] [x] [] []    Balance [] [x] [] []    Posture [] [x] [] []    Sensation [] [x] [] []    Coordination [x] [] [] []    Tone [] [] [] []    Edema [] [] [] []    Activity Tolerance [] [x] [] []     [] [] [] []      COGNITION/  PERCEPTION: Intact Impaired   (see comments) Comments:   Orientation [x] []    Vision [x] []    Hearing [x] []    Command Following [] [x]    Safety Awareness [] [x]     [] []      MOBILITY: I Mod I S SBA CGA Min Mod Max Total  NT x2 Comments:   Bed Mobility    Rolling [] [] [] [] [] [] [] [] [] [x] []    Supine to Sit [] [] [] [] [] [] [] [] [] [x] []    Scooting [] [] [] [] [] [] [] [] [] [x] []    Sit to Supine [] [] [] [] [] [] [] [] [] [x] []    Transfers    Sit to Stand [] [] [] [] [] [] [x] [] [] [] []    Bed to Chair [] [] [] [] [] [] [x] [] [] [] []    Stand to Sit [] [] [] [] [] [] [x] [] [] [] []    I=Independent, Mod I=Modified Independent, S=Supervision, SBA=Standby Assistance, CGA=Contact Guard Assistance,   Min=Minimal Assistance, Mod=Moderate Assistance, Max=Maximal Assistance, Total=Total Assistance, NT=Not Tested  GAIT: I Mod I S SBA CGA Min Mod Max Total  NT x2 Comments:   Level of Assistance [] [] [] [] [] [] [x] [x] [] [] [] Impulsive and unsafe   Distance 20    DME Rolling Walker    Gait Quality Flexed, impulsive, unsteady, leans r    Weightbearing Status N/A     I=Independent, Mod I=Modified Independent, S=Supervision, SBA=Standby Assistance, CGA=Contact Guard Assistance,   Min=Minimal Assistance, Mod=Moderate Assistance, Max=Maximal Assistance, Total=Total Assistance, NT=Not Tested    [facility] Inpatient Short Form       How much difficulty does the patient currently have. .. Unable A Lot A Little None   1. Turning over in bed (including adjusting bedclothes, sheets and blankets)? [] 1   [x] 2   [] 3   [] 4   2.   Sitting down on and standing up from a chair with arms ( e.g., wheelchair, bedside commode, etc.)   [] 1   [x] 2   [] 3   [] 4   3. Moving from lying on back to sitting on the side of the bed? [] 1   [x] 2   [] 3   [] 4   How much help from another person does the patient currently need. .. Total A Lot A Little None   4. Moving to and from a bed to a chair (including a wheelchair)? [] 1   [x] 2   [] 3   [] 4   5. Need to walk in hospital room? [] 1   [x] 2   [] 3   [] 4   6. Climbing 3-5 steps with a railing? [] 1   [x] 2   [] 3   [] 4   © 2007, Trustees of [address] [place] [address] [address] [id number], under license to [employer]. All rights reserved     Score:  Initial: 12 Most Recent: X (Date: -- )    Interpretation of Tool:  Represents activities that are increasingly more difficult (i.e. Bed mobility, Transfers, Gait). PLAN:   FREQUENCY/DURATION: PT Plan of Care: BID for duration of hospital stay or until stated goals are met, whichever comes first.    PROBLEM LIST:   (Skilled intervention is medically necessary to address:)  1. Decreased ADL/Functional Activities  2. Decreased Activity Tolerance  3. Decreased AROM/PROM  4. Decreased Balance  5. Decreased Cognition  6. Decreased Coordination  7. Decreased Gait Ability  8. Decreased Strength  9. Decreased Transfer Abilities  10. Increased Pain   INTERVENTIONS PLANNED:   (Benefits and precautions of physical therapy have been discussed with the patient.)  1. Therapeutic Activity  2. Therapeutic Exercise/HEP  3. Neuromuscular Re-education  4. Gait Training  5. Education     TREATMENT:     EVALUATION: High Complexity : (Untimed Charge)    TREATMENT:   ($$ Therapeutic Activity: 8-22 mins    )  Therapeutic Activity (14 Minutes): Therapeutic activity included Scooting, Lateral Scooting, Transfer Training, Ambulation on level ground, Sitting balance  and Standing balance to improve functional Mobility, Strength and Activity tolerance.     TREATMENT GRID:  N/A    AFTER TREATMENT POSITION/PRECAUTIONS:  Chair, Needs within reach and RN notified    INTERDISCIPLINARY COLLABORATION:  RN/PCT and PT/PTA    TOTAL TREATMENT DURATION:  PT Patient Time In/Time Out  Time In: 0920  Time Out: [address], [place]

## 2021-05-07 NOTE — PROGRESS NOTES
POD 3 Days Post-Op    Procedure:  Procedure(s):  CORONARY ARTERY BYPASS GRAFT (CABG X3) WITH AVR, LIMA  LULÚ/ANES PROBE  VEIN HARVEST ENDOSCOPIC, GREATER SAPHENOUS      Subjective:     Patient has No significant medical complaints      Objective:     Patient Vitals for the past 8 hrs:   BP Temp Pulse Resp SpO2 Weight   21 0601 (!) 134/59 100.2 °F (37.9 °C) 94 (!) 31 97 % --   21 0529 (!) 153/72 100 °F (37.8 °C) 100 25 97 % --   21 0500 (!) 148/68 99.8 °F (37.7 °C) (!) 102 (!) 39 96 % --   21 0429 (!) 151/68 99.8 °F (37.7 °C) 97 25 98 % --   21 0400 (!) 158/75 99.8 °F (37.7 °C) 100 28 96 % 242 lb 11.6 oz (110.1 kg)   21 0329 (!) 147/67 99.5 °F (37.5 °C) 92 (!) 38 98 % --   21 0301 139/65 99.5 °F (37.5 °C) 91 25 97 % --   21 0229 (!) 149/68 99.3 °F (37.4 °C) 93 25 98 % --   21 0200 (!) 150/94 99.3 °F (37.4 °C) 93 (!) 40 96 % --   21 0100 (!) 145/66 99.3 °F (37.4 °C) 90 24 97 % --   21 0001 (!) 161/72 98.9 °F (37.2 °C) 99 29 91 % --     Temp (24hrs), Av.7 °F (37.6 °C), Min:98.7 °F (37.1 °C), Max:100.4 °F (38 °C)        Hemodynamics    PAP  CO (l/min): 7.9 l/min CO  CI (l/min/m2): 3.6 l/min/m2 CI    No intake/output data recorded.  190 -  0700  In: 5697.5 [P.O.:1340;  I.V.:3615]  Out: 3220 [Urine:2365]    CT Drainage              total of all CT's    Heart:  regular rate and rhythm, S1, S2 normal, no murmur, click, rub or gallop  Lung:  clear to auscultation bilaterally  Neuro: Grossly non focal  Incisions: Clean, dry, and intact    Labs:  Recent Results (from the past 24 hour(s))   CBC W/O DIFF    Collection Time: 21  7:37 AM   Result Value Ref Range    WBC 15.7 (H) 4.3 - 11.1 K/uL    RBC 2.62 (L) 4.23 - 5.6 M/uL    HGB 8.0 (L) 13.6 - 17.2 g/dL    HCT 23.9 (L) 41.1 - 50.3 %    MCV 91.2 79.6 - 97.8 FL    MCH 30.5 26.1 - 32.9 PG    MCHC 33.5 31.4 - 35.0 g/dL    RDW 15.2 (H) 11.9 - 14.6 %    PLATELET 68 (L) 333 - 450 K/uL    MPV 12.3 9.4 - 12.3 FL    ABSOLUTE NRBC 0.00 0.0 - 0.2 K/uL   RBC, ALLOCATE    Collection Time: 05/06/21  7:45 AM   Result Value Ref Range    HISTORY CHECKED? Historical check performed    RBC, ALLOCATE    Collection Time: 05/06/21  8:15 AM   Result Value Ref Range    HISTORY CHECKED? Historical check performed    GLUCOSE, POC    Collection Time: 05/06/21 11:35 AM   Result Value Ref Range    Glucose (POC) 156 (H) 65 - 100 mg/dL    Performed by Willie     GLUCOSE, POC    Collection Time: 05/06/21  5:10 PM   Result Value Ref Range    Glucose (POC) 132 (H) 65 - 100 mg/dL    Performed by Hafsa Becerra, POC    Collection Time: 05/06/21  9:31 PM   Result Value Ref Range    Glucose (POC) 151 (H) 65 - 100 mg/dL    Performed by [address] [place] [address] PPD TEST IN 48 HRS    Collection Time: 05/06/21 10:00 PM   Result Value Ref Range    PPD Negative Negative    mm Induration 0 0 - 5 mm   CBC W/O DIFF    Collection Time: 05/07/21  5:16 AM   Result Value Ref Range    WBC 16.1 (H) 4.3 - 11.1 K/uL    RBC 3.41 (L) 4.23 - 5.6 M/uL    HGB 10.3 (L) 13.6 - 17.2 g/dL    HCT 31.6 (L) 41.1 - 50.3 %    MCV 92.7 79.6 - 97.8 FL    MCH 30.2 26.1 - 32.9 PG    MCHC 32.6 31.4 - 35.0 g/dL    RDW 15.1 (H) 11.9 - 14.6 %    PLATELET 59 (L) 632 - 450 K/uL    MPV 11.9 9.4 - 12.3 FL    ABSOLUTE NRBC 0.00 0.0 - 0.2 K/uL   METABOLIC PANEL, BASIC    Collection Time: 05/07/21  5:16 AM   Result Value Ref Range    Sodium 140 136 - 145 mmol/L    Potassium 4.0 3.5 - 5.1 mmol/L    Chloride 109 (H) 98 - 107 mmol/L    CO2 26 21 - 32 mmol/L    Anion gap 5 (L) 7 - 16 mmol/L    Glucose 131 (H) 65 - 100 mg/dL    BUN 20 8 - 23 MG/DL    Creatinine 0.92 0.8 - 1.5 MG/DL    GFR est AA >60 >60 ml/min/1.73m2    GFR est non-AA >60 >60 ml/min/1.73m2    Calcium 7.9 (L) 8.3 - 10.4 MG/DL   MAGNESIUM    Collection Time: 05/07/21  5:16 AM   Result Value Ref Range    Magnesium 2.4 1.8 - 2.4 mg/dL   GLUCOSE, POC    Collection Time: 05/07/21  7:01 AM Result Value Ref Range    Glucose (POC) 120 (H) 65 - 100 mg/dL    Performed by Yanni        Assessment:     Active Problems:     Aortic valve stenosis (5/4/2021)      CAD (coronary artery disease) (5/4/2021)      Atherosclerosis of native coronary artery of native heart with unstable angina pectoris ([facility] [address] (5/4/2021)      Encounter for weaning from ventilator (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

## 2021-05-08 ENCOUNTER — APPOINTMENT (OUTPATIENT)
Dept: GENERAL RADIOLOGY | Age: 76
DRG: 267 | End: 2021-05-08
Attending: PHYSICIAN ASSISTANT
Payer: MEDICARE

## 2021-05-08 PROBLEM — Z99.11 ENCOUNTER FOR WEANING FROM VENTILATOR (HCC): Status: RESOLVED | Noted: 2021-05-04 | Resolved: 2021-05-08

## 2021-05-08 PROBLEM — I25.10 CAD (CORONARY ARTERY DISEASE): Status: RESOLVED | Noted: 2021-05-04 | Resolved: 2021-05-08

## 2021-05-08 PROBLEM — I25.110 ATHEROSCLEROSIS OF NATIVE CORONARY ARTERY OF NATIVE HEART WITH UNSTABLE ANGINA PECTORIS (HCC): Status: RESOLVED | Noted: 2021-05-04 | Resolved: 2021-05-08

## 2021-05-08 LAB
ERYTHROCYTE [DISTWIDTH] IN BLOOD BY AUTOMATED COUNT: 15 % (ref 11.9–14.6)
HCT VFR BLD AUTO: 29.4 % (ref 41.1–50.3)
HGB BLD-MCNC: 9.7 G/DL (ref 13.6–17.2)
MAGNESIUM SERPL-MCNC: 2.5 MG/DL (ref 1.8–2.4)
MCH RBC QN AUTO: 30.6 PG (ref 26.1–32.9)
MCHC RBC AUTO-ENTMCNC: 33 G/DL (ref 31.4–35)
MCV RBC AUTO: 92.7 FL (ref 79.6–97.8)
NRBC # BLD: 0 K/UL (ref 0–0.2)
PLATELET # BLD AUTO: 77 K/UL (ref 150–450)
PMV BLD AUTO: 12.3 FL (ref 9.4–12.3)
POTASSIUM SERPL-SCNC: 3.4 MMOL/L (ref 3.5–5.1)
RBC # BLD AUTO: 3.17 M/UL (ref 4.23–5.6)
WBC # BLD AUTO: 10.5 K/UL (ref 4.3–11.1)

## 2021-05-08 PROCEDURE — 94760 N-INVAS EAR/PLS OXIMETRY 1: CPT

## 2021-05-08 PROCEDURE — 74011000250 HC RX REV CODE- 250: Performed by: THORACIC SURGERY (CARDIOTHORACIC VASCULAR SURGERY)

## 2021-05-08 PROCEDURE — 83735 ASSAY OF MAGNESIUM: CPT

## 2021-05-08 PROCEDURE — 84132 ASSAY OF SERUM POTASSIUM: CPT

## 2021-05-08 PROCEDURE — 71046 X-RAY EXAM CHEST 2 VIEWS: CPT

## 2021-05-08 PROCEDURE — 74011250637 HC RX REV CODE- 250/637: Performed by: PHYSICIAN ASSISTANT

## 2021-05-08 PROCEDURE — 2709999900 HC NON-CHARGEABLE SUPPLY

## 2021-05-08 PROCEDURE — 97530 THERAPEUTIC ACTIVITIES: CPT

## 2021-05-08 PROCEDURE — 94664 DEMO&/EVAL PT USE INHALER: CPT

## 2021-05-08 PROCEDURE — 65660000004 HC RM CVT STEPDOWN

## 2021-05-08 PROCEDURE — 77030012890

## 2021-05-08 PROCEDURE — 85027 COMPLETE CBC AUTOMATED: CPT

## 2021-05-08 PROCEDURE — 99232 SBSQ HOSP IP/OBS MODERATE 35: CPT | Performed by: INTERNAL MEDICINE

## 2021-05-08 PROCEDURE — 74011000250 HC RX REV CODE- 250: Performed by: NURSE PRACTITIONER

## 2021-05-08 PROCEDURE — 94640 AIRWAY INHALATION TREATMENT: CPT

## 2021-05-08 PROCEDURE — 77010033678 HC OXYGEN DAILY

## 2021-05-08 PROCEDURE — 36415 COLL VENOUS BLD VENIPUNCTURE: CPT

## 2021-05-08 RX ORDER — LEVALBUTEROL INHALATION SOLUTION 1.25 MG/3ML
1.25 SOLUTION RESPIRATORY (INHALATION)
Status: DISCONTINUED | OUTPATIENT
Start: 2021-05-08 | End: 2021-05-10 | Stop reason: HOSPADM

## 2021-05-08 RX ADMIN — Medication 10 ML: at 06:37

## 2021-05-08 RX ADMIN — ACETAMINOPHEN 650 MG: 325 TABLET ORAL at 16:58

## 2021-05-08 RX ADMIN — Medication 10 ML: at 21:00

## 2021-05-08 RX ADMIN — POTASSIUM CHLORIDE 20 MEQ: 20 TABLET, EXTENDED RELEASE ORAL at 16:58

## 2021-05-08 RX ADMIN — FAMOTIDINE 20 MG: 20 TABLET, FILM COATED ORAL at 16:58

## 2021-05-08 RX ADMIN — ATORVASTATIN CALCIUM 80 MG: 80 TABLET, FILM COATED ORAL at 21:00

## 2021-05-08 RX ADMIN — LEVALBUTEROL HYDROCHLORIDE 1.25 MG: 1.25 SOLUTION RESPIRATORY (INHALATION) at 16:51

## 2021-05-08 RX ADMIN — Medication 10 ML: at 16:59

## 2021-05-08 RX ADMIN — AMIODARONE HYDROCHLORIDE 200 MG: 200 TABLET ORAL at 21:00

## 2021-05-08 RX ADMIN — PREDNISOLONE ACETATE 1 DROP: 10 SUSPENSION/ DROPS OPHTHALMIC at 09:00

## 2021-05-08 RX ADMIN — LEVALBUTEROL HYDROCHLORIDE 1.25 MG: 1.25 SOLUTION RESPIRATORY (INHALATION) at 20:42

## 2021-05-08 RX ADMIN — POTASSIUM CHLORIDE 40 MEQ: 20 TABLET, EXTENDED RELEASE ORAL at 06:37

## 2021-05-08 RX ADMIN — FAMOTIDINE 20 MG: 20 TABLET, FILM COATED ORAL at 08:20

## 2021-05-08 RX ADMIN — AMIODARONE HYDROCHLORIDE 200 MG: 200 TABLET ORAL at 08:20

## 2021-05-08 RX ADMIN — Medication 1 AMPULE: at 08:20

## 2021-05-08 RX ADMIN — ACETAMINOPHEN 650 MG: 325 TABLET ORAL at 08:23

## 2021-05-08 RX ADMIN — POTASSIUM CHLORIDE 10 MEQ: 750 TABLET, EXTENDED RELEASE ORAL at 08:20

## 2021-05-08 RX ADMIN — LEVALBUTEROL HYDROCHLORIDE 1.25 MG: 1.25 SOLUTION RESPIRATORY (INHALATION) at 03:58

## 2021-05-08 RX ADMIN — FUROSEMIDE 40 MG: 40 TABLET ORAL at 08:20

## 2021-05-08 RX ADMIN — Medication 1 AMPULE: at 21:00

## 2021-05-08 RX ADMIN — LEVALBUTEROL HYDROCHLORIDE 1.25 MG: 1.25 SOLUTION RESPIRATORY (INHALATION) at 00:21

## 2021-05-08 RX ADMIN — POTASSIUM CHLORIDE 20 MEQ: 20 TABLET, EXTENDED RELEASE ORAL at 08:23

## 2021-05-08 NOTE — PROGRESS NOTES
Cardiovascular ICU Consult Note: 5/8/2021  Arabella Vaughn     Patient is seen at the request of Dr. José Luis Clements for respiratory management status post cardiac surgery. Patient had CABG x 3 and AV Repair. Currently is sedated in CV-ICU and orally intubated receiving  mechanical ventilation. PMH of HTN, HLD, prostate cancer with radiation, and Aortic stenosis. He was seen by Cardiology for symptomatic AS and underwent LHC for workup for valve replacement. LHC showed MVCAD and patient was referred for CT Surgery. We have been asked to see in the CV-ICU for mechanical ventilation management and weaning. Currently patient is coagulopathic and bleeding from chest drains requiring blood products, he is not ready to safely wean off ventilator. Admission Date: 5/4/2021     The patient's chart is reviewed and the patient is discussed with the staff. Subjective:   Now on RA. Moved to step down yesterday and progressing well, but only pulling 250 ml on IS even with instruction.        Review of Systems:  + mild incisional chest pain  -Headaches  -Chest pain  -Dyspnea, -wheezing, -cough   -Abdominal pain, -constipation  -Leg swelling  All other organ systems grossly normal.    Allergies   Allergen Reactions    Latex, Natural Rubber Rash     \"tears skin off\"       Current Facility-Administered Medications   Medication Dose Route Frequency    furosemide (LASIX) tablet 40 mg  40 mg Oral DAILY    magnesium hydroxide (MILK OF MAGNESIA) 400 mg/5 mL oral suspension 30 mL  30 mL Oral DAILY PRN    alum-mag hydroxide-simeth (MYLANTA) oral suspension 30 mL  30 mL Oral Q4H PRN    famotidine (PEPCID) tablet 20 mg  20 mg Oral BID    ondansetron (ZOFRAN) injection 4 mg  4 mg IntraVENous Q4H PRN    magnesium oxide (MAG-OX) tablet 400 mg  400 mg Oral TID PRN    magnesium oxide (MAG-OX) tablet 400 mg  400 mg Oral QID PRN    potassium chloride (KLOR-CON) tablet 10 mEq  10 mEq Oral DAILY    potassium chloride (K-DUR, KLOR-CON) SR tablet 20 mEq  20 mEq Oral BID PRN    potassium chloride (K-DUR, KLOR-CON) SR tablet 40 mEq  40 mEq Oral BID PRN    levalbuterol (XOPENEX) nebulizer soln 1.25 mg/3 mL  1.25 mg Nebulization Q4H RT    traMADoL (ULTRAM) tablet 50 mg  50 mg Oral Q6H PRN    benzocaine-menthol (CEPACOL) lozenge  1 Lozenge Oral Q2H PRN    ALPRAZolam (XANAX) tablet 0.25 mg  0.25 mg Oral QID PRN    acetaminophen (TYLENOL) tablet 650 mg  650 mg Oral Q4H PRN    alcohol 62% (NOZIN) nasal  1 Ampule  1 Ampule Topical Q12H    sodium chloride (NS) flush 5-40 mL  5-40 mL IntraVENous Q8H    sodium chloride (NS) flush 5-40 mL  5-40 mL IntraVENous PRN    amiodarone (CORDARONE) tablet 200 mg  200 mg Oral Q12H    [Held by provider] metoprolol tartrate (LOPRESSOR) tablet 25 mg  25 mg Oral Q12H    atorvastatin (LIPITOR) tablet 80 mg  80 mg Oral QHS    aspirin chewable tablet 81 mg  81 mg Oral DAILY    prednisoLONE acetate (PRED FORTE) 1 % ophthalmic suspension 1 Drop  1 Drop Both Eyes DAILY    lip protectant (BLISTEX) ointment 1 Each  1 Each Topical PRN         Objective:     Vitals:    05/08/21 0320 05/08/21 0342 05/08/21 0400 05/08/21 0703   BP: (!) 141/68   134/60   Pulse: 86   93   Resp: 20   24   Temp: 97.9 °F (36.6 °C)   98.7 °F (37.1 °C)   SpO2: 94%  99% 92%   Weight:  232 lb 12.8 oz (105.6 kg)     Height:         Intake and Output:   05/06 1901 - 05/08 0700  In: 827.4 [P.O.:700; I.V.:127.4]  Out: 2340 [Urine:2070]  No intake/output data recorded. Physical Exam:          Constitutional:  Alert, calm, obese, appears stated age  EENMT:  Sclera clear, pupils equal, oral mucosa moist  Respiratory: crackles posterior base, clear otherwise  Cardiovascular:  RRR with no M,G,R;  Gastrointestinal:  soft; positive bowel sounds present  Musculoskeletal:  warm with no cyanosis, no lower extremity edema. [place] site leftleg with ace wrap. Ulus Reusing   SKIN:  no jaundice or ecchymosis   Neurologic:   no gross neuro deficits  Psychiatric:  Calm and alert. CXR:   5/8/2021 5/5/2021      LABS    Recent Labs     05/08/21 0318 05/07/21  0516 05/06/21  0737   WBC 10.5 16.1* 15.7*   HGB 9.7* 10.3* 8.0*   HCT 29.4* 31.6* 23.9*   PLT 77* 59* 68*     Recent Labs     05/08/21 0318 05/07/21  0516 05/06/21  0223   NA  --  140 146*   K 3.4* 4.0 3.1*   CL  --  109* 119*   GLU  --  131* 137*   CO2  --  26 18*   BUN  --  20 16   CREA  --  0.92 0.67*   MG 2.5* 2.4 1.6*     No results for input(s): PH, PCO2, PO2, HCO3, PHI, PCO2I, PO2I, HCO3I in the last 72 hours. No results for input(s): LCAD, LAC in the last 72 hours. PFT 5/3      Assessment and Plan :  (Medical Decision Making)     Hospital Problems  Date Reviewed: 9/15/2020          Codes Class Noted POA    S/P AVR ICD-10-CM: Z95.2  ICD-9-CM: V43.3  5/5/2021 Unknown        * (Principal) S/P CABG x 3 ICD-10-CM: Z95.1  ICD-9-CM: V45.81  5/5/2021 Unknown        Atelectasis ICD-10-CM: J98.11  ICD-9-CM: 518.0  5/5/2021 Unknown        Aortic valve stenosis ICD-10-CM: I35.0  ICD-9-CM: 424.1  5/4/2021 Unknown        CAD (coronary artery disease) ICD-10-CM: I25.10  ICD-9-CM: 414.00  5/4/2021 Unknown        Atherosclerosis of native coronary artery of native heart with unstable angina pectoris (HCC) ICD-10-CM: I25.110  ICD-9-CM: 414.01, 411.1  5/4/2021 Unknown        Encounter for weaning from ventilator [facility]) ICD-10-CM: Z99.11  ICD-9-CM: V46.13  5/4/2021 Unknown        Hypoxia ICD-10-CM: R09.02  ICD-9-CM: 799.02  5/4/2021 Unknown            64yo with PMH of HTN, HLD AS and prostates cancer. Underwent LHC for AV replacement. LHC showed MVCAD. Underwent CABG x 3 and AVR on 5/4.  [facility]    Plan:   --add flutter valve  --lytes replaced per CV protocol  --Incentive spirometry, pulmonary toilet  --WBC improved.   --HIT panel pending, but platelets have improved since yesterday, could have dropped r/t surgical process    More than 50% of the time documented was spent in face-to-face contact with the patient and in the care of the patient on the floor/unit where the patient is located. Rocky Mondragon, [place]   Lungs:  clear  Heart:  RRR with no Murmur/Rubs/Gallops    Additional Comments:  Having hallucinations but getting better  cxr- some atelectasis vs effusion R lung base      I have spoken with and examined the patient. I agree with the above assessment and plan as documented.     Jevon Oakley MD

## 2021-05-08 NOTE — PROGRESS NOTES
Received telephone call from patient's wife Linda Valle she states patient has texted her requesting her to bring him breakfast.  Update provided to patient's wife Linda Leungs and emotional support provided.

## 2021-05-08 NOTE — PROGRESS NOTES
ACUTE PHYSICAL THERAPY GOALS:  (Developed with and agreed upon by patient and/or caregiver.)  STG:  (1.)Mr. Jason Ge will move from supine to sit and sit to supine , scoot up and down and roll side to side with MINIMAL ASSIST within 3 treatment day(s). (2.)Mr. Jason Ge will transfer from bed to chair and chair to bed with MINIMAL ASSIST using the least restrictive device within 3 treatment day(s). (3.)Mr. Jason Ge will ambulate with MINIMAL ASSIST for 25 feet with the least restrictive device within 3 treatment day(s).      LTG:  (1.)Mr. Jason Ge will move from supine to sit and sit to supine , scoot up and down and roll side to side in bed with SUPERVISION within 7 treatment day(s). (2.)Mr. Jason Ge will transfer from bed to chair and chair to bed with SUPERVISION using the least restrictive device within 7 treatment day(s). (3.)Mr. Jason Ge will ambulate with SUPERVISION for 200+ feet with the least restrictive device within 7 treatment day(s). (4.)Mr. Jason Ge will negotiate stairs up/down x14 with SUPERVISION with the least restrictive device within 7 treatment day(s).     PHYSICAL THERAPY: Daily Note and AM Treatment Day # 2    Kelly Ramon is a 66 y.o. male   PRIMARY DIAGNOSIS: S/P CABG x 3  Atherosclerosis of native coronary artery of native heart with unstable angina pectoris (HCC) [I25.110]  Aortic valve stenosis, etiology of cardiac valve disease unspecified [I35.0]  CAD (coronary artery disease) [I25.10]  Aortic valve stenosis [I35.0]  Procedure(s) (LRB):  CORONARY ARTERY BYPASS GRAFT (CABG X3) WITH AVR, LIMA (N/A)  LULÚ/ANES PROBE (N/A)  VEIN HARVEST ENDOSCOPIC, GREATER SAPHENOUS (Left)  4 Days Post-Op    ASSESSMENT:     REHAB RECOMMENDATIONS: CURRENT LEVEL OF FUNCTION:  (Most Recently Demonstrated)   Recommendation to date pending progress:  [address] [place] [address] vs. Short-term Rehab   HHPT pending progress  Equipment:    None Bed Mobility:   Not tested  Sit to Stand:   Contact Guard Assistance  Transfers:   Contact Guard Assistance  Gait/Mobility:   Contact Guard Assistance     ASSESSMENT:  Mr. Sabino Griggs is making good progress towards PT goals as noted by increased gait distance and overall mobility. Will continue efforts. SUBJECTIVE:   Mr. Sabino Griggs states, \"Do you want me to wiggle my ears now? 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    Home Environment: Private residence  One/Two Story Residence: Two story  Living Alone: No  Support Systems: Spouse/Significant Other/Partner  OBJECTIVE:     PAIN: VITAL SIGNS: LINES/DRAINS:   Pre Treatment: Pain Screen  Pain Scale 1: FLACC  Pain Intensity 1: 0  Post Treatment: 0   Champion Catheter  O2 Device: None (Room air)     MOBILITY: I Mod I S SBA CGA Min Mod Max Total  NT x2 Comments:   Bed Mobility    Rolling [] [] [] [] [] [] [] [] [] [x] []    Supine to Sit [] [] [] [] [] [] [] [] [] [x] []    Scooting [] [] [] [] [] [] [] [] [] [x] []    Sit to Supine [] [] [] [] [] [] [] [] [] [x] []    Transfers    Sit to Stand [] [] [] [] [x] [] [] [] [] [] []    Bed to Chair [] [] [] [] [x] [] [] [] [] [] []    Stand to Sit [] [] [] [] [x] [] [] [] [] [] []    I=Independent, Mod I=Modified Independent, S=Supervision, SBA=Standby Assistance, CGA=Contact Guard Assistance,   Min=Minimal Assistance, Mod=Moderate Assistance, Max=Maximal Assistance, Total=Total Assistance, NT=Not Tested    BALANCE: Good Fair+ Fair Fair- Poor NT Comments   Sitting Static [x] [] [] [] [] []    Sitting Dynamic [] [x] [] [] [] []              Standing Static [] [] [x] [] [] []    Standing Dynamic [] [] [x] [] [] []      GAIT: I Mod I S SBA CGA Min Mod Max Total  NT x2 Comments:   Level of Assistance [] [] [] [] [x] [] [] [] [] [] []    Distance 200'    DME Rolling Walker    Gait Quality     Weightbearing  Status N/A     I=Independent, Mod I=Modified Independent, S=Supervision, SBA=Standby Assistance, CGA=Contact Guard Assistance,   Min=Minimal Assistance, Mod=Moderate Assistance, Max=Maximal Assistance, Total=Total Assistance, NT=Not Tested    PLAN:   FREQUENCY/DURATION: PT Plan of Care: BID for duration of hospital stay or until stated goals are met, whichever comes first.  TREATMENT:     TREATMENT:   ($$ Therapeutic Activity: 23-37 mins    )  Therapeutic Activity (24 Minutes): Therapeutic activity included Scooting, Transfer Training, Ambulation on level ground, Sitting balance , Standing balance and LE exercises to improve functional Mobility, Strength and Activity tolerance.     TREATMENT GRID:  N/A    AFTER TREATMENT POSITION/PRECAUTIONS:  Alarm Activated, Chair, Needs within reach, RN notified and Visitors at bedside    INTERDISCIPLINARY COLLABORATION:  RN/PCT and PT/PTA    TOTAL TREATMENT DURATION:  PT Patient Time In/Time Out  Time In: 0836  Time Out: 0900    Chinedu Whipple PTA

## 2021-05-08 NOTE — PROGRESS NOTES
Problem: Falls - Risk of  Goal: *Absence of Falls  Description: Document Ginna Bravo Fall Risk and appropriate interventions in the flowsheet.   Outcome: Progressing Towards Goal  Note: Fall Risk Interventions:  Mobility Interventions: Bed/chair exit alarm, Patient to call before getting OOB, PT Consult for mobility concerns    Mentation Interventions: Bed/chair exit alarm, Adequate sleep, hydration, pain control, Door open when patient unattended, More frequent rounding    Medication Interventions: Bed/chair exit alarm, Patient to call before getting OOB, Teach patient to arise slowly    Elimination Interventions: Bed/chair exit alarm, Call light in reach, Patient to call for help with toileting needs, Urinal in reach

## 2021-05-08 NOTE — PROGRESS NOTES
Problem: CABG: Post-Op Day 4  Goal: Activity/Safety  Outcome: Progressing Towards Goal  Goal: Diagnostic Test/Procedures  Outcome: Progressing Towards Goal  Goal: Nutrition/Diet  Outcome: Progressing Towards Goal  Goal: Medications  Outcome: Progressing Towards Goal  Goal: Discharge Planning  Outcome: Progressing Towards Goal  Goal: Respiratory  Outcome: Progressing Towards Goal  Goal: Treatments/Interventions/Procedures  Outcome: Progressing Towards Goal  Goal: Psychosocial  Outcome: Progressing Towards Goal  Goal: *Hemodynamically stable  Outcome: Progressing Towards Goal  Goal: *Lungs clear or at baseline  Outcome: Progressing Towards Goal  Goal: *Optimal pain control at patient's stated goal  Outcome: Progressing Towards Goal  Goal: *Incisions without signs and symptoms of wound complication  Outcome: Progressing Towards Goal  Goal: *Demonstrates progressive activity  Outcome: Progressing Towards Goal  Goal: *Tolerating diet  Outcome: Progressing Towards Goal

## 2021-05-08 NOTE — PROGRESS NOTES
Bedside shift change report given to myself (oncoming nurse) by [facility], RN (offgoing nurse). Report included the following information SBAR, Kardex, Procedure Summary, Intake/Output, MAR, Recent Results and Cardiac Rhythm NSR/BBB/1st deg AVB . @8568     BSSR given to Sarah Alatorre RN.

## 2021-05-08 NOTE — PROGRESS NOTES
Bedside shift change report given to [address] [place] [address] [place] (oncoming nurse) by Joel Shin RN (offgoing nurse). Report included the following information SBAR, Kardex, OR Summary, Intake/Output, MAR and Cardiac Rhythm sinus rhythm, first degree AVB.

## 2021-05-08 NOTE — PROGRESS NOTES
ACUTE PHYSICAL THERAPY GOALS:  (Developed with and agreed upon by patient and/or caregiver.)  STG:  (1.)Mr. Jason Ge will move from supine to sit and sit to supine , scoot up and down and roll side to side with MINIMAL ASSIST within 3 treatment day(s). (2.)Mr. Jason Ge will transfer from bed to chair and chair to bed with MINIMAL ASSIST using the least restrictive device within 3 treatment day(s). (3.)Mr. Jason Ge will ambulate with MINIMAL ASSIST for 25 feet with the least restrictive device within 3 treatment day(s).      LTG:  (1.)Mr. Jason Ge will move from supine to sit and sit to supine , scoot up and down and roll side to side in bed with SUPERVISION within 7 treatment day(s). (2.)Mr. Jason Ge will transfer from bed to chair and chair to bed with SUPERVISION using the least restrictive device within 7 treatment day(s). (3.)Mr. Jason Ge will ambulate with SUPERVISION for 200+ feet with the least restrictive device within 7 treatment day(s). (4.)Mr. Jason Ge will negotiate stairs up/down x14 with SUPERVISION with the least restrictive device within 7 treatment day(s).     PHYSICAL THERAPY: Daily Note and PM Treatment Day # 3    Kelly Ramon is a 66 y.o. male   PRIMARY DIAGNOSIS: S/P CABG x 3  Atherosclerosis of native coronary artery of native heart with unstable angina pectoris (HCC) [I25.110]  Aortic valve stenosis, etiology of cardiac valve disease unspecified [I35.0]  CAD (coronary artery disease) [I25.10]  Aortic valve stenosis [I35.0]  Procedure(s) (LRB):  CORONARY ARTERY BYPASS GRAFT (CABG X3) WITH AVR, LIMA (N/A)  LULÚ/ANES PROBE (N/A)  VEIN HARVEST ENDOSCOPIC, GREATER SAPHENOUS (Left)  4 Days Post-Op    ASSESSMENT:     REHAB RECOMMENDATIONS: CURRENT LEVEL OF FUNCTION:  (Most Recently Demonstrated)   Recommendation to date pending progress:  [address] [place] [address] vs. Short-term Rehab   HHPT pending progress  Equipment:    None Bed Mobility:   Not tested  Sit to Stand:   Contact Guard Assistance  Transfers:   Contact Guard Assistance  Gait/Mobility:   Contact Guard Assistance     ASSESSMENT:  Mr. Melissa Hickman is making good progress towards PT goals. He was more impulsive this afternoon needing cues to improve. Patient participated in exercises with encouragement. Will continue efforts. SUBJECTIVE:   Mr. 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    Home Environment: Private residence  One/Two Story Residence: Two story  Living Alone: No  Support Systems: Spouse/Significant Other/Partner  OBJECTIVE:     PAIN: VITAL SIGNS: LINES/DRAINS:   Pre Treatment: Pain Screen  Pain Scale 1: FLACC  Pain Intensity 1: 0  Post Treatment: 0   None  O2 Device: None (Room air)     MOBILITY: I Mod I S SBA CGA Min Mod Max Total  NT x2 Comments:   Bed Mobility    Rolling [] [] [] [] [] [] [] [] [] [x] []    Supine to Sit [] [] [] [] [] [] [] [] [] [x] []    Scooting [] [] [] [] [] [] [] [] [] [x] []    Sit to Supine [] [] [] [] [] [] [] [] [] [x] []    Transfers    Sit to Stand [] [] [] [] [x] [] [] [] [] [] []    Bed to Chair [] [] [] [] [x] [] [] [] [] [] []    Stand to Sit [] [] [] [] [x] [] [] [] [] [] []    I=Independent, Mod I=Modified Independent, S=Supervision, SBA=Standby Assistance, CGA=Contact Guard Assistance,   Min=Minimal Assistance, Mod=Moderate Assistance, Max=Maximal Assistance, Total=Total Assistance, NT=Not Tested    BALANCE: Good Fair+ Fair Fair- Poor NT Comments   Sitting Static [x] [] [] [] [] []    Sitting Dynamic [] [x] [] [] [] []              Standing Static [] [] [x] [] [] []    Standing Dynamic [] [] [x] [] [] []      GAIT: I Mod I S SBA CGA Min Mod Max Total  NT x2 Comments:   Level of Assistance [] [] [] [] [x] [] [] [] [] [] []    Distance 200'    DME Rolling Walker    Gait Quality     Weightbearing  Status N/A     I=Independent, Mod I=Modified Independent, S=Supervision, SBA=Standby Assistance, CGA=Contact Guard Assistance,   Min=Minimal Assistance, Mod=Moderate Assistance, Max=Maximal Assistance, Total=Total Assistance, NT=Not Tested    PLAN:   FREQUENCY/DURATION: PT Plan of Care: BID for duration of hospital stay or until stated goals are met, whichever comes first.  TREATMENT:     TREATMENT:   ($$ Therapeutic Activity: 23-37 mins    )  Therapeutic Activity (24 Minutes): Therapeutic activity included Scooting, Transfer Training, Ambulation on level ground, Sitting balance , Standing balance and LE exercises to improve functional Mobility, Strength and Activity tolerance.     TREATMENT GRID:  N/A    AFTER TREATMENT POSITION/PRECAUTIONS:  Alarm Activated, Chair, Needs within reach, RN notified and Visitors at bedside    INTERDISCIPLINARY COLLABORATION:  RN/PCT and PT/PTA    TOTAL TREATMENT DURATION:  PT Patient Time In/Time Out  Time In: 1231  Time Out: Via Katerina Whipple PTA

## 2021-05-08 NOTE — PROGRESS NOTES
Patient has been sleeping well. Patient awakened by lab he is requesting to sit in recliner after using the bathroom. Chair alarm is on call light is within reach instructed to call for assistance when needs to get up patient verbalized understanding.

## 2021-05-08 NOTE — PROGRESS NOTES
CV Progress Note    Subjective: Incisional pain:  moderate  Appetite:  good   Activity: Ambulating with assistance      Objective:     Vitals:  Blood pressure 134/60, pulse 93, temperature 98.7 °F (37.1 °C), resp. rate 24, height 5' 11\" (1.803 m), weight 232 lb 12.8 oz (105.6 kg), SpO2 92 %. Temp (24hrs), Av.3 °F (36.8 °C), Min:97.9 °F (36.6 °C), Max:98.9 °F (37.2 °C)        Plan/Recommendations/Medical Decision Making:     Principal Problem:    S/P CABG x 3 (2021)    Active Problems: Aortic valve stenosis (2021)      CAD (coronary artery disease) (2021)      Atherosclerosis of native coronary artery of native heart with unstable angina pectoris ([address] (2021)      Encounter for weaning from ventilator ([address] (2021)      Hypoxia (2021)      S/P AVR (2021)      Atelectasis (2021)        Continue present treatment    See orders for details. Ansley Ellis.  Helen Quintana MD

## 2021-05-08 NOTE — PROGRESS NOTES
Chair alarm is sounding upon entering patient's room he is up from recliner transferring to bed. Reinforced education on safety and importance of calling for assistance when needs to transfer from bed or chair patient verbalized understanding.

## 2021-05-08 NOTE — PROGRESS NOTES
Bedside shift change report given to Lyly Pichardo RN (oncoming nurse) by Samantha Cassidy RN (offgoing nurse). Report included the following information SBAR, Kardex, OR Summary, Intake/Output, MAR, Recent Results and Cardiac Rhythm sinus rhythm, first degree AVB, BBB.

## 2021-05-09 LAB
HEPARIN INDUCED PLT,XHIPA: NEGATIVE
HIT INTERPRETATION,XINTPR: NEGATIVE
HIT PROFILE,XHITT: NORMAL
MAGNESIUM SERPL-MCNC: 2.4 MG/DL (ref 1.8–2.4)
OPTICAL DENSITY READ,XHITAO: 0.23 ABS
POTASSIUM SERPL-SCNC: 3.6 MMOL/L (ref 3.5–5.1)

## 2021-05-09 PROCEDURE — 99232 SBSQ HOSP IP/OBS MODERATE 35: CPT | Performed by: INTERNAL MEDICINE

## 2021-05-09 PROCEDURE — 83735 ASSAY OF MAGNESIUM: CPT

## 2021-05-09 PROCEDURE — 74011250637 HC RX REV CODE- 250/637: Performed by: PHYSICIAN ASSISTANT

## 2021-05-09 PROCEDURE — 84132 ASSAY OF SERUM POTASSIUM: CPT

## 2021-05-09 PROCEDURE — 74011000250 HC RX REV CODE- 250: Performed by: NURSE PRACTITIONER

## 2021-05-09 PROCEDURE — 97530 THERAPEUTIC ACTIVITIES: CPT

## 2021-05-09 PROCEDURE — 94760 N-INVAS EAR/PLS OXIMETRY 1: CPT

## 2021-05-09 PROCEDURE — 94640 AIRWAY INHALATION TREATMENT: CPT

## 2021-05-09 PROCEDURE — 77030012890

## 2021-05-09 PROCEDURE — 65660000004 HC RM CVT STEPDOWN

## 2021-05-09 PROCEDURE — 2709999900 HC NON-CHARGEABLE SUPPLY

## 2021-05-09 PROCEDURE — 36415 COLL VENOUS BLD VENIPUNCTURE: CPT

## 2021-05-09 RX ADMIN — PREDNISOLONE ACETATE 1 DROP: 10 SUSPENSION/ DROPS OPHTHALMIC at 09:00

## 2021-05-09 RX ADMIN — Medication 10 ML: at 18:07

## 2021-05-09 RX ADMIN — ATORVASTATIN CALCIUM 80 MG: 80 TABLET, FILM COATED ORAL at 21:25

## 2021-05-09 RX ADMIN — AMIODARONE HYDROCHLORIDE 200 MG: 200 TABLET ORAL at 08:51

## 2021-05-09 RX ADMIN — LEVALBUTEROL HYDROCHLORIDE 1.25 MG: 1.25 SOLUTION RESPIRATORY (INHALATION) at 07:28

## 2021-05-09 RX ADMIN — ACETAMINOPHEN 650 MG: 325 TABLET ORAL at 08:51

## 2021-05-09 RX ADMIN — Medication 10 ML: at 06:52

## 2021-05-09 RX ADMIN — POTASSIUM CHLORIDE 40 MEQ: 20 TABLET, EXTENDED RELEASE ORAL at 08:52

## 2021-05-09 RX ADMIN — Medication 1 AMPULE: at 08:51

## 2021-05-09 RX ADMIN — FAMOTIDINE 20 MG: 20 TABLET, FILM COATED ORAL at 21:26

## 2021-05-09 RX ADMIN — Medication 10 ML: at 21:26

## 2021-05-09 RX ADMIN — LEVALBUTEROL HYDROCHLORIDE 1.25 MG: 1.25 SOLUTION RESPIRATORY (INHALATION) at 16:01

## 2021-05-09 RX ADMIN — LEVALBUTEROL HYDROCHLORIDE 1.25 MG: 1.25 SOLUTION RESPIRATORY (INHALATION) at 00:00

## 2021-05-09 RX ADMIN — FUROSEMIDE 40 MG: 40 TABLET ORAL at 08:51

## 2021-05-09 RX ADMIN — LEVALBUTEROL HYDROCHLORIDE 1.25 MG: 1.25 SOLUTION RESPIRATORY (INHALATION) at 19:48

## 2021-05-09 RX ADMIN — POTASSIUM CHLORIDE 10 MEQ: 750 TABLET, EXTENDED RELEASE ORAL at 08:52

## 2021-05-09 RX ADMIN — FAMOTIDINE 20 MG: 20 TABLET, FILM COATED ORAL at 08:51

## 2021-05-09 RX ADMIN — Medication 1 AMPULE: at 21:26

## 2021-05-09 RX ADMIN — AMIODARONE HYDROCHLORIDE 200 MG: 200 TABLET ORAL at 21:25

## 2021-05-09 NOTE — PROGRESS NOTES
Pacing wires pulled per Dr. Laurita Cruz. Patient instructed to one hour bedrest with every 15 minute blood pressure checks for one hour. Pt voices understanding.

## 2021-05-09 NOTE — PROGRESS NOTES
Rosie Mehtau  Admission Date: 5/4/2021             Daily Progress Note: 5/9/2021    The patient's chart is reviewed and the patient is discussed with the staff. Patient is seen at the request of Dr. Judd Bustillos for respiratory management status post cardiac surgery. Patient had CABG x 3 and AV Repair. Currently is sedated in CV-ICU and orally intubated receiving  mechanical ventilation.     PMH of HTN, HLD, prostate cancer with radiation, and Aortic stenosis. He was seen by Cardiology for symptomatic AS and underwent LHC for workup for valve replacement. LHC showed MVCAD and patient was referred for CT Surgery.      We have been asked to see in the CV-ICU for mechanical ventilation management and weaning.     Currently patient is coagulopathic and bleeding from chest drains requiring blood products, he is not ready to safely wean off ventilator      Subjective:     Now on RA, sitting up in chair this morning and ambulating in vazquez. Denies shortness of breath. Has minimal cough, able to clear secretions. Wife reports some wheezing. Responds well to nebulizer. Appropriately conversant.     Current Facility-Administered Medications   Medication Dose Route Frequency    levalbuterol (XOPENEX) nebulizer soln 1.25 mg/3 mL  1.25 mg Nebulization Q4H RT    furosemide (LASIX) tablet 40 mg  40 mg Oral DAILY    magnesium hydroxide (MILK OF MAGNESIA) 400 mg/5 mL oral suspension 30 mL  30 mL Oral DAILY PRN    alum-mag hydroxide-simeth (MYLANTA) oral suspension 30 mL  30 mL Oral Q4H PRN    famotidine (PEPCID) tablet 20 mg  20 mg Oral BID    ondansetron (ZOFRAN) injection 4 mg  4 mg IntraVENous Q4H PRN    magnesium oxide (MAG-OX) tablet 400 mg  400 mg Oral TID PRN    magnesium oxide (MAG-OX) tablet 400 mg  400 mg Oral QID PRN    potassium chloride (KLOR-CON) tablet 10 mEq  10 mEq Oral DAILY    potassium chloride (K-DUR, KLOR-CON) SR tablet 20 mEq  20 mEq Oral BID PRN    potassium chloride (K-DUR, KLOR-CON) SR tablet 40 mEq  40 mEq Oral BID PRN    traMADoL (ULTRAM) tablet 50 mg  50 mg Oral Q6H PRN    benzocaine-menthol (CEPACOL) lozenge  1 Lozenge Oral Q2H PRN    ALPRAZolam (XANAX) tablet 0.25 mg  0.25 mg Oral QID PRN    acetaminophen (TYLENOL) tablet 650 mg  650 mg Oral Q4H PRN    alcohol 62% (NOZIN) nasal  1 Ampule  1 Ampule Topical Q12H    sodium chloride (NS) flush 5-40 mL  5-40 mL IntraVENous Q8H    sodium chloride (NS) flush 5-40 mL  5-40 mL IntraVENous PRN    amiodarone (CORDARONE) tablet 200 mg  200 mg Oral Q12H    [Held by provider] metoprolol tartrate (LOPRESSOR) tablet 25 mg  25 mg Oral Q12H    atorvastatin (LIPITOR) tablet 80 mg  80 mg Oral QHS    [Held by provider] aspirin chewable tablet 81 mg  81 mg Oral DAILY    prednisoLONE acetate (PRED FORTE) 1 % ophthalmic suspension 1 Drop  1 Drop Both Eyes DAILY    lip protectant (BLISTEX) ointment 1 Each  1 Each Topical PRN       Review of Systems      Constitutional: negative for fever, chills, sweats  Cardiovascular: negative for  palpitations, syncope, edema.  + mild incisional pain. Wearing compression stockings.   Respiratory: minimal wheeze  Gastrointestinal:  negative for dysphagia, reflux, vomiting, diarrhea, abdominal pain, or melena  Neurologic:  negative for focal weakness, numbness, headache    Objective:     Vitals:    05/08/21 2322 05/09/21 0407 05/09/21 0656 05/09/21 0728   BP: 125/66 137/73 138/67    Pulse: 89 86 90    Resp: 20 22 20    Temp: 98.2 °F (36.8 °C) 97.9 °F (36.6 °C) 98.6 °F (37 °C)    SpO2: 94% 95% 95% 95%   Weight:  235 lb 9.6 oz (106.9 kg)     Height:             Intake/Output Summary (Last 24 hours) at 5/9/2021 5613  Last data filed at 5/9/2021 2033  Gross per 24 hour   Intake 1710 ml   Output 150 ml   Net 1560 ml       Physical Exam:   Constitution:  the patient is well developed and in no acute distress  EENMT:  Sclera clear, pupils equal, oral mucosa moist  Respiratory: - slightly decreased, faint expiratory wheeze, no rhonchi. Minimal basilar crackles. Cardiovascular:  RRR without M,G,R  Gastrointestinal: soft and non-tender; with positive bowel sounds. Musculoskeletal: warm without cyanosis. There is no lower extremity edema. Skin:  no jaundice   Neurologic: no gross neuro deficits     Psychiatric:  alert and oriented x responds to questions appropriately. Oriented to date. CXR:     Final [99] 5/08/2021 07:43 5/08/2021  7:51 AM [id number]  7:51 AM   Study Result    TWO-VIEW CHEST:     CLINICAL HISTORY: Follow-up CABG.     COMPARISON:  May 6, 2021.     FINDINGS: PA and lateral chest images demonstrate bibasilar  atelectasis/infiltrate. Mediastinal tube and central venous line have been  removed. The heart remains enlarged without evidence of congestive heart  failure or pneumothorax. There are overlying radiopaque support devices.     IMPRESSION  INTERVAL REMOVAL OF MEDIASTINAL TUBE AND CENTRAL VENOUS LINE  WITHOUT OTHER SIGNIFICANT CHANGE FROM MAY 6.              LAB  Recent Labs     05/07/21  0701 05/06/21  2131 05/06/21  1710 05/06/21  1135   GLUCPOC 120* 151* 132* 156*      Recent Labs     05/08/21  0318 05/07/21  0516   WBC 10.5 16.1*   HGB 9.7* 10.3*   HCT 29.4* 31.6*   PLT 77* 59*     Recent Labs     05/09/21  0401 05/08/21  0318 05/07/21  0516   NA  --   --  140   K 3.6 3.4* 4.0   CL  --   --  109*   CO2  --   --  26   GLU  --   --  131*   BUN  --   --  20   CREA  --   --  0.92   MG 2.4 2.5* 2.4   CA  --   --  7.9*         Assessment:  (Medical Decision Making)     Hospital Problems  Date Reviewed: 9/15/2020          Codes Class Noted POA    S/P AVR ICD-10-CM: Z95.2  ICD-9-CM: V43.3  5/5/2021 Unknown        * (Principal) S/P CABG x 3 ICD-10-CM: Z95.1  ICD-9-CM: V45.81  5/5/2021 Unknown        Atelectasis ICD-10-CM: J98.11  ICD-9-CM: 518.0  5/5/2021 Unknown        Aortic valve stenosis ICD-10-CM: I35.0  ICD-9-CM: 424.1  5/4/2021 Unknown        Hypoxia ICD-10-CM: R09.02  ICD-9-CM: 799.02  5/4/2021 Unknown            66yo with PMH of HTN, HLD AS and prostates cancer. Underwent LHC for AV replacement. LHC showed MVCAD. Underwent CABG x 3 and AVR on 5/4. Moved to CVSD on 5/7. Now on RA. Platelets improved. Plan:  (Medical Decision Making)     --continue flutter valve. --lytes replaced per CV protocol  --Incentive spirometry, pulmonary toilet  --WBC improved.   --HIT panel pending, but platelets have improved since yesterday, could have dropped r/t surgical process    More than 50% of the time documented was spent in face-to-face contact with the patient and in the care of the patient on the floor/unit where the patient is located. Ki Gonzales NP  Lungs:  Faint wheeze  Heart:  RRR with no Murmur/Rubs/Gallops    Additional Comments:  cxr tomorrow    I have spoken with and examined the patient. I agree with the above assessment and plan as documented.     Rachel Renteria MD

## 2021-05-09 NOTE — PROGRESS NOTES
CV Progress Note    Subjective: Incisional pain:  moderate  Appetite:  poor  Activity: Ambulating with assistance      Objective:     Vitals:  Blood pressure 137/67, pulse 86, temperature 98.6 °F (37 °C), resp. rate 20, height 5' 11\" (1.803 m), weight 235 lb 9.6 oz (106.9 kg), SpO2 95 %. Temp (24hrs), Av.4 °F (36.9 °C), Min:97.9 °F (36.6 °C), Max:98.9 °F (37.2 °C)        Plan/Recommendations/Medical Decision Making:     Principal Problem:    S/P CABG x 3 (2021)    Active Problems: Aortic valve stenosis (2021)      Hypoxia (2021)      S/P AVR (2021)      Atelectasis (2021)        Continue present treatment  TPW out  See orders for details. Ansley Ellis.  Helen Quintana MD

## 2021-05-09 NOTE — PROGRESS NOTES
Bedside shift change report given to myself (oncoming nurse) by Cleotha Denver, RN (offgoing nurse). Report included the following information SBAR, Kardex, Procedure Summary, Intake/Output, MAR, Recent Results and Cardiac Rhythm NSR.

## 2021-05-09 NOTE — PROGRESS NOTES
ACUTE PHYSICAL THERAPY GOALS:  (Developed with and agreed upon by patient and/or caregiver.)  STG:  (1.)Mr. Melissa Hickman will move from supine to sit and sit to supine , scoot up and down and roll side to side with MINIMAL ASSIST within 3 treatment day(s). (2.)Mr. Melissa Hickman will transfer from bed to chair and chair to bed with MINIMAL ASSIST using the least restrictive device within 3 treatment day(s). GOAL MET 2021    (3.)Mr. Melissa Hickman will ambulate with MINIMAL ASSIST for 25 feet with the least restrictive device within 3 treatment day(s). GOAL MET 2021       LTG:  (1.)Mr. Melissa Hickman will move from supine to sit and sit to supine , scoot up and down and roll side to side in bed with SUPERVISION within 7 treatment day(s). (2.)Mr. Melissa Hickman will transfer from bed to chair and chair to bed with SUPERVISION using the least restrictive device within 7 treatment day(s). GOAL MET 2021    (3.)Mr. Melissa Hickman will ambulate with SUPERVISION for 200+ feet with the least restrictive device within 7 treatment day(s). GOAL MET 2021    (4.)Mr. Melissa Hickman will negotiate stairs up/down x14 with SUPERVISION with the least restrictive device within 7 treatment day(s).     PHYSICAL THERAPY: Daily Note and PM Treatment Day # 4    Brian Lugo is a 66 y.o. male   PRIMARY DIAGNOSIS: S/P CABG x 3  Atherosclerosis of native coronary artery of native heart with unstable angina pectoris (HCC) [I25.110]  Aortic valve stenosis, etiology of cardiac valve disease unspecified [I35.0]  CAD (coronary artery disease) [I25.10]  Aortic valve stenosis [I35.0]  Procedure(s) (LRB):  CORONARY ARTERY BYPASS GRAFT (CABG X3) WITH AVR, LIMA (N/A)  LULÚ/ANES PROBE (N/A)  VEIN HARVEST ENDOSCOPIC, GREATER SAPHENOUS (Left)  5 Days Post-Op    ASSESSMENT:     REHAB RECOMMENDATIONS: CURRENT LEVEL OF FUNCTION:  (Most Recently Demonstrated)   Recommendation to date pending progress:  [address] [place] [address] Therapy  Equipment:  None Bed Mobility:   Not tested  Sit to Stand:   Supervision  Transfers:   Supervision  Gait/Mobility:  [facility] [employer] Assistance     ASSESSMENT:  Mr. Melissa Hickman is sleeping in the recliner and happy to walk. He walked 2 full laps of the unit without AD as well as up and over the stair unit all with SBA. Steady progress and moving well. SUBJECTIVE:   Mr. 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    Home Environment: Private residence  One/Two Story Residence: Two story  Living Alone: No  Support Systems: Spouse/Significant Other/Partner  OBJECTIVE:     PAIN: VITAL SIGNS: LINES/DRAINS:   Pre Treatment: Pain Screen  Pain Scale 1: FLACC  Pain Intensity 1: 0  Post Treatment: 0   None  O2 Device: None (Room air)     MOBILITY: I Mod I S SBA CGA Min Mod Max Total  NT x2 Comments:   Bed Mobility    Rolling [] [] [] [] [] [] [] [] [] [x] []    Supine to Sit [] [] [] [] [] [] [] [] [] [x] []    Scooting [] [] [] [] [] [] [] [] [] [x] []    Sit to Supine [] [] [] [] [] [] [] [] [] [x] []    Transfers    Sit to Stand [] [] [x] [] [] [] [] [] [] [] []    Bed to Chair [] [] [] [] [x] [] [] [] [] [] []    Stand to Sit [x] [] [] [] [] [] [] [] [] [] []    I=Independent, Mod I=Modified Independent, S=Supervision, SBA=Standby Assistance, CGA=Contact Guard Assistance,   Min=Minimal Assistance, Mod=Moderate Assistance, Max=Maximal Assistance, Total=Total Assistance, NT=Not Tested    BALANCE: Good Fair+ Fair Fair- Poor NT Comments   Sitting Static [x] [] [] [] [] []    Sitting Dynamic [x] [] [] [] [] []              Standing Static [] [x] [] [] [] []    Standing Dynamic [] [x] [] [] [] []      GAIT: I Mod I S SBA CGA Min Mod Max Total  NT x2 Comments:   Level of Assistance [] [] [] [x] [] [] [] [] [] [] [] Up and over stair unit as well with SBA   Distance 400'    DME None    Gait Quality No real issues    Weightbearing  Status N/A     I=Independent, Mod I=Modified Independent, S=Supervision, SBA=Standby Assistance, CGA=Contact Guard Assistance,   Min=Minimal Assistance, Mod=Moderate Assistance, Max=Maximal Assistance, Total=Total Assistance, NT=Not Tested    PLAN:   FREQUENCY/DURATION: PT Plan of Care: BID for duration of hospital stay or until stated goals are met, whichever comes first.  TREATMENT:     TREATMENT:   ($$ Therapeutic Activity: 8-22 mins    )  Therapeutic Activity (15 Minutes): Therapeutic activity included Transfer Training, Ambulation on level ground, Standing balance and stair practice to improve functional Mobility, Strength and Activity tolerance.     TREATMENT GRID:  N/A    AFTER TREATMENT POSITION/PRECAUTIONS:  RN notified and on toilet    INTERDISCIPLINARY COLLABORATION:  RN/PCT and PT/PTA    TOTAL TREATMENT DURATION:  PT Patient Time In/Time Out  Time In: 1350  Time Out: 20000 [employer] Machine [facility], PTA

## 2021-05-10 ENCOUNTER — APPOINTMENT (OUTPATIENT)
Dept: GENERAL RADIOLOGY | Age: 76
DRG: 267 | End: 2021-05-10
Attending: THORACIC SURGERY (CARDIOTHORACIC VASCULAR SURGERY)
Payer: MEDICARE

## 2021-05-10 VITALS
OXYGEN SATURATION: 93 % | RESPIRATION RATE: 16 BRPM | TEMPERATURE: 98.9 F | HEIGHT: 71 IN | DIASTOLIC BLOOD PRESSURE: 68 MMHG | SYSTOLIC BLOOD PRESSURE: 135 MMHG | WEIGHT: 234.8 LBS | HEART RATE: 87 BPM | BODY MASS INDEX: 32.87 KG/M2

## 2021-05-10 PROBLEM — R09.02 HYPOXIA: Status: RESOLVED | Noted: 2021-05-04 | Resolved: 2021-05-10

## 2021-05-10 LAB
ERYTHROCYTE [DISTWIDTH] IN BLOOD BY AUTOMATED COUNT: 15.6 % (ref 11.9–14.6)
HCT VFR BLD AUTO: 30 % (ref 41.1–50.3)
HGB BLD-MCNC: 9.6 G/DL (ref 13.6–17.2)
MAGNESIUM SERPL-MCNC: 2.3 MG/DL (ref 1.8–2.4)
MCH RBC QN AUTO: 30.5 PG (ref 26.1–32.9)
MCHC RBC AUTO-ENTMCNC: 32 G/DL (ref 31.4–35)
MCV RBC AUTO: 95.2 FL (ref 79.6–97.8)
NRBC # BLD: 0 K/UL (ref 0–0.2)
PLATELET # BLD AUTO: 108 K/UL (ref 150–450)
PMV BLD AUTO: 11.1 FL (ref 9.4–12.3)
POTASSIUM SERPL-SCNC: 3.8 MMOL/L (ref 3.5–5.1)
RBC # BLD AUTO: 3.15 M/UL (ref 4.23–5.6)
WBC # BLD AUTO: 8.6 K/UL (ref 4.3–11.1)

## 2021-05-10 PROCEDURE — 2709999900 HC NON-CHARGEABLE SUPPLY

## 2021-05-10 PROCEDURE — 36415 COLL VENOUS BLD VENIPUNCTURE: CPT

## 2021-05-10 PROCEDURE — 74011250637 HC RX REV CODE- 250/637: Performed by: PHYSICIAN ASSISTANT

## 2021-05-10 PROCEDURE — 74011000250 HC RX REV CODE- 250: Performed by: NURSE PRACTITIONER

## 2021-05-10 PROCEDURE — 84132 ASSAY OF SERUM POTASSIUM: CPT

## 2021-05-10 PROCEDURE — 83735 ASSAY OF MAGNESIUM: CPT

## 2021-05-10 PROCEDURE — 71045 X-RAY EXAM CHEST 1 VIEW: CPT

## 2021-05-10 PROCEDURE — 97530 THERAPEUTIC ACTIVITIES: CPT

## 2021-05-10 PROCEDURE — 97110 THERAPEUTIC EXERCISES: CPT

## 2021-05-10 PROCEDURE — 99024 POSTOP FOLLOW-UP VISIT: CPT | Performed by: PHYSICIAN ASSISTANT

## 2021-05-10 PROCEDURE — 94760 N-INVAS EAR/PLS OXIMETRY 1: CPT

## 2021-05-10 PROCEDURE — 94640 AIRWAY INHALATION TREATMENT: CPT

## 2021-05-10 PROCEDURE — 77030012890

## 2021-05-10 PROCEDURE — 85027 COMPLETE CBC AUTOMATED: CPT

## 2021-05-10 RX ORDER — METOPROLOL TARTRATE 25 MG/1
12.5 TABLET, FILM COATED ORAL EVERY 12 HOURS
Status: DISCONTINUED | OUTPATIENT
Start: 2021-05-10 | End: 2021-05-10 | Stop reason: HOSPADM

## 2021-05-10 RX ORDER — ACETAMINOPHEN 325 MG/1
650 TABLET ORAL
Status: SHIPPED | COMMUNITY
Start: 2021-05-10

## 2021-05-10 RX ORDER — METOPROLOL TARTRATE 25 MG/1
12.5 TABLET, FILM COATED ORAL EVERY 12 HOURS
Qty: 60 TAB | Refills: 2 | Status: SHIPPED | OUTPATIENT
Start: 2021-05-10 | End: 2021-07-12 | Stop reason: SDUPTHER

## 2021-05-10 RX ORDER — ATORVASTATIN CALCIUM 80 MG/1
80 TABLET, FILM COATED ORAL
Qty: 90 TAB | Refills: 3 | Status: SHIPPED | OUTPATIENT
Start: 2021-05-10

## 2021-05-10 RX ADMIN — LEVALBUTEROL HYDROCHLORIDE 1.25 MG: 1.25 SOLUTION RESPIRATORY (INHALATION) at 04:00

## 2021-05-10 RX ADMIN — ACETAMINOPHEN 650 MG: 325 TABLET ORAL at 00:31

## 2021-05-10 RX ADMIN — FUROSEMIDE 40 MG: 40 TABLET ORAL at 09:55

## 2021-05-10 RX ADMIN — LEVALBUTEROL HYDROCHLORIDE 1.25 MG: 1.25 SOLUTION RESPIRATORY (INHALATION) at 08:00

## 2021-05-10 RX ADMIN — POTASSIUM CHLORIDE 20 MEQ: 20 TABLET, EXTENDED RELEASE ORAL at 06:25

## 2021-05-10 RX ADMIN — FAMOTIDINE 20 MG: 20 TABLET, FILM COATED ORAL at 09:55

## 2021-05-10 RX ADMIN — ASPIRIN 81 MG: 81 TABLET, CHEWABLE ORAL at 09:56

## 2021-05-10 RX ADMIN — PREDNISOLONE ACETATE 1 DROP: 10 SUSPENSION/ DROPS OPHTHALMIC at 09:58

## 2021-05-10 RX ADMIN — Medication 10 ML: at 06:26

## 2021-05-10 RX ADMIN — Medication 1 AMPULE: at 09:56

## 2021-05-10 RX ADMIN — AMIODARONE HYDROCHLORIDE 200 MG: 200 TABLET ORAL at 09:55

## 2021-05-10 RX ADMIN — POTASSIUM CHLORIDE 10 MEQ: 750 TABLET, EXTENDED RELEASE ORAL at 09:55

## 2021-05-10 NOTE — DISCHARGE SUMMARY
Discharge Summary     Patient ID:  Shanell Baxter  [id number]  65 y.o.  1945    Admit date: 5/4/2021    Discharge date:  5/10/2021    Admitting Physician: Blade Coffey MD     Discharge Physician: PRITESH Odell/Dr. Cedrick Manrique    Admission Diagnoses: Atherosclerosis of native coronary artery of native heart with unstable angina pectoris ([facility] [address] [I25.110]  Aortic valve stenosis, etiology of cardiac valve disease unspecified [I35.0]  CAD (coronary artery disease) [I25.10]  Aortic valve stenosis [I35.0]    Discharge Diagnoses:   Patient Active Problem List    Diagnosis Date Noted    S/P AVR 05/05/2021    S/P CABG x 3 05/05/2021    Atelectasis 05/05/2021    Aortic valve stenosis 05/04/2021    Bilateral carotid artery disease ([facility] [address] 04/21/2021    Nonrheumatic aortic valve stenosis 04/22/2018    Benign essential hypertension 12/17/2015    Hyperlipidemia 12/17/2015    Prostate nodule        Procedures this admission:  Aortic Valve Replacement, Coronary Artery Bypass Graft Surgery   Consults: [facility] DR QUIN BECKWITH Course: Patient presented for elective CABG/AVR. He has been followed by cardiology for AS for years. He stated they first noted his murmur in the [employer]. Recent echo showed progression of AS with WILMER of 0.63cm2 with mean gradient of 56.4mmHg. LHC was planned. He underwent cardiac catheterization that showed severe LM and PDA disease. He denied any chest pain or discomfort. He denied any significant NUNN. He was getting winded doing yard work but after resting for a short time frame, he could then resume his work. AVR/CABG surgery was planned. He underwent aortic valve replacement with a 25mm Baker Intuity pericardial valve as well as CABG X 3 with LIMA to LAD, reverse SVG to the OM, and reverse SVG to the PDA on 5/4/21. Post op, he continued to require pressor support. He was transfused for anemia. He had insomnia and was given Restoril. He developed delirium and hallucinations after Restoril. He had thrombocytopenia and aspirin was held. HIT panel was negative. He improved and was transferred to CV stepdown. He was weaned off of O2. His mental status was much improved but he continued to have insomnia. He remained in sinus rhythm. He progressed well with PT. Thrombocytopenia improved. The morning of 5/10/21, patient was feeling well and was ambulating without any symptoms. Patient was determined stable and ready for discharge. Patient was instructed on the importance of medication compliance and outpatient follow up. For maximized medical therapy for CAD, patient will continue ASA, BB, and statin as well. ARB was not resumed due to borderline low BP. The patient will have close transitional care follow up with [facility] Dr. Mery Cotto on May 17th at 2:45pm [facility]). He will then resume care with Dr. Suzanne Casey. The patient will follow up with Dr. Karen Hilliard on June 2nd at 2:10pm and has been referred to cardiac rehab. DISPOSITION: The patient is being discharged home with home health services in stable condition on a low saturated fat, low cholesterol and low salt diet. The patient is instructed to advance activities as tolerated to the limit of fatigue or shortness of breath. The patient is instructed to avoid all heavy lifting or activities that strain the chest or upper arm muscles. Strenuous activity should be avoided. The patient is instructed to watch for signs of infection which include: increasing area of redness, fever or purulent drainage at the incision site. The patient is instructed to call the office or return to the ER for immediate evaluation for any shortness of breath or chest pain not relieved by NTG.     Discharge Exam:   Visit Vitals  /68   Pulse 87   Temp 98.9 °F (37.2 °C)   Resp 16   Ht 5' 11\" (1.803 m)   Wt 234 lb 12.8 oz (106.5 kg)   SpO2 93%   BMI 32.75 kg/m²         Physical Exam:  General: Well Developed, Well Nourished, No Acute Distress, Alert & Oriented  Neck: supple, no JVD  Heart: S1S2 with RRR without murmurs or gallops  Lungs: Clear throughout auscultation bilaterally without adventitious sounds  Abd: soft, nontender, nondistended, with good bowel sounds  Ext: warm, no edema  Sternal incision: clean, dry, and intact  Skin: warm and dry      Recent Results (from the past 24 hour(s))   MAGNESIUM    Collection Time: 05/10/21  3:43 AM   Result Value Ref Range    Magnesium 2.3 1.8 - 2.4 mg/dL   POTASSIUM    Collection Time: 05/10/21  3:43 AM   Result Value Ref Range    Potassium 3.8 3.5 - 5.1 mmol/L   CBC W/O DIFF    Collection Time: 05/10/21  3:43 AM   Result Value Ref Range    WBC 8.6 4.3 - 11.1 K/uL    RBC 3.15 (L) 4.23 - 5.6 M/uL    HGB 9.6 (L) 13.6 - 17.2 g/dL    HCT 30.0 (L) 41.1 - 50.3 %    MCV 95.2 79.6 - 97.8 FL    MCH 30.5 26.1 - 32.9 PG    MCHC 32.0 31.4 - 35.0 g/dL    RDW 15.6 (H) 11.9 - 14.6 %    PLATELET 771 (L) 968 - 450 K/uL    MPV 11.1 9.4 - 12.3 FL    ABSOLUTE NRBC 0.00 0.0 - 0.2 K/uL         Patient Instructions:   Current Discharge Medication List      START taking these medications    Details   acetaminophen (TYLENOL) 325 mg tablet Take 2 Tabs by mouth every six (6) hours as needed for Pain. atorvastatin (LIPITOR) 80 mg tablet Take 1 Tab by mouth nightly. Qty: 90 Tab, Refills: 3      metoprolol tartrate (LOPRESSOR) 25 mg tablet Take 0.5 Tabs by mouth every twelve (12) hours. Qty: 60 Tab, Refills: 2         CONTINUE these medications which have NOT CHANGED    Details   ZINC PO Take  by mouth. cholecalciferol, vitamin D3, (VITAMIN D3 PO) Take  by mouth.      prednisoLONE acetate (PRED FORTE) 1 % ophthalmic suspension Administer 1 Drop to both eyes Every morning. Refills: 5      aspirin delayed-release 81 mg tablet Take  by mouth Every morning.       fluticasone propionate (Flonase Allergy Relief) 50 mcg/actuation nasal spray 2 Sprays by Both Nostrils route daily. fexofenadine (ALLEGRA) 180 mg tablet Take 180 mg by mouth as needed.          STOP taking these medications       valsartan-hydroCHLOROthiazide (DIOVAN-HCT) 320-25 mg per tablet Comments:   Reason for Stopping:                 Signed:  Antoinette Grey PA-C  5/10/2021  10:08 AM

## 2021-05-10 NOTE — PROGRESS NOTES
Aaliyah Renae  Admission Date: 5/4/2021             Daily Progress Note: 5/10/2021    The patient's chart is reviewed and the patient is discussed with the staff. Patient is seen at the request of Dr. Jacek Sommer for respiratory management status post cardiac surgery.  Patient had CABG x 3 and AV Repair.  Currently is sedated in CV-ICU and orally intubated receiving  mechanical ventilation.     PMH of HTN, HLD, prostate cancer with radiation, and Aortic stenosis. He was seen by Cardiology for symptomatic AS and underwent LHC for workup for valve replacement. LHC showed MVCAD and patient was referred for CT Surgery. Patient was coagulopathic and bleeding from chest drains requiring blood products post op. Extubated 5/4. Subjective:     Patient sleeping, awakens easily to voice. Denies any SOB, NUNN or pain. He has an occasional cough.     Current Facility-Administered Medications   Medication Dose Route Frequency    levalbuterol (XOPENEX) nebulizer soln 1.25 mg/3 mL  1.25 mg Nebulization Q4H RT    furosemide (LASIX) tablet 40 mg  40 mg Oral DAILY    magnesium hydroxide (MILK OF MAGNESIA) 400 mg/5 mL oral suspension 30 mL  30 mL Oral DAILY PRN    alum-mag hydroxide-simeth (MYLANTA) oral suspension 30 mL  30 mL Oral Q4H PRN    famotidine (PEPCID) tablet 20 mg  20 mg Oral BID    ondansetron (ZOFRAN) injection 4 mg  4 mg IntraVENous Q4H PRN    magnesium oxide (MAG-OX) tablet 400 mg  400 mg Oral TID PRN    magnesium oxide (MAG-OX) tablet 400 mg  400 mg Oral QID PRN    potassium chloride (KLOR-CON) tablet 10 mEq  10 mEq Oral DAILY    potassium chloride (K-DUR, KLOR-CON) SR tablet 20 mEq  20 mEq Oral BID PRN    potassium chloride (K-DUR, KLOR-CON) SR tablet 40 mEq  40 mEq Oral BID PRN    traMADoL (ULTRAM) tablet 50 mg  50 mg Oral Q6H PRN    benzocaine-menthol (CEPACOL) lozenge  1 Lozenge Oral Q2H PRN    ALPRAZolam (XANAX) tablet 0.25 mg  0.25 mg Oral QID PRN    acetaminophen (TYLENOL) tablet 650 mg  650 mg Oral Q4H PRN    alcohol 62% (NOZIN) nasal  1 Ampule  1 Ampule Topical Q12H    sodium chloride (NS) flush 5-40 mL  5-40 mL IntraVENous Q8H    sodium chloride (NS) flush 5-40 mL  5-40 mL IntraVENous PRN    amiodarone (CORDARONE) tablet 200 mg  200 mg Oral Q12H    [Held by provider] metoprolol tartrate (LOPRESSOR) tablet 25 mg  25 mg Oral Q12H    atorvastatin (LIPITOR) tablet 80 mg  80 mg Oral QHS    [Held by provider] aspirin chewable tablet 81 mg  81 mg Oral DAILY    prednisoLONE acetate (PRED FORTE) 1 % ophthalmic suspension 1 Drop  1 Drop Both Eyes DAILY    lip protectant (BLISTEX) ointment 1 Each  1 Each Topical PRN       Review of Systems  +cough  Constitutional: negative for fever, chills, sweats  Cardiovascular: negative for chest pain, palpitations, syncope, edema  Gastrointestinal:  negative for dysphagia, reflux, vomiting, diarrhea, abdominal pain, or melena  Neurologic:  negative for focal weakness, numbness, headache    Objective:     Vitals:    05/10/21 0341 05/10/21 0400 05/10/21 0448 05/10/21 0659   BP: (!) 142/69   135/68   Pulse: 88   87   Resp: 20   16   Temp: 98.3 °F (36.8 °C)   98.9 °F (37.2 °C)   SpO2: 92% 94%  94%   Weight:   234 lb 12.8 oz (106.5 kg)    Height:             Intake/Output Summary (Last 24 hours) at 5/10/2021 0741  Last data filed at 5/10/2021 0600  Gross per 24 hour   Intake 920 ml   Output --   Net 920 ml       Physical Exam:   Constitution:  the patient is well developed and in no acute distress  EENMT:  Sclera clear, pupils equal, oral mucosa moist  Respiratory: clear, on room air  Cardiovascular:  RRR without M,G,R  Gastrointestinal: soft and non-tender; with positive bowel sounds. Musculoskeletal: warm without cyanosis. There is no lower extremity edema.   Skin:  no jaundice or rashes, no wounds   Neurologic: no gross neuro deficits     Psychiatric:  alert and oriented x 3    CXR 5/10:       LAB    Recent Labs 05/10/21  0343 05/08/21  0318   WBC 8.6 10.5   HGB 9.6* 9.7*   HCT 30.0* 29.4*   * 77*     Recent Labs     05/10/21  0343 05/09/21  0401 05/08/21  0318   K 3.8 3.6 3.4*   MG 2.3 2.4 2.5*       Assessment:  (Medical Decision Making)     Hospital Problems  Date Reviewed: 9/15/2020          Codes Class Noted POA    S/P AVR ICD-10-CM: Z95.2  ICD-9-CM: V43.3  5/5/2021 Unknown        * (Principal) S/P CABG x 3 ICD-10-CM: Z95.1  ICD-9-CM: V45.81  5/5/2021 Unknown        Atelectasis ICD-10-CM: J98.11  ICD-9-CM: 518.0  5/5/2021 Unknown        Aortic valve stenosis ICD-10-CM: I35.0  ICD-9-CM: 424.1  5/4/2021 Unknown            66yo with PMH of HTN, HLD AS and prostates cancer. Underwent LHC for AV replacement. LHC showed MVCAD. Underwent CABG x 3 and AVR on 5/4. [facility] Now on RA. Plan:  (Medical Decision Making)     -- Continue flutter valve/ IS/ mobilization  -- HIT negative  -- remaining treatment per primary team    More than 50% of the time documented was spent in face-to-face contact with the patient and in the care of the patient on the floor/unit where the patient is located.     Fidel Pedersen NP

## 2021-05-10 NOTE — PROGRESS NOTES
This CM met with pt and spouse this day to complete assessment. Pt verified his PCP, insurance, emergency contact, and home address. He reports no difficulty obtaining his medications in the community. He lives at home with spouse with no steps to enter but it is a 2-story home. His home DME includes a cane, walker, and wheelchair. He confirms that at baseline prior to admission he is independent with his ADLs including bathing, dressing, cooking, and driving. Pt with discharge orders this day. Pt reports plan to return home with spouse. We discussed the role and recommendation of home health at discharge - he is agreeable to referral.  [facility] - he is agreeable to [facility]. Referral made this day. No additional CM needs at discharge. Milestones met. Care Management Interventions  PCP Verified by CM: Yes  Mode of Transport at Discharge:  Other (see comment)(spouse)  Transition of Care Consult (CM Consult): Discharge Planning, Home Health  Discharge Durable Medical Equipment: No  Physical Therapy Consult: Yes  Occupational Therapy Consult: No  Speech Therapy Consult: No  Current Support Network: Own Home, Lives with Spouse  Confirm Follow Up Transport: Family  The Plan for Transition of Care is Related to the Following Treatment Goals : home with home health  The Patient and/or Patient Representative was Provided with a Choice of Provider and Agrees with the Discharge Plan?: Yes  Name of the Patient Representative Who was Provided with a Choice of Provider and Agrees with the Discharge Plan: Mr and Mrs Kwan Ez of Choice List was Provided with Basic Dialogue that Supports the Patient's Individualized Plan of Care/Goals, Treatment Preferences and Shares the Quality Data Associated with the Providers?: Yes  [place] Resource Information Provided?: No  Discharge Location  Discharge Placement: Home with home health

## 2021-05-10 NOTE — PROGRESS NOTES
Bedside shift change report given to Faizan Santo RN (oncoming nurse) by myself (offgoing nurse). Report included the following information SBAR, Kardex, Procedure Summary, Intake/Output, MAR, Recent Results and Cardiac Rhythm NSR with 1st deg AVB.

## 2021-05-10 NOTE — ROUTINE PROCESS
Cardiac Rehab: Spoke with patient regarding referral to cardiac rehab. Patient meets admission criteria based on CABGx3 with AVR (5/4/21). Written information about Cardiac Rehab given and reviewed with patient. Discussed lifestyle modifications to promote cardiac wellness. Patient indicated that he wants to participate in the cardiac rehab program at the [facility] which is two blocks from his home and where he has been a member in the past. We will forward her referral for Cardiac Rehab to the [facility]. His Cardiologist is Dr. Noah Gomez.       Thank you,  GERBER PerezN, RN  Cardiopulmonary Rehabilitation Nurse Liaison  [facility]

## 2021-05-10 NOTE — DISCHARGE INSTRUCTIONS
All patients with prosthetic valves are considered among those at highest risk for endocarditis (infection of a heart valve). It is important to maintain good oral health care with regular use of a manual or powered toothbrush, dental floss or other plaque-removing devices. The risk of an infection on the heart valve is generally the highest with dental procedures which includes routine dental cleaning. You will need to take antibiotics before most dental procedures or oral surgery. You will also need antibiotics before procedures such as a having tonsils removed or procedures involving the lungs. If you require surgery for a skin infection, you will also need antibiotics to prevent infection of a heart valve. Please contact your cardiologist or primary care provider for further instructions. 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 Your doctor did the surgery through a cut, called an incision, in your chest.  You will feel tired and sore for the first few weeks after surgery. You may have some brief, sharp pains on either side of your chest. Your chest, shoulders, and upper back may ache. The incision in your chest may be sore or swollen. These symptoms usually get better after 4 to 6 weeks. You will probably be able to do many of your usual activities after 4 to 6 weeks. But for at least 6 weeks, you will not be able to lift heavy objects or do activities that strain your chest or upper arm muscles. At first you may notice that you get tired easily and need to rest often. It may take 1 to 2 months to get your energy back. Some people find that they are more emotional after this surgery. You may cry easily or show emotion in ways that are unusual for you. This is common and may last for up to a year. Some people get depressed after this surgery.  Talk with your doctor if you have sadness that continues or you are concerned about how you are feeling. Treatment and other support can help you feel better. Even though you have a new aortic valve, it is still important to eat a heart-healthy diet, get regular exercise, not smoke, take your heart medicines, and reduce stress. Your doctor may recommend that you work with a nurse, a dietitian, and a physical therapist to make these changes. This is sometimes called cardiac rehabilitation. This care sheet gives you a general idea about how long it will take for you to recover. But each person recovers at a different pace. Follow the steps below to get better as quickly as possible. How can you care for yourself at home? Activity    · Rest when you feel tired. Getting enough sleep will help you recover. Try to sleep on your back while you heal. If your breastbone (sternum) was cut, healing usually takes about 4 to 6 weeks.     · Try to walk each day. Start by walking a little more than you did the day before. Bit by bit, increase the amount you walk. Walking boosts blood flow and helps prevent pneumonia and constipation.     · Avoid strenuous activities, such as bicycle riding, jogging, weight lifting, or heavy aerobic exercise, until your doctor says it is okay.     · For 3 months, avoid activities that strain your chest or upper arm muscles. This includes pushing a  or vacuum, mopping floors, or swinging a golf club or tennis racquet.     · For at least 6 weeks, avoid lifting anything that would make you strain. This may include a child, heavy grocery bags and milk containers, a heavy briefcase or backpack, or cat litter or dog food bags.     · Hold a pillow firmly over your chest incision when you cough or take deep breaths. This will support your chest and reduce your pain.     · Do breathing exercises at home as instructed by your doctor.  This will help prevent pneumonia.     · Ask your doctor when you can drive again.     · You will probably need to take 4 to 12 weeks off from work. It depends on the type of work you do and how you feel.     · You may shower as usual. Pat the incision dry. Do not take a bath for the first 3 weeks, or until your doctor tells you it is okay.     · Do not swim or use a hot tub for at least 1 month, or until your doctor says it is okay.     · Ask your doctor when it is okay for you to have sex. Diet    · Eat a heart-healthy, low-salt diet. If you have not been eating this way, talk to your doctor. You also may want to talk to a dietitian. A dietitian can help you plan meals and learn about healthy foods.     · Drink plenty of fluids (unless your doctor tells you not to).     · You may notice that your bowel movements are not regular right after your surgery. This is common. Try to avoid constipation and straining with bowel movements. You may want to take a fiber supplement every day. If you have not had a bowel movement after a couple of days, ask your doctor about taking a mild laxative. Medicines    · Your doctor will tell you if and when you can restart your medicines. He or she will also give you instructions about taking any new medicines.     · If you take aspirin or some other blood thinner, ask your doctor if and when to start taking it again. Make sure that you understand exactly what your doctor wants you to do.     · Be safe with medicines. Take your medicines exactly as prescribed. Call your doctor if you think you are having a problem with your medicine.     · Take pain medicines exactly as directed. ? If the doctor gave you a prescription medicine for pain, take it as prescribed. ? If you are not taking a prescription pain medicine, ask your doctor if you can take an over-the-counter medicine.   ? Do not take aspirin, ibuprofen (Advil, Motrin), naproxen (Aleve), or other nonsteroidal anti-inflammatory drugs (NSAIDs) unless your doctor says it is okay.     · If you think your pain medicine is making you sick to your stomach:  ? Take your medicine after meals (unless your doctor has told you not to). ? Ask your doctor for a different pain medicine.     · If your doctor prescribed antibiotics, take them as directed. Do not stop taking them just because you feel better. You need to take the full course of antibiotics.     · Your doctor may give you a blood thinner to prevent blood clots. If you take a blood thinner, be sure you get instructions about how to take your medicine safely. Blood thinners can cause serious bleeding problems. Incision care    · If you have strips of tape on the incision the doctor made, leave the tape on for a week or until it falls off.     · Wash the area daily with warm, soapy water and pat it dry. Don't use hydrogen peroxide or alcohol, which may delay healing. You may cover the area with a gauze bandage if it weeps or rubs against clothing. Change the bandage every day.     · Keep the area clean and dry. Other instructions    · Keep track of your weight. Weigh yourself every day at the same time of day, on the same scale, in the same amount of clothing. A sudden increase in weight can be a sign of a problem with your heart. Tell your doctor if you suddenly gain weight, such as 3 pounds or more in 2 to 3 days.     · Be sure to tell all your doctors and your dentist that you have an artificial aortic valve. This is important, because you may need to take antibiotics before certain procedures to prevent infection. Follow-up care is a key part of your treatment and safety. Be sure to make and go to all appointments, and call your doctor if you are having problems. It's also a good idea to know your test results and keep a list of the medicines you take. When should you call for help? Call 911 anytime you think you may need emergency care.  For example, call if:    · You passed out (lost consciousness).     · You have trouble breathing.     · You have severe pain in your chest.     · You have symptoms of a stroke. These may include:  ? Sudden numbness, tingling, weakness, or loss of movement in your face, arm, or leg, especially on only one side of your body. ? Sudden vision changes. ? Sudden trouble speaking. ? Sudden confusion or trouble understanding simple statements. ? Sudden problems with walking or balance. ? A sudden, severe headache that is different from past headaches.     · You have symptoms of a heart attack. These may include:  ? Chest pain or pressure, or a strange feeling in the chest.  ? Sweating. ? Shortness of breath. ? Nausea or vomiting. ? Pain, pressure, or a strange feeling in the back, neck, jaw, or upper belly or in one or both shoulders or arms. ? Lightheadedness or sudden weakness. ? A fast or irregular heartbeat. After you call 911, the  may tell you to chew 1 adult-strength or 2 to 4 low-dose aspirin. Wait for an ambulance. Do not try to drive yourself. Call your doctor now or seek immediate medical care if:    · You have pain that does not get better after you take pain medicine.     · You have loose stitches, or your incision comes open.     · Bright red blood has soaked through the bandage over your incision.     · You have signs of infection, such as:  ? Increased pain, swelling, warmth, or redness. ? Red streaks leading from the incision. ? Pus draining from the incision. ? A fever.     · You have signs of a blood clot, such as:  ? Pain in your calf, back of the knee, thigh, or groin. ? Redness and swelling in your leg or groin.     · You have new or changed symptoms of heart failure, such as:  ? New or increased shortness of breath. ? New or worse swelling in your legs, ankles, or feet. ? Sudden weight gain, such as more than 2 to 3 pounds in a day or 5 pounds in a week. (Your doctor may suggest a different range of weight gain.)  ? Feeling dizzy or lightheaded or like you may faint. ?  Feeling so tired or weak that you cannot do your usual activities. ? Not sleeping well. Shortness of breath wakes you at night. You need extra pillows to prop yourself up to breathe easier. Watch closely for changes in your health, and be sure to contact your doctor if you have any problems. Where can you learn more? Go to http://www.gray.com/  Enter D936 in the search box to learn more about \"Aortic Valve Replacement Surgery: What to Expect at Home. \"  Current as of: August 31, 2020               Content Version: 12.8  © 2006-2021 [employer]. Care instructions adapted under license by [employer] (which disclaims liability or warranty for this information). If you have questions about a medical condition or this instruction, always ask your healthcare professional. [address] any warranty or liability for your use of this information. Coronary Artery Bypass Graft: What to Expect at Home  Your Recovery     Coronary artery bypass graft (CABG) is surgery to treat coronary artery disease. The surgery helps blood make a detour, or bypass, around one or more narrowed or blocked coronary arteries. Coronary arteries are the blood vessels that bring blood to the heart. Your doctor did the surgery through a cut, called an incision, in your chest.  You will feel tired and sore for the first few weeks after surgery. You may have some brief, sharp pains on either side of your chest. Your chest, shoulders, and upper back may ache. The incision in your chest and the area where the healthy vein was taken may be sore or swollen. These symptoms usually get better after 4 to 6 weeks. You will probably be able to do many of your usual activities after 4 to 6 weeks. But for 2 to 3 months you will not be able to lift heavy objects or do activities that strain your chest or upper arm muscles. At first you may notice that you get tired easily and need to rest often.  It may take 1 to 2 months to get your energy back. Some people find that they are more emotional after this surgery. You may cry easily or show emotion in ways that are unusual for you. This is common and may last for up to a year. Some people get depressed after the surgery. Talk with your doctor if you have sadness that continues or you are concerned about how you are feeling. Treatment and other support can help you feel better. Even though the surgery may improve your symptoms, you will still need to make changes in your lifestyle to lower your risk of a heart attack or stroke. It will be important to eat a heart-healthy diet, get regular exercise, not smoke, take your heart medicines, and reduce stress. You will likely start a cardiac rehabilitation (rehab) program in the hospital. You will continue with this rehab program after you go home to help you recover and prevent problems with your heart. Talk to your doctor about whether rehab is right for you. This care sheet gives you a general idea about how long it will take for you to recover. But each person recovers at a different pace. Follow the steps below to get better as quickly as possible. How can you care for yourself at home? Activity    · Rest when you feel tired. Getting enough sleep will help you recover. Try to sleep on your back for 4 to 6 weeks while your breastbone (sternum) heals. This usually takes about 4 to 6 weeks.     · Try to walk each day. Start by walking a little more than you did the day before. Bit by bit, increase the amount you walk. Walking boosts blood flow and helps prevent pneumonia and constipation.     · Avoid strenuous activities, such as bicycle riding, jogging, weight lifting, or heavy aerobic exercise, until your doctor says it is okay.     · For 3 months, avoid activities that strain your chest or upper arm muscles. This includes pushing a  or vacuum, mopping floors, or swinging a golf club or tennis racquet.   · For 2 to 3 months, avoid lifting anything that would make you strain. This may include a child, heavy grocery bags and milk containers, a heavy briefcase or backpack, or cat litter or dog food bags.     · Hold a pillow firmly over your chest incision when you cough or take deep breaths. This will support your chest and reduce your pain.     · Do breathing exercises at home as instructed by your doctor. This will help prevent pneumonia.     · Ask your doctor when you can drive again.     · You will probably need to take 4 to 12 weeks off from work. It depends on the type of work you do and how you feel.     · You may shower as usual. Pat the incision dry. Do not take a bath for the first 3 weeks, or until your doctor tells you it is okay.     · Do not swim or use a hot tub for at least 1 month, or until your doctor says it is okay.     · Ask your doctor when it is okay for you to have sex. Diet    · Eat a heart-healthy diet. If you have not been eating this way, talk to your doctor. You also may want to talk to a dietitian. A dietitian can help you learn about healthy foods.     · Drink plenty of fluids (unless your doctor tells you not to).     · You may notice that your bowel movements are not regular right after your surgery. This is common. Try to avoid constipation and straining with bowel movements. You may want to take a fiber supplement every day. If you have not had a bowel movement after a couple of days, ask your doctor about taking a mild laxative. Medicines    · Your doctor will tell you if and when you can restart your medicines. He or she will also give you instructions about taking any new medicines.     · If you take aspirin or some other blood thinner, ask your doctor if and when to start taking it again.  Make sure that you understand exactly what your doctor wants you to do.     · Your doctor may give you medicines to prevent blood clots, keep your heartbeat steady, and lower your blood pressure and cholesterol. Take your medicines exactly as prescribed. Call your doctor if you think you are having a problem with your medicine.     · Be safe with medicines. Take pain medicines exactly as directed. ? If the doctor gave you a prescription medicine for pain, take it as prescribed. ? If you are not taking a prescription pain medicine, ask your doctor if you can take an over-the-counter medicine. ? Do not take aspirin, ibuprofen (Advil, Motrin), naproxen (Aleve), or other nonsteroidal anti-inflammatory drugs (NSAIDs) unless your doctor says it is okay.     · If you think your pain medicine is making you sick to your stomach:  ? Take your medicine after meals (unless your doctor has told you not to). ? Ask your doctor for a different pain medicine.     · If your doctor prescribed antibiotics, take them as directed. Do not stop taking them just because you feel better. You need to take the full course of antibiotics. Incision care    · If you have strips of tape on the incisions the doctor made, leave the tape on for a week or until it falls off.     · Wash the area daily with warm, soapy water, and pat it dry. Don't use hydrogen peroxide or alcohol, which can slow healing. You may cover the area with a gauze bandage if it weeps or rubs against clothing. Change the bandage every day.     · Keep the area clean and dry.     · Do not use any creams, lotions, powders, ointments, or oils unless your doctor tells you it is okay.     · If you have an incision in your leg:  ? Wear support stockings on your legs during the day for the first 2 weeks. You can take the stockings off at night while you sleep. ? Raise your legs above the level of your heart whenever you lie down for the first 4 to 6 weeks. Other instructions    · Keep track of your weight. Weigh yourself every day at the same time of day, on the same scale, in the same amount of clothing.  A sudden increase in weight can be a sign of a problem with your heart. Tell your doctor if you suddenly gain weight, such as 3 pounds or more in 2 to 3 days.     · Do not smoke. Smoking can make it harder for you to recover. And it will raise the chances of your arteries narrowing again. If you need help quitting, talk to your doctor about stop-smoking programs and medicines. These can increase your chances of quitting for good. Follow-up care is a key part of your treatment and safety. Be sure to make and go to all appointments, and call your doctor if you are having problems. It's also a good idea to know your test results and keep a list of the medicines you take. When should you call for help? Call 911 anytime you think you may need emergency care. For example, call if:    · You passed out (lost consciousness).     · You have severe trouble breathing.     · You have sudden chest pain and shortness of breath, or you cough up blood.     · You have severe pain in your chest.     · You have symptoms of a heart attack. These may include:  ? Chest pain or pressure, or a strange feeling in the chest.  ? Sweating. ? Shortness of breath. ? Nausea or vomiting. ? Pain, pressure, or a strange feeling in the back, neck, jaw, or upper belly or in one or both shoulders or arms. ? Lightheadedness or sudden weakness. ? A fast or irregular heartbeat. After you call 911, the  may tell you to chew 1 adult-strength or 2 to 4 low-dose aspirin. Wait for an ambulance. Do not try to drive yourself.     · You have angina symptoms (such as chest pain or pressure) that do not go away with rest or are not getting better within 5 minutes after you take a dose of nitroglycerin.    Call your doctor now or seek immediate medical care if:    · You have pain that does not get better after you take pain medicine.     · You have a fever over 100°F.     · You have loose stitches, or your incision comes open.     · Bright red blood has soaked through the bandage over your incision.     · You have signs of infection, such as:  ? Increased pain, swelling, warmth, or redness. ? Red streaks leading from the incision. ? Pus draining from the incision. ? Swollen lymph nodes in your neck, armpits, or groin. ? A fever.     · You have signs of a blood clot in a leg. If you had a vein removed from your leg, you may have tenderness and swelling while your leg heals. But signs of a blood clot may be in a different part of your leg and may include:  ? Pain in your calf, back of the knee, thigh, or groin. ? Redness and swelling in your leg or groin.     · Your heartbeat feels very fast or slow, skips beats, or flutters.     · You are dizzy or lightheaded, or you feel like you may faint.     · You have new or increased shortness of breath. Watch closely for changes in your health, and be sure to contact your doctor if:    · You gain weight suddenly, such as 3 pounds or more in 2 to 3 days.     · You have increased swelling in your legs, ankles, or feet.     · You have any concerns about your incision.     · You feel very sad or have other signs of depression, such as trouble sleeping or eating.     · You have questions about diet, exercise, quitting smoking, or stress reduction after surgery. Where can you learn more? Go to http://www.gray.com/  Enter F759 in the search box to learn more about \"Coronary Artery Bypass Graft: What to Expect at Home. \"  Current as of: August 31, 2020               Content Version: 12.8  © 2006-2021 [employer]. Care instructions adapted under license by [employer] (which disclaims liability or warranty for this information). If you have questions about a medical condition or this instruction, always ask your healthcare professional. [place] [address] any warranty or liability for your use of this information.      Coronary Artery Disease: Care Instructions  Your Care Instructions     The heart is a muscle, and like any muscle, it needs blood to work well. Coronary artery disease occurs when the arteries that bring oxygen-rich blood to your heart have a buildup of plaque--deposits of fats and other substances. Plaque can reduce blood flow to the heart muscle. This can cause angina symptoms such as chest pain or pressure. A heart attack can happen if blood flow is completely blocked. You can do a lot to improve your health and prevent a heart attack. Eating healthy food, not smoking, getting regular exercise, and taking your medicine are the main things you can do every day to stay healthy. Follow-up care is a key part of your treatment and safety. Be sure to make and go to all appointments, and call your doctor if you are having problems. It's also a good idea to know your test results and keep a list of the medicines you take. How can you care for yourself at home? Medicines    · Be safe with medicines. Take your medicines exactly as prescribed. Call your doctor if you think you are having a problem with your medicine. You will get more details on the specific medicines your doctor prescribes.     · You will take medicines that lower your risk of a heart attack and lower your risk of dying early from heart disease. These medicines include:  ? Angiotensin-converting enzyme (ACE) inhibitors or angiotensin II receptor blockers (ARBs). They lower blood pressure. ? Aspirin and other blood thinners. They prevent blood clots that could cause a heart attack. ? Beta-blockers. They lower the heart's workload. ? Statins and other cholesterol medicines. They lower cholesterol.     · If your doctor has given you nitroglycerin for angina symptoms (such as chest pain or pressure) keep it with you at all times. If you have symptoms, sit down and rest, and take the first dose of nitroglycerin as directed. If your symptoms get worse or are not getting better within 5 minutes, call 911 right away. Stay on the phone.  The emergency  will give you further instructions.     · Do not take any over-the-counter medicines, vitamins, or herbal products without talking to your doctor first.   Lifestyle  Ask your doctor if a cardiac rehab program is right for you. Cardiac rehab can help you make lifestyle changes. In cardiac rehab, a team of health professionals provides education and support to help you make new, healthy habits.    · Do not smoke. Avoid secondhand smoke too. Smoking can increase your risk of a heart attack or stroke. If you need help quitting, talk to your doctor about stop-smoking programs and medicines. These can increase your chances of quitting for good.     · Eat heart-healthy foods. These include vegetables, fruits, nuts, beans, lean meat, fish, and whole grains. Limit saturated fat, sodium, and alcohol. Limit drinks and foods with added sugar.     · If your doctor recommends it, get more exercise. Ask your doctor what level of exercise is safe for you. Walking is a good choice. Bit by bit, increase the amount you walk every day. Try for at least 30 minutes on most days of the week. You also may want to swim, bike, or do other activities.     · Stay at a healthy weight. Lose weight if you need to.     · Manage other health problems. These include diabetes, high blood pressure, and high cholesterol. If you think you may have a problem with alcohol or drug use, talk to your doctor.     · If you have angina symptoms, pay attention to your symptoms. This can help you see what causes them and what is typical for you.     · Avoid colds and flu. Get a pneumococcal vaccine shot. If you have had one before, ask your doctor whether you need another dose. Get a flu vaccine every year. If you must be around people with colds or flu, wash your hands often.     · If you think you have symptoms of depression, talk to your doctor.  Symptoms include feeling sad or hopeless most of the time, or losing interest in activities that used to make you happy. When should you call for help? Call 911 anytime you think you may need emergency care. For example, call if:    · You have symptoms of a heart attack. These may include:  ? Chest pain or pressure, or a strange feeling in the chest.  ? Sweating. ? Shortness of breath. ? Nausea or vomiting. ? Pain, pressure, or a strange feeling in the back, neck, jaw, or upper belly or in one or both shoulders or arms. ? Lightheadedness or sudden weakness. ? A fast or irregular heartbeat. After you call 911, the  may tell you to chew 1 adult-strength or 2 to 4 low-dose aspirin. Wait for an ambulance. Do not try to drive yourself.     · You have angina symptoms (such as chest pain or pressure) that do not go away with rest or are not getting better within 5 minutes after you take a dose of nitroglycerin.     · You passed out (lost consciousness). Call your doctor now or seek immediate medical care if:    · You are having angina symptoms, such as chest pain or pressure, more often than usual, or they are different or worse than usual.     · You have new or increased shortness of breath.     · You are dizzy or lightheaded, or you feel like you may faint. Watch closely for changes in your health, and be sure to contact your doctor if you have any problems. Where can you learn more? Go to http://www.gray.com/  Enter C156 in the search box to learn more about \"Coronary Artery Disease: Care Instructions. \"  Current as of: August 31, 2020               Content Version: 12.8  © 2006-2021 [employer]. Care instructions adapted under license by [employer] (which disclaims liability or warranty for this information). If you have questions about a medical condition or this instruction, always ask your healthcare professional. [address] any warranty or liability for your use of this information.      Heart-Healthy Diet: Care Instructions  Your Care Instructions     A heart-healthy diet has lots of vegetables, fruits, nuts, beans, and whole grains, and is low in salt. It limits foods that are high in saturated fat, such as meats, cheeses, and fried foods. It may be hard to change your diet, but even small changes can lower your risk of heart attack and heart disease. Follow-up care is a key part of your treatment and safety. Be sure to make and go to all appointments, and call your doctor if you are having problems. It's also a good idea to know your test results and keep a list of the medicines you take. How can you care for yourself at home? Watch your portions  · Use food labels to learn what the recommended servings are for the foods you eat. · Eat only the number of calories you need to stay at a healthy weight. If you need to lose weight, eat fewer calories than your body burns (through exercise and other physical activity). Eat more fruits and vegetables  · Eat a variety of fruit and vegetables every day. Dark green, deep orange, red, or yellow fruits and vegetables are especially good for you. Examples include spinach, carrots, peaches, and berries. · Keep carrots, celery, and other veggies handy for snacks. Buy fruit that is in season and store it where you can see it so that you will be tempted to eat it. · Cook dishes that have a lot of veggies in them, such as stir-fries and soups. Limit saturated fat  · Read food labels, and try to avoid saturated fats. They increase your risk of heart disease. · Use olive or canola oil when you cook. · Bake, broil, grill, or steam foods instead of frying them. · Choose lean meats instead of high-fat meats such as hot dogs and sausages. Cut off all visible fat when you prepare meat. · Eat fish, skinless poultry, and meat alternatives such as soy products instead of high-fat meats. Soy products, such as tofu, may be especially good for your heart.   · Choose low-fat or fat-free milk and dairy products. Eat foods high in fiber  · Eat a variety of grain products every day. Include whole-grain foods that have lots of fiber and nutrients. Examples of whole-grain foods include oats, whole wheat bread, and brown rice. · Buy whole-grain breads and cereals, instead of white bread or pastries. Limit salt and sodium  · Limit how much salt and sodium you eat to help lower your blood pressure. · Taste food before you salt it. Add only a little salt when you think you need it. With time, your taste buds will adjust to less salt. · Eat fewer snack items, fast foods, and other high-salt, processed foods. Check food labels for the amount of sodium in packaged foods. · Choose low-sodium versions of canned goods (such as soups, vegetables, and beans). Limit sugar  · Limit drinks and foods with added sugar. These include candy, desserts, and soda pop. Limit alcohol  · Limit alcohol to no more than 2 drinks a day for men and 1 drink a day for women. Too much alcohol can cause health problems. When should you call for help? Watch closely for changes in your health, and be sure to contact your doctor if:    · You would like help planning heart-healthy meals. Where can you learn more? Go to http://www.[employer].com/  Enter V137 in the search box to learn more about \"Heart-Healthy Diet: Care Instructions. \"  Current as of: December 17, 2020               Content Version: 12.8  © 4931-8716 [employer]. Care instructions adapted under license by "[employer] (which disclaims liability or warranty for this information). If you have questions about a medical condition or this instruction, always ask your healthcare professional. [place] [address] any warranty or liability for your use of this information.

## 2021-05-10 NOTE — PROGRESS NOTES
ACUTE PHYSICAL THERAPY GOALS:  (Developed with and agreed upon by patient and/or caregiver.)  STG:  (1.)Mr. Rita Cartagena will move from supine to sit and sit to supine , scoot up and down and roll side to side with MINIMAL ASSIST within 3 treatment day(s). (2.)Mr. Rita Cartagena will transfer from bed to chair and chair to bed with MINIMAL ASSIST using the least restrictive device within 3 treatment day(s). GOAL MET 2021    (3.)Mr. Rita Cartagena will ambulate with MINIMAL ASSIST for 25 feet with the least restrictive device within 3 treatment day(s). GOAL MET 2021       LTG:  (1.)Mr. Rita Cartagena will move from supine to sit and sit to supine , scoot up and down and roll side to side in bed with SUPERVISION within 7 treatment day(s). (2.)Mr. Rita Cartagena will transfer from bed to chair and chair to bed with SUPERVISION using the least restrictive device within 7 treatment day(s). GOAL MET 2021    (3.)Mr. Rita Cartagena will ambulate with SUPERVISION for 200+ feet with the least restrictive device within 7 treatment day(s). GOAL MET 2021    (4.)Mr. Rita Cartagena will negotiate stairs up/down x14 with SUPERVISION with the least restrictive device within 7 treatment day(s).     PHYSICAL THERAPY: Daily Note and AM Treatment Day # 5    Dottie Mcdaniel is a 66 y.o. male   PRIMARY DIAGNOSIS: S/P CABG x 3  Atherosclerosis of native coronary artery of native heart with unstable angina pectoris (HCC) [I25.110]  Aortic valve stenosis, etiology of cardiac valve disease unspecified [I35.0]  CAD (coronary artery disease) [I25.10]  Aortic valve stenosis [I35.0]  Procedure(s) (LRB):  CORONARY ARTERY BYPASS GRAFT (CABG X3) WITH AVR, LIMA (N/A)  LULÚ/ANES PROBE (N/A)  VEIN HARVEST ENDOSCOPIC, GREATER SAPHENOUS (Left)  6 Days Post-Op    ASSESSMENT:     REHAB RECOMMENDATIONS: CURRENT LEVEL OF FUNCTION:  (Most Recently Demonstrated)   Recommendation to date pending progress:  [address] [place] [address]  Equipment:  None Bed Mobility:   Not tested  Sit to Stand:   Supervision  Transfers:   Supervision  Gait/Mobility:  Shruti Ervin Assistance     ASSESSMENT:  Mr. Caleb Salcido is sitting in the recliner and agreeable to therapy. Wife at bedside. Mobility is with supervision. Gait training without AD x 400 feet. Returned to recliner and after a short rest break he performed therapeutic exercises. Steady progress and moving well. Will continue PT efforts. Home with HHPT     SUBJECTIVE:   Mr. 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    Home Environment: Private residence  One/Two Story Residence: Two story  Living Alone: No  Support Systems: Spouse/Significant Other/Partner  OBJECTIVE:     PAIN: VITAL SIGNS: LINES/DRAINS:   Pre Treatment: Pain Screen  Pain Scale 1: Numeric (0 - 10)  Pain Intensity 1: 0  Post Treatment: 0   None  O2 Device: None (Room air)     MOBILITY: I Mod I S SBA CGA Min Mod Max Total  NT x2 Comments:   Bed Mobility    Rolling [] [] [] [] [] [] [] [] [] [x] []    Supine to Sit [] [] [] [] [] [] [] [] [] [x] []    Scooting [] [] [] [] [] [] [] [] [] [x] []    Sit to Supine [] [] [] [] [] [] [] [] [] [x] []    Transfers    Sit to Stand [] [] [x] [] [] [] [] [] [] [] []    Bed to Chair [] [] [] [] [] [] [] [] [] [x] []    Stand to Sit [x] [] [x] [] [] [] [] [] [] [] []    I=Independent, Mod I=Modified Independent, S=Supervision, SBA=Standby Assistance, CGA=Contact Guard Assistance,   Min=Minimal Assistance, Mod=Moderate Assistance, Max=Maximal Assistance, Total=Total Assistance, NT=Not Tested    BALANCE: Good Fair+ Fair Fair- Poor NT Comments   Sitting Static [x] [] [] [] [] []    Sitting Dynamic [x] [] [] [] [] []              Standing Static [] [x] [] [] [] []    Standing Dynamic [] [x] [] [] [] []      GAIT: I Mod I S SBA CGA Min Mod Max Total  NT x2 Comments:   Level of Assistance [] [] [] [x] [] [] [] [] [] [] []    Distance 400'    DME None    Gait Quality Slow but steady    Weightbearing  Status N/A     I=Independent, Mod I=Modified Independent, S=Supervision, SBA=Standby Assistance, CGA=Contact Guard Assistance,   Min=Minimal Assistance, Mod=Moderate Assistance, Max=Maximal Assistance, Total=Total Assistance, NT=Not Tested    PLAN:   FREQUENCY/DURATION: PT Plan of Care: BID for duration of hospital stay or until stated goals are met, whichever comes first.  TREATMENT:     TREATMENT:   ($$ Therapeutic Activity: 8-22 mins  $$ Therapeutic Exercises: 8-22 mins    )  Therapeutic Activity (14 Minutes): Therapeutic activity included Transfer Training, Ambulation on level ground, Standing balance and stair practice to improve functional Mobility, Strength and Activity tolerance. Therapeutic Exercise (12 Minutes): Therapeutic exercises noted below to improve functional activity tolerance, strength and mobility.      TREATMENT GRID:     Date:  05/10/21 Date:   Date:     ACTIVITY/EXERCISE AM PM AM PM AM PM   LAQ        15        Shoulder shrugs 15        Gluteal sets 15        marching 15        Ankle pumps 15        abduction 15                 B = bilateral; AA = active assistive; A = active; P = passive    AFTER TREATMENT POSITION/PRECAUTIONS:  Alarm Activated, Chair, Needs within reach, RN notified and Visitors at bedside    INTERDISCIPLINARY COLLABORATION:  RN/PCT and PT/PTA    TOTAL TREATMENT DURATION:  PT Patient Time In/Time Out  Time In: 0904  Time Out: 0930    Serge Tarango PTA

## 2021-05-10 NOTE — PROGRESS NOTES
Problem: Pressure Injury - Risk of  Goal: *Prevention of pressure injury  Description: Document Kanu Scale and appropriate interventions in the flowsheet. Outcome: Progressing Towards Goal  Note: Pressure Injury Interventions:  Sensory Interventions: Assess changes in LOC, Check visual cues for pain, Maintain/enhance activity level, Minimize linen layers, Pressure redistribution bed/mattress (bed type)    Moisture Interventions: Minimize layers    Activity Interventions: Increase time out of bed, Pressure redistribution bed/mattress(bed type), PT/OT evaluation    Mobility Interventions: HOB 30 degrees or less, Pressure redistribution bed/mattress (bed type), PT/OT evaluation    Nutrition Interventions: Document food/fluid/supplement intake, Offer support with meals,snacks and hydration    Friction and Shear Interventions: HOB 30 degrees or less, Minimize layers    Problem: Falls - Risk of  Goal: *Absence of Falls  Description: Document Javier Fall Risk and appropriate interventions in the flowsheet.   Outcome: Progressing Towards Goal  Note: Fall Risk Interventions:  Mobility Interventions: Bed/chair exit alarm, Communicate number of staff needed for ambulation/transfer, Patient to call before getting OOB, Strengthening exercises (ROM-active/passive)    Mentation Interventions: Adequate sleep, hydration, pain control, Bed/chair exit alarm    Medication Interventions: Bed/chair exit alarm, Evaluate medications/consider consulting pharmacy, Patient to call before getting OOB, Teach patient to arise slowly    Elimination Interventions: Bed/chair exit alarm, Call light in reach, Patient to call for help with toileting needs, Urinal in reach

## 2021-05-10 NOTE — ROUTINE PROCESS
Discharge instructions, follow up appointments and prescriptions reviewed with patient and family. Both verbalize understanding. All personal belongings taken with patient. Family member will drive patient home. Patient escorted to discharge area via wheelchair. Patient is stable at discharge. IV, monitor, and two chest tube sutures removed.

## 2021-05-11 PROCEDURE — 75574 CT ANGIO HRT W/3D IMAGE: CPT | Performed by: INTERNAL MEDICINE

## 2021-05-11 PROCEDURE — 75571 CT HRT W/O DYE W/CA TEST: CPT | Performed by: INTERNAL MEDICINE

## 2021-05-12 LAB
COMMENT, SRA4L: NORMAL
COMMENT, SRA8L: NORMAL
ENOXAPARIN HIGH DOSE, SRA6L: NORMAL %
ENOXAPARIN LOW DOSE, SRA5L: NORMAL %
ENOXAPARIN RESULT, SRA7L: NORMAL
SRA 100IU/ML UFH SER-ACNC: 4 % (ref 0–20)
SRA UFH SER-IMP: NORMAL
UNFRAC HEPARIN LOW DOSE: 3 % (ref 0–20)

## 2021-05-14 PROBLEM — G47.09 OTHER INSOMNIA: Status: ACTIVE | Noted: 2021-05-14

## 2021-06-25 PROBLEM — I25.10 CORONARY ARTERY DISEASE INVOLVING NATIVE CORONARY ARTERY OF NATIVE HEART: Status: ACTIVE | Noted: 2021-06-25

## 2021-06-25 PROBLEM — I35.0 AORTIC VALVE STENOSIS: Status: RESOLVED | Noted: 2021-05-04 | Resolved: 2021-06-25

## 2021-08-15 PROBLEM — I35.0 NONRHEUMATIC AORTIC VALVE STENOSIS: Status: RESOLVED | Noted: 2018-04-22 | Resolved: 2021-08-15

## 2021-08-15 PROBLEM — I42.9 CARDIOMYOPATHY (HCC): Status: ACTIVE | Noted: 2021-08-15

## 2021-09-18 NOTE — PROGRESS NOTES
Problem: Falls - Risk of  Goal: *Absence of Falls  Description: Document Burrell Canavan Fall Risk and appropriate interventions in the flowsheet.   Outcome: Progressing Towards Goal  Note: Fall Risk Interventions:  Mobility Interventions: Bed/chair exit alarm, Patient to call before getting OOB    Mentation Interventions: Family/sitter at bedside    Medication Interventions: Patient to call before getting OOB, Teach patient to arise slowly    Elimination Interventions: Call light in reach, Patient to call for help with toileting needs, Urinal in reach              Problem: CABG: Post-Op Day 5  Goal: Activity/Safety  Outcome: Progressing Towards Goal  Goal: Nutrition/Diet  Outcome: Progressing Towards Goal  Goal: Discharge Planning  Outcome: Progressing Towards Goal  Goal: Medications  Outcome: Progressing Towards Goal  Goal: Respiratory  Outcome: Progressing Towards Goal  Goal: Treatments/Interventions/Procedures  Outcome: Progressing Towards Goal  Goal: Psychosocial  Outcome: Progressing Towards Goal [id number]

## 2021-09-27 NOTE — PROCEDURE: CURETTAGE AND DESTRUCTION
Render Post-Care Instructions In Note?: no
Number Of Curettages: 4
Detail Level: Detailed
Anesthesia Volume In Cc: 0.3
Bill As A Line Item Or As Units: Line Item
Anesthesia Type: 1% lidocaine with epinephrine and a 1:10 solution of 8.4% sodium bicarbonate
Cautery Type: electrodesiccation
Total Volume (Ccs): 1
Post-Care Instructions: I reviewed with the patient in detail post-care instructions and written instructions were provided. Patient is to keep the area dry for 24 hours. Should the patient develop any fevers, chills, bleeding, severe pain patient will contact the office immediately.
What Was Performed First?: Destruction
Size Of Lesion After Curettage: 1.1
Consent was obtained from the patient. The risks, benefits and alternatives to therapy were discussed in detail. Specifically, the risks of infection, scarring, bleeding, prolonged wound healing, nerve injury, incomplete removal, allergy to anesthesia and recurrence were addressed. Alternatives to ED&C, such as: surgical removal and XRT were also discussed.  Prior to the procedure, the treatment site was clearly identified and confirmed by the patient.
Biopsy Photograph Reviewed: Yes
N/A

## 2021-12-02 ENCOUNTER — APPOINTMENT (RX ONLY)
Dept: URBAN - METROPOLITAN AREA CLINIC 23 | Facility: CLINIC | Age: 76
Setting detail: DERMATOLOGY
End: 2021-12-02

## 2021-12-02 DIAGNOSIS — D18.0 HEMANGIOMA: ICD-10-CM

## 2021-12-02 DIAGNOSIS — L28.1 PRURIGO NODULARIS: ICD-10-CM

## 2021-12-02 DIAGNOSIS — L57.8 OTHER SKIN CHANGES DUE TO CHRONIC EXPOSURE TO NONIONIZING RADIATION: ICD-10-CM

## 2021-12-02 DIAGNOSIS — L82.1 OTHER SEBORRHEIC KERATOSIS: ICD-10-CM

## 2021-12-02 DIAGNOSIS — L98.8 OTHER SPECIFIED DISORDERS OF THE SKIN AND SUBCUTANEOUS TISSUE: ICD-10-CM

## 2021-12-02 DIAGNOSIS — Z85.828 PERSONAL HISTORY OF OTHER MALIGNANT NEOPLASM OF SKIN: ICD-10-CM

## 2021-12-02 PROBLEM — J45.909 UNSPECIFIED ASTHMA, UNCOMPLICATED: Status: ACTIVE | Noted: 2021-12-02

## 2021-12-02 PROBLEM — L70.0 ACNE VULGARIS: Status: ACTIVE | Noted: 2021-12-02

## 2021-12-02 PROBLEM — D18.01 HEMANGIOMA OF SKIN AND SUBCUTANEOUS TISSUE: Status: ACTIVE | Noted: 2021-12-02

## 2021-12-02 PROCEDURE — ? LIQUID NITROGEN

## 2021-12-02 PROCEDURE — 99213 OFFICE O/P EST LOW 20 MIN: CPT | Mod: 25

## 2021-12-02 PROCEDURE — ? COUNSELING

## 2021-12-02 PROCEDURE — 17110 DESTRUCTION B9 LES UP TO 14: CPT

## 2021-12-02 ASSESSMENT — LOCATION SIMPLE DESCRIPTION DERM
LOCATION SIMPLE: LEFT HAND
LOCATION SIMPLE: RIGHT THUMB
LOCATION SIMPLE: RIGHT HAND
LOCATION SIMPLE: UPPER BACK
LOCATION SIMPLE: LEFT FOREARM
LOCATION SIMPLE: RIGHT INDEX FINGER
LOCATION SIMPLE: LEFT UPPER BACK
LOCATION SIMPLE: RIGHT UPPER BACK
LOCATION SIMPLE: ABDOMEN
LOCATION SIMPLE: RIGHT FOREARM
LOCATION SIMPLE: LEFT POSTERIOR THIGH

## 2021-12-02 ASSESSMENT — LOCATION ZONE DERM
LOCATION ZONE: TRUNK
LOCATION ZONE: ARM
LOCATION ZONE: LEG
LOCATION ZONE: HAND
LOCATION ZONE: FINGER

## 2021-12-02 ASSESSMENT — LOCATION DETAILED DESCRIPTION DERM
LOCATION DETAILED: SUPERIOR THORACIC SPINE
LOCATION DETAILED: LEFT DISTAL LATERAL POSTERIOR THIGH
LOCATION DETAILED: RIGHT DISTAL DORSAL FOREARM
LOCATION DETAILED: EPIGASTRIC SKIN
LOCATION DETAILED: RIGHT DORSAL INDEX METACARPOPHALANGEAL JOINT
LOCATION DETAILED: RIGHT MEDIAL UPPER BACK
LOCATION DETAILED: RIGHT RADIAL DORSAL HAND
LOCATION DETAILED: DORSAL INTERPHALANGEAL JOINT RIGHT THUMB
LOCATION DETAILED: LEFT DISTAL DORSAL FOREARM
LOCATION DETAILED: LEFT RADIAL DORSAL HAND
LOCATION DETAILED: RIGHT INDEX PROXIMAL INTERPHALANGEAL JOINT
LOCATION DETAILED: LEFT INFERIOR UPPER BACK

## 2021-12-02 NOTE — PROCEDURE: LIQUID NITROGEN
Consent: The patient's consent was obtained including but not limited to risks of crusting, scabbing, blistering, scarring, darker or lighter pigmentary change, recurrence, incomplete removal and infection.
Post-Care Instructions: I reviewed with the patient in detail post-care instructions. Patient is to wear sunprotection, and avoid picking at any of the treated lesions. Pt may apply Vaseline to crusted or scabbing areas.
Medical Necessity Clause: This procedure was medically necessary because the lesions that were treated were:irritated
Add 52 Modifier (Optional): no
Show Aperture Variable?: Yes
Medical Necessity Information: It is in your best interest to select a reason for this procedure from the list below. All of these items fulfill various CMS LCD requirements except the new and changing color options.
Detail Level: Detailed

## 2022-02-17 PROBLEM — J98.11 ATELECTASIS: Status: RESOLVED | Noted: 2021-05-05 | Resolved: 2022-02-17

## 2022-03-18 PROBLEM — G47.09 OTHER INSOMNIA: Status: ACTIVE | Noted: 2021-05-14

## 2022-03-19 PROBLEM — Z95.2 S/P AVR: Status: ACTIVE | Noted: 2021-05-05

## 2022-03-19 PROBLEM — Z95.1 S/P CABG X 3: Status: ACTIVE | Noted: 2021-05-05

## 2022-03-20 PROBLEM — I25.10 CORONARY ARTERY DISEASE INVOLVING NATIVE CORONARY ARTERY OF NATIVE HEART: Status: ACTIVE | Noted: 2021-06-25

## 2022-03-20 PROBLEM — I77.9 BILATERAL CAROTID ARTERY DISEASE (HCC): Status: ACTIVE | Noted: 2021-04-21

## 2022-03-20 PROBLEM — I42.9 CARDIOMYOPATHY (HCC): Status: ACTIVE | Noted: 2021-08-15

## 2022-07-12 NOTE — PROGRESS NOTES
ABG: ph 7.22, Co2 46, Po2 128, Hco3 19.5, Be -8.1, saturation 100%, decreased Fio2 to 30% post abg. Cardiology Consult  Date of Service: 07/11/22    ASSESSMENT:   60-year-old male with a past medical history of ESRD on iHD since 6/2022, morbid obesity, bilateral lower extremity edema with lymphedema and elephantitis and chronic stasis dermatitis, recent episodes of bacteremia 3/20/2022 and 6/20/2022 at outside hospital with strep agalactiae and Morganella Morgagni.  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  Cardiology consulted for assistance with hemodynamic management.     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 No h/o known chronically elevated pulmonary pressures. No prior RHC  #CAD - likely hemodynamically significant lesions in the LAD, recommend LIMA-LAD a time of surgery per prior cardiology recommendations.    Mr. Dodge's clinical condition continued to worsen overnight. Throughout most of his hospital stay has been in sinus rhythm, however he went into afib sometime last night/ this AM. He has also needed increased vasopressin, up from .12 to .23. This morning, his systolic blood pressure has remained in the 100-110s and his heart rate remained in the lows 90-105s. On clinical exam he also appears to be diffusely hypothermic. These findings are more indicative of a worsening septic shock, and we do not believe that him being in afib is the driving factor to his increased vasopressin needs, given the above stated findings and since he has not been tachycardic. We still agree with  surgical intervention that is planned for today as this will ultimately be the most helpful in improvement of his mixed cardiogenic/septic shock.     Patient discussed with Cardiology Fellow and Dr. Pam Santos, MS4  [facility]     Jorge Reyes Castro, MD, MS  Cardiology Fellow    ============================================================  Subjective:  Patient is currently intubate and unable to vocalize needs. He has required increased pressors needs overnight/ this AM. He appears pale and cold in all extremities, but does not appear to be in distress.     PAST MEDICAL HISTORY:  No past medical history on file.    CURRENT MEDICATIONS:  No current outpatient medications on file.       PAST SURGICAL HISTORY:  No past surgical history on file.    ALLERGIES     Allergies   Allergen Reactions     Other Environmental Allergy      Rozerem [Ramelteon]        FAMILY HISTORY:  No family history on file.    SOCIAL HISTORY:       Review of Systems   The 10 point Review of Systems is negative other than noted in the HPI or here.     Physical Exam   Temp: (!) 95.4  F (35.2  C) Temp src: Esophageal   Pulse: 101   Resp: 18 SpO2: 100 % O2 Device: Mechanical Ventilator    Vital Signs with Ranges  Temp:  [94.8  F (34.9  C)-98.9  F (37.2  C)] 95.4  F (35.2  C)  Pulse:  [] 101  Resp:  [18] 18  MAP:  [46 mmHg-216 mmHg] 61 mmHg  Arterial Line BP: ()/() 102/40  FiO2 (%):  [30 %] 30 %  SpO2:  [29 %-100 %] 100 %  344 lbs 5.74 oz    GEN: elderly appearing man, pale, intubated, laying in bed   ENT: no icterus, orotracheally tube in place   CV: distance heart sounds, difficult to appreciate JVP   CHEST: crackles throughout  Extremities: cold in all extremities, diffuse swelling, lymphedema and discoloration of bilateral lower extremities     Data   Data reviewed today:   Recent Labs   Lab 07/12/22  1247 07/12/22  1214 07/12/22  1148 07/12/22  0821 07/12/22  0401 07/11/22 2023 07/11/22  2016  07/11/22  1217 07/11/22  1212 07/11/22  0430 07/11/22  0419 07/10/22  1031 07/10/22  0328   WBC  --   --   --   --  7.4  --  7.8  --  7.9  --  6.8   < > 6.7   HGB 9.3* 9.4* 8.7*   < > 10.3*  --  10.1*  --  9.5*  --  8.9*   < > 9.9*   MCV  --   --   --   --  103*  --  103*  --  103*  --  104*   < > 106*   PLT  --   --   --   --  79*  --  83*  --  75*  --  75*   < > 63*   INR  --   --   --   --  1.60*  --   --   --   --   --  1.66*  --  1.69*    138 138   < > 133*  --  133*  --  133*  --  134*  134*   < > 134*  134*   POTASSIUM 4.5 4.3 3.8   < > 4.0  --  3.9  --  4.0  --  3.8  3.8   < > 4.3  4.3   CHLORIDE  --   --   --   --  100  --  101  --  100  --  101  101   < > 102  102   CO2  --   --   --   --  17*  --  17*  --  19*  --  19*  19*   < > 19*  19*   BUN  --   --   --   --  12.6  --  12.5  --  10.6  --  12.0  12.0   < > 11.7  11.7   CR  --   --   --   --  1.20*  --  1.40*  --  1.42*  --  1.58*  1.58*   < > 1.65*  1.65*   ANIONGAP  --   --   --   --  16*  --  15  --  14  --  14  14   < > 13  13   ABHIJIT  --   --   --   --  8.4*  --  8.4*  --  8.5*  --  8.3*  8.3*   < > 8.6*  8.6*   * 118* 118*   < > 111*   < > 113*   < > 84   < > 81  81   < > 109*  109*   ALBUMIN  --   --   --   --  3.4*  3.4*  --  3.4*  --  3.3*  --  3.1*  3.1*   < > 3.6  3.6   PROTTOTAL  --   --   --   --  8.1  --   --   --   --   --  7.2  --  8.1   BILITOTAL  --   --   --   --  6.7*  --   --   --   --   --  5.1*  --  5.4*   ALKPHOS  --   --   --   --  52  --   --   --   --   --  45  --  50   ALT  --   --   --   --  48  --   --   --   --   --  50  --  62*   AST  --   --   --   --  38  --   --   --   --   --  41  --  51*    < > = values in this interval not displayed.     IMPRESSION: CTA angiogram   1.  Technically suboptimal study due to severe cardiac motion artifact  despite repeat contrast injection and repeat image acquisition.  Diagnostic accuracy is significantly reduced.  2.  At least moderate multifocal CAD  involving the proximal and mid  LAD on extremely suboptimal images. Recommend invasive coronary  angiography.  3.  Diffuse calcific atherosclerosis involving the LAD and LCX.  4.  Total Agatston score 1497 placing the patient in the 97th  percentile when compared to age and gender matched control group.  5.  Please review Radiology report for incidental noncardiac findings  that will follow separately.      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    Comparison: Head CT 7/10/2022    Technique:     MRI: Multiplanar multisequence images of the brain were obtained  before and after the administration of intravenous contrast.    MRA Head:  Using a 3D time-of-flight image acquisition technique, MRA  of the major arteries at the base of the brain was obtained without  intravenous contrast. Three-dimensional reconstructions of the head  MRA were created, which were reviewed by the radiologist.    Dose: 10mL Gadavist    Findings:   MRI: Left greater than right cerebellar encephalomalacia. Focal  gyriform enhancement in the right parieto-occipital lobe (series 100  image 91; coronal series 101 image 160). There is associated T2  hyperintensity and T1 isointensity and mild blooming on susceptibility  weighted imaging. Mildly increased signal on diffusion imaging  corresponding to the surrounding gray-white matter junction which is  isointense on ADC. No definite acute diffusion restriction. There is  no mass effect, midline shift, or evidence of intracranial hemorrhage.   The ventricles are not enlarged out of proportion to the cerebral  sulci. No other abnormal enhancement. The major vascular flow-voids  appear patent. The orbits are unremarkable. Mild mucosal thickening in  the paranasal sinuses. Mastoid air cells are clear. 6 x 10 mm presumed  aneurysm  over the right postcentral gyrus.    MRA: Patent major intracranial arteries without focal stenosis or  evident aneurysm.      Impression    Impression:  1. Focal nonspecific gyriform enhancement in the right  parieto-occipital lobe. This may represent subacute infarct with  cortical laminar necrosis versus some other infectious, inflammatory,  or toxic insult. No other acute or suspicious intracranial findings.  2. Head MRA demonstrates patent major intracranial arteries without  focal stenosis or evident aneurysm.  3. Small presumed meningioma over the right postcentral gyrus.  4. Small focus of susceptibility the right superior frontal sulcus,  potentially subarachnoid hemorrhage or superficial siderosis.    I have personally reviewed the examination and initial interpretation  and I agree with the findings.    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    Comparison: Head CT 7/10/2022    Technique:     MRI: Multiplanar multisequence images of the brain were obtained  before and after the administration of intravenous contrast.    MRA Head:  Using a 3D time-of-flight image acquisition technique, MRA  of the major arteries at the base of the brain was obtained without  intravenous contrast. Three-dimensional reconstructions of the head  MRA were created, which were reviewed by the radiologist.    Dose: 10mL Gadavist    Findings:   MRI: Left greater than right cerebellar encephalomalacia. Focal  gyriform enhancement in the right parieto-occipital lobe (series 100  image 91; coronal series 101 image 160). There is associated T2  hyperintensity and T1 isointensity and mild blooming on susceptibility  weighted imaging. Mildly increased signal on diffusion imaging  corresponding to the surrounding gray-white matter junction which is  isointense on ADC. No  definite acute diffusion restriction. There is  no mass effect, midline shift, or evidence of intracranial hemorrhage.   The ventricles are not enlarged out of proportion to the cerebral  sulci. No other abnormal enhancement. The major vascular flow-voids  appear patent. The orbits are unremarkable. Mild mucosal thickening in  the paranasal sinuses. Mastoid air cells are clear. 6 x 10 mm presumed  aneurysm over the right postcentral gyrus.    MRA: Patent major intracranial arteries without focal stenosis or  evident aneurysm.      Impression    Impression:  1. Focal nonspecific gyriform enhancement in the right  parieto-occipital lobe. This may represent subacute infarct with  cortical laminar necrosis versus some other infectious, inflammatory,  or toxic insult. No other acute or suspicious intracranial findings.  2. Head MRA demonstrates patent major intracranial arteries without  focal stenosis or evident aneurysm.  3. Small presumed meningioma over the right postcentral gyrus.  4. Small focus of susceptibility the right superior frontal sulcus,  potentially subarachnoid hemorrhage or superficial siderosis.    I have personally reviewed the examination and initial interpretation  and I agree with the findings.    OVIDIO LANE MD         SYSTEM ID:  [id number]     I very much appreciated the opportunity to assess Fletcher Dodge in the hospital with CV Fellow Dr Reyes and Cards 1 resident team.   Cardiology was asked to assist in the treatment of atrial fibrillation in this seriously ill patient.  Sepsis is an important underlying consideration in his overall care.  Surgical intervention is scheduled for today.      I agree with the note above which summarizes my findings and current recommendations.Please do not hesitate to contact my office if you have any questions or concerns.      Ovidio Orozco MD  Cardiac Arrhythmia Service  [facility]  [phone]

## 2022-08-04 RX ORDER — METOPROLOL SUCCINATE 25 MG/1
25 TABLET, EXTENDED RELEASE ORAL DAILY
Qty: 90 TABLET | Refills: 3 | Status: SHIPPED | OUTPATIENT
Start: 2022-08-04

## 2022-08-04 RX ORDER — ATORVASTATIN CALCIUM 80 MG/1
TABLET, FILM COATED ORAL
Qty: 90 TABLET | Refills: 3 | OUTPATIENT
Start: 2022-08-04

## 2022-08-04 NOTE — TELEPHONE ENCOUNTER
Patient left a voicmail on the Rx line stating he needed his blood thinner called in but didn't know the name. Call in to [address] [place] [address].

## 2022-08-09 PROBLEM — Z95.2 S/P AVR: Status: ACTIVE | Noted: 2021-05-05

## 2022-08-09 RX ORDER — ATORVASTATIN CALCIUM 80 MG/1
80 TABLET, FILM COATED ORAL DAILY
Qty: 90 TABLET | Refills: 3 | Status: SHIPPED | OUTPATIENT
Start: 2022-08-09 | End: 2022-08-11 | Stop reason: ALTCHOICE

## 2022-08-11 ENCOUNTER — OFFICE VISIT (OUTPATIENT)
Dept: CARDIOLOGY CLINIC | Age: 77
End: 2022-08-11
Payer: MEDICARE

## 2022-08-11 VITALS
HEIGHT: 71 IN | HEART RATE: 74 BPM | WEIGHT: 206 LBS | DIASTOLIC BLOOD PRESSURE: 88 MMHG | BODY MASS INDEX: 28.84 KG/M2 | SYSTOLIC BLOOD PRESSURE: 126 MMHG

## 2022-08-11 DIAGNOSIS — I25.10 CORONARY ARTERY DISEASE INVOLVING NATIVE CORONARY ARTERY OF NATIVE HEART WITHOUT ANGINA PECTORIS: ICD-10-CM

## 2022-08-11 DIAGNOSIS — I10 BENIGN ESSENTIAL HYPERTENSION: ICD-10-CM

## 2022-08-11 DIAGNOSIS — E78.2 MIXED HYPERLIPIDEMIA: ICD-10-CM

## 2022-08-11 DIAGNOSIS — I65.23 BILATERAL CAROTID ARTERY STENOSIS: Primary | ICD-10-CM

## 2022-08-11 DIAGNOSIS — Z95.2 S/P AVR: ICD-10-CM

## 2022-08-11 PROBLEM — I42.9 CARDIOMYOPATHY (HCC): Status: RESOLVED | Noted: 2021-08-15 | Resolved: 2022-08-11

## 2022-08-11 PROCEDURE — 99214 OFFICE O/P EST MOD 30 MIN: CPT | Performed by: INTERNAL MEDICINE

## 2022-08-11 PROCEDURE — G8417 CALC BMI ABV UP PARAM F/U: HCPCS | Performed by: INTERNAL MEDICINE

## 2022-08-11 PROCEDURE — 1123F ACP DISCUSS/DSCN MKR DOCD: CPT | Performed by: INTERNAL MEDICINE

## 2022-08-11 PROCEDURE — 1036F TOBACCO NON-USER: CPT | Performed by: INTERNAL MEDICINE

## 2022-08-11 PROCEDURE — G8428 CUR MEDS NOT DOCUMENT: HCPCS | Performed by: INTERNAL MEDICINE

## 2022-08-11 RX ORDER — ATORVASTATIN CALCIUM 40 MG/1
40 TABLET, FILM COATED ORAL EVERY EVENING
Qty: 90 TABLET | Refills: 3 | Status: SHIPPED | OUTPATIENT
Start: 2022-08-11

## 2022-08-11 ASSESSMENT — ENCOUNTER SYMPTOMS: SHORTNESS OF BREATH: 0

## 2022-08-25 ENCOUNTER — APPOINTMENT (RX ONLY)
Dept: URBAN - METROPOLITAN AREA CLINIC 23 | Facility: CLINIC | Age: 77
Setting detail: DERMATOLOGY
End: 2022-08-25

## 2022-08-25 DIAGNOSIS — L57.0 ACTINIC KERATOSIS: ICD-10-CM

## 2022-08-25 DIAGNOSIS — D18.0 HEMANGIOMA: ICD-10-CM

## 2022-08-25 DIAGNOSIS — Z85.828 PERSONAL HISTORY OF OTHER MALIGNANT NEOPLASM OF SKIN: ICD-10-CM

## 2022-08-25 DIAGNOSIS — L57.8 OTHER SKIN CHANGES DUE TO CHRONIC EXPOSURE TO NONIONIZING RADIATION: ICD-10-CM

## 2022-08-25 DIAGNOSIS — L82.1 OTHER SEBORRHEIC KERATOSIS: ICD-10-CM

## 2022-08-25 PROBLEM — D18.01 HEMANGIOMA OF SKIN AND SUBCUTANEOUS TISSUE: Status: ACTIVE | Noted: 2022-08-25

## 2022-08-25 PROCEDURE — 99213 OFFICE O/P EST LOW 20 MIN: CPT | Mod: 25

## 2022-08-25 PROCEDURE — 17000 DESTRUCT PREMALG LESION: CPT

## 2022-08-25 PROCEDURE — ? COUNSELING

## 2022-08-25 PROCEDURE — ? LIQUID NITROGEN

## 2022-08-25 PROCEDURE — 17003 DESTRUCT PREMALG LES 2-14: CPT

## 2022-08-25 ASSESSMENT — LOCATION SIMPLE DESCRIPTION DERM
LOCATION SIMPLE: LEFT UPPER BACK
LOCATION SIMPLE: RIGHT FOREHEAD
LOCATION SIMPLE: RIGHT UPPER BACK
LOCATION SIMPLE: LEFT FOREHEAD
LOCATION SIMPLE: LEFT HAND
LOCATION SIMPLE: LEFT FOREARM
LOCATION SIMPLE: UPPER BACK
LOCATION SIMPLE: RIGHT FOREARM
LOCATION SIMPLE: RIGHT HAND
LOCATION SIMPLE: ABDOMEN
LOCATION SIMPLE: LEFT TEMPLE
LOCATION SIMPLE: RIGHT ZYGOMA

## 2022-08-25 ASSESSMENT — LOCATION DETAILED DESCRIPTION DERM
LOCATION DETAILED: RIGHT MEDIAL UPPER BACK
LOCATION DETAILED: LEFT FOREHEAD
LOCATION DETAILED: LEFT SUPERIOR FOREHEAD
LOCATION DETAILED: LEFT CENTRAL TEMPLE
LOCATION DETAILED: LEFT INFERIOR UPPER BACK
LOCATION DETAILED: RIGHT SUPERIOR LATERAL FOREHEAD
LOCATION DETAILED: RIGHT RADIAL DORSAL HAND
LOCATION DETAILED: LEFT RADIAL DORSAL HAND
LOCATION DETAILED: EPIGASTRIC SKIN
LOCATION DETAILED: LEFT DISTAL DORSAL FOREARM
LOCATION DETAILED: SUPERIOR THORACIC SPINE
LOCATION DETAILED: RIGHT DISTAL DORSAL FOREARM
LOCATION DETAILED: RIGHT CENTRAL ZYGOMA

## 2022-08-25 ASSESSMENT — LOCATION ZONE DERM
LOCATION ZONE: TRUNK
LOCATION ZONE: HAND
LOCATION ZONE: ARM
LOCATION ZONE: FACE

## 2022-08-25 NOTE — PROCEDURE: LIQUID NITROGEN
Duration Of Freeze Thaw-Cycle (Seconds): 0
Detail Level: Detailed
Show Applicator Variable?: Yes
Post-Care Instructions: I reviewed with the patient in detail post-care instructions. Patient is to wear sunprotection, and avoid picking at any of the treated lesions. Pt may apply Vaseline to crusted or scabbing areas.
Consent: The patient's consent was obtained including but not limited to risks of crusting, scabbing, blistering, scarring, darker or lighter pigmentary change, recurrence, incomplete removal and infection.
Render Post-Care Instructions In Note?: no

## 2023-02-14 ENCOUNTER — TELEPHONE (OUTPATIENT)
Dept: CARDIOLOGY CLINIC | Age: 78
End: 2023-02-14

## 2023-02-14 RX ORDER — VALSARTAN AND HYDROCHLOROTHIAZIDE 160; 12.5 MG/1; MG/1
1 TABLET, FILM COATED ORAL DAILY
Qty: 30 TABLET | Refills: 0 | Status: SHIPPED | OUTPATIENT
Start: 2023-02-14

## 2023-02-14 NOTE — TELEPHONE ENCOUNTER
Prescription sent to pharmacy. Has appointment 2-23-23.  Will fill for 1 year at appointment//brendab  Requested Prescriptions     Signed Prescriptions Disp Refills    valsartan-hydroCHLOROthiazide (DIOVAN-HCT) 160-12.5 MG per tablet 30 tablet 0     Sig: Take 1 tablet by mouth daily     Authorizing Provider: Gonzalo Knight     Ordering User: Lesley Alford

## 2023-02-15 ENCOUNTER — TELEPHONE (OUTPATIENT)
Dept: CARDIOLOGY CLINIC | Age: 78
End: 2023-02-15

## 2023-02-15 DIAGNOSIS — I25.10 CORONARY ARTERY DISEASE INVOLVING NATIVE CORONARY ARTERY OF NATIVE HEART: ICD-10-CM

## 2023-02-15 DIAGNOSIS — I77.9 BILATERAL CAROTID ARTERY DISEASE (HCC): Primary | ICD-10-CM

## 2023-02-15 NOTE — TELEPHONE ENCOUNTER
The patient's CTA is on the schedule for 2/22/23 and his follow-up with Dr. Shaun Daniels is on 2/23/23, is Dr. Shaun Daniels okay with that or does his follow-up need to be moved out to a later date?

## 2023-02-15 NOTE — TELEPHONE ENCOUNTER
Patient called with results. Order entered for CTA//nainandab    ----- Message from Elin Trevino MD sent at 2/15/2023  9:11 AM EST -----  Please call patient. His carotid duplex suggest that the right-sided carotid stenosis may be worse. Can we get a CTA of his carotids before he comes back to see me on the 23rd.     Thank you

## 2023-02-16 ENCOUNTER — TELEPHONE (OUTPATIENT)
Dept: CARDIOLOGY CLINIC | Age: 78
End: 2023-02-16

## 2023-02-16 NOTE — TELEPHONE ENCOUNTER
Wife called and informed that prescription was called into [facility] on Tuesday 2-14-23 for #30. Will refill at appointment.  Wife states that patient spoke with pharmacy and will get medications today//brendab

## 2023-02-16 NOTE — TELEPHONE ENCOUNTER
MEDICATION REFILL REQUEST      Name of Medication:  Valsartan-hydrochlorothiazide  Dose:  160-12.5  Frequency:  1 a day  Quantity:  30  Days' supply:  30 with refills      Pharmacy Name/Location:  no pharmacy info left on vm message    Pt also said he needs a refill on his blood pressure meds but he did not leave a name of med. Please call pt to confirm meds and pharmacy

## 2023-02-22 ENCOUNTER — HOSPITAL ENCOUNTER (OUTPATIENT)
Dept: CT IMAGING | Age: 78
Discharge: HOME | End: 2023-02-25
Payer: MEDICARE

## 2023-02-22 DIAGNOSIS — I77.9 BILATERAL CAROTID ARTERY DISEASE (HCC): ICD-10-CM

## 2023-02-22 DIAGNOSIS — I25.10 CORONARY ARTERY DISEASE INVOLVING NATIVE CORONARY ARTERY OF NATIVE HEART: ICD-10-CM

## 2023-02-22 LAB — CREAT BLD-MCNC: 1.09 MG/DL (ref 0.8–1.5)

## 2023-02-22 PROCEDURE — 82565 ASSAY OF CREATININE: CPT

## 2023-02-22 PROCEDURE — 6360000004 HC RX CONTRAST MEDICATION: Performed by: INTERNAL MEDICINE

## 2023-02-22 PROCEDURE — 70498 CT ANGIOGRAPHY NECK: CPT

## 2023-02-22 PROCEDURE — 2580000003 HC RX 258: Performed by: INTERNAL MEDICINE

## 2023-02-22 RX ORDER — 0.9 % SODIUM CHLORIDE 0.9 %
100 INTRAVENOUS SOLUTION INTRAVENOUS
Status: COMPLETED | OUTPATIENT
Start: 2023-02-22 | End: 2023-02-22

## 2023-02-22 RX ORDER — SODIUM CHLORIDE 0.9 % (FLUSH) 0.9 %
10 SYRINGE (ML) INJECTION
Status: COMPLETED | OUTPATIENT
Start: 2023-02-22 | End: 2023-02-22

## 2023-02-22 RX ADMIN — SODIUM CHLORIDE 100 ML: 9 INJECTION, SOLUTION INTRAVENOUS at 14:14

## 2023-02-22 RX ADMIN — SODIUM CHLORIDE, PRESERVATIVE FREE 10 ML: 5 INJECTION INTRAVENOUS at 14:14

## 2023-02-22 RX ADMIN — IOPAMIDOL 50 ML: 755 INJECTION, SOLUTION INTRAVENOUS at 14:13

## 2023-02-23 ENCOUNTER — OFFICE VISIT (OUTPATIENT)
Dept: CARDIOLOGY CLINIC | Age: 78
End: 2023-02-23

## 2023-02-23 VITALS
HEIGHT: 71 IN | WEIGHT: 203.6 LBS | BODY MASS INDEX: 28.5 KG/M2 | DIASTOLIC BLOOD PRESSURE: 64 MMHG | SYSTOLIC BLOOD PRESSURE: 128 MMHG

## 2023-02-23 DIAGNOSIS — I44.7 LBBB (LEFT BUNDLE BRANCH BLOCK): ICD-10-CM

## 2023-02-23 DIAGNOSIS — I65.23 BILATERAL CAROTID ARTERY STENOSIS: ICD-10-CM

## 2023-02-23 DIAGNOSIS — Z95.2 S/P AVR: ICD-10-CM

## 2023-02-23 DIAGNOSIS — I25.10 CORONARY ARTERY DISEASE INVOLVING NATIVE CORONARY ARTERY OF NATIVE HEART WITHOUT ANGINA PECTORIS: Primary | ICD-10-CM

## 2023-02-23 DIAGNOSIS — I10 BENIGN ESSENTIAL HYPERTENSION: ICD-10-CM

## 2023-02-23 RX ORDER — VALSARTAN AND HYDROCHLOROTHIAZIDE 160; 12.5 MG/1; MG/1
1 TABLET, FILM COATED ORAL DAILY
Qty: 90 TABLET | Refills: 3 | Status: SHIPPED | OUTPATIENT
Start: 2023-02-23

## 2023-02-23 ASSESSMENT — ENCOUNTER SYMPTOMS: SHORTNESS OF BREATH: 0

## 2023-02-23 NOTE — PROGRESS NOTES
[address] [facility], [address] [place] [address]  PHONE: [address]  1945      SUBJECTIVE:   Rishabh Kessler is a 76 y.o. male seen for a follow up visit regarding the following:     Chief Complaint   Patient presents with    6 Month Follow-Up    Hyperlipidemia    Coronary Artery Disease    Hypertension       HPI:    Rishabh Kessler presents for routine follow up for known Coronary Artery Disease. Multiple issues addressed as outlined below:     Coronary Artery Disease:  Patient denies any recent angina. Notes compliance with medical therapy. No recent NTG use. No intolerance to anti-platelet therapy. AVR:  No fever or chills. Last echo:  Echo (8/3/2022): EF 55-60%. Mild MR/MS.  AVR: MG 7    Carotid Stenosis:  Recent CTA:  Bilateral carotid bifurcation disease. Soft plaque causing 40%   stenosis of the proximal left internal carotid artery. Soft plaque causing 80%   stenosis of the proximal right internal carotid artery. Right carotid   bifurcation is at the level of the C3 vertebral body. .       Hypertension:  Ambulatory BP readings have been controlled. Patient reports compliance with medical therapy without side effects. Hyperlipidemia:   Tolerating Lipitor. Exercise:  3 times a week goes to gym and exercises. LBBB:  No dizziness or lightheadedness. Past Medical History, Past Surgical History, Family history, Social History, and Medications were all reviewed with the patient today and updated as necessary.            Current Outpatient Medications:     valsartan-hydroCHLOROthiazide (DIOVAN-HCT) 160-12.5 MG per tablet, Take 1 tablet by mouth daily, Disp: 90 tablet, Rfl: 3    atorvastatin (LIPITOR) 40 MG tablet, Take 1 tablet by mouth every evening, Disp: 90 tablet, Rfl: 3    metoprolol succinate (TOPROL XL) 25 MG extended release tablet, Take 1 tablet by mouth in the morning., Disp: 90 tablet, Rfl: 3    ZINC PO, Take by mouth, Disp: , Rfl:     acetaminophen (TYLENOL) 325 MG tablet, Take 650 mg by mouth every 6 hours as needed, Disp: , Rfl:     aspirin 81 MG EC tablet, Take by mouth every morning, Disp: , Rfl:     fexofenadine (ALLEGRA) 180 MG tablet, Take 180 mg by mouth as needed, Disp: , Rfl:     fluticasone (FLONASE) 50 MCG/ACT nasal spray, 2 sprays by Nasal route daily, Disp: , Rfl:     guaiFENesin 200 MG tablet, Take 200 mg by mouth every 4 hours as needed, Disp: , Rfl:     prednisoLONE acetate (PRED FORTE) 1 % ophthalmic suspension, Apply 1 drop to eye every morning, Disp: , Rfl:      Allergies   Allergen Reactions    Latex Rash        Patient Active Problem List    Diagnosis Date Noted    LBBB (left bundle branch block) 02/23/2023     Priority: High    Coronary artery disease involving native coronary artery of native heart 06/25/2021     Priority: Low     CABG (5/12/21):  LIMA to LAD, SVG to OM, SVG to PDA          Other insomnia 05/14/2021     Priority: Low    S/P CABG x 3 05/05/2021     Priority: Low    Bilateral carotid artery disease ([facility] [address] 04/21/2021     Priority: Low     1. Carotid Duplex (4/21/21): Bilateral ICA stenosis of 50-69%  2. Carotid duplex (2/14/2023): Severe BETY stenosis (70-79%). Moderate LICA [id number](71-12%). .        Benign essential hypertension 12/17/2015     Priority: Low    Hyperlipidemia 12/17/2015     Priority: Low    Prostate nodule      Priority: Low    S/P AVR 05/05/2021     1. AVR (5/12/21): 25 mm Intuity Yvette Lopez pericardial valve  2. Echo (7/26/21):  EF 45-50%. AVR:  MG 9.  PG 16. Mild MR.    3.  Echo (8/3/2022): EF 55-60%. Mild MR/MS.  AVR: MG 7          Social History     Tobacco Use    Smoking status: Never    Smokeless tobacco: Never   Substance Use Topics    Alcohol use: No       ROS:    Review of Systems   Constitutional: Negative for malaise/fatigue. Cardiovascular:  Negative for chest pain. Respiratory:  Negative for shortness of breath. Musculoskeletal:  Positive for arthritis. Neurological:  Negative for focal weakness. Psychiatric/Behavioral:  Negative for depression. PHYSICAL EXAM:  Wt Readings from Last 3 Encounters:   02/23/23 203 lb 9.6 oz (92.4 kg)   02/14/23 206 lb (93.4 kg)   08/11/22 206 lb (93.4 kg)     BP Readings from Last 3 Encounters:   02/23/23 128/64   08/11/22 126/88   02/17/22 122/62     Pulse Readings from Last 3 Encounters:   08/11/22 74   02/17/22 80   08/16/21 80       Physical Exam  Constitutional:       General: He is not in acute distress. Neck:      Vascular: No carotid bruit. Cardiovascular:      Rate and Rhythm: Normal rate and regular rhythm. Heart sounds: Murmur (Grade I/VI KYRA) heard. Pulmonary:      Effort: No respiratory distress. Breath sounds: Normal breath sounds. Abdominal:      General: Bowel sounds are normal.      Palpations: Abdomen is soft. Musculoskeletal:         General: No swelling. Skin:     General: Skin is warm and dry. Neurological:      General: No focal deficit present. Psychiatric:         Mood and Affect: Mood normal.       Medical problems and test results were reviewed with the patient today.        Lab Results   Component Value Date    WBC 8.6 05/10/2021    HGB 9.6 (L) 05/10/2021    HCT 30.0 (L) 05/10/2021    MCV 95.2 05/10/2021     (L) 05/10/2021       Lab Results   Component Value Date/Time     05/07/2021 05:16 AM    K 3.8 05/10/2021 03:43 AM     05/07/2021 05:16 AM    CO2 26 05/07/2021 05:16 AM    BUN 20 05/07/2021 05:16 AM    CREATININE 1.09 02/22/2023 02:07 PM    CREATININE 0.92 05/07/2021 05:16 AM    GLUCOSE 131 05/07/2021 05:16 AM    CALCIUM 7.9 05/07/2021 05:16 AM        Lab Results   Component Value Date    CHOL 200 (H) 03/09/2020     Lab Results   Component Value Date    TRIG 206 (H) 03/09/2020     Lab Results   Component Value Date    HDL 39 (L) 03/09/2020     Lab Results   Component Value Date    LDLCALC 120 (H) 03/09/2020 Lab Results   Component Value Date    LABVLDL 41 (H) 03/09/2020     No results found for: CHOLHDLRATIO     Data from outside records/labs from outside providers have been reviewed and summarized as noted in the HPI, past history and data review sections of this note     EKG done today and reviewed with patient showed:  Sinus  Rhythm   -Left bundle branch block. Rate 75      ASSESSMENT and PLAN      1. Coronary artery disease involving native coronary artery of native heart  Patient is doing well without any anginal symptoms. Continue Aspirin 81 mg a day and PRN NTG. We discussed the importance of continued risk factor modification. The patient is encouraged to contact my office with any worsening dyspnea or chest discomfort. - EKG 12 Lead    2. Bilateral carotid artery stenosis  Refer to vascular surgery  - Shavonne Silva MD, Vascular Surgery, [facility]    3. Benign essential hypertension  Currently BP appears to be under acceptable control on current therapy. Continue current medications including Diovan/HCTZ and Toprol. Discussed monitoring ambulatory BP readings to ensure continued optimal management. If BP becomes elevated, patient is encouraged to contact my office for further titration of therapy. 4. S/P AVR  Normal valve function. 5. LBBB (left bundle branch block)  Patient with EKG consistent with conduction system disease. Currently asymptomatic. Patient to call if develops symptoms of dizziness, syncope or change in exercise tolerance. Return in about 6 months (around 8/23/2023). Nae Casillas MD  2/23/2023  8:12 AM    This note may have been dictated using speech recognition software.   As a result, error of speech recognition may have occurred

## 2023-02-27 ENCOUNTER — OFFICE VISIT (OUTPATIENT)
Dept: VASCULAR SURGERY | Age: 78
End: 2023-02-27
Payer: MEDICARE

## 2023-02-27 ENCOUNTER — PREP FOR PROCEDURE (OUTPATIENT)
Dept: VASCULAR SURGERY | Age: 78
End: 2023-02-27

## 2023-02-27 VITALS
BODY MASS INDEX: 28.14 KG/M2 | HEART RATE: 79 BPM | HEIGHT: 71 IN | DIASTOLIC BLOOD PRESSURE: 81 MMHG | SYSTOLIC BLOOD PRESSURE: 131 MMHG | WEIGHT: 201 LBS | OXYGEN SATURATION: 96 %

## 2023-02-27 DIAGNOSIS — I65.21 CAROTID STENOSIS, ASYMPTOMATIC, RIGHT: Primary | ICD-10-CM

## 2023-02-27 PROCEDURE — 3078F DIAST BP <80 MM HG: CPT | Performed by: STUDENT IN AN ORGANIZED HEALTH CARE EDUCATION/TRAINING PROGRAM

## 2023-02-27 PROCEDURE — G8427 DOCREV CUR MEDS BY ELIG CLIN: HCPCS | Performed by: STUDENT IN AN ORGANIZED HEALTH CARE EDUCATION/TRAINING PROGRAM

## 2023-02-27 PROCEDURE — 99205 OFFICE O/P NEW HI 60 MIN: CPT | Performed by: STUDENT IN AN ORGANIZED HEALTH CARE EDUCATION/TRAINING PROGRAM

## 2023-02-27 PROCEDURE — 3075F SYST BP GE 130 - 139MM HG: CPT | Performed by: STUDENT IN AN ORGANIZED HEALTH CARE EDUCATION/TRAINING PROGRAM

## 2023-02-27 PROCEDURE — G8417 CALC BMI ABV UP PARAM F/U: HCPCS | Performed by: STUDENT IN AN ORGANIZED HEALTH CARE EDUCATION/TRAINING PROGRAM

## 2023-02-27 PROCEDURE — 1123F ACP DISCUSS/DSCN MKR DOCD: CPT | Performed by: STUDENT IN AN ORGANIZED HEALTH CARE EDUCATION/TRAINING PROGRAM

## 2023-02-27 PROCEDURE — 1036F TOBACCO NON-USER: CPT | Performed by: STUDENT IN AN ORGANIZED HEALTH CARE EDUCATION/TRAINING PROGRAM

## 2023-02-27 PROCEDURE — G8484 FLU IMMUNIZE NO ADMIN: HCPCS | Performed by: STUDENT IN AN ORGANIZED HEALTH CARE EDUCATION/TRAINING PROGRAM

## 2023-02-27 RX ORDER — CLOPIDOGREL BISULFATE 75 MG/1
75 TABLET ORAL DAILY
Qty: 90 TABLET | Refills: 1 | Status: SHIPPED | OUTPATIENT
Start: 2023-02-27

## 2023-02-27 NOTE — PROGRESS NOTES
[address] FAX: [phone]    Maria M Sue  : 1945      Reason for visit: Right ICA stenosis    Chief Complaint: 76 y.o. male with 80% right ICA stenosis. Compliant with ASA, statin and antihypertensives. Denies slurred speech, unilateral weakness, and changes in vision. CTA with 80% right ICA with heterogenous plaque. Plan: Plan for Right TCAR, start Plavix. P2Y12 in 7 days    Level 5 , Acute or chronic illness or injury that poses threat to life or bodily function. , and Elective major surgery with risk factors below    Imaging interpreted: CTA Head and neck, Carotid DUS    Smoker:  Tobacco Use      Smoking status: Never      Smokeless tobacco: Never      Complexity of illness:  HTN, HLD, and CAD    Procedures by [facility]:None    Referred by: MD Leah Smith MD     Statin: Yes     DAPT/AC: ASA    Dye allergy: no    Metal implants or AICD: no     Ambulatory status: Without claudication    Constitutional:   Negative for fevers and unexplained weight loss. Eyes:   Negative for visual disturbance. ENT:   Negative for significant hearing loss and tinnitus. Respiratory:   Negative for hemoptysis. Cardiovascular:   Negative except as noted in HPI. Gastrointestinal:   Negative for melena and abdominal pain. Genitourinary:   Negative for hematuria, renal stones. Integumentary:   Negative for rash or non-healing wounds  Hematologic/Lymphatic:   Negative for excessive bleeding hx or clotting disorder. Musculoskeletal:  Negative for active, unexplained/severe joint pain. Neurological:   Negative for stroke. Behavioral/Psych:   Negative for suicidal ideations. Endocrine:   Negative for uncontrolled diabetic symptoms including polyuria, polydipsia and poor wound healing. Physical Examination:   Height: 5' 11\" (1.803 m), Weight: 201 lb (91.2 kg), BP: 131/81    General:Well-developed.  No distress. HENT: AT  CV: Regular rhythm. LUNG: Effort normal and breath sounds normal. No respiratory distress. Abdominal: Soft. Bowel sounds are normal. he exhibits no distension. There is no tenderness. There is no guarding. No hernia. Extremities:no wounds, motor and sensation intact    Nancy Lyons MD    Elements of this note have been dictated using speech recognition software. As a result, errors of speech recognition may have occurred.

## 2023-03-01 ENCOUNTER — HOSPITAL ENCOUNTER (OUTPATIENT)
Dept: PREADMISSION TESTING | Age: 78
Discharge: HOME | End: 2023-03-04
Payer: MEDICARE

## 2023-03-01 VITALS
WEIGHT: 204.3 LBS | RESPIRATION RATE: 17 BRPM | SYSTOLIC BLOOD PRESSURE: 140 MMHG | OXYGEN SATURATION: 95 % | HEIGHT: 71 IN | TEMPERATURE: 97.8 F | DIASTOLIC BLOOD PRESSURE: 64 MMHG | BODY MASS INDEX: 28.6 KG/M2

## 2023-03-01 DIAGNOSIS — Z79.02 PLATELET INHIBITION DUE TO PLAVIX: Primary | ICD-10-CM

## 2023-03-01 DIAGNOSIS — I65.21 CAROTID STENOSIS, ASYMPTOMATIC, RIGHT: ICD-10-CM

## 2023-03-01 LAB
ANION GAP SERPL CALC-SCNC: 4 MMOL/L (ref 2–11)
BUN SERPL-MCNC: 24 MG/DL (ref 8–23)
CALCIUM SERPL-MCNC: 9.3 MG/DL (ref 8.3–10.4)
CHLORIDE SERPL-SCNC: 104 MMOL/L (ref 101–110)
CO2 SERPL-SCNC: 31 MMOL/L (ref 21–32)
CREAT SERPL-MCNC: 1.23 MG/DL (ref 0.8–1.5)
ERYTHROCYTE [DISTWIDTH] IN BLOOD BY AUTOMATED COUNT: 12.5 % (ref 11.9–14.6)
GLUCOSE SERPL-MCNC: 99 MG/DL (ref 65–100)
HCT VFR BLD AUTO: 39 % (ref 41.1–50.3)
HGB BLD-MCNC: 12.9 G/DL (ref 13.6–17.2)
MCH RBC QN AUTO: 30.9 PG (ref 26.1–32.9)
MCHC RBC AUTO-ENTMCNC: 33.1 G/DL (ref 31.4–35)
MCV RBC AUTO: 93.3 FL (ref 82–102)
NRBC # BLD: 0 K/UL (ref 0–0.2)
PLATELET # BLD AUTO: 131 K/UL (ref 150–450)
PMV BLD AUTO: 12.6 FL (ref 9.4–12.3)
POTASSIUM SERPL-SCNC: 3.8 MMOL/L (ref 3.5–5.1)
RBC # BLD AUTO: 4.18 M/UL (ref 4.23–5.6)
SODIUM SERPL-SCNC: 139 MMOL/L (ref 133–143)
WBC # BLD AUTO: 7.6 K/UL (ref 4.3–11.1)

## 2023-03-01 PROCEDURE — 80048 BASIC METABOLIC PNL TOTAL CA: CPT

## 2023-03-01 PROCEDURE — 85027 COMPLETE CBC AUTOMATED: CPT

## 2023-03-01 NOTE — PRE-PROCEDURE INSTRUCTIONS
Patient verified name and     Order for consent was not found in EHR g; patient verified. Type III surgery, walk in assessment complete. Labs per surgeon: none received. Patient aware he has an [facility] outpatient lab that needs to be drawn on 3/7/2023. Labs per anesthesia protocol: CBC, BMP. Type and Screen DOS. [id number], acceptable per protocol. Hospital approved surgical skin cleanser and instructions given per hospital policy. Patient provided with and instructed on educational handouts including Guide to Surgery, Pain Management, Hand Hygiene, Blood Transfusion Education, and [place] [facility] Brochure. Patient answered medical/surgical history questions at their best of ability. All prior to admission medications documented in [facility]. Original medication prescription bottle were visualized during patient appointment. Patient instructed to hold all vitamins 7 days prior to surgery and NSAIDS 5 days prior to surgery, patient verbalized understanding. Patient teach back successful and patient demonstrates knowledge of instructions.

## 2023-03-07 ENCOUNTER — ANESTHESIA EVENT (OUTPATIENT)
Dept: SURGERY | Age: 78
DRG: 036 | End: 2023-03-07
Payer: MEDICARE

## 2023-03-07 DIAGNOSIS — Z79.02 PLATELET INHIBITION DUE TO PLAVIX: ICD-10-CM

## 2023-03-07 LAB — P2Y12 PLT RESPONSE: 180 PRU

## 2023-03-07 RX ORDER — ONDANSETRON 2 MG/ML
4 INJECTION INTRAMUSCULAR; INTRAVENOUS
Status: CANCELLED | OUTPATIENT
Start: 2023-03-07 | End: 2023-03-08

## 2023-03-07 RX ORDER — HYDROMORPHONE HYDROCHLORIDE 2 MG/ML
0.25 INJECTION, SOLUTION INTRAMUSCULAR; INTRAVENOUS; SUBCUTANEOUS EVERY 5 MIN PRN
Status: CANCELLED | OUTPATIENT
Start: 2023-03-07

## 2023-03-07 RX ORDER — OXYCODONE HYDROCHLORIDE 5 MG/1
5 TABLET ORAL
Status: CANCELLED | OUTPATIENT
Start: 2023-03-07 | End: 2023-03-08

## 2023-03-07 RX ORDER — SODIUM CHLORIDE, SODIUM LACTATE, POTASSIUM CHLORIDE, CALCIUM CHLORIDE 600; 310; 30; 20 MG/100ML; MG/100ML; MG/100ML; MG/100ML
INJECTION, SOLUTION INTRAVENOUS CONTINUOUS
Status: CANCELLED | OUTPATIENT
Start: 2023-03-07

## 2023-03-07 RX ORDER — SODIUM CHLORIDE 9 MG/ML
INJECTION, SOLUTION INTRAVENOUS PRN
Status: CANCELLED | OUTPATIENT
Start: 2023-03-07

## 2023-03-07 RX ORDER — SODIUM CHLORIDE 0.9 % (FLUSH) 0.9 %
5-40 SYRINGE (ML) INJECTION EVERY 12 HOURS SCHEDULED
Status: CANCELLED | OUTPATIENT
Start: 2023-03-07

## 2023-03-07 RX ORDER — DEXTROSE MONOHYDRATE 100 MG/ML
INJECTION, SOLUTION INTRAVENOUS CONTINUOUS PRN
Status: CANCELLED | OUTPATIENT
Start: 2023-03-07

## 2023-03-07 RX ORDER — HALOPERIDOL 5 MG/ML
1 INJECTION INTRAMUSCULAR
Status: CANCELLED | OUTPATIENT
Start: 2023-03-07 | End: 2023-03-08

## 2023-03-07 RX ORDER — SODIUM CHLORIDE 0.9 % (FLUSH) 0.9 %
5-40 SYRINGE (ML) INJECTION PRN
Status: CANCELLED | OUTPATIENT
Start: 2023-03-07

## 2023-03-08 ENCOUNTER — HOSPITAL ENCOUNTER (INPATIENT)
Age: 78
LOS: 1 days | Discharge: HOME | DRG: 036 | End: 2023-03-09
Attending: STUDENT IN AN ORGANIZED HEALTH CARE EDUCATION/TRAINING PROGRAM | Admitting: STUDENT IN AN ORGANIZED HEALTH CARE EDUCATION/TRAINING PROGRAM
Payer: MEDICARE

## 2023-03-08 ENCOUNTER — ANESTHESIA (OUTPATIENT)
Dept: SURGERY | Age: 78
DRG: 036 | End: 2023-03-08
Payer: MEDICARE

## 2023-03-08 ENCOUNTER — APPOINTMENT (OUTPATIENT)
Dept: INTERVENTIONAL RADIOLOGY/VASCULAR | Age: 78
DRG: 036 | End: 2023-03-08
Attending: STUDENT IN AN ORGANIZED HEALTH CARE EDUCATION/TRAINING PROGRAM
Payer: MEDICARE

## 2023-03-08 DIAGNOSIS — I65.21 OCCLUSION OF RIGHT CAROTID ARTERY: ICD-10-CM

## 2023-03-08 DIAGNOSIS — I65.21 RIGHT-SIDED EXTRACRANIAL CAROTID ARTERY STENOSIS: Primary | ICD-10-CM

## 2023-03-08 PROBLEM — I65.23 CAROTID STENOSIS, ASYMPTOMATIC, BILATERAL: Status: ACTIVE | Noted: 2023-03-08

## 2023-03-08 LAB
ABO + RH BLD: NORMAL
ACT BLD: 149 SECS (ref 70–128)
ACT BLD: 450 SECS (ref 70–128)
BLOOD GROUP ANTIBODIES SERPL: NORMAL
CREAT BLD-MCNC: 1.06 MG/DL (ref 0.8–1.5)
POTASSIUM BLD-SCNC: 3.6 MMOL/L (ref 3.5–5.1)
SPECIMEN EXP DATE BLD: NORMAL

## 2023-03-08 PROCEDURE — C1894 INTRO/SHEATH, NON-LASER: HCPCS | Performed by: STUDENT IN AN ORGANIZED HEALTH CARE EDUCATION/TRAINING PROGRAM

## 2023-03-08 PROCEDURE — 2500000003 HC RX 250 WO HCPCS: Performed by: STUDENT IN AN ORGANIZED HEALTH CARE EDUCATION/TRAINING PROGRAM

## 2023-03-08 PROCEDURE — 85347 COAGULATION TIME ACTIVATED: CPT

## 2023-03-08 PROCEDURE — 82565 ASSAY OF CREATININE: CPT

## 2023-03-08 PROCEDURE — 2100000000 HC CCU R&B

## 2023-03-08 PROCEDURE — 2500000003 HC RX 250 WO HCPCS: Performed by: NURSE ANESTHETIST, CERTIFIED REGISTERED

## 2023-03-08 PROCEDURE — B3131ZZ FLUOROSCOPY OF RIGHT COMMON CAROTID ARTERY USING LOW OSMOLAR CONTRAST: ICD-10-PCS | Performed by: STUDENT IN AN ORGANIZED HEALTH CARE EDUCATION/TRAINING PROGRAM

## 2023-03-08 PROCEDURE — 76000 FLUOROSCOPY <1 HR PHYS/QHP: CPT

## 2023-03-08 PROCEDURE — 2580000003 HC RX 258: Performed by: ANESTHESIOLOGY

## 2023-03-08 PROCEDURE — C1876 STENT, NON-COA/NON-COV W/DEL: HCPCS | Performed by: STUDENT IN AN ORGANIZED HEALTH CARE EDUCATION/TRAINING PROGRAM

## 2023-03-08 PROCEDURE — 3700000001 HC ADD 15 MINUTES (ANESTHESIA): Performed by: STUDENT IN AN ORGANIZED HEALTH CARE EDUCATION/TRAINING PROGRAM

## 2023-03-08 PROCEDURE — 2580000003 HC RX 258: Performed by: NURSE ANESTHETIST, CERTIFIED REGISTERED

## 2023-03-08 PROCEDURE — 3600000017 HC SURGERY HYBRID ADDL 15MIN: Performed by: STUDENT IN AN ORGANIZED HEALTH CARE EDUCATION/TRAINING PROGRAM

## 2023-03-08 PROCEDURE — 037H3DZ DILATION OF RIGHT COMMON CAROTID ARTERY WITH INTRALUMINAL DEVICE, PERCUTANEOUS APPROACH: ICD-10-PCS | Performed by: STUDENT IN AN ORGANIZED HEALTH CARE EDUCATION/TRAINING PROGRAM

## 2023-03-08 PROCEDURE — 3600000007 HC SURGERY HYBRID BASE: Performed by: STUDENT IN AN ORGANIZED HEALTH CARE EDUCATION/TRAINING PROGRAM

## 2023-03-08 PROCEDURE — 6360000002 HC RX W HCPCS: Performed by: STUDENT IN AN ORGANIZED HEALTH CARE EDUCATION/TRAINING PROGRAM

## 2023-03-08 PROCEDURE — 37215 TRANSCATH STENT CCA W/EPS: CPT | Performed by: STUDENT IN AN ORGANIZED HEALTH CARE EDUCATION/TRAINING PROGRAM

## 2023-03-08 PROCEDURE — 3700000000 HC ANESTHESIA ATTENDED CARE: Performed by: STUDENT IN AN ORGANIZED HEALTH CARE EDUCATION/TRAINING PROGRAM

## 2023-03-08 PROCEDURE — 06HY33Z INSERTION OF INFUSION DEVICE INTO LOWER VEIN, PERCUTANEOUS APPROACH: ICD-10-PCS | Performed by: STUDENT IN AN ORGANIZED HEALTH CARE EDUCATION/TRAINING PROGRAM

## 2023-03-08 PROCEDURE — 86900 BLOOD TYPING SEROLOGIC ABO: CPT

## 2023-03-08 PROCEDURE — C1769 GUIDE WIRE: HCPCS | Performed by: STUDENT IN AN ORGANIZED HEALTH CARE EDUCATION/TRAINING PROGRAM

## 2023-03-08 PROCEDURE — 6360000002 HC RX W HCPCS: Performed by: NURSE ANESTHETIST, CERTIFIED REGISTERED

## 2023-03-08 PROCEDURE — C1725 CATH, TRANSLUMIN NON-LASER: HCPCS | Performed by: STUDENT IN AN ORGANIZED HEALTH CARE EDUCATION/TRAINING PROGRAM

## 2023-03-08 PROCEDURE — 2709999900 HC NON-CHARGEABLE SUPPLY: Performed by: STUDENT IN AN ORGANIZED HEALTH CARE EDUCATION/TRAINING PROGRAM

## 2023-03-08 PROCEDURE — 6370000000 HC RX 637 (ALT 250 FOR IP): Performed by: ANESTHESIOLOGY

## 2023-03-08 PROCEDURE — 2580000003 HC RX 258: Performed by: STUDENT IN AN ORGANIZED HEALTH CARE EDUCATION/TRAINING PROGRAM

## 2023-03-08 PROCEDURE — 6360000004 HC RX CONTRAST MEDICATION: Performed by: STUDENT IN AN ORGANIZED HEALTH CARE EDUCATION/TRAINING PROGRAM

## 2023-03-08 PROCEDURE — C1884 EMBOLIZATION PROTECT SYST: HCPCS | Performed by: STUDENT IN AN ORGANIZED HEALTH CARE EDUCATION/TRAINING PROGRAM

## 2023-03-08 PROCEDURE — 6370000000 HC RX 637 (ALT 250 FOR IP): Performed by: STUDENT IN AN ORGANIZED HEALTH CARE EDUCATION/TRAINING PROGRAM

## 2023-03-08 DEVICE — 10 MM X 40 MM
Type: IMPLANTABLE DEVICE | Site: CAROTID | Status: FUNCTIONAL
Brand: ENROUTE TRANSCAROTID STENT

## 2023-03-08 RX ORDER — ENOXAPARIN SODIUM 100 MG/ML
40 INJECTION SUBCUTANEOUS DAILY
Status: DISCONTINUED | OUTPATIENT
Start: 2023-03-08 | End: 2023-03-09 | Stop reason: HOSPADM

## 2023-03-08 RX ORDER — SODIUM CHLORIDE 0.9 % (FLUSH) 0.9 %
5-40 SYRINGE (ML) INJECTION PRN
Status: DISCONTINUED | OUTPATIENT
Start: 2023-03-08 | End: 2023-03-09 | Stop reason: HOSPADM

## 2023-03-08 RX ORDER — NICARDIPINE HYDROCHLORIDE 0.1 MG/ML
INJECTION INTRAVENOUS PRN
Status: DISCONTINUED | OUTPATIENT
Start: 2023-03-08 | End: 2023-03-08 | Stop reason: SDUPTHER

## 2023-03-08 RX ORDER — HEPARIN SODIUM 5000 [USP'U]/ML
INJECTION, SOLUTION INTRAVENOUS; SUBCUTANEOUS PRN
Status: DISCONTINUED | OUTPATIENT
Start: 2023-03-08 | End: 2023-03-08 | Stop reason: HOSPADM

## 2023-03-08 RX ORDER — HEPARIN SODIUM 200 [USP'U]/100ML
INJECTION, SOLUTION INTRAVENOUS CONTINUOUS PRN
Status: COMPLETED | OUTPATIENT
Start: 2023-03-08 | End: 2023-03-08

## 2023-03-08 RX ORDER — DEXAMETHASONE SODIUM PHOSPHATE 4 MG/ML
INJECTION, SOLUTION INTRA-ARTICULAR; INTRALESIONAL; INTRAMUSCULAR; INTRAVENOUS; SOFT TISSUE PRN
Status: DISCONTINUED | OUTPATIENT
Start: 2023-03-08 | End: 2023-03-08 | Stop reason: SDUPTHER

## 2023-03-08 RX ORDER — SODIUM CHLORIDE 9 MG/ML
INJECTION, SOLUTION INTRAVENOUS PRN
Status: DISCONTINUED | OUTPATIENT
Start: 2023-03-08 | End: 2023-03-09 | Stop reason: HOSPADM

## 2023-03-08 RX ORDER — ONDANSETRON 4 MG/1
4 TABLET, ORALLY DISINTEGRATING ORAL EVERY 8 HOURS PRN
Status: DISCONTINUED | OUTPATIENT
Start: 2023-03-08 | End: 2023-03-09 | Stop reason: HOSPADM

## 2023-03-08 RX ORDER — OXYCODONE HYDROCHLORIDE 5 MG/1
10 TABLET ORAL EVERY 4 HOURS PRN
Status: DISCONTINUED | OUTPATIENT
Start: 2023-03-08 | End: 2023-03-09 | Stop reason: HOSPADM

## 2023-03-08 RX ORDER — ROCURONIUM BROMIDE 10 MG/ML
INJECTION, SOLUTION INTRAVENOUS PRN
Status: DISCONTINUED | OUTPATIENT
Start: 2023-03-08 | End: 2023-03-08 | Stop reason: SDUPTHER

## 2023-03-08 RX ORDER — PROPOFOL 10 MG/ML
INJECTION, EMULSION INTRAVENOUS PRN
Status: DISCONTINUED | OUTPATIENT
Start: 2023-03-08 | End: 2023-03-08 | Stop reason: SDUPTHER

## 2023-03-08 RX ORDER — HEPARIN SODIUM 1000 [USP'U]/ML
INJECTION, SOLUTION INTRAVENOUS; SUBCUTANEOUS PRN
Status: DISCONTINUED | OUTPATIENT
Start: 2023-03-08 | End: 2023-03-08 | Stop reason: SDUPTHER

## 2023-03-08 RX ORDER — ATORVASTATIN CALCIUM 20 MG/1
10 TABLET, FILM COATED ORAL NIGHTLY
Status: DISCONTINUED | OUTPATIENT
Start: 2023-03-08 | End: 2023-03-09 | Stop reason: HOSPADM

## 2023-03-08 RX ORDER — BUPIVACAINE HYDROCHLORIDE 2.5 MG/ML
INJECTION, SOLUTION EPIDURAL; INFILTRATION; INTRACAUDAL PRN
Status: DISCONTINUED | OUTPATIENT
Start: 2023-03-08 | End: 2023-03-08 | Stop reason: HOSPADM

## 2023-03-08 RX ORDER — ASPIRIN 81 MG/1
81 TABLET ORAL DAILY
Status: DISCONTINUED | OUTPATIENT
Start: 2023-03-08 | End: 2023-03-09 | Stop reason: HOSPADM

## 2023-03-08 RX ORDER — LIDOCAINE HYDROCHLORIDE 10 MG/ML
1 INJECTION, SOLUTION INFILTRATION; PERINEURAL
Status: DISCONTINUED | OUTPATIENT
Start: 2023-03-08 | End: 2023-03-08 | Stop reason: HOSPADM

## 2023-03-08 RX ORDER — NEOSTIGMINE METHYLSULFATE 1 MG/ML
INJECTION, SOLUTION INTRAVENOUS PRN
Status: DISCONTINUED | OUTPATIENT
Start: 2023-03-08 | End: 2023-03-08 | Stop reason: SDUPTHER

## 2023-03-08 RX ORDER — ONDANSETRON 2 MG/ML
INJECTION INTRAMUSCULAR; INTRAVENOUS PRN
Status: DISCONTINUED | OUTPATIENT
Start: 2023-03-08 | End: 2023-03-08 | Stop reason: SDUPTHER

## 2023-03-08 RX ORDER — SODIUM CHLORIDE 0.9 % (FLUSH) 0.9 %
5-40 SYRINGE (ML) INJECTION PRN
Status: DISCONTINUED | OUTPATIENT
Start: 2023-03-08 | End: 2023-03-08 | Stop reason: HOSPADM

## 2023-03-08 RX ORDER — CLOPIDOGREL BISULFATE 75 MG/1
75 TABLET ORAL DAILY
Status: DISCONTINUED | OUTPATIENT
Start: 2023-03-09 | End: 2023-03-09 | Stop reason: HOSPADM

## 2023-03-08 RX ORDER — NOREPINEPHRINE BIT/0.9 % NACL 16MG/250ML
1-100 INFUSION BOTTLE (ML) INTRAVENOUS CONTINUOUS
Status: DISCONTINUED | OUTPATIENT
Start: 2023-03-08 | End: 2023-03-09 | Stop reason: HOSPADM

## 2023-03-08 RX ORDER — GLYCOPYRROLATE 0.2 MG/ML
INJECTION INTRAMUSCULAR; INTRAVENOUS PRN
Status: DISCONTINUED | OUTPATIENT
Start: 2023-03-08 | End: 2023-03-08 | Stop reason: SDUPTHER

## 2023-03-08 RX ORDER — EPHEDRINE SULFATE/0.9% NACL/PF 50 MG/5 ML
SYRINGE (ML) INTRAVENOUS PRN
Status: DISCONTINUED | OUTPATIENT
Start: 2023-03-08 | End: 2023-03-08 | Stop reason: SDUPTHER

## 2023-03-08 RX ORDER — NOREPINEPHRINE BIT/0.9 % NACL 16MG/250ML
INFUSION BOTTLE (ML) INTRAVENOUS
Status: DISPENSED
Start: 2023-03-08 | End: 2023-03-08

## 2023-03-08 RX ORDER — FENTANYL CITRATE 50 UG/ML
INJECTION, SOLUTION INTRAMUSCULAR; INTRAVENOUS PRN
Status: DISCONTINUED | OUTPATIENT
Start: 2023-03-08 | End: 2023-03-08 | Stop reason: SDUPTHER

## 2023-03-08 RX ORDER — ONDANSETRON 2 MG/ML
4 INJECTION INTRAMUSCULAR; INTRAVENOUS EVERY 6 HOURS PRN
Status: DISCONTINUED | OUTPATIENT
Start: 2023-03-08 | End: 2023-03-09 | Stop reason: HOSPADM

## 2023-03-08 RX ORDER — MIDAZOLAM HYDROCHLORIDE 2 MG/2ML
2 INJECTION, SOLUTION INTRAMUSCULAR; INTRAVENOUS
Status: DISCONTINUED | OUTPATIENT
Start: 2023-03-08 | End: 2023-03-08 | Stop reason: HOSPADM

## 2023-03-08 RX ORDER — ACETAMINOPHEN 500 MG
1000 TABLET ORAL ONCE
Status: COMPLETED | OUTPATIENT
Start: 2023-03-08 | End: 2023-03-08

## 2023-03-08 RX ORDER — SODIUM CHLORIDE 9 MG/ML
INJECTION, SOLUTION INTRAVENOUS PRN
Status: DISCONTINUED | OUTPATIENT
Start: 2023-03-08 | End: 2023-03-08 | Stop reason: HOSPADM

## 2023-03-08 RX ORDER — PROTAMINE SULFATE 10 MG/ML
INJECTION, SOLUTION INTRAVENOUS PRN
Status: DISCONTINUED | OUTPATIENT
Start: 2023-03-08 | End: 2023-03-08 | Stop reason: SDUPTHER

## 2023-03-08 RX ORDER — SODIUM CHLORIDE, SODIUM LACTATE, POTASSIUM CHLORIDE, CALCIUM CHLORIDE 600; 310; 30; 20 MG/100ML; MG/100ML; MG/100ML; MG/100ML
INJECTION, SOLUTION INTRAVENOUS CONTINUOUS PRN
Status: DISCONTINUED | OUTPATIENT
Start: 2023-03-08 | End: 2023-03-08 | Stop reason: SDUPTHER

## 2023-03-08 RX ORDER — SODIUM CHLORIDE 0.9 % (FLUSH) 0.9 %
5-40 SYRINGE (ML) INJECTION EVERY 12 HOURS SCHEDULED
Status: DISCONTINUED | OUTPATIENT
Start: 2023-03-08 | End: 2023-03-08 | Stop reason: HOSPADM

## 2023-03-08 RX ORDER — LIDOCAINE HYDROCHLORIDE 20 MG/ML
INJECTION, SOLUTION EPIDURAL; INFILTRATION; INTRACAUDAL; PERINEURAL PRN
Status: DISCONTINUED | OUTPATIENT
Start: 2023-03-08 | End: 2023-03-08 | Stop reason: SDUPTHER

## 2023-03-08 RX ORDER — PREDNISOLONE ACETATE 10 MG/ML
1 SUSPENSION/ DROPS OPHTHALMIC DAILY
Status: DISCONTINUED | OUTPATIENT
Start: 2023-03-09 | End: 2023-03-09 | Stop reason: HOSPADM

## 2023-03-08 RX ORDER — SODIUM CHLORIDE, SODIUM LACTATE, POTASSIUM CHLORIDE, CALCIUM CHLORIDE 600; 310; 30; 20 MG/100ML; MG/100ML; MG/100ML; MG/100ML
INJECTION, SOLUTION INTRAVENOUS CONTINUOUS
Status: DISCONTINUED | OUTPATIENT
Start: 2023-03-08 | End: 2023-03-08 | Stop reason: HOSPADM

## 2023-03-08 RX ORDER — SODIUM CHLORIDE 0.9 % (FLUSH) 0.9 %
5-40 SYRINGE (ML) INJECTION EVERY 12 HOURS SCHEDULED
Status: DISCONTINUED | OUTPATIENT
Start: 2023-03-08 | End: 2023-03-09 | Stop reason: HOSPADM

## 2023-03-08 RX ORDER — OXYCODONE HYDROCHLORIDE 5 MG/1
5 TABLET ORAL EVERY 4 HOURS PRN
Status: DISCONTINUED | OUTPATIENT
Start: 2023-03-08 | End: 2023-03-09 | Stop reason: HOSPADM

## 2023-03-08 RX ADMIN — ACETAMINOPHEN 1000 MG: 500 TABLET, FILM COATED ORAL at 06:27

## 2023-03-08 RX ADMIN — SODIUM CHLORIDE, POTASSIUM CHLORIDE, SODIUM LACTATE AND CALCIUM CHLORIDE: 600; 310; 30; 20 INJECTION, SOLUTION INTRAVENOUS at 06:27

## 2023-03-08 RX ADMIN — SODIUM CHLORIDE, PRESERVATIVE FREE 10 ML: 5 INJECTION INTRAVENOUS at 21:16

## 2023-03-08 RX ADMIN — PHENYLEPHRINE HYDROCHLORIDE 100 MCG: 10 INJECTION INTRAVENOUS at 07:37

## 2023-03-08 RX ADMIN — PROTAMINE SULFATE 20 MG: 10 INJECTION, SOLUTION INTRAVENOUS at 08:30

## 2023-03-08 RX ADMIN — NOREPINEPHRINE BITARTRATE 4 MCG/MIN: 1 SOLUTION INTRAVENOUS at 10:36

## 2023-03-08 RX ADMIN — ONDANSETRON 4 MG: 2 INJECTION INTRAMUSCULAR; INTRAVENOUS at 07:30

## 2023-03-08 RX ADMIN — GLYCOPYRROLATE 0.6 MG: 0.2 INJECTION INTRAMUSCULAR; INTRAVENOUS at 08:45

## 2023-03-08 RX ADMIN — PHENYLEPHRINE HYDROCHLORIDE 50 MCG: 10 INJECTION INTRAVENOUS at 08:09

## 2023-03-08 RX ADMIN — GLYCOPYRROLATE 0.2 MG: 0.2 INJECTION INTRAMUSCULAR; INTRAVENOUS at 07:31

## 2023-03-08 RX ADMIN — PHENYLEPHRINE HYDROCHLORIDE 50 MCG: 10 INJECTION INTRAVENOUS at 08:07

## 2023-03-08 RX ADMIN — PHENYLEPHRINE HYDROCHLORIDE 100 MCG: 10 INJECTION INTRAVENOUS at 07:26

## 2023-03-08 RX ADMIN — SODIUM CHLORIDE, SODIUM LACTATE, POTASSIUM CHLORIDE, AND CALCIUM CHLORIDE: 600; 310; 30; 20 INJECTION, SOLUTION INTRAVENOUS at 07:09

## 2023-03-08 RX ADMIN — NICARDIPINE HYDROCHLORIDE 0.05 MG: 0.1 INJECTION INTRAVENOUS at 08:38

## 2023-03-08 RX ADMIN — PROTAMINE SULFATE 10 MG: 10 INJECTION, SOLUTION INTRAVENOUS at 08:28

## 2023-03-08 RX ADMIN — PROTAMINE SULFATE 10 MG: 10 INJECTION, SOLUTION INTRAVENOUS at 08:27

## 2023-03-08 RX ADMIN — PHENYLEPHRINE HYDROCHLORIDE 100 MCG: 10 INJECTION INTRAVENOUS at 07:42

## 2023-03-08 RX ADMIN — LIDOCAINE HYDROCHLORIDE 100 MG: 20 INJECTION, SOLUTION EPIDURAL; INFILTRATION; INTRACAUDAL; PERINEURAL at 07:07

## 2023-03-08 RX ADMIN — PHENYLEPHRINE HYDROCHLORIDE 30 MCG/MIN: 10 INJECTION INTRAVENOUS at 07:33

## 2023-03-08 RX ADMIN — PROTAMINE SULFATE 20 MG: 10 INJECTION, SOLUTION INTRAVENOUS at 08:29

## 2023-03-08 RX ADMIN — ROCURONIUM BROMIDE 50 MG: 10 INJECTION, SOLUTION INTRAVENOUS at 07:07

## 2023-03-08 RX ADMIN — PROTAMINE SULFATE 15 MG: 10 INJECTION, SOLUTION INTRAVENOUS at 08:31

## 2023-03-08 RX ADMIN — HEPARIN SODIUM 15000 UNITS: 1000 INJECTION INTRAVENOUS; SUBCUTANEOUS at 07:59

## 2023-03-08 RX ADMIN — PHENYLEPHRINE HYDROCHLORIDE 50 MCG: 10 INJECTION INTRAVENOUS at 09:05

## 2023-03-08 RX ADMIN — Medication 2000 MG: at 07:37

## 2023-03-08 RX ADMIN — PHENYLEPHRINE HYDROCHLORIDE 50 MCG: 10 INJECTION INTRAVENOUS at 08:45

## 2023-03-08 RX ADMIN — Medication 10 MG: at 07:47

## 2023-03-08 RX ADMIN — PROPOFOL 150 MG: 10 INJECTION, EMULSION INTRAVENOUS at 07:07

## 2023-03-08 RX ADMIN — FENTANYL CITRATE 50 MCG: 50 INJECTION, SOLUTION INTRAMUSCULAR; INTRAVENOUS at 07:07

## 2023-03-08 RX ADMIN — ATORVASTATIN CALCIUM 10 MG: 20 TABLET, FILM COATED ORAL at 21:05

## 2023-03-08 RX ADMIN — Medication 5 MG: at 08:45

## 2023-03-08 RX ADMIN — DEXAMETHASONE SODIUM PHOSPHATE 10 MG: 4 INJECTION, SOLUTION INTRAMUSCULAR; INTRAVENOUS at 07:30

## 2023-03-08 RX ADMIN — FENTANYL CITRATE 50 MCG: 50 INJECTION, SOLUTION INTRAMUSCULAR; INTRAVENOUS at 07:50

## 2023-03-08 RX ADMIN — GLYCOPYRROLATE 0.1 MG: 0.2 INJECTION INTRAMUSCULAR; INTRAVENOUS at 07:42

## 2023-03-08 RX ADMIN — ENOXAPARIN SODIUM 40 MG: 100 INJECTION SUBCUTANEOUS at 11:35

## 2023-03-08 SDOH — ECONOMIC STABILITY: HOUSING INSECURITY
IN THE LAST 12 MONTHS, WAS THERE A TIME WHEN YOU DID NOT HAVE A STEADY PLACE TO SLEEP OR SLEPT IN A SHELTER (INCLUDING NOW)?: NO

## 2023-03-08 SDOH — ECONOMIC STABILITY: HOUSING INSECURITY: IN THE LAST 12 MONTHS, HOW MANY PLACES HAVE YOU LIVED?: 1

## 2023-03-08 SDOH — ECONOMIC STABILITY: FOOD INSECURITY: WITHIN THE PAST 12 MONTHS, THE FOOD YOU BOUGHT JUST DIDN'T LAST AND YOU DIDN'T HAVE MONEY TO GET MORE.: NEVER TRUE

## 2023-03-08 SDOH — HEALTH STABILITY: PHYSICAL HEALTH: ON AVERAGE, HOW MANY DAYS PER WEEK DO YOU ENGAGE IN MODERATE TO STRENUOUS EXERCISE (LIKE A BRISK WALK)?: 3 DAYS

## 2023-03-08 SDOH — HEALTH STABILITY: PHYSICAL HEALTH: ON AVERAGE, HOW MANY MINUTES DO YOU ENGAGE IN EXERCISE AT THIS LEVEL?: 30 MIN

## 2023-03-08 SDOH — ECONOMIC STABILITY: INCOME INSECURITY: IN THE LAST 12 MONTHS, WAS THERE A TIME WHEN YOU WERE NOT ABLE TO PAY THE MORTGAGE OR RENT ON TIME?: NO

## 2023-03-08 SDOH — ECONOMIC STABILITY: TRANSPORTATION INSECURITY
IN THE PAST 12 MONTHS, HAS THE LACK OF TRANSPORTATION KEPT YOU FROM MEDICAL APPOINTMENTS OR FROM GETTING MEDICATIONS?: NO

## 2023-03-08 SDOH — ECONOMIC STABILITY: FOOD INSECURITY: WITHIN THE PAST 12 MONTHS, YOU WORRIED THAT YOUR FOOD WOULD RUN OUT BEFORE YOU GOT MONEY TO BUY MORE.: NEVER TRUE

## 2023-03-08 SDOH — ECONOMIC STABILITY: TRANSPORTATION INSECURITY
IN THE PAST 12 MONTHS, HAS LACK OF TRANSPORTATION KEPT YOU FROM MEETINGS, WORK, OR FROM GETTING THINGS NEEDED FOR DAILY LIVING?: NO

## 2023-03-08 ASSESSMENT — PATIENT HEALTH QUESTIONNAIRE - PHQ9
SUM OF ALL RESPONSES TO PHQ9 QUESTIONS 1 & 2: 0
1. LITTLE INTEREST OR PLEASURE IN DOING THINGS: NOT AT ALL
2. FEELING DOWN, DEPRESSED OR HOPELESS: NOT AT ALL
1. LITTLE INTEREST OR PLEASURE IN DOING THINGS: NOT AT ALL
2. FEELING DOWN, DEPRESSED OR HOPELESS: NOT AT ALL
SUM OF ALL RESPONSES TO PHQ9 QUESTIONS 1 & 2: 0

## 2023-03-08 ASSESSMENT — LIFESTYLE VARIABLES
HOW OFTEN DO YOU HAVE A DRINK CONTAINING ALCOHOL: NEVER
HOW OFTEN DO YOU HAVE A DRINK CONTAINING ALCOHOL: NEVER
HOW MANY STANDARD DRINKS CONTAINING ALCOHOL DO YOU HAVE ON A TYPICAL DAY: PATIENT DOES NOT DRINK
HOW MANY STANDARD DRINKS CONTAINING ALCOHOL DO YOU HAVE ON A TYPICAL DAY: PATIENT DOES NOT DRINK

## 2023-03-08 ASSESSMENT — SOCIAL DETERMINANTS OF HEALTH (SDOH)
HOW HARD IS IT FOR YOU TO PAY FOR THE VERY BASICS LIKE FOOD, HOUSING, MEDICAL CARE, AND HEATING?: NOT HARD AT ALL
WITHIN THE LAST YEAR, HAVE YOU BEEN AFRAID OF YOUR PARTNER OR EX-PARTNER?: NO
WITHIN THE LAST YEAR, HAVE TO BEEN RAPED OR FORCED TO HAVE ANY KIND OF SEXUAL ACTIVITY BY YOUR PARTNER OR EX-PARTNER?: NO
WITHIN THE LAST YEAR, HAVE YOU BEEN KICKED, HIT, SLAPPED, OR OTHERWISE PHYSICALLY HURT BY YOUR PARTNER OR EX-PARTNER?: NO
DO YOU BELONG TO ANY CLUBS OR ORGANIZATIONS SUCH AS CHURCH GROUPS UNIONS, FRATERNAL OR ATHLETIC GROUPS, OR SCHOOL GROUPS?: YES
HOW OFTEN DO YOU ATTEND CHURCH OR RELIGIOUS SERVICES?: NEVER
HOW OFTEN DO YOU ATTENT MEETINGS OF THE CLUB OR ORGANIZATION YOU BELONG TO?: MORE THAN 4 TIMES PER YEAR
HOW OFTEN DO YOU GET TOGETHER WITH FRIENDS OR RELATIVES?: THREE TIMES A WEEK
IN A TYPICAL WEEK, HOW MANY TIMES DO YOU TALK ON THE PHONE WITH FAMILY, FRIENDS, OR NEIGHBORS?: TWICE A WEEK
WITHIN THE LAST YEAR, HAVE YOU BEEN HUMILIATED OR EMOTIONALLY ABUSED IN OTHER WAYS BY YOUR PARTNER OR EX-PARTNER?: NO

## 2023-03-08 ASSESSMENT — PAIN SCALES - GENERAL: PAINLEVEL_OUTOF10: 0

## 2023-03-08 ASSESSMENT — PAIN - FUNCTIONAL ASSESSMENT: PAIN_FUNCTIONAL_ASSESSMENT: 0-10

## 2023-03-08 NOTE — ANESTHESIA POSTPROCEDURE EVALUATION
Department of Anesthesiology  Postprocedure Note    Patient: Brant Thompson  MRN: [id number]  YOB: 1945  Date of evaluation: 3/8/2023      Procedure Summary     Date: 03/08/23 Room / Location: [facility] MAIN OR  / [facility] MAIN OR    Anesthesia Start: 0656 Anesthesia Stop: 0236    Procedure: RIGHT TCAR - CAROTID STENT PLACEMENT (Right: Neck) Diagnosis:       Occlusion of right carotid artery      (Occlusion of right carotid artery [I65.21])    Providers: Blanca Smith MD Responsible Provider: Monik Carvajal MD    Anesthesia Type: General ASA Status: 3          Anesthesia Type: General    Charles Phase I: Charles Score: 10    Charles Phase II:        Anesthesia Post Evaluation    Patient location during evaluation: ICU  Patient participation: complete - patient participated  Level of consciousness: awake and alert  Airway patency: patent  Nausea: well controlled.   Complications: no  Cardiovascular status: vasoactive/inotropes  Respiratory status: acceptable  Hydration status: stable

## 2023-03-08 NOTE — CARE COORDINATION
Chart screened by  for discharge planning. No needs identified at this time. Please consult  if any new issues arise.        03/08/23 2794   Condition of Participation: Discharge Planning   The Plan for Transition of Care is related to the following treatment goals: anticipate return home

## 2023-03-08 NOTE — ANESTHESIA PROCEDURE NOTES
Arterial Line:    An arterial line was placed using surface landmarks, in the OR for the following indication(s): continuous blood pressure monitoring and blood sampling needed. A 20 gauge (size), 1 and 3/4 inch (length), Arrow (type) catheter was placed, Seldinger technique used, into the right radial artery, secured by Tegaderm and tape. Anesthesia type: General    Events:  patient tolerated procedure well with no complications and EBL 0mL. 3/8/2023 7:16 AM3/8/2023 7:21 AM  Resident/CRNA: TAMIKA Casey CRNA  Performed: Resident/CRNA   Preanesthetic Checklist  Completed: patient identified, IV checked, site marked, risks and benefits discussed, surgical/procedural consents, equipment checked, pre-op evaluation, timeout performed, anesthesia consent given, oxygen available, monitors applied/VS acknowledged, fire risk safety assessment completed and verbalized and blood product R/B/A discussed and consented

## 2023-03-08 NOTE — OP NOTE
[place] , [address]  [phone] FAX: [phone]        Pre-Op diagnosis:   Asymptomatic Right Carotid artery stenosis greater than 80%    Post-Op diagnosis:   Asymptomatic Right Carotid artery stenosis greater than 80%    Surgeon: Tracy Asencio MD     Procedure:   Right Trans Carotid artery revascularization CPT 77733  Left femoral vein catheter placement under ultrasound guidance CPT 92309, 66466     Complications: None     EBL: 10cc     Anesthesia: General     Radiographic findings:   Right carotid angiogram: 80% stenosis proximal     Description of procedure: After informed consent was obtained the patient was brought to the operating room and placed in supine position. Preoperative antibiotics were given. Appropriate anesthesia was administered and endotracheal intubation was performed. Timeout was performed confirming patient identity and procedure. Patient was then prepped and draped in sterile fashion. Common carotid artery was then identified under ultrasound distal to the clavicle. Incision was then made 1 fingerbreadth above the clavicle and dissection was carried out through the platysma. Heads of the sternocleidomastoid were then bluntly retracted. Carotid sheath was identified and common carotid artery was controlled proximally. 5-0 Prolene U stitch was placed common carotid. Patient was systemically heparinized with 15,000 units heparin to an ACT greater than 250. Contralateral groin was accessed under ultrasound guidance, femoral vein patent, with ultrasound with an 18-gauge needle, J-wire, and 8 Fr venous sheath placed. Common carotid was accessed with a micropuncture needle, wire, and sheath. Carotid angiogram was obtained. Proximal aspect of lesion was marked. Stiff wire was then advanced through the micropuncture sheath and exchanged for carotid interventional sheath and secured in place with 2 silk sutures.   Flow reversal system was connected and flow reversal was confirmed. Common carotid artery was clamped and flow reversal reconfirmed. 014 interventional wire was then used to cross the lesion. Lesion was ballooned with 6mm balloon. Bradycardia was not noted and atropine was/was not given. Balloon was then exchanged for a 10mm x 40mm stent. Completion angiogram was obtained. Wire was removed under fluoroscopy. Carotid was unclamped. Sheath was then removed and Prolene was cinched down. Neutralizing dose of protamine was given. Venous sheath was removed and pressure was held. No hematoma was identified. Hemostasis was then achieved in the neck incision. Incision was then closed in layers with 2-0 Vicryl for the platysma and 4-0 Monocryl for the skin. Dermabond was applied. Sterile pressure dressing was applied. Patient was awoken and neurologically intact. Patient was taken to PACU in stable condition.     VQI:  DIAGNOSIS: Asymptomatic  SURGICAL RISK: HSR:, Age >76, Surgically inaccessible lesion, and Bilateral stenosis requiring treatment  ANTICOAGULATION: Heparin  PROPHYLACTIC ANTI-BRADYARRHYTHMIC: Yes  LESION:   ARCH TYPE: 1   SIDE:  Right  LOCATION:  Bifurcation and ICA  LENGTH: 20 mm  %STENOSIS: 80%  ICA TORTUOSITY: Moderate  PRE-DILATION: Yes- 6mm x 30mm Balloon  STENT: 10mm x 40mm  2ND STENT: No  POST-DILATION: No  CONTRAST (ml): 10  FLUORSCOPY (min): 9.5  FLOW REVERSAL (min): 8.5  PROCEDURE (min): 58  DOSE AREA (Gy/cm2): 53.95

## 2023-03-08 NOTE — ANESTHESIA PRE PROCEDURE
Department of Anesthesiology  Preprocedure Note       Name:  Sylvester Norman   Age:  76 y.o.  :  1945                                          MRN:  [id number]         Date:  3/8/2023      Surgeon: Walker Query):  Maryellen Jensen MD    Procedure: Procedure(s):  RIGHT TCAR - CAROTID STENT PLACEMENT    Medications prior to admission:   Prior to Admission medications    Medication Sig Start Date End Date Taking? Authorizing Provider   clopidogrel (PLAVIX) 75 MG tablet Take 1 tablet by mouth daily  Patient taking differently: Take 75 mg by mouth Daily with lunch 23   Maryellen Jensen MD   valsartan-hydroCHLOROthiazide (DIOVAN-HCT) 160-12.5 MG per tablet Take 1 tablet by mouth daily 23   Alvarez Roth MD   atorvastatin (LIPITOR) 40 MG tablet Take 1 tablet by mouth every evening  Patient taking differently: Take 40 mg by mouth daily 22   Alvarez Roth MD   metoprolol succinate (TOPROL XL) 25 MG extended release tablet Take 1 tablet by mouth in the morning.   Patient taking differently: Take 25 mg by mouth at bedtime 22   Alvarez Roth MD   acetaminophen (TYLENOL) 325 MG tablet Take 650 mg by mouth every 6 hours as needed 5/10/21   Ar Automatic Reconciliation   aspirin 81 MG EC tablet Take by mouth every morning    Ar Automatic Reconciliation   fexofenadine (ALLEGRA) 180 MG tablet Take 180 mg by mouth as needed    Ar Automatic Reconciliation   fluticasone (FLONASE) 50 MCG/ACT nasal spray 2 sprays by Nasal route as needed    Ar Automatic Reconciliation   guaiFENesin 200 MG tablet Take 200 mg by mouth every 4 hours as needed    Ar Automatic Reconciliation   prednisoLONE acetate (PRED FORTE) 1 % ophthalmic suspension Apply 1 drop to eye every morning 18   Ar Automatic Reconciliation       Current medications:    Current Facility-Administered Medications   Medication Dose Route Frequency Provider Last Rate Last Admin    lidocaine 1 % injection 1 mL  1 mL IntraDERmal Once PRN Joseline Sharma MD        lactated ringers IV soln infusion   IntraVENous Continuous Joseline Sharma  mL/hr at 03/08/23 0627 New Bag at 03/08/23 0627    sodium chloride flush 0.9 % injection 5-40 mL  5-40 mL IntraVENous 2 times per day Joseline Sharma MD        sodium chloride flush 0.9 % injection 5-40 mL  5-40 mL IntraVENous PRN Joseline Sharma MD        0.9 % sodium chloride infusion   IntraVENous PRN Joseline Sharma MD        midazolam PF (VERSED) injection 2 mg  2 mg IntraVENous Once PRN Joseline Sharma MD        ceFAZolin (ANCEF) 2000 mg in sterile water 20 mL IV syringe  2,000 mg IntraVENous Once Caren iSlva MD           Allergies:     Allergies   Allergen Reactions    Latex Rash    Adhesive Tape      Tolerates paper tape       Problem List:    Patient Active Problem List   Diagnosis Code    Benign essential hypertension I10    Prostate nodule N40.2    Other insomnia G47.09    S/P CABG x 3 Z95.1    S/P AVR Z95.2    Hyperlipidemia E78.5    Coronary artery disease involving native coronary artery of native heart I25.10    Bilateral carotid artery disease (HCC) I77.9    LBBB (left bundle branch block) I44.7    Occlusion of right carotid artery I65.21       Past Medical History:        Diagnosis Date    Aortic stenosis, moderate     MOD TO SEVERE per echo 11/2019-- followed by dr Melita Lr    Asthma     childhood    CAD (coronary artery disease)     CABG 5-4-2021    Cancer [facility])     prostate radiation in 12/2020    GERD (gastroesophageal reflux disease)     with certain foods - no meds     High cholesterol     no current medications    Hypertension     controlled with med    Prostate nodule     followed by dr Powers Favnikki allergic rhinitis        Past Surgical History:        Procedure Laterality Date    COLONOSCOPY  2016    polyps--due in 2021    CORNEAL TRANSPLANT Bilateral last one 2017    followed by dr Will Velazco BYPASS GRAFT  05/04/2021   • HEMORRHOID SURGERY  2004   • ORTHOPEDIC SURGERY Left 2016    great toe surg       Social History:    Social History     Tobacco Use   • Smoking status: Never   • Smokeless tobacco: Never   Substance Use Topics   • Alcohol use: No                                Counseling given: Not Answered      Vital Signs (Current):   Vitals:    03/08/23 0533   BP: 132/69   Pulse: 78   Resp: 12   Temp: 97.8 °F (36.6 °C)   TempSrc: Oral   SpO2: 96%   Weight: 203 lb 3.2 oz (92.2 kg)   Height: 5' 11\" (1.803 m)                                              BP Readings from Last 3 Encounters:   03/08/23 132/69   03/01/23 (!) 140/64   02/27/23 131/81       NPO Status: Time of last liquid consumption: 0000                        Time of last solid consumption: 0000                        Date of last liquid consumption: 03/07/23                        Date of last solid food consumption: 03/07/23    BMI:   Wt Readings from Last 3 Encounters:   03/08/23 203 lb 3.2 oz (92.2 kg)   03/01/23 204 lb 4.8 oz (92.7 kg)   02/27/23 201 lb (91.2 kg)     Body mass index is 28.34 kg/m².    CBC:   Lab Results   Component Value Date/Time    WBC 7.6 03/01/2023 03:04 PM    RBC 4.18 03/01/2023 03:04 PM    HGB 12.9 03/01/2023 03:04 PM    HCT 39.0 03/01/2023 03:04 PM    MCV 93.3 03/01/2023 03:04 PM    RDW 12.5 03/01/2023 03:04 PM     03/01/2023 03:04 PM       CMP:   Lab Results   Component Value Date/Time     03/01/2023 03:04 PM    K 3.8 03/01/2023 03:04 PM     03/01/2023 03:04 PM    CO2 31 03/01/2023 03:04 PM    BUN 24 03/01/2023 03:04 PM    CREATININE 1.06 03/08/2023 06:05 AM    CREATININE 1.23 03/01/2023 03:04 PM    GFRAA >60 05/07/2021 05:16 AM    AGRATIO 0.9 05/03/2021 08:01 AM    LABGLOM 60 03/01/2023 03:04 PM    GLUCOSE 99 03/01/2023 03:04 PM    PROT 8.1 05/03/2021 08:01 AM    CALCIUM 9.3 03/01/2023 03:04 PM    BILITOT 0.6 05/03/2021 08:01 AM    ALKPHOS 93 05/03/2021 08:01 AM    AST 18 05/03/2021 08:01 AM  ALT 24 05/03/2021 08:01 AM       POC Tests:   Recent Labs     03/08/23  0605   POCK 3.6       Coags:   Lab Results   Component Value Date/Time    PROTIME 21.1 05/04/2021 01:35 PM    INR 1.8 05/04/2021 01:35 PM    APTT 65.0 05/04/2021 01:35 PM       HCG (If Applicable): No results found for: PREGTESTUR, PREGSERUM, HCG, HCGQUANT     ABGs:   Lab Results   Component Value Date/Time    PHART 7.36 05/04/2021 10:24 PM    PO2ART 109 05/04/2021 10:24 PM    OPY7RUQ 38.8 05/04/2021 10:24 PM    DYP7PLV 22.1 05/04/2021 10:24 PM    BEART 0.1 05/04/2021 11:00 AM        Type & Screen (If Applicable):  No results found for: LABABO, LABRH    Drug/Infectious Status (If Applicable):  No results found for: HIV, HEPCAB    COVID-19 Screening (If Applicable):   Lab Results   Component Value Date/Time    COVID19 Please find results under separate order 05/03/2021 08:04 AM    COVID19 Not detected 05/03/2021 08:04 AM    COVID19 Not detected 05/03/2021 08:04 AM    COVID19 Not Detected 09/04/2020 08:56 AM           Anesthesia Evaluation  Patient summary reviewed and Nursing notes reviewed no history of anesthetic complications:   Airway: Mallampati: II  TM distance: >3 FB   Neck ROM: full  Mouth opening: > = 3 FB   Dental:      Comment: Fillings    Pulmonary:Negative Pulmonary ROS and normal exam                               Cardiovascular:    (+) hypertension:, valvular problems/murmurs (S/p AVR):, CAD:, CABG/stent (3vCABG):, hyperlipidemia               ROS comment: LBBB    TTE 8/2022:  Left Ventricle: Normal left ventricular systolic function with a visually estimated EF of 55 - 60%. Left ventricle size is normal. Mildly increased wall thickness. Normal wall motion.   Aortic Valve: AV mean gradient 7mmHg, well seated valve.   Mitral Valve: Moderate annular calcification of the mitral valve. Mild regurgitation. Mild stenosis noted.        Neuro/Psych:               GI/Hepatic/Renal:   (+) GERD:,           Endo/Other:    (+) blood dyscrasia: anemia and anticoagulation therapy:., .                 Abdominal:             Vascular:   + PVD, aortic or cerebral (Bilateral carotid stenosis R>L), . Other Findings:           Anesthesia Plan      general     ASA 3     (GETA, a-line, 2nd PIV    Took Plavix this AM)  Induction: intravenous. arterial line  MIPS: Postoperative opioids intended. Anesthetic plan and risks discussed with patient and spouse. Use of blood products discussed with patient and spouse whom consented to blood products.                      Laureano Carson MD   3/8/2023

## 2023-03-08 NOTE — ANESTHESIA PROCEDURE NOTES
Airway  Date/Time: 3/8/2023 7:10 AM  Urgency: elective    Airway not difficult    General Information and Staff    Patient location during procedure: OR  Resident/CRNA: TAMIKA Mercer CRNA  Performed: resident/CRNA     Indications and Patient Condition  Indications for airway management: anesthesia  Spontaneous Ventilation: absent  Sedation level: deep  Preoxygenated: yes  Patient position: sniffing  MILS not maintained throughout  Mask difficulty assessment: vent by bag mask    Final Airway Details  Final airway type: endotracheal airway      Successful airway: ETT  Cuffed: yes   Successful intubation technique: direct laryngoscopy  Facilitating devices/methods: intubating stylet  Endotracheal tube insertion site: oral  Blade: Roseann  Blade size: #4  ETT size (mm): 7.5  Cormack-Lehane Classification: grade I - full view of glottis  Placement verified by: chest auscultation and capnometry   Measured from: lips  ETT to lips (cm): 23  Number of attempts at approach: 1  Ventilation between attempts: bag mask  Number of other approaches attempted: 0    Additional Comments  Placed by Danay Calderon.  Preop dentition/soft tissue intact

## 2023-03-08 NOTE — PERIOP NOTE
TRANSFER - OUT REPORT:    Verbal report given to nurse  on [facility]  being transferred to CVICU  for routine post-op       Report consisted of patient's Situation, Background, Assessment and   Recommendations(SBAR). Information from the following report(s) Nurse Handoff Report, Surgery Report, and Neuro Assessment was reviewed with the receiving nurse. [place] Assessment: No data recorded  Lines:   Peripheral IV 03/08/23 Left Wrist (Active)   Site Assessment Clean, dry & intact 03/08/23 0610   Line Status Infusing 03/08/23 0610   Line Care Connections checked and tightened 03/08/23 0610   Phlebitis Assessment No symptoms 03/08/23 0610   Infiltration Assessment 0 03/08/23 0610   Alcohol Cap Used No 03/08/23 0610   Dressing Status New dressing applied 03/08/23 0610   Dressing Type Transparent 03/08/23 0610   Dressing Intervention New 03/08/23 0610       Peripheral IV 03/08/23 Right Wrist (Active)        Opportunity for questions and clarification was provided.       Patient transported with:  Monitor, O2 @ 4lpm, and Registered Shaun

## 2023-03-09 VITALS
OXYGEN SATURATION: 94 % | DIASTOLIC BLOOD PRESSURE: 56 MMHG | HEIGHT: 71 IN | RESPIRATION RATE: 28 BRPM | HEART RATE: 78 BPM | WEIGHT: 203.2 LBS | BODY MASS INDEX: 28.45 KG/M2 | TEMPERATURE: 97.8 F | SYSTOLIC BLOOD PRESSURE: 121 MMHG

## 2023-03-09 LAB
ANION GAP SERPL CALC-SCNC: 6 MMOL/L (ref 2–11)
BUN SERPL-MCNC: 19 MG/DL (ref 8–23)
CALCIUM SERPL-MCNC: 8.7 MG/DL (ref 8.3–10.4)
CHLORIDE SERPL-SCNC: 105 MMOL/L (ref 101–110)
CO2 SERPL-SCNC: 26 MMOL/L (ref 21–32)
CREAT SERPL-MCNC: 0.9 MG/DL (ref 0.8–1.5)
ERYTHROCYTE [DISTWIDTH] IN BLOOD BY AUTOMATED COUNT: 12.5 % (ref 11.9–14.6)
GLUCOSE SERPL-MCNC: 128 MG/DL (ref 65–100)
HCT VFR BLD AUTO: 32.1 % (ref 41.1–50.3)
HGB BLD-MCNC: 10.8 G/DL (ref 13.6–17.2)
MCH RBC QN AUTO: 31.3 PG (ref 26.1–32.9)
MCHC RBC AUTO-ENTMCNC: 33.6 G/DL (ref 31.4–35)
MCV RBC AUTO: 93 FL (ref 82–102)
NRBC # BLD: 0 K/UL (ref 0–0.2)
PLATELET # BLD AUTO: 120 K/UL (ref 150–450)
PMV BLD AUTO: 12.9 FL (ref 9.4–12.3)
POTASSIUM SERPL-SCNC: 4 MMOL/L (ref 3.5–5.1)
RBC # BLD AUTO: 3.45 M/UL (ref 4.23–5.6)
SODIUM SERPL-SCNC: 137 MMOL/L (ref 133–143)
WBC # BLD AUTO: 10.4 K/UL (ref 4.3–11.1)

## 2023-03-09 PROCEDURE — 6370000000 HC RX 637 (ALT 250 FOR IP): Performed by: STUDENT IN AN ORGANIZED HEALTH CARE EDUCATION/TRAINING PROGRAM

## 2023-03-09 PROCEDURE — 80048 BASIC METABOLIC PNL TOTAL CA: CPT

## 2023-03-09 PROCEDURE — 2580000003 HC RX 258: Performed by: STUDENT IN AN ORGANIZED HEALTH CARE EDUCATION/TRAINING PROGRAM

## 2023-03-09 PROCEDURE — 37799 UNLISTED PX VASCULAR SURGERY: CPT

## 2023-03-09 PROCEDURE — 6360000002 HC RX W HCPCS: Performed by: STUDENT IN AN ORGANIZED HEALTH CARE EDUCATION/TRAINING PROGRAM

## 2023-03-09 PROCEDURE — 85027 COMPLETE CBC AUTOMATED: CPT

## 2023-03-09 RX ORDER — OXYCODONE HYDROCHLORIDE AND ACETAMINOPHEN 5; 325 MG/1; MG/1
1 TABLET ORAL EVERY 6 HOURS PRN
Qty: 12 TABLET | Refills: 0 | Status: SHIPPED | OUTPATIENT
Start: 2023-03-09 | End: 2023-03-12

## 2023-03-09 RX ADMIN — CLOPIDOGREL BISULFATE 75 MG: 75 TABLET ORAL at 08:54

## 2023-03-09 RX ADMIN — PREDNISOLONE ACETATE 1 DROP: 10 SUSPENSION/ DROPS OPHTHALMIC at 08:54

## 2023-03-09 RX ADMIN — ENOXAPARIN SODIUM 40 MG: 100 INJECTION SUBCUTANEOUS at 08:54

## 2023-03-09 RX ADMIN — ASPIRIN 81 MG: 81 TABLET ORAL at 08:54

## 2023-03-09 RX ADMIN — SODIUM CHLORIDE, PRESERVATIVE FREE 10 ML: 5 INJECTION INTRAVENOUS at 08:55

## 2023-03-09 ASSESSMENT — PAIN SCALES - GENERAL
PAINLEVEL_OUTOF10: 0
PAINLEVEL_OUTOF10: 0

## 2023-03-09 NOTE — FLOWSHEET NOTE
Patient discharged in NAD. Education provided to patient and wife. Understanding of education was verbalized. All lines removed prior to discharge. Dual neuro reassessment WNL and documented below. 03/09/23 1002   Assessment   Charting Type Reassessment   Reassessment Performed No change to previous assessment   Neurological   Neuro (WDL) WDL   Level of Consciousness 0   Orientation Level Oriented X4   Cognition Appropriate judgement; Appropriate safety awareness; Appropriate attention/concentration; Appropriate for developmental age; Follows commands   Speech Clear   R Pupil Size (mm) 4   R Pupil Shape Round   R Pupil Reaction Brisk   L Pupil Size (mm) 4   L Pupil Shape Round   L Pupil Reaction Brisk   R Hand  Strong   L Hand  Strong   R Foot Dorsiflexion Strong   L Foot Dorsiflexion Strong   R Foot Plantar Flexion Strong   L Foot Plantar Flexion Strong   RUE Motor Response Normal extension;Normal flexion; Responds to command   RUE Sensation  Full sensation; No numbness   LUE Motor Response Normal extension;Normal flexion; Responds to command   LUE Sensation  Full sensation; No numbness   RLE Motor Response Normal extension;Normal flexion; Responds to command   RLE Sensation  Full sensation; No numbness   LLE Motor Response Normal extension;Normal flexion; Responds to command   LLE Sensation  Full sensation; No numbness   Tongue Deviation None   Gag Present   Cough Reflex Present   [place] Coma Scale   Eye Opening 4   Best Verbal Response 5   Best Motor Response 6   [place] Coma Scale Score 15   NIHSS Stroke Scale   Interval Reassessment  (Discharge)   Level of Consciousness (1a) 0   LOC Questions (1b) 0   LOC Commands (1c) 0   Best Gaze (2) 0   Visual (3) 0   Facial Palsy (4) 0   Motor Arm, Left (5a) 0   Motor Arm, Right (5b) 0   Motor Leg, Left (6a) 0   Motor Leg, Right (6b) 0   Limb Ataxia (7) 0   Sensory (8) 0   Best Language (9) 0   Dysarthria (10) 0   Extinction and Inattention (11) 0   Total 0   NIHSS Stroke Scale Assessed Yes   Rhythm Interpretation   Heart Rate 78

## 2023-03-09 NOTE — PLAN OF CARE
Problem: Pain  Goal: Verbalizes/displays adequate comfort level or baseline comfort level  3/8/2023 2142 by Leonila Yap RN  Outcome: Progressing  3/8/2023 1945 by Presley Henry RN  Outcome: Progressing  Flowsheets (Taken 3/8/2023 1945 by Leonila Yap RN)  Verbalizes/displays adequate comfort level or baseline comfort level: Encourage patient to monitor pain and request assistance     Problem: Safety - Adult  Goal: Free from fall injury  3/8/2023 2142 by Leonila Yap RN  Outcome: Progressing  3/8/2023 1945 by Presley Henry RN  Outcome: Wayne Elise (Taken 3/8/2023 1945 by Leonila Yap RN)  Free From Fall Injury:   Instruct family/caregiver on patient safety   Based on caregiver fall risk screen, instruct family/caregiver to ask for assistance with transferring infant if caregiver noted to have fall risk factors     Problem: Discharge Planning  Goal: Discharge to home or other facility with appropriate resources  3/8/2023 2142 by Leonila Yap RN  Outcome: Progressing  3/8/2023 1945 by Presley Henry RN  Outcome: Progressing  Flowsheets (Taken 3/8/2023 1945 by Loenila Yap RN)  Discharge to home or other facility with appropriate resources: Identify barriers to discharge with patient and caregiver     Problem: Neurosensory - Adult  Goal: Achieves stable or improved neurological status  Outcome: Progressing  Flowsheets (Taken 3/8/2023 1945)  Achieves stable or improved neurological status: Assess for and report changes in neurological status     Problem: Respiratory - Adult  Goal: Achieves optimal ventilation and oxygenation  Outcome: Progressing  Flowsheets (Taken 3/8/2023 1945)  Achieves optimal ventilation and oxygenation:   Assess for changes in respiratory status   Assess for changes in mentation and behavior     Problem: Cardiovascular - Adult  Goal: Maintains optimal cardiac output and hemodynamic stability  Outcome: Progressing  Flowsheets (Taken 3/8/2023 1945)  Maintains optimal cardiac output and hemodynamic stability: Monitor blood pressure and heart rate  Goal: Absence of cardiac dysrhythmias or at baseline  Outcome: Progressing  Flowsheets (Taken 3/8/2023 1945)  Absence of cardiac dysrhythmias or at baseline:   Monitor cardiac rate and rhythm   Assess for signs of decreased cardiac output     Problem: Skin/Tissue Integrity - Adult  Goal: Skin integrity remains intact  Outcome: Progressing  Flowsheets (Taken 3/8/2023 1945)  Skin Integrity Remains Intact:   Monitor for areas of redness and/or skin breakdown   Assess vascular access sites hourly  Goal: Incisions, wounds, or drain sites healing without S/S of infection  Outcome: Progressing  Flowsheets (Taken 3/8/2023 1945)  Incisions, Wounds, or Drain Sites Healing Without Sign and Symptoms of Infection:   ADMISSION and DAILY: Assess and document risk factors for pressure ulcer development   TWICE DAILY: Assess and document dressing/incision, wound bed, drain sites and surrounding tissue  Goal: Oral mucous membranes remain intact  Outcome: Progressing  Flowsheets (Taken 3/8/2023 1945)  Oral Mucous Membranes Remain Intact:   Assess oral mucosa and hygiene practices   Implement preventative oral hygiene regimen     Problem: Musculoskeletal - Adult  Goal: Return mobility to safest level of function  Outcome: Progressing  Flowsheets (Taken 3/8/2023 1945)  Return Mobility to Safest Level of Function:   Assess patient stability and activity tolerance for standing, transferring and ambulating with or without assistive devices   Assist with transfers and ambulation using safe patient handling equipment as needed     Problem: Gastrointestinal - Adult  Goal: Minimal or absence of nausea and vomiting  Outcome: Progressing  Flowsheets (Taken 3/8/2023 1945)  Minimal or absence of nausea and vomiting: Administer IV fluids as ordered to ensure adequate hydration     Problem: Genitourinary - Adult  Goal: Absence of urinary retention  Outcome: Progressing  Flowsheets (Taken 3/8/2023 1945)  Absence of urinary retention: Assess patients ability to void and empty bladder     Problem: Infection - Adult  Goal: Absence of infection at discharge  Outcome: Progressing  Flowsheets (Taken 3/8/2023 1945)  Absence of infection at discharge:   Assess and monitor for signs and symptoms of infection   Monitor lab/diagnostic results  Goal: Absence of infection during hospitalization  Outcome: Progressing  Flowsheets (Taken 3/8/2023 1945)  Absence of infection during hospitalization:   Assess and monitor for signs and symptoms of infection   Monitor lab/diagnostic results  Goal: Absence of fever/infection during anticipated neutropenic period  Outcome: Progressing  Flowsheets (Taken 3/8/2023 1945)  Absence of fever/infection during anticipated neutropenic period:   Monitor white blood cell count   Administer growth factors as ordered     Problem: Metabolic/Fluid and Electrolytes - Adult  Goal: Electrolytes maintained within normal limits  Outcome: Progressing  Flowsheets (Taken 3/8/2023 1945)  Electrolytes maintained within normal limits:   Monitor labs and assess patient for signs and symptoms of electrolyte imbalances   Administer electrolyte replacement as ordered  Goal: Hemodynamic stability and optimal renal function maintained  Outcome: Progressing  Flowsheets (Taken 3/8/2023 1945)  Hemodynamic stability and optimal renal function maintained:   Monitor labs and assess for signs and symptoms of volume excess or deficit   Monitor intake, output and patient weight  Goal: Glucose maintained within prescribed range  Outcome: Progressing  Flowsheets (Taken 3/8/2023 1945)  Glucose maintained within prescribed range:   Monitor blood glucose as ordered   Assess for signs and symptoms of hyperglycemia and hypoglycemia     Problem: Hematologic - Adult  Goal: Maintains hematologic stability  Outcome: Progressing  Flowsheets (Taken 3/8/2023 1945)  Maintains hematologic stability:   Assess for signs and symptoms of bleeding or hemorrhage   Monitor labs for bleeding or clotting disorders

## 2023-03-09 NOTE — DISCHARGE SUMMARY
[address] FAX: [phone]        Physician Discharge Summary     Patient: Abigail Wright MRN: [id number]  SSN: [id number]    YOB: 1945  Age: 76 y.o. Sex: male       Admit Date: 3/8/2023    Discharge Date: 3/9/2023      Admitting Physician: Akira Porter MD     Discharge Physician: Regina Bustos, APRN - [facility]    Admission Diagnoses: Occlusion of right carotid artery [I65.21]  Carotid stenosis, asymptomatic, bilateral [I65.23]  Right-sided extracranial carotid artery stenosis [I65.21]    Discharge Diagnoses:        Procedures for this admission: Procedure(s):  RIGHT TCAR - CAROTID STENT PLACEMENT    Discharged Condition: stable    Hospital Course: 76 y.o. male with 80% right ICA stenosis. Compliant with ASA, statin and antihypertensives. Denies slurred speech, unilateral weakness, and changes in vision. CTA with 80% right ICA with heterogenous plaque. Plan for right TCAR. Dr. Perla Redd performed on 3/8/23 Asymptomatic Right Carotid artery stenosis greater than 80%    Consults: None    Significant Diagnostic Studies: labs    Treatments: IV hydration, antibiotics: Ancef, analgesia: acetaminophen and Oxycodone, anticoagulation: ASA, Plavix, and Lovenox, and surgery: see above    Discharge Exam: GEN: alert, appears stated age, and cooperative  LUNG: clear to auscultation bilaterally  HEART: regular rate and rhythm, S1, S2 normal, no murmur, click, rub or gallop  Incision:  neck healing well, no S/S of infection. Incision intact    Disposition: home    Patient Instructions:   Current Discharge Medication List        START taking these medications    Details   oxyCODONE-acetaminophen (PERCOCET) 5-325 MG per tablet Take 1 tablet by mouth every 6 hours as needed for Pain for up to 3 days. Intended supply: 3 days.  Take lowest dose possible to manage pain Max Daily Amount: 4 tablets  Qty: 12 tablet, Refills: 0 Comments: Reduce doses taken as pain becomes manageable  Associated Diagnoses: Right-sided extracranial carotid artery stenosis           CONTINUE these medications which have NOT CHANGED    Details   clopidogrel (PLAVIX) 75 MG tablet Take 1 tablet by mouth daily  Qty: 90 tablet, Refills: 1      valsartan-hydroCHLOROthiazide (DIOVAN-HCT) 160-12.5 MG per tablet Take 1 tablet by mouth daily  Qty: 90 tablet, Refills: 3      atorvastatin (LIPITOR) 40 MG tablet Take 1 tablet by mouth every evening  Qty: 90 tablet, Refills: 3      metoprolol succinate (TOPROL XL) 25 MG extended release tablet Take 1 tablet by mouth in the morning. Qty: 90 tablet, Refills: 3      acetaminophen (TYLENOL) 325 MG tablet Take 650 mg by mouth every 6 hours as needed      aspirin 81 MG EC tablet Take by mouth every morning      fexofenadine (ALLEGRA) 180 MG tablet Take 180 mg by mouth as needed      fluticasone (FLONASE) 50 MCG/ACT nasal spray 2 sprays by Nasal route as needed      guaiFENesin 200 MG tablet Take 200 mg by mouth every 4 hours as needed      prednisoLONE acetate (PRED FORTE) 1 % ophthalmic suspension Apply 1 drop to eye every morning             Reference discharge instructions as provided by nursing for diet, labs, medications, activity, wound care and any outpatient referrals. No follow-ups on file. Signed:  TAMIKA Reza CNP  3/9/2023  8:17 AM    Elements of this note have been dictated using speech recognition software. As a result, errors of speech recognition may have occurred.

## 2023-03-09 NOTE — DISCHARGE INSTRUCTIONS
Do not lift anything heavier than 5 pounds (a gallon of milk); no bending, straining, strenuous activity or driving until seen by Dr. Lori Vanessa at follow-up. You may ride in a car. It is important that you have the ability to turn your head quickly without discomfort while operating a vehicle. Walk as often as you wish. Walk short distances at first and increase slowly. Avoid exercising in extreme temperatures. You may have some slight dizziness, a mild headache or tiredness for a few days. You may go up and down stairs. Hold onto the rail for the first few days after surgery because you may experience dizziness. If you smoke, please quit. Smoking increases you chances of developing heart disease, carotid artery disease, lung cancer, and peripheral vascular disease. It can also delay wound healing. You may shower this evening, 3/9/23. Use soap and water to gently clean the incision. Do not scrub the incision. Pat dry with a clean towel. Do not soak in a tub, whirlpool, or hot tub, or go swimming until completely healed. This is generally 3-4 weeks for most people. Call Your Surgeon for Any of These Symptoms (#[phone]):  - Increasing pain or swelling in the neck causing difficulty breathing or swallowing.  - Sudden or severe headache. A headache that will not go away. - Changes in your eyesight, problem speaking, or problem swallowing.  - Increasing pain, not relieved by pain medication.  - Fever above 101 degrees. - Redness, an increase in swelling or bruising around your incision. There may be some slight drainage the first couple of days from the incision, but this should stop and the incision should be  dry. If there is continued drainage or pus the surgeon should be called. If you cannot reach your doctor quickly, go to the nearest emergency room for immediate assessment.

## 2023-03-09 NOTE — CARE COORDINATION
Pt with discharge orders this day. Chart screened by  for discharge planning. No CM needs identified at time of discharge. Milestones met.

## 2023-03-14 ENCOUNTER — TELEPHONE (OUTPATIENT)
Dept: CARDIOLOGY CLINIC | Age: 78
End: 2023-03-14

## 2023-03-14 ENCOUNTER — TELEPHONE (OUTPATIENT)
Dept: VASCULAR SURGERY | Age: 78
End: 2023-03-14

## 2023-03-14 NOTE — TELEPHONE ENCOUNTER
MEDICATION REFILL REQUEST      Name of Medication:  Valsartan  Dose:  160 mg  Frequency:  QD  Quantity:  ?  Days' supply:  ?       Pharmacy Name/Location:  [facility] [phone]

## 2023-03-14 NOTE — TELEPHONE ENCOUNTER
[facility] for Valsartan-hctz was sent o [facility] 2-23-23 for a year.  Please check with [email or web address]

## 2023-03-14 NOTE — TELEPHONE ENCOUNTER
Patient called to ask if he can restart his Metoprolol medication (patient had stopped prior to his 3/8 surgery). Informed patient that he could resume medication as prescribed. Patient has post op appointment on 3/30.

## 2023-03-30 ENCOUNTER — OFFICE VISIT (OUTPATIENT)
Dept: VASCULAR SURGERY | Age: 78
End: 2023-03-30

## 2023-03-30 VITALS
DIASTOLIC BLOOD PRESSURE: 77 MMHG | SYSTOLIC BLOOD PRESSURE: 126 MMHG | HEART RATE: 81 BPM | OXYGEN SATURATION: 98 % | WEIGHT: 200 LBS | BODY MASS INDEX: 28 KG/M2 | HEIGHT: 71 IN

## 2023-03-30 DIAGNOSIS — Z09 POSTOPERATIVE EXAMINATION: Primary | ICD-10-CM

## 2023-03-30 PROCEDURE — 99024 POSTOP FOLLOW-UP VISIT: CPT | Performed by: STUDENT IN AN ORGANIZED HEALTH CARE EDUCATION/TRAINING PROGRAM

## 2023-03-30 ASSESSMENT — PATIENT HEALTH QUESTIONNAIRE - PHQ9
SUM OF ALL RESPONSES TO PHQ QUESTIONS 1-9: 0
SUM OF ALL RESPONSES TO PHQ QUESTIONS 1-9: 0
1. LITTLE INTEREST OR PLEASURE IN DOING THINGS: 0
SUM OF ALL RESPONSES TO PHQ9 QUESTIONS 1 & 2: 0
SUM OF ALL RESPONSES TO PHQ QUESTIONS 1-9: 0
SUM OF ALL RESPONSES TO PHQ QUESTIONS 1-9: 0
2. FEELING DOWN, DEPRESSED OR HOPELESS: 0

## 2023-03-30 NOTE — PROGRESS NOTES
Postop Progress Note    Subjective    Saad Sandy presents to the office 3 weeks  following right TCAR. Denies slurred speech, changes in vision and unilateral weakness. BP well controlled. Sys 110-120    Objective    Vitals:    03/30/23 0956   BP: 126/77   Pulse:    SpO2:      General: alert, cooperative, and no distress  Incision: healing well, no drainage, no erythema, no seroma, no swelling, well approximated    Assessment    Doing well postoperatively. Plan   1. Continue ASA and Plavix   2.  Return to clinic in 1 week for carotid DUS           Electronically signed by Misha Rivero MD on 3/30/2023 at 10:21 AM

## 2023-04-06 ENCOUNTER — OFFICE VISIT (OUTPATIENT)
Dept: VASCULAR SURGERY | Age: 78
End: 2023-04-06

## 2023-04-06 VITALS
HEART RATE: 84 BPM | SYSTOLIC BLOOD PRESSURE: 136 MMHG | DIASTOLIC BLOOD PRESSURE: 74 MMHG | OXYGEN SATURATION: 97 % | WEIGHT: 202 LBS | BODY MASS INDEX: 28.28 KG/M2 | HEIGHT: 71 IN

## 2023-04-06 DIAGNOSIS — I65.23 CAROTID STENOSIS, ASYMPTOMATIC, BILATERAL: Primary | ICD-10-CM

## 2023-04-06 PROCEDURE — 99024 POSTOP FOLLOW-UP VISIT: CPT | Performed by: STUDENT IN AN ORGANIZED HEALTH CARE EDUCATION/TRAINING PROGRAM

## 2023-04-06 NOTE — PROGRESS NOTES
Postop Progress Note    Subjective    Chasidy Leon presents to the office 4 weeks  following right TCAR. Denies slurred speech, changes in vision and unilateral weakness. BP well controlled. Reports improved cognition. Objective    Vitals:    04/06/23 0819   BP: 136/74   Pulse:    SpO2:      General: alert, cooperative, and no distress  Incision: healing well, no drainage, no erythema, no seroma, no swelling, well approximated    Assessment    Doing well postoperatively. Plan   1. Continue ASA and Plavix   2.  Return to clinic @ 6months for carotid DUS       Electronically signed by Cindy Tirado MD on 4/6/2023 at 8:28 AM

## 2023-05-04 ENCOUNTER — OFFICE VISIT (OUTPATIENT)
Dept: UROLOGY | Age: 78
End: 2023-05-04
Payer: MEDICARE

## 2023-05-04 DIAGNOSIS — R31.29 MICROSCOPIC HEMATURIA: ICD-10-CM

## 2023-05-04 DIAGNOSIS — R39.89 PNEUMATOURIA: Primary | ICD-10-CM

## 2023-05-04 DIAGNOSIS — C61 PROSTATE CANCER (HCC): ICD-10-CM

## 2023-05-04 DIAGNOSIS — N39.0 RECURRENT UTI: ICD-10-CM

## 2023-05-04 LAB
BILIRUBIN, URINE, POC: NEGATIVE
BLOOD URINE, POC: NORMAL
GLUCOSE URINE, POC: NEGATIVE
KETONES, URINE, POC: NEGATIVE
LEUKOCYTE ESTERASE, URINE, POC: NORMAL
NITRITE, URINE, POC: NEGATIVE
PH, URINE, POC: 7 (ref 4.6–8)
PROTEIN,URINE, POC: 30
SPECIFIC GRAVITY, URINE, POC: 1.02 (ref 1–1.03)
URINALYSIS CLARITY, POC: NORMAL
URINALYSIS COLOR, POC: NORMAL
UROBILINOGEN, POC: NORMAL

## 2023-05-04 PROCEDURE — 81003 URINALYSIS AUTO W/O SCOPE: CPT | Performed by: NURSE PRACTITIONER

## 2023-05-04 PROCEDURE — G8427 DOCREV CUR MEDS BY ELIG CLIN: HCPCS | Performed by: NURSE PRACTITIONER

## 2023-05-04 PROCEDURE — G8417 CALC BMI ABV UP PARAM F/U: HCPCS | Performed by: NURSE PRACTITIONER

## 2023-05-04 PROCEDURE — 1036F TOBACCO NON-USER: CPT | Performed by: NURSE PRACTITIONER

## 2023-05-04 PROCEDURE — 1123F ACP DISCUSS/DSCN MKR DOCD: CPT | Performed by: NURSE PRACTITIONER

## 2023-05-04 PROCEDURE — 99214 OFFICE O/P EST MOD 30 MIN: CPT | Performed by: NURSE PRACTITIONER

## 2023-05-04 ASSESSMENT — ENCOUNTER SYMPTOMS: NAUSEA: 0

## 2023-05-04 NOTE — PROGRESS NOTES
[facility]  [address], [address] [facility]  [phone]          Stephanie Colon  : 1945    Chief Complaint   Patient presents with    Follow-up     F/u on UTI          HPI     Stephanie Colon is a 76 y.o. male followed by Dr. Lukas Avalos for prostate cancer being seen today for possible fistula. About 2 weeks ago he had a flare of diverticulitis. This was tx, however he began to notice intense UU prompting a UA check. UA showed RBC and WBC. He was given antibiotic. Urine culture came back contaminated. He called back to PCP reporting passing air w urination. CT cystogram was ordered by PCP, however this was not done and do not see order in chart. Today, he reports he is feeling well. Continues to pass air in urine. He is afebrile. Denies gross hematuria. In regard to prostate cancer, he was last seen by Dr. Lukas Avalos in  and was referred to rad/onc. Seen by Dr. Chris Sidhu. He is s/p SBRT cyberknife 2020. PSA 0.35  and 0.286 Aug 23. PVR 0 cc via u/s.        Past Medical History:   Diagnosis Date    Aortic stenosis, moderate     MOD TO SEVERE per echo 2019-- followed by dr Suman Caldwell    Asthma     childhood    CAD (coronary artery disease)     CABG --    Cancer [facility])     prostate radiation in 2020    GERD (gastroesophageal reflux disease)     with certain foods - no meds     High cholesterol     no current medications    Hypertension     controlled with med    Prostate nodule     followed by dr Lu Guillen allergic rhinitis      Past Surgical History:   Procedure Laterality Date    CAROTID STENT PLACEMENT Right 3/8/2023    RIGHT TCAR - CAROTID STENT PLACEMENT performed by Cherie Dennison MD at [facility] 71      polyps--due in     CORNEAL TRANSPLANT Bilateral last one     followed by dr Olga Armando  2021    HEMORRHOID SURGERY  2004    [address] [address]

## 2023-05-05 ENCOUNTER — TELEPHONE (OUTPATIENT)
Dept: UROLOGY | Age: 78
End: 2023-05-05

## 2023-05-07 LAB
BACTERIA SPEC CULT: NORMAL
SERVICE CMNT-IMP: NORMAL

## 2023-05-08 ENCOUNTER — HOSPITAL ENCOUNTER (OUTPATIENT)
Dept: CT IMAGING | Age: 78
Discharge: HOME | End: 2023-05-11
Payer: MEDICARE

## 2023-05-08 ENCOUNTER — TELEPHONE (OUTPATIENT)
Dept: UROLOGY | Age: 78
End: 2023-05-08

## 2023-05-08 DIAGNOSIS — R31.29 MICROSCOPIC HEMATURIA: ICD-10-CM

## 2023-05-08 DIAGNOSIS — N39.0 RECURRENT UTI: ICD-10-CM

## 2023-05-08 DIAGNOSIS — R39.89 PNEUMATOURIA: ICD-10-CM

## 2023-05-08 PROCEDURE — 74176 CT ABD & PELVIS W/O CONTRAST: CPT

## 2023-05-08 NOTE — TELEPHONE ENCOUNTER
LVM for pt to call back will give call    ----- Message from TAMIKA Hitchcock CNP sent at 5/8/2023  3:30 PM EDT -----  CT shows air present in bladder. Need to take a closer look w cysto. Keep scheduled appt w Dr. Chelo Patino.

## 2023-05-08 NOTE — TELEPHONE ENCOUNTER
Spoke with pt about  infection and to keep apt with [facility]   ----- Message from TAMIKA Mcknight CNP sent at 5/8/2023  7:51 AM EDT -----  Urine culture shows no infection. Will call results of CT. Keep scheduled appt w  [facility].

## 2023-05-09 ENCOUNTER — TELEPHONE (OUTPATIENT)
Dept: UROLOGY | Age: 78
End: 2023-05-09

## 2023-05-09 NOTE — TELEPHONE ENCOUNTER
Spoke with to pt about air in urine. Also to keep apt with Dr Chante Mas.      ----- Message from TAMIKA Diamond CNP sent at 5/8/2023  3:30 PM EDT -----  CT shows air present in bladder. Need to take a closer look w cysto. Keep scheduled appt w Dr. Chante Mas.

## 2023-05-17 ENCOUNTER — OFFICE VISIT (OUTPATIENT)
Dept: UROLOGY | Age: 78
End: 2023-05-17
Payer: MEDICARE

## 2023-05-17 ENCOUNTER — TELEPHONE (OUTPATIENT)
Dept: UROLOGY | Age: 78
End: 2023-05-17

## 2023-05-17 DIAGNOSIS — R31.29 MICROSCOPIC HEMATURIA: ICD-10-CM

## 2023-05-17 DIAGNOSIS — N39.0 RECURRENT UTI: Primary | ICD-10-CM

## 2023-05-17 LAB
BILIRUBIN, URINE, POC: NEGATIVE
BLOOD URINE, POC: NORMAL
GLUCOSE URINE, POC: NEGATIVE
KETONES, URINE, POC: NEGATIVE
LEUKOCYTE ESTERASE, URINE, POC: NORMAL
NITRITE, URINE, POC: POSITIVE
PH, URINE, POC: 6 (ref 4.6–8)
PROTEIN,URINE, POC: 300
SPECIFIC GRAVITY, URINE, POC: 1.01 (ref 1–1.03)
URINALYSIS CLARITY, POC: NORMAL
URINALYSIS COLOR, POC: NORMAL
UROBILINOGEN, POC: NORMAL

## 2023-05-17 PROCEDURE — 81003 URINALYSIS AUTO W/O SCOPE: CPT | Performed by: NURSE PRACTITIONER

## 2023-05-17 RX ORDER — CIPROFLOXACIN 500 MG/1
500 TABLET, FILM COATED ORAL 2 TIMES DAILY
Qty: 14 TABLET | Refills: 0 | Status: SHIPPED | OUTPATIENT
Start: 2023-05-17 | End: 2023-05-24

## 2023-05-17 NOTE — PROGRESS NOTES
Results for orders placed or performed in visit on 05/17/23   AMB POC URINALYSIS DIP STICK AUTO W/O MICRO   Result Value Ref Range    Color (UA POC)      Clarity (UA POC)      Glucose, Urine, POC Negative Negative    Bilirubin, Urine, POC Negative Negative    KETONES, Urine, POC Negative Negative    Specific Gravity, Urine, POC 1.015 1.001 - 1.035    Blood (UA POC) Moderate Negative    pH, Urine, POC 6 4.6 - 8.0    Protein, Urine,  Negative    Urobilinogen, POC 0.2 mg/dL     Nitrite, Urine, POC Positive Negative    Leukocyte Esterase, Urine, POC Large Negative     Urine suspicious for UTI. Start Cipro. Send culture. Will call results.    Flor Norris, APRN - CNP

## 2023-05-20 LAB
BACTERIA SPEC CULT: ABNORMAL
SERVICE CMNT-IMP: ABNORMAL

## 2023-05-22 ENCOUNTER — TELEPHONE (OUTPATIENT)
Dept: UROLOGY | Age: 78
End: 2023-05-22

## 2023-05-22 NOTE — TELEPHONE ENCOUNTER
ANKUR apt June 20 @ 1045    ----- Message from TAMIKA Jacome CNP sent at 5/22/2023 10:42 AM EDT -----  He needs an office visit w me 1 week prior to cysto. See prev message.

## 2023-05-24 ENCOUNTER — APPOINTMENT (RX ONLY)
Dept: URBAN - METROPOLITAN AREA CLINIC 23 | Facility: CLINIC | Age: 78
Setting detail: DERMATOLOGY
End: 2023-05-24

## 2023-05-24 DIAGNOSIS — L57.0 ACTINIC KERATOSIS: ICD-10-CM

## 2023-05-24 DIAGNOSIS — D18.0 HEMANGIOMA: ICD-10-CM

## 2023-05-24 DIAGNOSIS — L82.0 INFLAMED SEBORRHEIC KERATOSIS: ICD-10-CM

## 2023-05-24 DIAGNOSIS — L28.0 LICHEN SIMPLEX CHRONICUS: ICD-10-CM

## 2023-05-24 DIAGNOSIS — L57.8 OTHER SKIN CHANGES DUE TO CHRONIC EXPOSURE TO NONIONIZING RADIATION: ICD-10-CM

## 2023-05-24 DIAGNOSIS — Z85.828 PERSONAL HISTORY OF OTHER MALIGNANT NEOPLASM OF SKIN: ICD-10-CM

## 2023-05-24 DIAGNOSIS — L82.1 OTHER SEBORRHEIC KERATOSIS: ICD-10-CM

## 2023-05-24 PROBLEM — D18.01 HEMANGIOMA OF SKIN AND SUBCUTANEOUS TISSUE: Status: ACTIVE | Noted: 2023-05-24

## 2023-05-24 PROCEDURE — ? LIQUID NITROGEN

## 2023-05-24 PROCEDURE — ? COUNSELING

## 2023-05-24 PROCEDURE — 17003 DESTRUCT PREMALG LES 2-14: CPT | Mod: 59

## 2023-05-24 PROCEDURE — 17000 DESTRUCT PREMALG LESION: CPT | Mod: 59

## 2023-05-24 PROCEDURE — 99213 OFFICE O/P EST LOW 20 MIN: CPT | Mod: 25

## 2023-05-24 PROCEDURE — 17110 DESTRUCTION B9 LES UP TO 14: CPT

## 2023-05-24 ASSESSMENT — LOCATION ZONE DERM
LOCATION ZONE: HAND
LOCATION ZONE: LEG
LOCATION ZONE: TRUNK
LOCATION ZONE: ARM
LOCATION ZONE: EAR
LOCATION ZONE: FACE

## 2023-05-24 ASSESSMENT — LOCATION SIMPLE DESCRIPTION DERM
LOCATION SIMPLE: LEFT FOREARM
LOCATION SIMPLE: LEFT EAR
LOCATION SIMPLE: RIGHT FOREARM
LOCATION SIMPLE: RIGHT CHEEK
LOCATION SIMPLE: UPPER BACK
LOCATION SIMPLE: RIGHT UPPER BACK
LOCATION SIMPLE: LEFT HAND
LOCATION SIMPLE: RIGHT HAND
LOCATION SIMPLE: ABDOMEN
LOCATION SIMPLE: LEFT THIGH
LOCATION SIMPLE: LEFT UPPER BACK

## 2023-05-24 ASSESSMENT — LOCATION DETAILED DESCRIPTION DERM
LOCATION DETAILED: LEFT DISTAL DORSAL FOREARM
LOCATION DETAILED: RIGHT RADIAL DORSAL HAND
LOCATION DETAILED: LEFT ANTERIOR DISTAL THIGH
LOCATION DETAILED: LEFT RADIAL DORSAL HAND
LOCATION DETAILED: RIGHT MEDIAL UPPER BACK
LOCATION DETAILED: EPIGASTRIC SKIN
LOCATION DETAILED: RIGHT INFERIOR CENTRAL MALAR CHEEK
LOCATION DETAILED: LEFT SUPERIOR CRUS OF ANTIHELIX
LOCATION DETAILED: RIGHT DISTAL DORSAL FOREARM
LOCATION DETAILED: LEFT INFERIOR UPPER BACK
LOCATION DETAILED: SUPERIOR THORACIC SPINE

## 2023-05-24 NOTE — PROCEDURE: LIQUID NITROGEN
Show Topical Anesthesia Variable?: Yes
Render Post-Care Instructions In Note?: no
Post-Care Instructions: I reviewed with the patient in detail post-care instructions. Patient is to wear sunprotection, and avoid picking at any of the treated lesions. Pt may apply Vaseline to crusted or scabbing areas.
Spray Paint Text: The liquid nitrogen was applied to the skin utilizing a spray paint frosting technique.
Consent: The patient's consent was obtained including but not limited to risks of crusting, scabbing, blistering, scarring, darker or lighter pigmentary change, recurrence, incomplete removal and infection.
Detail Level: Detailed
Medical Necessity Clause: This procedure was medically necessary because the lesions that were treated were:
Medical Necessity Information: It is in your best interest to select a reason for this procedure from the list below. All of these items fulfill various CMS LCD requirements except the new and changing color options.
Duration Of Freeze Thaw-Cycle (Seconds): 0
Detail Level: Simple

## 2023-06-20 ENCOUNTER — OFFICE VISIT (OUTPATIENT)
Dept: UROLOGY | Age: 78
End: 2023-06-20
Payer: MEDICARE

## 2023-06-20 DIAGNOSIS — R39.89 PNEUMATOURIA: ICD-10-CM

## 2023-06-20 DIAGNOSIS — C61 PROSTATE CANCER (HCC): ICD-10-CM

## 2023-06-20 DIAGNOSIS — N39.0 RECURRENT UTI: Primary | ICD-10-CM

## 2023-06-20 DIAGNOSIS — R31.29 MICROSCOPIC HEMATURIA: ICD-10-CM

## 2023-06-20 LAB
BILIRUBIN, URINE, POC: NEGATIVE
BLOOD URINE, POC: NORMAL
GLUCOSE URINE, POC: NEGATIVE
KETONES, URINE, POC: NEGATIVE
LEUKOCYTE ESTERASE, URINE, POC: NORMAL
NITRITE, URINE, POC: NEGATIVE
PH, URINE, POC: 7 (ref 4.6–8)
PROTEIN,URINE, POC: 300
SPECIFIC GRAVITY, URINE, POC: 1.02 (ref 1–1.03)
URINALYSIS CLARITY, POC: NORMAL
URINALYSIS COLOR, POC: NORMAL
UROBILINOGEN, POC: NORMAL

## 2023-06-20 PROCEDURE — 81003 URINALYSIS AUTO W/O SCOPE: CPT | Performed by: NURSE PRACTITIONER

## 2023-06-20 PROCEDURE — 99214 OFFICE O/P EST MOD 30 MIN: CPT | Performed by: NURSE PRACTITIONER

## 2023-06-20 PROCEDURE — G8417 CALC BMI ABV UP PARAM F/U: HCPCS | Performed by: NURSE PRACTITIONER

## 2023-06-20 PROCEDURE — G8427 DOCREV CUR MEDS BY ELIG CLIN: HCPCS | Performed by: NURSE PRACTITIONER

## 2023-06-20 PROCEDURE — 1036F TOBACCO NON-USER: CPT | Performed by: NURSE PRACTITIONER

## 2023-06-20 PROCEDURE — 1123F ACP DISCUSS/DSCN MKR DOCD: CPT | Performed by: NURSE PRACTITIONER

## 2023-06-20 ASSESSMENT — ENCOUNTER SYMPTOMS: BACK PAIN: 0

## 2023-06-20 NOTE — PROGRESS NOTES
Bilateral last one 2017    followed by dr Juma Douglass  05/04/2021    HEMORRHOID SURGERY  2004    ORTHOPEDIC SURGERY Left 2016    great toe surg     Current Outpatient Medications   Medication Sig Dispense Refill    clopidogrel (PLAVIX) 75 MG tablet Take 1 tablet by mouth daily (Patient taking differently: Take 1 tablet by mouth Daily with lunch) 90 tablet 1    valsartan-hydroCHLOROthiazide (DIOVAN-HCT) 160-12.5 MG per tablet Take 1 tablet by mouth daily 90 tablet 3    atorvastatin (LIPITOR) 40 MG tablet Take 1 tablet by mouth every evening (Patient taking differently: Take 1 tablet by mouth daily) 90 tablet 3    metoprolol succinate (TOPROL XL) 25 MG extended release tablet Take 1 tablet by mouth in the morning. (Patient taking differently: Take 1 tablet by mouth at bedtime) 90 tablet 3    acetaminophen (TYLENOL) 325 MG tablet Take 2 tablets by mouth every 6 hours as needed      aspirin 81 MG EC tablet Take by mouth every morning      fexofenadine (ALLEGRA) 180 MG tablet Take 1 tablet by mouth as needed      fluticasone (FLONASE) 50 MCG/ACT nasal spray 2 sprays by Nasal route as needed      guaiFENesin 200 MG tablet Take 1 tablet by mouth every 4 hours as needed      prednisoLONE acetate (PRED FORTE) 1 % ophthalmic suspension Apply 1 drop to eye every morning       No current facility-administered medications for this visit.      Allergies   Allergen Reactions    Latex Rash    Adhesive Tape      Tolerates paper tape     Social History     Socioeconomic History    Marital status:      Spouse name: Not on file    Number of children: Not on file    Years of education: Not on file    Highest education level: Not on file   Occupational History    Not on file   Tobacco Use    Smoking status: Never    Smokeless tobacco: Never   Vaping Use    Vaping Use: Never used   Substance and Sexual Activity    Alcohol use: No    Drug use: No    Sexual activity: Not on file   Other Topics Concern    Not

## 2023-06-23 LAB
BACTERIA SPEC CULT: NORMAL
SERVICE CMNT-IMP: NORMAL

## 2023-06-28 ENCOUNTER — PROCEDURE VISIT (OUTPATIENT)
Dept: UROLOGY | Age: 78
End: 2023-06-28
Payer: MEDICARE

## 2023-06-28 ENCOUNTER — TELEPHONE (OUTPATIENT)
Dept: UROLOGY | Age: 78
End: 2023-06-28

## 2023-06-28 DIAGNOSIS — K57.92 DIVERTICULITIS: ICD-10-CM

## 2023-06-28 DIAGNOSIS — R39.89 PNEUMATOURIA: Primary | ICD-10-CM

## 2023-06-28 DIAGNOSIS — N32.89 BLADDER MASS: ICD-10-CM

## 2023-06-28 DIAGNOSIS — C61 PROSTATE CANCER (HCC): ICD-10-CM

## 2023-06-28 LAB
BILIRUBIN, URINE, POC: NEGATIVE
BLOOD URINE, POC: NORMAL
GLUCOSE URINE, POC: NEGATIVE
KETONES, URINE, POC: NORMAL
LEUKOCYTE ESTERASE, URINE, POC: NORMAL
NITRITE, URINE, POC: NEGATIVE
PH, URINE, POC: 6.5 (ref 4.6–8)
PROTEIN,URINE, POC: 100
SPECIFIC GRAVITY, URINE, POC: 1.02 (ref 1–1.03)
URINALYSIS CLARITY, POC: NORMAL
URINALYSIS COLOR, POC: NORMAL
UROBILINOGEN, POC: NORMAL

## 2023-06-28 PROCEDURE — 52000 CYSTOURETHROSCOPY: CPT | Performed by: UROLOGY

## 2023-06-28 PROCEDURE — 81003 URINALYSIS AUTO W/O SCOPE: CPT | Performed by: UROLOGY

## 2023-07-13 ENCOUNTER — TELEPHONE (OUTPATIENT)
Dept: UROLOGY | Age: 78
End: 2023-07-13

## 2023-07-13 PROBLEM — N32.89 BLADDER MASS: Status: ACTIVE | Noted: 2023-07-13

## 2023-07-13 NOTE — TELEPHONE ENCOUNTER
I did not start him on Plavix. This was started by vascular surgery.   They need to call their office     Note faxed//[email or web address]

## 2023-07-13 NOTE — TELEPHONE ENCOUNTER
Cardiac Pre-operative Assessment      Physician or Practice Requesting Medication Clearance or Risk Assessment: Dr Flori Martins: [facility] Phone Number: [phone]    Fax Number: [phone]    Date of Surgery/Procedure: 8/11    Type of Surgery or Procedure: TURBT      Medications to hold Plavix   Will remain on asa 81 mg daily     # Days pre-procedure to hold 5 days prior     Restart medication ____    Risk assessment:Medium      Please send the response back in this phone encounter

## 2023-07-14 ENCOUNTER — TELEPHONE (OUTPATIENT)
Dept: UROLOGY | Age: 78
End: 2023-07-14

## 2023-07-14 NOTE — TELEPHONE ENCOUNTER
The patient is scheduled for a TURBT with Dr Jessica Serrano. Ok to hold Plavix 5 days prior? He will remain on ASA 81 mg daily.     Thank Bernardo Flores-surgery scheduler

## 2023-07-20 ENCOUNTER — PREP FOR PROCEDURE (OUTPATIENT)
Dept: SURGERY | Age: 78
End: 2023-07-20

## 2023-07-20 RX ORDER — SODIUM CHLORIDE 9 MG/ML
INJECTION, SOLUTION INTRAVENOUS PRN
Status: CANCELLED | OUTPATIENT
Start: 2023-07-20

## 2023-07-20 RX ORDER — SODIUM CHLORIDE 0.9 % (FLUSH) 0.9 %
5-40 SYRINGE (ML) INJECTION PRN
Status: CANCELLED | OUTPATIENT
Start: 2023-07-20

## 2023-07-20 RX ORDER — SODIUM CHLORIDE 0.9 % (FLUSH) 0.9 %
5-40 SYRINGE (ML) INJECTION EVERY 12 HOURS SCHEDULED
Status: CANCELLED | OUTPATIENT
Start: 2023-07-20

## 2023-08-07 NOTE — PROGRESS NOTES
PLEASE CONTINUE TAKING ALL PRESCRIPTION MEDICATIONS UP TO THE DAY OF SURGERY UNLESS OTHERWISE DIRECTED BELOW. DISCONTINUE all vitamins and supplements 7 days prior to surgery. DISCONTINUE Non-Steriodal Anti-Inflammatory (NSAIDS) such as Advil and Aleve 5 days prior to surgery. Home Medications to take  the day of surgery    Asa 81 mg, pred forte eye drops           Home Medications   to Hold   Hold plavix x 5 days--oked by dr Tam Siegel-- note in epic--pt aware        Comments      On the day before surgery please take Acetaminophen 1000mg in the morning and then again before bed. You may substitute for Tylenol 650 mg. Please do not bring home medications with you on the day of surgery unless otherwise directed by your nurse. If you are instructed to bring home medications, please give them to your nurse as they will be administered by the nursing staff. If you have any questions, please call [address] [place] [address] [phone]. A copy of this note was provided to the patient for reference.
Patient verified name and     Order for consent WAS found in EHR and matches case posting; patient verified. Type 1B surgery, PHONE assessment complete. Labs per surgeon: NONE  Labs per anesthesia protocol: POTASSIUM DOS-- ORDER PLACED IN EPIC  EKG: IN Epic AS NEEDED  OF NOTE-- OK TO HOLD PLAVIX X 5 DAYS PER DR Garcia. [address] approved surgical skin cleanser and instructions given per hospital policy. Patient provided with and instructed on educational handouts including Guide to Surgery, Pain Management, Hand Hygiene, Blood Transfusion Education, and [place] [facility] Brochure. Patient answered medical/surgical history questions at their best of ability. All prior to admission medications documented in [facility]. Original medication prescription bottle WAS NOT visualized during patient appointment. Patient instructed to hold all vitamins 7 days prior to surgery and NSAIDS 5 days prior to surgery, patient verbalized understanding. Patient teach back successful and patient demonstrates knowledge of instructions.
no

## 2023-08-10 ENCOUNTER — TELEPHONE (OUTPATIENT)
Age: 78
End: 2023-08-10

## 2023-08-10 ENCOUNTER — TELEPHONE (OUTPATIENT)
Dept: UROLOGY | Age: 78
End: 2023-08-10

## 2023-08-10 RX ORDER — METOPROLOL SUCCINATE 25 MG/1
25 TABLET, EXTENDED RELEASE ORAL DAILY
Qty: 90 TABLET | Refills: 3 | Status: SHIPPED | OUTPATIENT
Start: 2023-08-10

## 2023-08-10 RX ORDER — ATORVASTATIN CALCIUM 40 MG/1
40 TABLET, FILM COATED ORAL EVERY EVENING
Qty: 90 TABLET | Refills: 3 | Status: SHIPPED | OUTPATIENT
Start: 2023-08-10

## 2023-08-10 NOTE — TELEPHONE ENCOUNTER
Prescriptions sent to pharmacy//[email or web address]  Requested Prescriptions     Signed Prescriptions Disp Refills    atorvastatin (LIPITOR) 40 MG tablet 90 tablet 3     Sig: Take 1 tablet by mouth every evening     Authorizing Provider: Alejandra Villanueva     Ordering User: Lito Melendez    metoprolol succinate (TOPROL XL) 25 MG extended release tablet 90 tablet 3     Sig: Take 1 tablet by mouth daily     Authorizing Provider: Alejandra Villanueva     Ordering User: Lito Melendez

## 2023-08-10 NOTE — TELEPHONE ENCOUNTER
Patient called this PM upset because his surgery on 23 with Dr. Mart was apparently canceled and rescheduled to 23 without explanation. He states that no person called or notified him of the change. He called the hospital to verify arrival time and was only then told that his surgery was not on the list for tomorrow. I reviewed scheduling history with the patient and his wife and it appears that Manasa Osullivan canceled his surgery on 23 15 [id number] and rescheduled him to 23. There is no record that the patient was notified of the change. I apologized for the inconvenience and informed him that we will need to do some customer service education, as he should have been notified of the cancellation / reschedule prior to the date being changed. Dino Leblanc M.D.     [facility] Urology  [facility], [address] [place] [address]  Phone: [phone]  Fax: [phone]

## 2023-08-10 NOTE — TELEPHONE ENCOUNTER
MEDICATION REFILL REQUEST      Name of Medication:  Metoprolol  Dose:  25 mg  Frequency:  QD  Quantity:  90  Days' supply:  90 with refills      Pharmacy Name/Location:  [id number]    MEDICATION REFILL REQUEST      Name of Medication:  Atorvastatin  Dose:  40  Frequency:  QD  Quantity:  90  Days' supply:  90 with refills      Pharmacy Name/Location:  [id number]

## 2023-08-24 ENCOUNTER — OFFICE VISIT (OUTPATIENT)
Age: 78
End: 2023-08-24
Payer: MEDICARE

## 2023-08-24 VITALS
WEIGHT: 199.2 LBS | SYSTOLIC BLOOD PRESSURE: 138 MMHG | HEART RATE: 76 BPM | BODY MASS INDEX: 27.89 KG/M2 | DIASTOLIC BLOOD PRESSURE: 70 MMHG | HEIGHT: 71 IN

## 2023-08-24 DIAGNOSIS — E78.2 MIXED HYPERLIPIDEMIA: ICD-10-CM

## 2023-08-24 DIAGNOSIS — Z95.2 S/P AVR: ICD-10-CM

## 2023-08-24 DIAGNOSIS — I10 BENIGN ESSENTIAL HYPERTENSION: ICD-10-CM

## 2023-08-24 DIAGNOSIS — I44.7 LBBB (LEFT BUNDLE BRANCH BLOCK): ICD-10-CM

## 2023-08-24 DIAGNOSIS — I65.23 BILATERAL CAROTID ARTERY STENOSIS: ICD-10-CM

## 2023-08-24 DIAGNOSIS — I25.10 CORONARY ARTERY DISEASE INVOLVING NATIVE CORONARY ARTERY OF NATIVE HEART WITHOUT ANGINA PECTORIS: Primary | ICD-10-CM

## 2023-08-24 PROBLEM — I65.21 RIGHT-SIDED EXTRACRANIAL CAROTID ARTERY STENOSIS: Status: RESOLVED | Noted: 2023-03-08 | Resolved: 2023-08-24

## 2023-08-24 PROCEDURE — 3075F SYST BP GE 130 - 139MM HG: CPT | Performed by: INTERNAL MEDICINE

## 2023-08-24 PROCEDURE — G8417 CALC BMI ABV UP PARAM F/U: HCPCS | Performed by: INTERNAL MEDICINE

## 2023-08-24 PROCEDURE — 3078F DIAST BP <80 MM HG: CPT | Performed by: INTERNAL MEDICINE

## 2023-08-24 PROCEDURE — 1036F TOBACCO NON-USER: CPT | Performed by: INTERNAL MEDICINE

## 2023-08-24 PROCEDURE — 99214 OFFICE O/P EST MOD 30 MIN: CPT | Performed by: INTERNAL MEDICINE

## 2023-08-24 PROCEDURE — 1123F ACP DISCUSS/DSCN MKR DOCD: CPT | Performed by: INTERNAL MEDICINE

## 2023-08-24 PROCEDURE — G8427 DOCREV CUR MEDS BY ELIG CLIN: HCPCS | Performed by: INTERNAL MEDICINE

## 2023-08-24 ASSESSMENT — ENCOUNTER SYMPTOMS: SHORTNESS OF BREATH: 0

## 2023-08-24 NOTE — PROGRESS NOTES
reviewed and summarized as noted in the HPI, past history and data review sections of this note       ASSESSMENT and PLAN      1. Bilateral carotid artery stenosis  Patient is status post carotid stenting. Continue aspirin. Defer to vascular surgery whether long-term clopidogrel therapy is indicated. Encouraged him to discuss with his vascular surgeon. 2. Coronary artery disease involving native coronary artery of native heart without angina pectoris  Patient is doing well without any anginal symptoms. Continue Aspirin 81 mg a day and PRN NTG. We discussed the importance of continued risk factor modification. The patient is encouraged to contact my office with any worsening dyspnea or chest discomfort. 3. S/P AVR  Normal function on his last echo. Normal function based on exam.    4. Mixed hyperlipidemia  Continue Lipitor. 5. Benign essential hypertension  Blood pressure well controlled. Continue Toprol and Diovan/HCTZ. 6. LBBB (left bundle branch block)  Patient with EKG consistent with conduction system disease. Currently asymptomatic. Patient to call if develops symptoms of dizziness, syncope or change in exercise tolerance. Return in about 6 months (around 2/24/2024). Trey Dixon MD  8/24/2023  8:55 AM    This note may have been dictated using speech recognition software.   As a result, error of speech recognition may have occurred

## 2023-08-29 ENCOUNTER — OFFICE VISIT (OUTPATIENT)
Dept: UROLOGY | Age: 78
End: 2023-08-29
Payer: MEDICARE

## 2023-08-29 DIAGNOSIS — N39.0 RECURRENT UTI: Primary | ICD-10-CM

## 2023-08-29 PROCEDURE — 81003 URINALYSIS AUTO W/O SCOPE: CPT | Performed by: NURSE PRACTITIONER

## 2023-08-29 RX ORDER — SULFAMETHOXAZOLE AND TRIMETHOPRIM 800; 160 MG/1; MG/1
1 TABLET ORAL 2 TIMES DAILY
Qty: 14 TABLET | Refills: 0 | Status: ON HOLD | OUTPATIENT
Start: 2023-08-29 | End: 2023-09-05

## 2023-08-29 NOTE — PROGRESS NOTES
Results for orders placed or performed in visit on 08/29/23   AMB POC URINALYSIS DIP STICK AUTO W/O MICRO   Result Value Ref Range    Color (UA POC)      Clarity (UA POC)      Glucose, Urine, POC Negative Negative    Bilirubin, Urine, POC Negative Negative    KETONES, Urine, POC Negative Negative    Specific Gravity, Urine, POC 1.020 1.001 - 1.035    Blood (UA POC) Small Negative    pH, Urine, POC 7 4.6 - 8.0    Protein, Urine, POC 30 Negative    Urobilinogen, POC 0.2 mg/dL     Nitrite, Urine, POC Negative Negative    Leukocyte Esterase, Urine, POC Small Negative     Start bactrim to ensure urine clear for upcoming procedure.    Radha Keane, APRN - CNP

## 2023-08-31 ENCOUNTER — ANESTHESIA EVENT (OUTPATIENT)
Dept: SURGERY | Age: 78
End: 2023-08-31
Payer: MEDICARE

## 2023-09-01 ENCOUNTER — ANESTHESIA (OUTPATIENT)
Dept: SURGERY | Age: 78
End: 2023-09-01
Payer: MEDICARE

## 2023-09-01 ENCOUNTER — HOSPITAL ENCOUNTER (OUTPATIENT)
Age: 78
Setting detail: OUTPATIENT SURGERY
Discharge: HOME | DRG: 988 | End: 2023-09-01
Attending: UROLOGY | Admitting: UROLOGY
Payer: MEDICARE

## 2023-09-01 VITALS
HEART RATE: 73 BPM | WEIGHT: 197.8 LBS | RESPIRATION RATE: 20 BRPM | OXYGEN SATURATION: 96 % | BODY MASS INDEX: 27.69 KG/M2 | DIASTOLIC BLOOD PRESSURE: 62 MMHG | TEMPERATURE: 97.1 F | SYSTOLIC BLOOD PRESSURE: 123 MMHG | HEIGHT: 71 IN

## 2023-09-01 DIAGNOSIS — N32.89 BLADDER MASS: ICD-10-CM

## 2023-09-01 LAB — POTASSIUM BLD-SCNC: 4.1 MMOL/L (ref 3.5–5.1)

## 2023-09-01 PROCEDURE — 0TBB8ZZ EXCISION OF BLADDER, VIA NATURAL OR ARTIFICIAL OPENING ENDOSCOPIC: ICD-10-PCS | Performed by: UROLOGY

## 2023-09-01 PROCEDURE — 7100000010 HC PHASE II RECOVERY - FIRST 15 MIN: Performed by: UROLOGY

## 2023-09-01 PROCEDURE — 2709999900 HC NON-CHARGEABLE SUPPLY: Performed by: UROLOGY

## 2023-09-01 PROCEDURE — 7100000000 HC PACU RECOVERY - FIRST 15 MIN: Performed by: UROLOGY

## 2023-09-01 PROCEDURE — 6370000000 HC RX 637 (ALT 250 FOR IP): Performed by: ANESTHESIOLOGY

## 2023-09-01 PROCEDURE — 3600000013 HC SURGERY LEVEL 3 ADDTL 15MIN: Performed by: UROLOGY

## 2023-09-01 PROCEDURE — 3700000000 HC ANESTHESIA ATTENDED CARE: Performed by: UROLOGY

## 2023-09-01 PROCEDURE — 2580000003 HC RX 258: Performed by: ANESTHESIOLOGY

## 2023-09-01 PROCEDURE — 2580000003 HC RX 258: Performed by: NURSE ANESTHETIST, CERTIFIED REGISTERED

## 2023-09-01 PROCEDURE — 7100000001 HC PACU RECOVERY - ADDTL 15 MIN: Performed by: UROLOGY

## 2023-09-01 PROCEDURE — 6360000002 HC RX W HCPCS: Performed by: NURSE ANESTHETIST, CERTIFIED REGISTERED

## 2023-09-01 PROCEDURE — 52224 CYSTOSCOPY AND TREATMENT: CPT | Performed by: UROLOGY

## 2023-09-01 PROCEDURE — 7100000011 HC PHASE II RECOVERY - ADDTL 15 MIN: Performed by: UROLOGY

## 2023-09-01 PROCEDURE — 3700000001 HC ADD 15 MINUTES (ANESTHESIA): Performed by: UROLOGY

## 2023-09-01 PROCEDURE — 6360000002 HC RX W HCPCS: Performed by: UROLOGY

## 2023-09-01 PROCEDURE — 3600000003 HC SURGERY LEVEL 3 BASE: Performed by: UROLOGY

## 2023-09-01 PROCEDURE — 2720000010 HC SURG SUPPLY STERILE: Performed by: UROLOGY

## 2023-09-01 PROCEDURE — 2500000003 HC RX 250 WO HCPCS: Performed by: NURSE ANESTHETIST, CERTIFIED REGISTERED

## 2023-09-01 PROCEDURE — 84132 ASSAY OF SERUM POTASSIUM: CPT

## 2023-09-01 PROCEDURE — 88307 TISSUE EXAM BY PATHOLOGIST: CPT

## 2023-09-01 RX ORDER — OXYCODONE HYDROCHLORIDE 5 MG/1
5 TABLET ORAL
Status: DISCONTINUED | OUTPATIENT
Start: 2023-09-01 | End: 2023-09-01 | Stop reason: HOSPADM

## 2023-09-01 RX ORDER — FENTANYL CITRATE 50 UG/ML
100 INJECTION, SOLUTION INTRAMUSCULAR; INTRAVENOUS
Status: DISCONTINUED | OUTPATIENT
Start: 2023-09-01 | End: 2023-09-01 | Stop reason: HOSPADM

## 2023-09-01 RX ORDER — SODIUM CHLORIDE 9 MG/ML
INJECTION, SOLUTION INTRAVENOUS PRN
Status: DISCONTINUED | OUTPATIENT
Start: 2023-09-01 | End: 2023-09-01 | Stop reason: HOSPADM

## 2023-09-01 RX ORDER — SODIUM CHLORIDE 0.9 % (FLUSH) 0.9 %
5-40 SYRINGE (ML) INJECTION EVERY 12 HOURS SCHEDULED
Status: DISCONTINUED | OUTPATIENT
Start: 2023-09-01 | End: 2023-09-01 | Stop reason: HOSPADM

## 2023-09-01 RX ORDER — SODIUM CHLORIDE, SODIUM LACTATE, POTASSIUM CHLORIDE, CALCIUM CHLORIDE 600; 310; 30; 20 MG/100ML; MG/100ML; MG/100ML; MG/100ML
INJECTION, SOLUTION INTRAVENOUS CONTINUOUS PRN
Status: DISCONTINUED | OUTPATIENT
Start: 2023-09-01 | End: 2023-09-01 | Stop reason: SDUPTHER

## 2023-09-01 RX ORDER — MIDAZOLAM HYDROCHLORIDE 2 MG/2ML
2 INJECTION, SOLUTION INTRAMUSCULAR; INTRAVENOUS
Status: DISCONTINUED | OUTPATIENT
Start: 2023-09-01 | End: 2023-09-01 | Stop reason: HOSPADM

## 2023-09-01 RX ORDER — HYDROMORPHONE HYDROCHLORIDE 2 MG/ML
0.5 INJECTION, SOLUTION INTRAMUSCULAR; INTRAVENOUS; SUBCUTANEOUS EVERY 10 MIN PRN
Status: DISCONTINUED | OUTPATIENT
Start: 2023-09-01 | End: 2023-09-01 | Stop reason: HOSPADM

## 2023-09-01 RX ORDER — HYDROCODONE BITARTRATE AND ACETAMINOPHEN 5; 325 MG/1; MG/1
1 TABLET ORAL EVERY 4 HOURS PRN
Qty: 10 TABLET | Refills: 0 | Status: SHIPPED | OUTPATIENT
Start: 2023-09-01 | End: 2023-09-06

## 2023-09-01 RX ORDER — SODIUM CHLORIDE, SODIUM LACTATE, POTASSIUM CHLORIDE, CALCIUM CHLORIDE 600; 310; 30; 20 MG/100ML; MG/100ML; MG/100ML; MG/100ML
INJECTION, SOLUTION INTRAVENOUS CONTINUOUS
Status: DISCONTINUED | OUTPATIENT
Start: 2023-09-01 | End: 2023-09-01 | Stop reason: HOSPADM

## 2023-09-01 RX ORDER — SCOLOPAMINE TRANSDERMAL SYSTEM 1 MG/1
1 PATCH, EXTENDED RELEASE TRANSDERMAL
Status: DISCONTINUED | OUTPATIENT
Start: 2023-09-01 | End: 2023-09-01 | Stop reason: HOSPADM

## 2023-09-01 RX ORDER — LIDOCAINE HYDROCHLORIDE 20 MG/ML
INJECTION, SOLUTION EPIDURAL; INFILTRATION; INTRACAUDAL; PERINEURAL PRN
Status: DISCONTINUED | OUTPATIENT
Start: 2023-09-01 | End: 2023-09-01 | Stop reason: SDUPTHER

## 2023-09-01 RX ORDER — ONDANSETRON 2 MG/ML
4 INJECTION INTRAMUSCULAR; INTRAVENOUS
Status: DISCONTINUED | OUTPATIENT
Start: 2023-09-01 | End: 2023-09-01 | Stop reason: HOSPADM

## 2023-09-01 RX ORDER — DIPHENHYDRAMINE HYDROCHLORIDE 50 MG/ML
12.5 INJECTION INTRAMUSCULAR; INTRAVENOUS
Status: DISCONTINUED | OUTPATIENT
Start: 2023-09-01 | End: 2023-09-01 | Stop reason: HOSPADM

## 2023-09-01 RX ORDER — SODIUM CHLORIDE 0.9 % (FLUSH) 0.9 %
5-40 SYRINGE (ML) INJECTION PRN
Status: DISCONTINUED | OUTPATIENT
Start: 2023-09-01 | End: 2023-09-01 | Stop reason: HOSPADM

## 2023-09-01 RX ORDER — FENTANYL CITRATE 50 UG/ML
INJECTION, SOLUTION INTRAMUSCULAR; INTRAVENOUS PRN
Status: DISCONTINUED | OUTPATIENT
Start: 2023-09-01 | End: 2023-09-01 | Stop reason: SDUPTHER

## 2023-09-01 RX ORDER — METOCLOPRAMIDE HYDROCHLORIDE 5 MG/ML
10 INJECTION INTRAMUSCULAR; INTRAVENOUS
Status: DISCONTINUED | OUTPATIENT
Start: 2023-09-01 | End: 2023-09-01 | Stop reason: HOSPADM

## 2023-09-01 RX ORDER — FENTANYL CITRATE 50 UG/ML
25 INJECTION, SOLUTION INTRAMUSCULAR; INTRAVENOUS EVERY 5 MIN PRN
Status: DISCONTINUED | OUTPATIENT
Start: 2023-09-01 | End: 2023-09-01 | Stop reason: HOSPADM

## 2023-09-01 RX ORDER — EPHEDRINE SULFATE/0.9% NACL/PF 50 MG/5 ML
SYRINGE (ML) INTRAVENOUS PRN
Status: DISCONTINUED | OUTPATIENT
Start: 2023-09-01 | End: 2023-09-01 | Stop reason: SDUPTHER

## 2023-09-01 RX ORDER — ONDANSETRON 2 MG/ML
INJECTION INTRAMUSCULAR; INTRAVENOUS PRN
Status: DISCONTINUED | OUTPATIENT
Start: 2023-09-01 | End: 2023-09-01 | Stop reason: SDUPTHER

## 2023-09-01 RX ORDER — DEXAMETHASONE SODIUM PHOSPHATE 4 MG/ML
INJECTION, SOLUTION INTRA-ARTICULAR; INTRALESIONAL; INTRAMUSCULAR; INTRAVENOUS; SOFT TISSUE PRN
Status: DISCONTINUED | OUTPATIENT
Start: 2023-09-01 | End: 2023-09-01 | Stop reason: SDUPTHER

## 2023-09-01 RX ORDER — PROPOFOL 10 MG/ML
INJECTION, EMULSION INTRAVENOUS PRN
Status: DISCONTINUED | OUTPATIENT
Start: 2023-09-01 | End: 2023-09-01 | Stop reason: SDUPTHER

## 2023-09-01 RX ORDER — PHENAZOPYRIDINE 200 MG/1
200 TABLET, FILM COATED ORAL 3 TIMES DAILY PRN
Qty: 12 TABLET | Refills: 2 | Status: SHIPPED | OUTPATIENT
Start: 2023-09-01 | End: 2023-09-15

## 2023-09-01 RX ADMIN — ONDANSETRON 4 MG: 2 INJECTION INTRAMUSCULAR; INTRAVENOUS at 08:14

## 2023-09-01 RX ADMIN — SODIUM CHLORIDE, POTASSIUM CHLORIDE, SODIUM LACTATE AND CALCIUM CHLORIDE: 600; 310; 30; 20 INJECTION, SOLUTION INTRAVENOUS at 06:45

## 2023-09-01 RX ADMIN — PHENYLEPHRINE HYDROCHLORIDE 200 MCG: 10 INJECTION INTRAVENOUS at 08:20

## 2023-09-01 RX ADMIN — DEXAMETHASONE SODIUM PHOSPHATE 4 MG: 4 INJECTION INTRA-ARTICULAR; INTRALESIONAL; INTRAMUSCULAR; INTRAVENOUS; SOFT TISSUE at 08:14

## 2023-09-01 RX ADMIN — PHENYLEPHRINE HYDROCHLORIDE 100 MCG: 10 INJECTION INTRAVENOUS at 08:14

## 2023-09-01 RX ADMIN — LIDOCAINE HYDROCHLORIDE 100 MG: 20 INJECTION, SOLUTION EPIDURAL; INFILTRATION; INTRACAUDAL; PERINEURAL at 08:10

## 2023-09-01 RX ADMIN — PROPOFOL 150 MG: 10 INJECTION, EMULSION INTRAVENOUS at 08:10

## 2023-09-01 RX ADMIN — Medication 5 MG: at 08:24

## 2023-09-01 RX ADMIN — Medication 2 G: at 08:17

## 2023-09-01 RX ADMIN — Medication 5 MG: at 08:21

## 2023-09-01 RX ADMIN — SODIUM CHLORIDE, SODIUM LACTATE, POTASSIUM CHLORIDE, AND CALCIUM CHLORIDE: 600; 310; 30; 20 INJECTION, SOLUTION INTRAVENOUS at 08:00

## 2023-09-01 RX ADMIN — FENTANYL CITRATE 50 MCG: 50 INJECTION, SOLUTION INTRAMUSCULAR; INTRAVENOUS at 08:04

## 2023-09-01 ASSESSMENT — PAIN - FUNCTIONAL ASSESSMENT
PAIN_FUNCTIONAL_ASSESSMENT: 0-10
PAIN_FUNCTIONAL_ASSESSMENT: NONE - DENIES PAIN

## 2023-09-01 NOTE — ANESTHESIA POSTPROCEDURE EVALUATION
Department of Anesthesiology  Postprocedure Note    Patient: Simon Gonsales  MRN: [id number]  YOB: 1945  Date of evaluation: 9/1/2023      Procedure Summary     Date: 09/01/23 Room / Location: [facility] MAIN OR  [facility] / [facility] MAIN OR    Anesthesia Start: 0800 Anesthesia Stop: 8533    Procedure: CYSTOSCOPY TRANSURETHRAL RESECTION BLADDER (Penis) Diagnosis:       Bladder mass      (Bladder mass [N32.89])    Providers: Jimenez Rivas MD Responsible Provider: Danielito Olsen MD    Anesthesia Type: General ASA Status: 3          Anesthesia Type: General    Charles Phase I: Charles Score: 8    Charles Phase II:        Anesthesia Post Evaluation    Patient location during evaluation: PACU  Patient participation: complete - patient participated  Level of consciousness: awake and awake and alert  Airway patency: patent  Nausea & Vomiting: no nausea  Complications: no  Cardiovascular status: hemodynamically stable  Respiratory status: acceptable  Hydration status: euvolemic  Multimodal analgesia pain management approach  Pain management: adequate

## 2023-09-01 NOTE — ANESTHESIA POSTPROCEDURE EVALUATION
Department of Anesthesiology  Postprocedure Note    Patient: Tena Lopez  MRN: [id number]  YOB: 1945  Date of evaluation: 9/1/2023      Procedure Summary     Date: 09/01/23 Room / Location: [facility] MAIN OR 01 [facility] MAIN OR    Anesthesia Start: 0800 Anesthesia Stop: 2802    Procedure: CYSTOSCOPY TRANSURETHRAL RESECTION BLADDER (Penis) Diagnosis:       Bladder mass      (Bladder mass [N32.89])    Providers: Darby Martínez MD Responsible Provider: Savana Salamanca MD    Anesthesia Type: General ASA Status: 3          Anesthesia Type: General    Charles Phase I: Charles Score: 8    Charles Phase II: Charles Score: 10      Anesthesia Post Evaluation    Patient location during evaluation: PACU  Patient participation: complete - patient participated  Level of consciousness: awake and awake and alert  Airway patency: patent  Nausea & Vomiting: no nausea  Complications: no  Cardiovascular status: hemodynamically stable  Respiratory status: acceptable  Hydration status: euvolemic  Multimodal analgesia pain management approach  Pain management: adequate

## 2023-09-01 NOTE — OP NOTE
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 PROCEDURE PERFORMED:  Transurethral resection of bladder mass. SURGEON:  Lee Ann Hood MD    ASSISTANT:  None. ANESTHESIA:  General.    COMPLICATIONS:  None. SPECIMENS REMOVED:  Fragments of bladder mass for permanent path. IMPLANTS:  ***. ESTIMATED BLOOD LOSS:  Minimal.    DRAINS:  None. PROCEDURE:  The patient was taken to the OR and after adequate general anesthesia was achieved, he was placed in dorsal lithotomy position and prepped and draped in the usual sterile fashion for a cystoscopy case. The urethra was gently sounded with Austin Serrano sounds from 14-Vincentian up to 28-Vincentian and a 26-Vincentian resectoscope sheath with obturator was advanced through the urethra and into the bladder. Once within the bladder, the obturator was removed with clear return of urine. Curly Amend working element with standard loop was then inserted and bladder was inspected. There was an area of erythema on the dome to the left of the midline. This was probably about 3 cm in size and in the middle of this, there was an inflammatory mass which was very irregular and probably represent a fistula. I resected this partially and got three fragments of this which were sent for permanent path. The area was thoroughly fulgurated. Hemostasis was excellent. The bladder was then thoroughly emptied. All instruments were removed. The patient was taken down out of dorsal lithotomy position, awakened, extubated, and taken from the OR without any further incident or complaint.       Lee Ann Hood MD      [id number]  D:  2023 9:19  T:

## 2023-09-01 NOTE — CONSULTS
HISTORY AND PHYSICAL             Date: 9/1/2023        Patient Name: Amber Bronson     YOB: 1945      Age:  76 y.o. History of Present Illness     The patient has been having pneumaturia and recently had a episode of diverticulitis. CT scan on May 8, 2023 showed extensive diverticulosis with stranding around the sigmoid colon. There is no free fluid or free air within the pelvis but there is air within the bladder. This is very suggestive of colovesical fistula. The patient has not had fevers and has minimal abdominal pain. Urine culture from May 17 grew out greater than 100,000 colonies of E. coli. Urine culture on June 20 showed mixed skin fabiola. Past Medical History     Past Medical History:   Diagnosis Date    Aortic stenosis, moderate 05/12/2021    AVR AND CABG -- followed by dr Anupama Guerrier    Asthma     childhood    CAD (coronary artery disease)     CABG 5-4-2021    GERD (gastroesophageal reflux disease)     with certain foods - no meds     High cholesterol     Hypertension     controlled with med    Prostate cancer [facility])     radiation--    Seasonal allergic rhinitis         Past Surgical History     Past Surgical History:   Procedure Laterality Date    CAROTID STENT PLACEMENT Right 3/8/2023    RIGHT TCAR - CAROTID STENT PLACEMENT performed by Cherelle Snow MD at [address] [place] [address]  2016    polyps--due in 2021    CORNEAL TRANSPLANT Bilateral last one 2017    followed by dr Abram Primrose  05/04/2021    HEMORRHOID SURGERY  2004    ORTHOPEDIC SURGERY Left 2016    great toe surg        Medications Prior to Admission     Prior to Admission medications    Medication Sig Start Date End Date Taking?  Authorizing Provider   sulfamethoxazole-trimethoprim (BACTRIM DS;SEPTRA DS) 800-160 MG per tablet Take 1 tablet by mouth 2 times daily for 7 days 8/29/23 9/5/23  Jalyn Sanchez APRN - CNP   atorvastatin (LIPITOR) 40 MG tablet Take 1 tablet by

## 2023-09-01 NOTE — ANESTHESIA PROCEDURE NOTES
Airway  Date/Time: 9/1/2023 8:12 AM  Urgency: elective    Airway not difficult    General Information and Staff    Patient location during procedure: OR  Resident/CRNA: TAMIKA Bella - CRNA  Performed: resident/CRNA     Indications and Patient Condition  Indications for airway management: anesthesia  Spontaneous Ventilation: absent  Sedation level: deep  Preoxygenated: yes  Patient position: sniffing  Mask difficulty assessment: vent by bag mask    Final Airway Details  Final airway type: supraglottic airway      Successful airway: oropharyngeal  Size 4     Number of attempts at approach: 1  Ventilation between attempts: supraglottic airway  Number of other approaches attempted: 0    Additional Comments  Atraumatic. Oral structures and dentition unchanged.

## 2023-09-01 NOTE — BRIEF OP NOTE
Brief Postoperative Note      Patient: Zaid Fontaine  YOB: 1945  MRN: [id number]    Date of Procedure: 9/1/2023    Pre-Op Diagnosis Codes:      * Bladder mass [N32.89]    Post-Op Diagnosis: Same       Procedure(s):  CYSTOSCOPY TRANSURETHRAL RESECTION BLADDER    Surgeon(s):  Carlos Hoffman MD    Assistant:None    Anesthesia: General    Estimated Blood Loss (mL): Minimal    Complications: None    Specimens:   ID Type Source Tests Collected by Time Destination   A : bladder tumor Tissue Bladder SURGICAL PATHOLOGY Carlos Hoffman MD 9/1/2023 6526         Drains: None      Electronically signed by Carlos Hoffman MD on 9/1/2023 at 9:02 AM

## 2023-09-01 NOTE — DISCHARGE INSTRUCTIONS
If you have had surgery in the past 7-10 days by one of our providers and are having fever, bleeding, or drainage from an incision, have an opening in an incision, or having issues urinating properly, please call [phone]. Transurethral Resection for Bladder    You have had a transurethral resection (TUR) of the bladder. You may have a small tube called a catheter in your urethra to help stop bleeding and to prevent blockage of the urethra. When the bleeding has stopped, the tube is removed. You may feel the need to urinate frequently for a while after the surgery, but this should improve with time. It may burn when you urinate. Drink lots of fluids to help with the burning. Your urine also may look pink for up to 2 to 3 weeks after surgery. This is because there may be blood in it. You may have to avoid strenuous activity and heavy lifting for about 3 weeks after TUR. This care sheet gives you a general idea about how long it will take for you to recover. But each person recovers at a different pace. Follow the steps below to feel better as quickly as possible. How can you care for yourself at home? Activity  Rest when you feel tired. Getting enough sleep will help you recover. Try to walk each day. Start by walking a little more than you did the day before. Bit by bit, increase the amount you walk. Walking boosts blood flow and helps prevent pneumonia and constipation. Please limit your activities for 3 days. Avoid strenuous activities, such as bicycle riding, jogging, weight lifting, or aerobic exercise, for about 3 weeks, or until your doctor says it is okay. For about 3 weeks, avoid lifting anything that would make you strain. This may include heavy grocery bags and milk containers, a heavy briefcase or backpack, cat litter or dog food bags, a vacuum , or a child. Ask your doctor when you can drive again. You may shower and take baths when your doctor says it is okay.   Ask your doctor take.    When should you call for help? Call 911 anytime you think you may need emergency care. For example, call if:  You passed out (lost consciousness). You have severe trouble breathing. You have sudden chest pain and shortness of breath, or you cough up blood. You have severe pain in your belly. Call your doctor now or seek immediate medical care if:  You are sick to your stomach or cannot keep fluids down. You have trouble passing urine or stool, especially if you have pain or swelling in your lower belly. You have signs of a blood clot, such as:  Pain in your calf, back of the knee, thigh, or groin. Redness and swelling in your leg or groin. You have fever or severe chills. You have pain in your back just below your rib cage. This is called flank pain. Watch closely for any changes in your health, and be sure to contact your doctor if:  You have pain or burning when you urinate. A burning feeling is normal for a day or two after the surgery, but call if it does not get better. The blood has not cleared out of your urine after several weeks. You have a frequent urge to urinate but can pass only small amounts of urine. After general anesthesia or intravenous sedation, for 24 hours or while taking prescription Narcotics:  Limit your activities  A responsible adult needs to be with you for the next 24 hours  Do not drive and operate hazardous machinery  Do not make important personal or business decisions  Do not drink alcoholic beverages  If you have not urinated within 8 hours after discharge, and you are experiencing discomfort from urinary retention, please go to the nearest ED. If you have sleep apnea and have a CPAP machine, please use it for all naps and sleeping. Please use caution when taking narcotics and any of your home medications that may cause drowsiness. *  Please give a list of your current medications to your Primary Care Provider.   *  Please update this list whenever your

## 2023-09-03 ENCOUNTER — APPOINTMENT (OUTPATIENT)
Dept: GENERAL RADIOLOGY | Age: 78
DRG: 988 | End: 2023-09-03
Payer: MEDICARE

## 2023-09-03 ENCOUNTER — APPOINTMENT (OUTPATIENT)
Dept: CT IMAGING | Age: 78
DRG: 988 | End: 2023-09-03
Payer: MEDICARE

## 2023-09-03 ENCOUNTER — HOSPITAL ENCOUNTER (INPATIENT)
Age: 78
LOS: 2 days | Discharge: HOME | DRG: 988 | End: 2023-09-05
Attending: EMERGENCY MEDICINE | Admitting: HOSPITALIST
Payer: MEDICARE

## 2023-09-03 DIAGNOSIS — R41.0 DELIRIUM: Primary | ICD-10-CM

## 2023-09-03 PROBLEM — G93.40 ACUTE ENCEPHALOPATHY: Status: ACTIVE | Noted: 2023-09-03

## 2023-09-03 LAB
ALBUMIN SERPL-MCNC: 3.4 G/DL (ref 3.2–4.6)
ALBUMIN/GLOB SERPL: 1 (ref 0.4–1.6)
ALP SERPL-CCNC: 73 U/L (ref 50–136)
ALT SERPL-CCNC: 27 U/L (ref 12–65)
AMMONIA PLAS-SCNC: 24 UMOL/L (ref 11–32)
ANION GAP SERPL CALC-SCNC: 5 MMOL/L (ref 2–11)
APPEARANCE UR: CLEAR
AST SERPL-CCNC: 32 U/L (ref 15–37)
BACTERIA URNS QL MICRO: 0 /HPF
BASOPHILS # BLD: 0 K/UL (ref 0–0.2)
BASOPHILS NFR BLD: 0 % (ref 0–2)
BILIRUB SERPL-MCNC: 0.4 MG/DL (ref 0.2–1.1)
BILIRUB UR QL: NEGATIVE
BUN SERPL-MCNC: 27 MG/DL (ref 8–23)
CALCIUM SERPL-MCNC: 9.1 MG/DL (ref 8.3–10.4)
CHLORIDE SERPL-SCNC: 108 MMOL/L (ref 101–110)
CO2 SERPL-SCNC: 26 MMOL/L (ref 21–32)
COLOR UR: ABNORMAL
CREAT SERPL-MCNC: 1.8 MG/DL (ref 0.8–1.5)
DIFFERENTIAL METHOD BLD: ABNORMAL
EOSINOPHIL # BLD: 0.4 K/UL (ref 0–0.8)
EOSINOPHIL NFR BLD: 4 % (ref 0.5–7.8)
ERYTHROCYTE [DISTWIDTH] IN BLOOD BY AUTOMATED COUNT: 13.1 % (ref 11.9–14.6)
GLOBULIN SER CALC-MCNC: 3.3 G/DL (ref 2.8–4.5)
GLUCOSE SERPL-MCNC: 110 MG/DL (ref 65–100)
GLUCOSE UR STRIP.AUTO-MCNC: NEGATIVE MG/DL
HCT VFR BLD AUTO: 40.5 % (ref 41.1–50.3)
HGB BLD-MCNC: 13 G/DL (ref 13.6–17.2)
HGB UR QL STRIP: ABNORMAL
IMM GRANULOCYTES # BLD AUTO: 0 K/UL (ref 0–0.5)
IMM GRANULOCYTES NFR BLD AUTO: 0 % (ref 0–5)
KETONES UR QL STRIP.AUTO: NEGATIVE MG/DL
LACTATE SERPL-SCNC: 1.8 MMOL/L (ref 0.4–2)
LEUKOCYTE ESTERASE UR QL STRIP.AUTO: NEGATIVE
LYMPHOCYTES # BLD: 2.2 K/UL (ref 0.5–4.6)
LYMPHOCYTES NFR BLD: 19 % (ref 13–44)
MCH RBC QN AUTO: 30.2 PG (ref 26.1–32.9)
MCHC RBC AUTO-ENTMCNC: 32.1 G/DL (ref 31.4–35)
MCV RBC AUTO: 94.2 FL (ref 82–102)
MONOCYTES # BLD: 1 K/UL (ref 0.1–1.3)
MONOCYTES NFR BLD: 8 % (ref 4–12)
NEUTS SEG # BLD: 8.2 K/UL (ref 1.7–8.2)
NEUTS SEG NFR BLD: 69 % (ref 43–78)
NITRITE UR QL STRIP.AUTO: POSITIVE
NRBC # BLD: 0 K/UL (ref 0–0.2)
OTHER OBSERVATIONS: ABNORMAL
PH UR STRIP: 6.5 (ref 5–9)
PLATELET # BLD AUTO: 123 K/UL (ref 150–450)
PMV BLD AUTO: 12.1 FL (ref 9.4–12.3)
POTASSIUM SERPL-SCNC: 4.8 MMOL/L (ref 3.5–5.1)
PROCALCITONIN SERPL-MCNC: <0.05 NG/ML (ref 0–0.49)
PROT SERPL-MCNC: 6.7 G/DL (ref 6.3–8.2)
PROT UR STRIP-MCNC: NEGATIVE MG/DL
RBC # BLD AUTO: 4.3 M/UL (ref 4.23–5.6)
RBC #/AREA URNS HPF: ABNORMAL /HPF
SODIUM SERPL-SCNC: 139 MMOL/L (ref 133–143)
SP GR UR REFRACTOMETRY: 1.01 (ref 1–1.02)
TROPONIN I SERPL HS-MCNC: 21.6 PG/ML (ref 0–14)
UROBILINOGEN UR QL STRIP.AUTO: 1 EU/DL (ref 0.2–1)
WBC # BLD AUTO: 11.9 K/UL (ref 4.3–11.1)
WBC URNS QL MICRO: ABNORMAL /HPF

## 2023-09-03 PROCEDURE — 2580000003 HC RX 258: Performed by: HOSPITALIST

## 2023-09-03 PROCEDURE — 6370000000 HC RX 637 (ALT 250 FOR IP): Performed by: HOSPITALIST

## 2023-09-03 PROCEDURE — 83605 ASSAY OF LACTIC ACID: CPT

## 2023-09-03 PROCEDURE — 80053 COMPREHEN METABOLIC PANEL: CPT

## 2023-09-03 PROCEDURE — 87086 URINE CULTURE/COLONY COUNT: CPT

## 2023-09-03 PROCEDURE — 84145 PROCALCITONIN (PCT): CPT

## 2023-09-03 PROCEDURE — 70450 CT HEAD/BRAIN W/O DYE: CPT

## 2023-09-03 PROCEDURE — 71045 X-RAY EXAM CHEST 1 VIEW: CPT

## 2023-09-03 PROCEDURE — 36415 COLL VENOUS BLD VENIPUNCTURE: CPT

## 2023-09-03 PROCEDURE — 93005 ELECTROCARDIOGRAM TRACING: CPT | Performed by: EMERGENCY MEDICINE

## 2023-09-03 PROCEDURE — 84484 ASSAY OF TROPONIN QUANT: CPT

## 2023-09-03 PROCEDURE — 81001 URINALYSIS AUTO W/SCOPE: CPT

## 2023-09-03 PROCEDURE — 1100000000 HC RM PRIVATE

## 2023-09-03 PROCEDURE — 85025 COMPLETE CBC W/AUTO DIFF WBC: CPT

## 2023-09-03 PROCEDURE — 87040 BLOOD CULTURE FOR BACTERIA: CPT

## 2023-09-03 PROCEDURE — 82140 ASSAY OF AMMONIA: CPT

## 2023-09-03 PROCEDURE — 6360000002 HC RX W HCPCS: Performed by: HOSPITALIST

## 2023-09-03 PROCEDURE — 99285 EMERGENCY DEPT VISIT HI MDM: CPT

## 2023-09-03 RX ORDER — SODIUM CHLORIDE 0.9 % (FLUSH) 0.9 %
5-40 SYRINGE (ML) INJECTION EVERY 12 HOURS SCHEDULED
Status: DISCONTINUED | OUTPATIENT
Start: 2023-09-03 | End: 2023-09-05 | Stop reason: HOSPADM

## 2023-09-03 RX ORDER — ONDANSETRON 4 MG/1
4 TABLET, ORALLY DISINTEGRATING ORAL EVERY 8 HOURS PRN
Status: DISCONTINUED | OUTPATIENT
Start: 2023-09-03 | End: 2023-09-05 | Stop reason: HOSPADM

## 2023-09-03 RX ORDER — ALPRAZOLAM 0.5 MG/1
1 TABLET ORAL 3 TIMES DAILY PRN
Status: DISCONTINUED | OUTPATIENT
Start: 2023-09-03 | End: 2023-09-05 | Stop reason: HOSPADM

## 2023-09-03 RX ORDER — ASPIRIN 81 MG/1
81 TABLET ORAL EVERY MORNING
Status: DISCONTINUED | OUTPATIENT
Start: 2023-09-04 | End: 2023-09-05 | Stop reason: HOSPADM

## 2023-09-03 RX ORDER — ACETAMINOPHEN 325 MG/1
650 TABLET ORAL EVERY 6 HOURS PRN
Status: DISCONTINUED | OUTPATIENT
Start: 2023-09-03 | End: 2023-09-05 | Stop reason: HOSPADM

## 2023-09-03 RX ORDER — SODIUM CHLORIDE 9 MG/ML
INJECTION, SOLUTION INTRAVENOUS CONTINUOUS
Status: ACTIVE | OUTPATIENT
Start: 2023-09-03 | End: 2023-09-05

## 2023-09-03 RX ORDER — HYDROCHLOROTHIAZIDE 25 MG/1
12.5 TABLET ORAL DAILY
Status: DISCONTINUED | OUTPATIENT
Start: 2023-09-04 | End: 2023-09-05 | Stop reason: HOSPADM

## 2023-09-03 RX ORDER — ACETAMINOPHEN 650 MG/1
650 SUPPOSITORY RECTAL EVERY 6 HOURS PRN
Status: DISCONTINUED | OUTPATIENT
Start: 2023-09-03 | End: 2023-09-05 | Stop reason: HOSPADM

## 2023-09-03 RX ORDER — VALSARTAN 160 MG/1
160 TABLET ORAL DAILY
Status: DISCONTINUED | OUTPATIENT
Start: 2023-09-04 | End: 2023-09-05 | Stop reason: HOSPADM

## 2023-09-03 RX ORDER — SODIUM CHLORIDE 0.9 % (FLUSH) 0.9 %
5-40 SYRINGE (ML) INJECTION PRN
Status: DISCONTINUED | OUTPATIENT
Start: 2023-09-03 | End: 2023-09-05 | Stop reason: HOSPADM

## 2023-09-03 RX ORDER — METOPROLOL SUCCINATE 25 MG/1
25 TABLET, EXTENDED RELEASE ORAL DAILY
Status: DISCONTINUED | OUTPATIENT
Start: 2023-09-03 | End: 2023-09-05 | Stop reason: HOSPADM

## 2023-09-03 RX ORDER — SODIUM CHLORIDE 9 MG/ML
INJECTION, SOLUTION INTRAVENOUS PRN
Status: DISCONTINUED | OUTPATIENT
Start: 2023-09-03 | End: 2023-09-05 | Stop reason: HOSPADM

## 2023-09-03 RX ORDER — ONDANSETRON 2 MG/ML
4 INJECTION INTRAMUSCULAR; INTRAVENOUS EVERY 6 HOURS PRN
Status: DISCONTINUED | OUTPATIENT
Start: 2023-09-03 | End: 2023-09-05 | Stop reason: HOSPADM

## 2023-09-03 RX ORDER — VALSARTAN AND HYDROCHLOROTHIAZIDE 160; 12.5 MG/1; MG/1
1 TABLET, FILM COATED ORAL DAILY
Status: DISCONTINUED | OUTPATIENT
Start: 2023-09-03 | End: 2023-09-03 | Stop reason: SDUPTHER

## 2023-09-03 RX ORDER — POLYETHYLENE GLYCOL 3350 17 G/17G
17 POWDER, FOR SOLUTION ORAL DAILY PRN
Status: DISCONTINUED | OUTPATIENT
Start: 2023-09-03 | End: 2023-09-05 | Stop reason: HOSPADM

## 2023-09-03 RX ADMIN — SODIUM CHLORIDE, PRESERVATIVE FREE 10 ML: 5 INJECTION INTRAVENOUS at 15:02

## 2023-09-03 RX ADMIN — CEFTRIAXONE 1000 MG: 1 INJECTION, POWDER, FOR SOLUTION INTRAMUSCULAR; INTRAVENOUS at 14:44

## 2023-09-03 RX ADMIN — METOPROLOL SUCCINATE 25 MG: 25 TABLET, EXTENDED RELEASE ORAL at 21:31

## 2023-09-03 RX ADMIN — SODIUM CHLORIDE: 9 INJECTION, SOLUTION INTRAVENOUS at 14:54

## 2023-09-03 ASSESSMENT — ENCOUNTER SYMPTOMS
ABDOMINAL PAIN: 0
COUGH: 0
VOMITING: 0

## 2023-09-03 ASSESSMENT — LIFESTYLE VARIABLES
HOW MANY STANDARD DRINKS CONTAINING ALCOHOL DO YOU HAVE ON A TYPICAL DAY: PATIENT DOES NOT DRINK
HOW OFTEN DO YOU HAVE A DRINK CONTAINING ALCOHOL: NEVER

## 2023-09-03 ASSESSMENT — PAIN SCALES - GENERAL: PAINLEVEL_OUTOF10: 0

## 2023-09-03 ASSESSMENT — PAIN - FUNCTIONAL ASSESSMENT: PAIN_FUNCTIONAL_ASSESSMENT: 0-10

## 2023-09-03 NOTE — PROGRESS NOTES
Patient arrived via stretcher, ambulated to bed with unsteady gait, assist X 1, wife accompanied pt, admission in progress. 1500: Admission data base 95% completed, wife will return and remainder of questions will need to be answered. Pt very forgetful and has gotten up OOB several times without calling for staff. Bed alarm on, pt has non-skid socks on, siderails up X 2, fall risk light on and patient has been reminded to [facility] before you fall\" and verbalizes understanding. However, pt continues to get OOB.

## 2023-09-03 NOTE — ED PROVIDER NOTES
Emergency Department Provider Note       PCP: Robyn Bhatti MD   Age: 76 y.o. Sex: male     DISPOSITION Decision To Admit 09/03/2023 11:29:35 AM       ICD-10-CM    1. Delirium  R41.0           Medical Decision Making   Apparent delirium in a patient less than 48 hours postop. Spouse says this occurred somewhat after his heart surgery as well. Potential with issues with medication as patient has recently taken some hydrocodone. Also taking Pyridium and is on Septra according to records. Will need assessment for dehydration, infection, electrolyte abnormalities. Check EEG. Check head CT. Complexity of Problems Addressed:  1 or more acute illnesses that pose a threat to life or bodily function. Data Reviewed and Analyzed:   I independently ordered and reviewed each unique test.  I reviewed external records: provider visit note from PCP. I reviewed external records: provider visit note from outside specialist.  Operative report from urologist with outpatient surgery for bladder mass done 48 hours ago. Cardiology notes regarding aortic valve replacement and previous heart surgery. On Plavix. The patients assessment required an independent historian: Patient's spouse. The reason they were needed is  an altered level of consciousness. I independently ordered and interpreted the ED EKG in the absence of a Cardiologist.    Rate: 75  EKG Interpretation: EKG Interpretation: left bundle branch block  ST Segments: Nonspecific ST segments - NO STEMI  I interpreted the X-rays agree with radiologist.  No infiltrates. I interpreted the CT Scan agree with radiologist.  No evidence for bleed. Discussion of management or test interpretation. Patient appears to have some postoperative delirium. Possibly related to medication. No obvious source of infection. No obvious stroke. Will discuss with hospitalist regarding observation admission. Stress of surgery may have brought out some worsening dementia.   The (H) 4.3 - 11.1 K/uL    RBC 4.30 4.23 - 5.6 M/uL    Hemoglobin 13.0 (L) 13.6 - 17.2 g/dL    Hematocrit 40.5 (L) 41.1 - 50.3 %    MCV 94.2 82 - 102 FL    MCH 30.2 26.1 - 32.9 PG    MCHC 32.1 31.4 - 35.0 g/dL    RDW 13.1 11.9 - 14.6 %    Platelets 491 (L) 760 - 450 K/uL    MPV 12.1 9.4 - 12.3 FL    nRBC 0.00 0.0 - 0.2 K/uL    Differential Type AUTOMATED      Neutrophils % 69 43 - 78 %    Lymphocytes % 19 13 - 44 %    Monocytes % 8 4.0 - 12.0 %    Eosinophils % 4 0.5 - 7.8 %    Basophils % 0 0.0 - 2.0 %    Immature Granulocytes 0 0.0 - 5.0 %    Neutrophils Absolute 8.2 1.7 - 8.2 K/UL    Lymphocytes Absolute 2.2 0.5 - 4.6 K/UL    Monocytes Absolute 1.0 0.1 - 1.3 K/UL    Eosinophils Absolute 0.4 0.0 - 0.8 K/UL    Basophils Absolute 0.0 0.0 - 0.2 K/UL    Absolute Immature Granulocyte 0.0 0.0 - 0.5 K/UL   Ammonia   Result Value Ref Range    Ammonia 24 11 - 32 UMOL/L   Urinalysis   Result Value Ref Range    Color, UA DARK YELLOW      Appearance CLEAR      Specific [place], UA 1.011 1.001 - 1.023      pH, Urine 6.5 5.0 - 9.0      Protein, UA Negative NEG mg/dL    Glucose, UA Negative mg/dL    Ketones, Urine Negative NEG mg/dL    Bilirubin Urine Negative NEG      Blood, Urine SMALL (A) NEG      Urobilinogen, Urine 1.0 0.2 - 1.0 EU/dL    Nitrite, Urine Positive (A) NEG      Leukocyte Esterase, Urine Negative NEG      WBC, UA 0-3 0 /hpf    RBC, UA 5-10 0 /hpf    BACTERIA, URINE 0 0 /hpf    Other observations RESULTS VERIFIED MANUALLY     Lactic Acid   Result Value Ref Range    Lactic Acid, Plasma 1.8 0.4 - 2.0 MMOL/L   Procalcitonin   Result Value Ref Range    Procalcitonin <0.05 0.00 - 0.49 ng/mL        CT HEAD WO CONTRAST   Final Result   1. No acute intracranial abnormality. 2. Moderate patchy white matter hypoattenuation noted bilaterally. This can be   seen with chronic microangiopathic disease      XR CHEST PORTABLE   Final Result   No acute abnormality.            NIH Stroke Scale  Interval: Baseline  Level of

## 2023-09-03 NOTE — PLAN OF CARE
Problem: Discharge Planning  Goal: Discharge to home or other facility with appropriate resources  Outcome: Progressing  Flowsheets (Taken 9/3/2023 1256)  Discharge to home or other facility with appropriate resources: Identify barriers to discharge with patient and caregiver     Problem: Pain  Goal: Verbalizes/displays adequate comfort level or baseline comfort level  9/3/2023 1504 by Mena Randall RN  Flowsheets (Taken 9/3/2023 1504)  Verbalizes/displays adequate comfort level or baseline comfort level: Encourage patient to monitor pain and request assistance  9/3/2023 1503 by Mena Randall RN  Outcome: Progressing     Problem: Safety - Adult  Goal: Free from fall injury  Outcome: Progressing  Flowsheets (Taken 9/3/2023 1414)  Free From Fall Injury:   Instruct family/caregiver on patient safety   Based on caregiver fall risk screen, instruct family/caregiver to ask for assistance with transferring infant if caregiver noted to have fall risk factors

## 2023-09-03 NOTE — H&P
[unfilled]    INTERNAL MEDICINE H&P/CONSULT    Subjective:     53-year-old male  w/ hx of recent UB mass and cystoscopy 48h ago, LBBB, CABG , AVR, HTN and bilateral carotid stenosis, brought in by his spouse. She states approximately 36-hour history of increasing confusion. Patient has some issues with some delusions, not thinking he had surgery that he did actually have outpatient surgery. He has had 4 falls as well. Unsure of any head impact or loss of consciousness. Increasing confusion as well. Patient has cystoscopy scope done for bladder mass approximately 48 hours ago. Patient states he is not really not having any pain. He does know its September 2023. Does seem to have some confusion about more recent events. No shortness of breath chest pain. No abdominal pain. No fever chills vomiting or diarrhea. No focal numbness or weakness. Patient's spouse states patient has a history of hypertension. Also coronary disease with bypass grafting aortic valve replacement in the past.  Also I believe a carotid endarterectomy. She states no stroke or TIA prior to this. Also there is a history of prostate cancer as well. On further questioning, pt appears anxious but oriented. No specific localized neurological deficits reported. He had falls, makes urine.     Past Medical History:   Diagnosis Date    Aortic stenosis, moderate 05/12/2021    AVR AND CABG -- followed by dr Anupama Guerrier    Asthma     childhood    CAD (coronary artery disease)     CABG 5-4-2021    GERD (gastroesophageal reflux disease)     with certain foods - no meds     High cholesterol     Hypertension     controlled with med    Prostate cancer [facility])     radiation--    Seasonal allergic rhinitis       Past Surgical History:   Procedure Laterality Date    CAROTID STENT PLACEMENT Right 3/8/2023    RIGHT TCAR - CAROTID STENT PLACEMENT performed by Cherelle Snow MD at [facility]  2016    polyps--due in 2021 Lymphocytes Absolute 2.2 0.5 - 4.6 K/UL    Monocytes Absolute 1.0 0.1 - 1.3 K/UL    Eosinophils Absolute 0.4 0.0 - 0.8 K/UL    Basophils Absolute 0.0 0.0 - 0.2 K/UL    Absolute Immature Granulocyte 0.0 0.0 - 0.5 K/UL   Ammonia    Collection Time: 09/03/23  9:56 AM   Result Value Ref Range    Ammonia 24 11 - 32 UMOL/L   Lactic Acid    Collection Time: 09/03/23  9:56 AM   Result Value Ref Range    Lactic Acid, Plasma 1.8 0.4 - 2.0 MMOL/L   Procalcitonin    Collection Time: 09/03/23  9:56 AM   Result Value Ref Range    Procalcitonin <0.05 0.00 - 0.49 ng/mL   Urinalysis    Collection Time: 09/03/23 10:10 AM   Result Value Ref Range    Color, UA DARK YELLOW      Appearance CLEAR      Specific [place], UA 1.011 1.001 - 1.023      pH, Urine 6.5 5.0 - 9.0      Protein, UA Negative NEG mg/dL    Glucose, UA Negative mg/dL    Ketones, Urine Negative NEG mg/dL    Bilirubin Urine Negative NEG      Blood, Urine SMALL (A) NEG      Urobilinogen, Urine 1.0 0.2 - 1.0 EU/dL    Nitrite, Urine Positive (A) NEG      Leukocyte Esterase, Urine Negative NEG      WBC, UA 0-3 0 /hpf    RBC, UA 5-10 0 /hpf    BACTERIA, URINE 0 0 /hpf    Other observations RESULTS VERIFIED MANUALLY     EKG 12 Lead    Collection Time: 09/03/23 10:19 AM   Result Value Ref Range    Ventricular Rate 75 BPM    Atrial Rate 75 BPM    P-R Interval 192 ms    QRS Duration 152 ms    Q-T Interval 421 ms    QTc Calculation (Bazett) 471 ms    P Axis 93 degrees    R Axis 68 degrees    T Axis 113 degrees    Diagnosis       Sinus rhythm  Ventricular premature complex  Left bundle branch block     Comprehensive Metabolic Panel    Collection Time: 09/03/23 12:15 PM   Result Value Ref Range    Sodium 139 133 - 143 mmol/L    Potassium 4.8 3.5 - 5.1 mmol/L    Chloride 108 101 - 110 mmol/L    CO2 26 21 - 32 mmol/L    Anion Gap 5 2 - 11 mmol/L    Glucose 110 (H) 65 - 100 mg/dL    BUN 27 (H) 8 - 23 MG/DL    Creatinine 1.80 (H) 0.8 - 1.5 MG/DL    Est, Glom Filt Rate 38 (L) >60

## 2023-09-03 NOTE — ED NOTES
TRANSFER - OUT REPORT:    Verbal report given to Darryl Walker RN on Yael Ogden  being transferred to [phone] for routine progression of patient care       Report consisted of patient's Situation, Background, Assessment and   Recommendations(SBAR). Information from the following report(s) ED Encounter Summary was reviewed with the receiving nurse. Seffner Fall Assessment:    Presents to emergency department  because of falls (Syncope, seizure, or loss of consciousness): Yes  Age > 79: Yes  Altered Mental Status, Intoxication with alcohol or substance confusion (Disorientation, impaired judgment, poor safety awaremess, or inability to follow instructions): Yes  Impaired Mobility: Ambulates or transfers with assistive devices or assistance; Unable to ambulate or transer.: No  Nursing Judgement: Yes          Lines:   Peripheral IV Distal;Right Cephalic (Active)        Opportunity for questions and clarification was provided.       Patient transported with:  Cindie Runner, RN  09/03/23 [id number]

## 2023-09-03 NOTE — PROGRESS NOTES
TRANSFER - IN REPORT:    Verbal report received from [facility] on Krissy Ledyard  being received from ED for routine progression of patient care      Report consisted of patient's Situation, Background, Assessment and   Recommendations(SBAR). Information from the following report(s) Nurse Handoff Report, Index, ED Encounter Summary, ED SBAR, Intake/Output, MAR, Recent Results, and Med Rec Status was reviewed with the receiving nurse. Opportunity for questions and clarification was provided. Assessment completed upon patient's arrival to unit and care assumed.  Waiting on transport

## 2023-09-03 NOTE — ED TRIAGE NOTES
Per family pt has had recent falls and confusion since 1800 Friday. Symptoms include fist clenching, talking to people who are not present, did not remember surgery had occurred. Pt reports recent cystoscope for blood in urine Friday. Has had similar symptoms after a heart surgery and thought it could possibly be an anesthesia reaction.    Oriented to self, year (month after delay), place [facility]    (+)tremor   (-)drift, facial asymmetry, weakness

## 2023-09-04 LAB
ALBUMIN SERPL-MCNC: 3.1 G/DL (ref 3.2–4.6)
ANION GAP SERPL CALC-SCNC: 3 MMOL/L (ref 2–11)
ANION GAP SERPL CALC-SCNC: 6 MMOL/L (ref 2–11)
BASOPHILS # BLD: 0.1 K/UL (ref 0–0.2)
BASOPHILS NFR BLD: 1 % (ref 0–2)
BUN SERPL-MCNC: 19 MG/DL (ref 8–23)
BUN SERPL-MCNC: 21 MG/DL (ref 8–23)
CALCIUM SERPL-MCNC: 8.7 MG/DL (ref 8.3–10.4)
CALCIUM SERPL-MCNC: 8.9 MG/DL (ref 8.3–10.4)
CHLORIDE SERPL-SCNC: 110 MMOL/L (ref 101–110)
CHLORIDE SERPL-SCNC: 112 MMOL/L (ref 101–110)
CO2 SERPL-SCNC: 27 MMOL/L (ref 21–32)
CO2 SERPL-SCNC: 27 MMOL/L (ref 21–32)
CREAT SERPL-MCNC: 1.4 MG/DL (ref 0.8–1.5)
CREAT SERPL-MCNC: 1.4 MG/DL (ref 0.8–1.5)
DIFFERENTIAL METHOD BLD: ABNORMAL
EKG ATRIAL RATE: 75 BPM
EKG DIAGNOSIS: NORMAL
EKG P AXIS: 93 DEGREES
EKG P-R INTERVAL: 192 MS
EKG Q-T INTERVAL: 421 MS
EKG QRS DURATION: 152 MS
EKG QTC CALCULATION (BAZETT): 471 MS
EKG R AXIS: 68 DEGREES
EKG T AXIS: 113 DEGREES
EKG VENTRICULAR RATE: 75 BPM
EOSINOPHIL # BLD: 0.6 K/UL (ref 0–0.8)
EOSINOPHIL NFR BLD: 8 % (ref 0.5–7.8)
ERYTHROCYTE [DISTWIDTH] IN BLOOD BY AUTOMATED COUNT: 13.1 % (ref 11.9–14.6)
GLUCOSE SERPL-MCNC: 100 MG/DL (ref 65–100)
GLUCOSE SERPL-MCNC: 118 MG/DL (ref 65–100)
HCT VFR BLD AUTO: 40.9 % (ref 41.1–50.3)
HGB BLD-MCNC: 13 G/DL (ref 13.6–17.2)
IMM GRANULOCYTES # BLD AUTO: 0 K/UL (ref 0–0.5)
IMM GRANULOCYTES NFR BLD AUTO: 0 % (ref 0–5)
LYMPHOCYTES # BLD: 1.2 K/UL (ref 0.5–4.6)
LYMPHOCYTES NFR BLD: 15 % (ref 13–44)
MCH RBC QN AUTO: 30.6 PG (ref 26.1–32.9)
MCHC RBC AUTO-ENTMCNC: 31.8 G/DL (ref 31.4–35)
MCV RBC AUTO: 96.2 FL (ref 82–102)
MONOCYTES # BLD: 0.7 K/UL (ref 0.1–1.3)
MONOCYTES NFR BLD: 9 % (ref 4–12)
NEUTS SEG # BLD: 5.2 K/UL (ref 1.7–8.2)
NEUTS SEG NFR BLD: 67 % (ref 43–78)
NRBC # BLD: 0 K/UL (ref 0–0.2)
PHOSPHATE SERPL-MCNC: 3.8 MG/DL (ref 2.3–3.7)
PLATELET # BLD AUTO: 103 K/UL (ref 150–450)
PMV BLD AUTO: 12.1 FL (ref 9.4–12.3)
POTASSIUM SERPL-SCNC: 4.7 MMOL/L (ref 3.5–5.1)
POTASSIUM SERPL-SCNC: 4.7 MMOL/L (ref 3.5–5.1)
RBC # BLD AUTO: 4.25 M/UL (ref 4.23–5.6)
SODIUM SERPL-SCNC: 142 MMOL/L (ref 133–143)
SODIUM SERPL-SCNC: 143 MMOL/L (ref 133–143)
WBC # BLD AUTO: 7.7 K/UL (ref 4.3–11.1)

## 2023-09-04 PROCEDURE — 6370000000 HC RX 637 (ALT 250 FOR IP): Performed by: HOSPITALIST

## 2023-09-04 PROCEDURE — 2580000003 HC RX 258: Performed by: HOSPITALIST

## 2023-09-04 PROCEDURE — 6370000000 HC RX 637 (ALT 250 FOR IP): Performed by: INTERNAL MEDICINE

## 2023-09-04 PROCEDURE — 93010 ELECTROCARDIOGRAM REPORT: CPT | Performed by: INTERNAL MEDICINE

## 2023-09-04 PROCEDURE — 97165 OT EVAL LOW COMPLEX 30 MIN: CPT

## 2023-09-04 PROCEDURE — 36415 COLL VENOUS BLD VENIPUNCTURE: CPT

## 2023-09-04 PROCEDURE — 97161 PT EVAL LOW COMPLEX 20 MIN: CPT

## 2023-09-04 PROCEDURE — 6360000002 HC RX W HCPCS: Performed by: HOSPITALIST

## 2023-09-04 PROCEDURE — 97530 THERAPEUTIC ACTIVITIES: CPT

## 2023-09-04 PROCEDURE — 80069 RENAL FUNCTION PANEL: CPT

## 2023-09-04 PROCEDURE — 97112 NEUROMUSCULAR REEDUCATION: CPT

## 2023-09-04 PROCEDURE — 80048 BASIC METABOLIC PNL TOTAL CA: CPT

## 2023-09-04 PROCEDURE — 83036 HEMOGLOBIN GLYCOSYLATED A1C: CPT

## 2023-09-04 PROCEDURE — 85025 COMPLETE CBC W/AUTO DIFF WBC: CPT

## 2023-09-04 PROCEDURE — 1100000000 HC RM PRIVATE

## 2023-09-04 RX ORDER — DIVALPROEX SODIUM 125 MG/1
125 CAPSULE, COATED PELLETS ORAL EVERY 12 HOURS SCHEDULED
Status: DISCONTINUED | OUTPATIENT
Start: 2023-09-04 | End: 2023-09-05 | Stop reason: HOSPADM

## 2023-09-04 RX ORDER — PREDNISOLONE ACETATE 10 MG/ML
1 SUSPENSION/ DROPS OPHTHALMIC EVERY MORNING
Status: DISCONTINUED | OUTPATIENT
Start: 2023-09-04 | End: 2023-09-05 | Stop reason: HOSPADM

## 2023-09-04 RX ADMIN — SODIUM CHLORIDE: 9 INJECTION, SOLUTION INTRAVENOUS at 20:37

## 2023-09-04 RX ADMIN — SODIUM CHLORIDE: 9 INJECTION, SOLUTION INTRAVENOUS at 12:08

## 2023-09-04 RX ADMIN — ALPRAZOLAM 1 MG: 0.5 TABLET ORAL at 01:40

## 2023-09-04 RX ADMIN — PREDNISOLONE ACETATE 1 DROP: 10 SUSPENSION/ DROPS OPHTHALMIC at 13:56

## 2023-09-04 RX ADMIN — METOPROLOL SUCCINATE 25 MG: 25 TABLET, EXTENDED RELEASE ORAL at 20:11

## 2023-09-04 RX ADMIN — ASPIRIN 81 MG: 81 TABLET, COATED ORAL at 09:01

## 2023-09-04 RX ADMIN — VALSARTAN 160 MG: 160 TABLET, FILM COATED ORAL at 08:56

## 2023-09-04 RX ADMIN — SODIUM CHLORIDE, PRESERVATIVE FREE 10 ML: 5 INJECTION INTRAVENOUS at 20:33

## 2023-09-04 RX ADMIN — SODIUM CHLORIDE: 9 INJECTION, SOLUTION INTRAVENOUS at 01:41

## 2023-09-04 RX ADMIN — SODIUM CHLORIDE, PRESERVATIVE FREE 10 ML: 5 INJECTION INTRAVENOUS at 09:01

## 2023-09-04 RX ADMIN — HYDROCHLOROTHIAZIDE 12.5 MG: 25 TABLET ORAL at 08:57

## 2023-09-04 RX ADMIN — DIVALPROEX SODIUM 125 MG: 125 CAPSULE ORAL at 20:12

## 2023-09-04 RX ADMIN — CEFTRIAXONE 1000 MG: 1 INJECTION, POWDER, FOR SOLUTION INTRAMUSCULAR; INTRAVENOUS at 12:06

## 2023-09-04 ASSESSMENT — PAIN SCALES - GENERAL: PAINLEVEL_OUTOF10: 0

## 2023-09-04 NOTE — PROGRESS NOTES
Hospitalist Progress Note   Admit Date:  9/3/2023  9:45 AM   Name:  Liat Henson   Age:  76 y.o. Sex:  male  :  1945   MRN:  [id number]   Room:  [facility]    Presenting/Chief Complaint: Altered Mental Status     Reason(s) for Admission: Delirium [R41.0]  Acute encephalopathy [G93.40]     Hospital Course: As per prior documentation:    75-year-old male  w/ hx of recent UB mass and cystoscopy 48h ago, LBBB, CABG , AVR, HTN and bilateral carotid stenosis, brought in by his spouse. She states approximately 36-hour history of increasing confusion. Patient has some issues with some delusions, not thinking he had surgery that he did actually have outpatient surgery. He has had 4 falls as well. Unsure of any head impact or loss of consciousness. Increasing confusion as well. Patient has cystoscopy scope done for bladder mass approximately 48 hours ago. Patient states he is not really not having any pain. He does know its 2023. Does seem to have some confusion about more recent events. No shortness of breath chest pain. No abdominal pain. No fever chills vomiting or diarrhea. No focal numbness or weakness. Patient's spouse states patient has a history of hypertension. Also coronary disease with bypass grafting aortic valve replacement in the past.  Also I believe a carotid endarterectomy. She states no stroke or TIA prior to this. Also there is a history of prostate cancer as well. On further questioning, pt appears anxious but oriented. No specific localized neurological deficits reported. He had falls, makes urine.       Subjective & 24hr Events:   Patient seen and evaluated  New patient for me today  Still confused  Very agitated overnight  Wife does not want him getting Restoril tramadol or ambien  She is concerned about what he got from anesthesia  We discussed that we will may just have to anticipate that he is going to have confusion status post anesthesia  And

## 2023-09-04 NOTE — CARE COORDINATION
Met with Mr. Phong Kevin and his wife, Martha Holden, at bedside for initial CM assessment. History provided by wife. Demographics and PCP verified. Mr. Phong Kevin lives with his wife in a two story home with no steps to enter. He was independent with mobility and ADLs at most recent baseline and used no DME or services. His wife tells me that prior to this hospitalization he was going to the gym 3x/week. He is retired and remains an active  in the community. His wife understands we are currently pending therapy evals at this time to determine his safest discharge plan. CM will continue to follow and assist with needs as they arise. 09/04/23 1200   Service Assessment   Patient Orientation Alert and Oriented;Person   Cognition Alert   History Provided By Spouse   Primary Caregiver Self   Accompanied By/Relationship [facility]   Support Systems Spouse/Significant Other;Family Members;Homemaker   Patient's Healthcare Decision Maker is: Legal Next of Kin   PCP Verified by CM Yes   Last Visit to PCP Within last 3 months   Prior Functional Level Independent in ADLs/IADLs   Current Functional Level Other (see comment)  (pending therapy evals)   Can patient return to prior living arrangement Yes   Ability to make needs known: Good   Family able to assist with home care needs: Yes   Would you like for me to discuss the discharge plan with any other family members/significant others, and if so, who?  Yes   Financial Resources Medicare   Community Resources None   Social/Functional History   Lives With Spouse   Type of Home House   Home Layout Multi-level   Home Access Level entry   Bathroom Shower/Tub Walk-in shower   Bathroom Toilet Standard   [address] [facility] None   ADL Assistance Independent   Homemaking Assistance Independent   Ambulation Assistance Independent   Transfer Assistance Independent   Active  Yes   Mode of Transportation Car   Occupation Retired   Discharge Planning

## 2023-09-04 NOTE — CARE COORDINATION
Received phone call from Christiana Bui at [facility] confirming they can accommodate patient for [address] [facility] at discharge. CM will continue to follow.

## 2023-09-04 NOTE — THERAPY EVALUATION
ACUTE OCCUPATIONAL THERAPY GOALS:   (Developed with and agreed upon by patient and/or caregiver.)  1. Patient will complete lower body bathing and dressing with MODIFIED INDEPENDENCE and adaptive equipment as needed. 2. Patient will complete toileting with MODIFIED INDEPENDENCE. 3. Patient will complete grooming ADL standing with MODIFIED INDEPENDENCE.  4. Patient will tolerate 25 minutes of OT treatment with 1-2 rest breaks to increase activity tolerance for ADLs. 5. Patient will complete functional transfers with MODIFIED INDEPENDENCE and adaptive equipment as needed. 6. Patient will tolerate 10 minutes BUE exercises to increase strength for safe, functional transfers. Timeframe: 7 visits       OCCUPATIONAL THERAPY Initial Assessment, Daily Note, and AM       OT Visit Days: 1  Acknowledge Orders  Time  OT Charge Capture  Rehab Caseload Tracker      Cathy Gallegos is a 76 y.o. male   PRIMARY DIAGNOSIS: Acute encephalopathy  Delirium [R41.0]  Acute encephalopathy [G93.40]       Reason for Referral: Generalized Muscle Weakness (M62.81)  Other lack of cordination (R27.8)  Difficulty in walking, Not elsewhere classified (R26.2)  Repeated Falls (R29.6)  History of falling (Z91.81)  Dizziness and Giddiness (R42)  Inpatient: Payor: MEDICARE / Plan: MEDICARE PART A AND B / Product Type: *No Product type* /     ASSESSMENT:     REHAB RECOMMENDATIONS:   Recommendation to date pending progress:  Setting:  Home Health Therapy    Equipment:    To Be Determined     ASSESSMENT:  Mr. Edmundo Falcon presents with acute encephalopathy, AMS and has had multiple falls recently. At baseline pt is independent in ADLs and functional mobility and lives with supportive wife. Today, pt demonstrates impairments in balance, cognition, safety awareness which increase fall risk and limit ADLs and functional mobility.  Pt required CGA and intermittent Min A d/t impulsivity and decreased balance for functional transfers and Device Care [] [] [] [] [] [] [] [] [] [x]    Functional Mobility [] [] [] [] [x] [x] [] [] [] [] No AD; intermittent Min A d/t veering and LOB   I=Independent, Mod I=Modified Independent, S=Supervision/Setup, SBA=Standby Assistance, CGA=Contact Guard Assistance, Min=Minimal Assistance, Mod=Moderate Assistance, Max=Maximal Assistance, Total=Total Assistance, NT=Not Tested    PLAN:   FREQUENCY/DURATION   OT Plan of Care: 3 times/week for duration of hospital stay or until stated goals are met, whichever comes first.    PROBLEM LIST:   (Skilled intervention is medically necessary to address:)  Decreased ADL/Functional Activities  Decreased Balance  Decreased Cognition  Decreased Gait Ability  Decreased Safety Awareness  Decreased Transfer Abilities   INTERVENTIONS PLANNED:  (Benefits and precautions of occupational therapy have been discussed with the patient.)  Self Care Training  Therapeutic Activity  Therapeutic Exercise/HEP  Neuromuscular Re-education  Manual Therapy  Education         TREATMENT:     EVALUATION: LOW COMPLEXITY: (Untimed Charge)    TREATMENT:   Neuromuscular Re-education (23 Minutes): Patient participated in neuromuscular re-education including functional reaching, weight shifting, postural training, visual scanning activity, standing tolerance activity , and sitting balance activity   with moderate and maximal verbal cues, tactile cues, visual cues, and education to improve sitting balance, standing balance, posture, coordination, static balance, dynamic balance, and safety awareness in order to prepare for functional task, prepare for seated ADLs, prepare for functional transfer, increase safety awareness, and prepare for self care. .     TREATMENT GRID:  N/A    AFTER TREATMENT PRECAUTIONS: Alarm Activated, Bed/Chair Locked, Call light within reach, Chair, Heels floated, Needs within reach, RN notified, Visitors at bedside, and sitter present    INTERDISCIPLINARY COLLABORATION:  RN/ PCT, PT/ PTA,

## 2023-09-04 NOTE — CARE COORDINATION
Met with patient and wife, Carmencita Cifuentes, at bedside to discuss therapy recommendation. Therapy is currently recommending HH PT/OT at discharge. Referral has been sent to [facility].

## 2023-09-05 VITALS
DIASTOLIC BLOOD PRESSURE: 75 MMHG | SYSTOLIC BLOOD PRESSURE: 132 MMHG | OXYGEN SATURATION: 95 % | BODY MASS INDEX: 27.58 KG/M2 | TEMPERATURE: 97.5 F | RESPIRATION RATE: 16 BRPM | HEART RATE: 67 BPM | WEIGHT: 197 LBS | HEIGHT: 71 IN

## 2023-09-05 LAB
ANION GAP SERPL CALC-SCNC: 5 MMOL/L (ref 2–11)
BUN SERPL-MCNC: 16 MG/DL (ref 8–23)
CALCIUM SERPL-MCNC: 8.7 MG/DL (ref 8.3–10.4)
CHLORIDE SERPL-SCNC: 111 MMOL/L (ref 101–110)
CO2 SERPL-SCNC: 26 MMOL/L (ref 21–32)
CREAT SERPL-MCNC: 1.3 MG/DL (ref 0.8–1.5)
EST. AVERAGE GLUCOSE BLD GHB EST-MCNC: 117 MG/DL
GLUCOSE SERPL-MCNC: 105 MG/DL (ref 65–100)
HBA1C MFR BLD: 5.7 % (ref 4.8–5.6)
POTASSIUM SERPL-SCNC: 4.5 MMOL/L (ref 3.5–5.1)
SODIUM SERPL-SCNC: 142 MMOL/L (ref 133–143)

## 2023-09-05 PROCEDURE — 36415 COLL VENOUS BLD VENIPUNCTURE: CPT

## 2023-09-05 PROCEDURE — 2580000003 HC RX 258: Performed by: HOSPITALIST

## 2023-09-05 PROCEDURE — 97116 GAIT TRAINING THERAPY: CPT

## 2023-09-05 PROCEDURE — 80048 BASIC METABOLIC PNL TOTAL CA: CPT

## 2023-09-05 PROCEDURE — 6370000000 HC RX 637 (ALT 250 FOR IP): Performed by: HOSPITALIST

## 2023-09-05 RX ADMIN — SODIUM CHLORIDE: 9 INJECTION, SOLUTION INTRAVENOUS at 04:44

## 2023-09-05 RX ADMIN — HYDROCHLOROTHIAZIDE 12.5 MG: 25 TABLET ORAL at 09:24

## 2023-09-05 RX ADMIN — SODIUM CHLORIDE, PRESERVATIVE FREE 10 ML: 5 INJECTION INTRAVENOUS at 09:27

## 2023-09-05 RX ADMIN — PREDNISOLONE ACETATE 1 DROP: 10 SUSPENSION/ DROPS OPHTHALMIC at 09:25

## 2023-09-05 RX ADMIN — VALSARTAN 160 MG: 160 TABLET, FILM COATED ORAL at 09:24

## 2023-09-05 RX ADMIN — ASPIRIN 81 MG: 81 TABLET, COATED ORAL at 09:24

## 2023-09-05 ASSESSMENT — PAIN SCALES - GENERAL: PAINLEVEL_OUTOF10: 0

## 2023-09-05 NOTE — DISCHARGE SUMMARY
Hospitalist Discharge Summary   Admit Date:  9/3/2023  9:45 AM   DC Note date: 2023  Name:  Jagdeep Mcclure   Age:  76 y.o. Sex:  male  :  1945   MRN:  [id number]   Room:  [facility]  PCP:  Silke Li MD    Presenting Complaint: Altered Mental Status     Initial Admission Diagnosis: Delirium [R41.0]  Acute encephalopathy [G93.40]     Problem List for this Hospitalization (present on admission):    Principal Problem:    Acute encephalopathy  Resolved Problems:    * No resolved hospital problems. *      Hospital Course:  49-year-old male  w/ hx of recent UB mass and cystoscopy 48h ago, LBBB, CABG , AVR, HTN and bilateral carotid stenosis, brought in by his spouse. She states approximately 36-hour history of increasing confusion. Patient has some issues with some delusions, not thinking he had surgery that he did actually have outpatient surgery. He has had 4 falls as well. Unsure of any head impact or loss of consciousness. Increasing confusion as well. Patient has cystoscopy scope done for bladder mass approximately 48 hours ago. Patient states he is not really not having any pain. He does know its 2023. Does seem to have some confusion about more recent events. No shortness of breath chest pain. No abdominal pain. No fever chills vomiting or diarrhea. No focal numbness or weakness. Patient's spouse states patient has a history of hypertension. Also coronary disease with bypass grafting aortic valve replacement in the past.  Also I believe a carotid endarterectomy. She states no stroke or TIA prior to this. Also there is a history of prostate cancer as well. On further questioning, pt appears anxious but oriented. No specific localized neurological deficits reported. He had falls, makes urine. Mr. Barrington Orozco was admitted. He continue to be confused. Infectious workup was negative. His stroke workup was negative. His confusion resolved.   He is stable for membranes  Neck:  No restricted ROM. Trachea midline  CV:   RRR. No m/r/g. No JVD  Lungs:   CTAB. No wheezing, rhonchi, or rales. Respirations even, unlabored  Abdomen:   Soft, nontender, nondistended. Extremities: Warm and dry. No cyanosis or clubbing. No edema. Skin:     No rashes. Normal coloration  Neuro:  CN II-XII grossly intact. Psych:  Normal mood and affect. Signed:  Albino Smith MD    Part of this note may have been written by using a voice dictation software. The note has been proof read but may still contain some grammatical/other typographical errors.

## 2023-09-05 NOTE — PROGRESS NOTES
ACUTE PHYSICAL THERAPY GOALS:   (Developed with and agreed upon by patient and/or caregiver.)  Pt will perform supine to/from sit with mod I in 7 treatment days. Pt will perform sit to/from stand with mod I and LRAD in 7 treatment days. Pt will ambulate 150 ft with mod I and LRAD in 7 treatment days. Pt will be independent with HEP in 7 days. PHYSICAL THERAPY AM  (Link to Caseload Tracking: PT Visit Days : 2  Time In/Out  PT Charge Capture  Rehab Caseload Tracker    Mani Salgado is a 76 y.o. male   PRIMARY DIAGNOSIS: Acute encephalopathy  Delirium [R41.0]  Acute encephalopathy [G93.40]       Reason for Referral: Other lack of cordination (R27.8)  Other abnormalities of gait and mobility (R26.89)  History of falling (Z91.81)  Inpatient: Payor: MEDICARE / Plan: MEDICARE PART A AND B / Product Type: *No Product type* /     ASSESSMENT:     REHAB RECOMMENDATIONS:   Recommendation to date pending progress:  Setting:  Home Health Therapy    Equipment:    To Be Determined     ASSESSMENT:  Mr. Sourav Paul is supine in bed and agreeable to therapy with wife at bedside. Pt performed all bed mobility with supervision. SBA for sit to stand. Pt amb 500' with IV pole in hallway SBA for safety. Pt able to navigate obstacles in the hallway very well. Stood to be weighed, had no problem stepping on/off the scale. Pt returned to bed, positioned to comfort with all needs within reach. Good session. With progress demonstrated.    HHPT at d/c      SUBJECTIVE:   Mr. Sourav Paul states, \"I gained weight\"     Social/Functional Lives With: Spouse  Type of Home: House  Home Layout: Multi-level  Home Access: Level entry  Bathroom Shower/Tub: Walk-in shower  Bathroom Toilet: Standard  Bathroom Equipment: None  Home Equipment: None  Has the patient had two or more falls in the past year or any fall with injury in the past year?: Yes  Receives Help From: Family  ADL Assistance: [id number]

## 2023-09-05 NOTE — PROGRESS NOTES
Hourly rounds in progress. Alert, oriented x 4. OOB to bathroom. Denies needs or pain at this time. Bed in low locked position. Call light within reach. Bed alarm on. Will continue to monitor and give report to oncoming day shift nurse.

## 2023-09-05 NOTE — CARE COORDINATION
Pt is for discharge home today with home health services. Referral called/faxed to [facility] for follow up home care as ordered. No additional CM orders received or supportive care needs expressed at this time.

## 2023-09-06 LAB
BACTERIA SPEC CULT: NORMAL
SERVICE CMNT-IMP: NORMAL

## 2023-09-08 LAB
BACTERIA SPEC CULT: NORMAL
SERVICE CMNT-IMP: NORMAL

## 2023-09-08 NOTE — PROGRESS NOTES
Physician Progress Note      PATIENT:               Isidro Wheeler  CSN #:                  [id number]  :                       1945  ADMIT DATE:       9/3/2023 9:45 AM  [address] [facility] DATE:        2023 1:05 PM  RESPONDING  PROVIDER #:        Jamil Aranda MD          QUERY TEXT:    Patient admitted with acute encephalopathy. Noted documentation of UTI. In   order to support the diagnosis of UTI, please include additional clinical   indicators in your documentation. Or please document if the diagnosis of UTI   has been ruled out after further study. The medical record reflects the following:  Risk Factors: recent cystoscopy  Clinical Indicators: UA with positive nitrite, 0 bacteria, 0-3 WBC. Urine   culture negative. Treatment: Rocephin  Options provided:  -- UTI present as evidenced by, Please document evidence. -- UTI was ruled out  -- Other - I will add my own diagnosis  -- Disagree - Not applicable / Not valid  -- Disagree - Clinically unable to determine / Unknown  -- Refer to Clinical Documentation Reviewer    PROVIDER RESPONSE TEXT:    UTI was ruled out after study. Query created by: John Cobos on 2023 2:18 PM      QUERY TEXT:    Pt admitted with confusion and has acute encephalopathy documented. Pt post   surgical procedure on . If possible, please document in progress notes and   discharge summary further specificity regarding the type of encephalopathy:      The medical record reflects the following:  Risk Factors: cystoscopy on   Clinical Indicators: As documented on history and physical, \"Acute   encephalopathy, likely metabolic, s/p anesthesia w/ cystoscopy, UA show +ve   nitrite and possible infection\". P/n dated , \"No stroke. Patient improving   but still confused\". Nursing noted dated , \"alert, oriented x4\". Treatment: Rocephin. Depakote sprinkle. IVF.   Options provided:  -- Metabolic encephalopathy due to, please specify  -- Drug-induced encephalopathy due to anesthesia  -- Other - I will add my own diagnosis  -- Disagree - Not applicable / Not valid  -- Disagree - Clinically unable to determine / Unknown  -- Refer to Clinical Documentation Reviewer    PROVIDER RESPONSE TEXT:    This patient has drug-induced encephalopathy due to anesthesia.     Query created by: Telma Suh on 9/8/2023 2:33 PM      Electronically signed by:  Tiffany Sofia MD 9/8/2023 3:56 PM

## 2023-09-11 DIAGNOSIS — N32.1 COLOVESICAL FISTULA: Primary | ICD-10-CM

## 2023-09-14 NOTE — PROGRESS NOTES
Physician Progress Note      PATIENT:               Lance Anderson  CSN #:                  [id number]  :                       1945  ADMIT DATE:       9/3/2023 9:45 AM  [facility] DATE:        2023 1:05 PM  RESPONDING  PROVIDER #:        Sarah Orozco MD          QUERY TEXT:    Patient admitted with acute encephalopathy secondary to anesthetic drugs. Noted documentation of Acute Kidney Injury in medical record. In order to   support the diagnosis of KESHAV, please include additional clinical indicators in   your documentation. ? Or please document if the diagnosis of KESHAV has been   ruled out after further study. The medical record reflects the following:  Risk Factors: hx HTN  Clinical Indicators: Creatinine on admission 1.80 with improvement to 1.30. Treatment: IVF    Defined by Kidney Disease Improving Global Outcomes (KDIGO) clinical practice   guideline for acute kidney injury:  -Increase in SCr by greater than or equal to 0.3 mg/dl within 48 hours; or  -Increase or decrease in SCr to greater than or equal to 1.5 times baseline,   which is known or presumed to have occurred within the prior 7 days; or  -Urine volume < 0.5ml/kg/h for 6 hours. Options provided:  -- Acute kidney injury evidenced by, Please document evidence as well as a   numerical baseline creatinine, if known.   -- Acute kidney injury ruled out after study  -- Other - I will add my own diagnosis  -- Disagree - Not applicable / Not valid  -- Disagree - Clinically unable to determine / Unknown  -- Refer to Clinical Documentation Reviewer    PROVIDER RESPONSE TEXT:    This patient has acute kidney injury as evidenced by -Increase in SCr by   greater than or equal to 0.3 mg/dl within 48 hours    Query created by: Courtney Basilio on 2023 2:07 PM      Electronically signed by:  Sarah Orozco MD 2023 7:53 AM

## 2023-09-18 ENCOUNTER — OFFICE VISIT (OUTPATIENT)
Dept: SURGERY | Age: 78
End: 2023-09-18
Payer: MEDICARE

## 2023-09-18 ENCOUNTER — TELEPHONE (OUTPATIENT)
Age: 78
End: 2023-09-18

## 2023-09-18 VITALS
HEART RATE: 84 BPM | SYSTOLIC BLOOD PRESSURE: 150 MMHG | WEIGHT: 200.7 LBS | DIASTOLIC BLOOD PRESSURE: 87 MMHG | HEIGHT: 71 IN | BODY MASS INDEX: 28.1 KG/M2

## 2023-09-18 DIAGNOSIS — N32.1 COLOVESICAL FISTULA: Primary | ICD-10-CM

## 2023-09-18 PROCEDURE — G8417 CALC BMI ABV UP PARAM F/U: HCPCS | Performed by: SURGERY

## 2023-09-18 PROCEDURE — 1111F DSCHRG MED/CURRENT MED MERGE: CPT | Performed by: SURGERY

## 2023-09-18 PROCEDURE — 3077F SYST BP >= 140 MM HG: CPT | Performed by: SURGERY

## 2023-09-18 PROCEDURE — 1123F ACP DISCUSS/DSCN MKR DOCD: CPT | Performed by: SURGERY

## 2023-09-18 PROCEDURE — 99203 OFFICE O/P NEW LOW 30 MIN: CPT | Performed by: SURGERY

## 2023-09-18 PROCEDURE — G8427 DOCREV CUR MEDS BY ELIG CLIN: HCPCS | Performed by: SURGERY

## 2023-09-18 PROCEDURE — 1036F TOBACCO NON-USER: CPT | Performed by: SURGERY

## 2023-09-18 PROCEDURE — 3079F DIAST BP 80-89 MM HG: CPT | Performed by: SURGERY

## 2023-09-18 NOTE — TELEPHONE ENCOUNTER
Patient needs cardiac clearance for surgery and would like to hold ASA. 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 Vascular doctor stopped his Plavix.  //lorne    Last office visit 8-24-23  Last echo 8-2-22

## 2023-09-18 NOTE — TELEPHONE ENCOUNTER
He is not on Coumadin. At his last visit he was on aspirin and Plavix. I would not hold his aspirin as he has had carotid stents. Would hold his Plavix 5 days prior to the procedure and restart if his vascular surgeon wants him on this long-term. We discussed his last visit to talk with vascular surgery whether they wanted him to stay on Plavix long-term.      Note faxed//lesaab

## 2023-09-18 NOTE — PROGRESS NOTES
aDate: 2023      Name: Jagdeep Mcclure      MRN: [id number]       : 1945       Age: 76 y.o. Sex: male        Silke Li MD       CC:  No chief complaint on file. HPI:     Jagdeep Mcclure is a 76 y.o. 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 Michael Andersen. The patient had a CT scan done on 23 which showed:    HISTORY: Microscopic hematuria     Exam: CT abdomen and pelvis without contrast, stone protocol     Technique: Thin section axial CT images are obtained from the lung bases to the  pubic symphysis. Radiation dose reduction techniques were used for this study. Our CT scanners use one or all of the following: Automated exposure control,  adjustment of the mA and/or kV according to patient size, use of iterative  reconstruction. No comparison. FINDINGS: The lung bases are clear. CT abdomen:  No contour deforming abnormality involving the liver and spleen,  though these are incompletely evaluated. The gallbladder and pancreas are  normal. The adrenal glands are unremarkable. Limited evaluation the bowel loops  in the upper abdomen is unremarkable. There is no definite upper abdominal  lymphadenopathy. No renal or ureteral calculi. CT pelvis: There is air present nondependent in the bladder. There is extensive  diverticulosis present with pericolonic fat stranding seen about the sigmoid  colon. There is no pelvic adenopathy. There is no free fluid or free air present  within the pelvis. Bone window evaluation demonstrates no aggressive osseous lesions. IMPRESSION:        Diverticulosis with pericolonic fat stranding seen about the sigmoid colon  suggesting diverticulitis. Cannot exclude colovesical fistula, as there is air  present nondependent within the bladder. Correlation for any recent  instrumentation of the bladder recommended. 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  The patient

## 2023-09-18 NOTE — TELEPHONE ENCOUNTER
Cardiac Clearance        Physician or Practice Requesting:[facility] -Dr Anna Truong: Gurdeep España Phone Number: [phone]  Fax Number: [phone]  Date of Surgery/Procedure: Tentative  Type of Surgery or Procedure: Exploratory Laparotomy , Sigmoid colon  Type of Anesthesia: General  Type of Clearance Requested: cardiac and med  Medication to Hold Aspirin ,may be coumadin  Days to Hold: ?

## 2023-09-22 ENCOUNTER — TELEPHONE (OUTPATIENT)
Dept: UROLOGY | Age: 78
End: 2023-09-22

## 2023-09-29 ENCOUNTER — TELEPHONE (OUTPATIENT)
Dept: UROLOGY | Age: 78
End: 2023-09-29

## 2023-09-29 NOTE — TELEPHONE ENCOUNTER
2 emails sent to the  to schedule surgery with Dr Christophe Guzmán and Dr MAURICIO [facility].  Ask Dr Christophe Guzmán what I am scheduling

## 2023-10-02 NOTE — TELEPHONE ENCOUNTER
How long do you need and do you go first or second? I have to take you out of office Dr Nadine Yo is only downtown on Thursdays. His  gave me 11/2 is that ok with you to take you out that day?

## 2023-10-03 ENCOUNTER — PREP FOR PROCEDURE (OUTPATIENT)
Dept: SURGERY | Age: 78
End: 2023-10-03

## 2023-10-03 DIAGNOSIS — N32.1 COLOVESICAL FISTULA: ICD-10-CM

## 2023-10-03 NOTE — TELEPHONE ENCOUNTER
Wife calling today very upset stating this is urgent and I need to schedule it right away as they have been waiting 8 weeks. Dr Kate Vizcaino has time next thurdsay I would need to take you out of office again. I just need the ok from you or if 11/2 is ok as that is the next date. How much time do you need and do you go first or second?

## 2023-10-04 NOTE — H&P
aDate: 10/4/2023      Name: Solomon Sanchez      MRN: [id number]       : 1945       Age: 76 y.o. Sex: male        Hillary Winn MD       CC:  No chief complaint on file. HPI:     Solomon Sanchez is a 76 y.o. 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 Puma Lopez. The patient had a CT scan done on 23 which showed:    HISTORY: Microscopic hematuria     Exam: CT abdomen and pelvis without contrast, stone protocol     Technique: Thin section axial CT images are obtained from the lung bases to the  pubic symphysis. Radiation dose reduction techniques were used for this study. Our CT scanners use one or all of the following: Automated exposure control,  adjustment of the mA and/or kV according to patient size, use of iterative  reconstruction. No comparison. FINDINGS: The lung bases are clear. CT abdomen:  No contour deforming abnormality involving the liver and spleen,  though these are incompletely evaluated. The gallbladder and pancreas are  normal. The adrenal glands are unremarkable. Limited evaluation the bowel loops  in the upper abdomen is unremarkable. There is no definite upper abdominal  lymphadenopathy. No renal or ureteral calculi. CT pelvis: There is air present nondependent in the bladder. There is extensive  diverticulosis present with pericolonic fat stranding seen about the sigmoid  colon. There is no pelvic adenopathy. There is no free fluid or free air present  within the pelvis. Bone window evaluation demonstrates no aggressive osseous lesions. IMPRESSION:        Diverticulosis with pericolonic fat stranding seen about the sigmoid colon  suggesting diverticulitis. Cannot exclude colovesical fistula, as there is air  present nondependent within the bladder. Correlation for any recent  instrumentation of the bladder recommended. 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  The problems include DVT/PE, pneumonia, stroke, heart attack, UTI and wound infection. The patient understood the risks and signed the consent form.        Evelyn Childers MD     FACS   10/4/2023  12:56 PM

## 2023-10-05 ENCOUNTER — HOSPITAL ENCOUNTER (OUTPATIENT)
Dept: SURGERY | Age: 78
Discharge: HOME | End: 2023-10-05
Payer: MEDICARE

## 2023-10-05 VITALS
OXYGEN SATURATION: 98 % | DIASTOLIC BLOOD PRESSURE: 70 MMHG | HEIGHT: 71 IN | TEMPERATURE: 98 F | RESPIRATION RATE: 18 BRPM | WEIGHT: 201.8 LBS | SYSTOLIC BLOOD PRESSURE: 143 MMHG | BODY MASS INDEX: 28.25 KG/M2 | HEART RATE: 88 BPM

## 2023-10-05 LAB
ANION GAP SERPL CALC-SCNC: 5 MMOL/L (ref 2–11)
BUN SERPL-MCNC: 25 MG/DL (ref 8–23)
CALCIUM SERPL-MCNC: 9.2 MG/DL (ref 8.3–10.4)
CHLORIDE SERPL-SCNC: 105 MMOL/L (ref 101–110)
CO2 SERPL-SCNC: 31 MMOL/L (ref 21–32)
CREAT SERPL-MCNC: 1.24 MG/DL (ref 0.8–1.5)
ERYTHROCYTE [DISTWIDTH] IN BLOOD BY AUTOMATED COUNT: 13.2 % (ref 11.9–14.6)
GLUCOSE SERPL-MCNC: 110 MG/DL (ref 65–100)
HCT VFR BLD AUTO: 42 % (ref 41.1–50.3)
HGB BLD-MCNC: 13.6 G/DL (ref 13.6–17.2)
MCH RBC QN AUTO: 30.4 PG (ref 26.1–32.9)
MCHC RBC AUTO-ENTMCNC: 32.4 G/DL (ref 31.4–35)
MCV RBC AUTO: 93.8 FL (ref 82–102)
NRBC # BLD: 0 K/UL (ref 0–0.2)
PLATELET # BLD AUTO: 129 K/UL (ref 150–450)
PMV BLD AUTO: 12.4 FL (ref 9.4–12.3)
POTASSIUM SERPL-SCNC: 4.2 MMOL/L (ref 3.5–5.1)
RBC # BLD AUTO: 4.48 M/UL (ref 4.23–5.6)
SODIUM SERPL-SCNC: 141 MMOL/L (ref 133–143)
WBC # BLD AUTO: 7 K/UL (ref 4.3–11.1)

## 2023-10-05 PROCEDURE — 80048 BASIC METABOLIC PNL TOTAL CA: CPT

## 2023-10-05 PROCEDURE — 85027 COMPLETE CBC AUTOMATED: CPT

## 2023-10-05 NOTE — PROGRESS NOTES
Latest Reference Range & Units 10/05/23 14:04   Sodium 133 - 143 mmol/L 141   Potassium 3.5 - 5.1 mmol/L 4.2   Chloride 101 - 110 mmol/L 105   CO2 21 - 32 mmol/L 31   BUN,BUNPL 8 - 23 MG/DL 25 (H)   Creatinine 0.8 - 1.5 MG/DL 1.24   Anion Gap 2 - 11 mmol/L 5   Est, Glom Filt Rate >60 ml/min/1.73m2 59 (L)   Glucose, Random 65 - 100 mg/dL 110 (H)   CALCIUM, SERUM, 217689 8.3 - 10.4 MG/DL 9.2   (H): Data is abnormally high  (L): Data is abnormally low   Latest Reference Range & Units 10/05/23 14:04   WBC 4.3 - 11.1 K/uL 7.0   RBC 4.23 - 5.6 M/uL 4.48   Hemoglobin Quant 13.6 - 17.2 g/dL 13.6   Hematocrit 41.1 - 50.3 % 42.0   MCV 82.0 - 102.0 FL 93.8   MCH 26.1 - 32.9 PG 30.4   MCHC 31.4 - 35.0 g/dL 32.4   MPV 9.4 - 12.3 FL 12.4 (H)   RDW 11.9 - 14.6 % 13.2   Platelet Count 492 - 450 K/uL 129 (L)   (H): Data is abnormally high  (L): Data is abnormally low

## 2023-10-05 NOTE — PROGRESS NOTES
PLEASE CONTINUE TAKING ALL PRESCRIPTION MEDICATIONS UP TO THE DAY OF SURGERY UNLESS OTHERWISE DIRECTED BELOW. DISCONTINUE all vitamins and supplements 7 days prior to surgery. DISCONTINUE Non-Steroidal Anti-Inflammatory (NSAIDS) such as Advil and Aleve 5 days prior to surgery. Home Medications to take  the day of surgery    Antoinette Galarza            Home Medications   to Hold           Comments      On the day before surgery please take Acetaminophen 1000mg in the morning and then again before bed. You may substitute for Tylenol 650 mg. Please do not bring home medications with you on the day of surgery unless otherwise directed by your nurse. If you are instructed to bring home medications, please give them to your nurse as they will be administered by the nursing staff. If you have any questions, please call [address] [place] [address] [phone]. A copy of this note was provided to the patient for reference.

## 2023-10-05 NOTE — PROGRESS NOTES
Patient verified name and     Order for consent not found in EHR and matches case posting; patient verified. Type 3 surgery, walk in assessment complete. Labs per surgeon: none;  Labs per anesthesia protocol: cbc,bmp; results pending, T/S DOS  EK/3/23      Hospital approved surgical skin cleanser and instructions given per hospital policy. Patient provided with and instructed on educational handouts including Guide to Surgery, Pain Management, Hand Hygiene, Blood Transfusion Education, and [facility] Brochure. Patient answered medical/surgical history questions at their best of ability. All prior to admission medications documented in [facility]. Original medication prescription bottle not visualized during patient appointment. Patient instructed to hold all vitamins 7 days prior to surgery and NSAIDS 5 days prior to surgery, patient verbalized understanding. Patient teach back successful and patient demonstrates knowledge of instructions.

## 2023-10-09 ENCOUNTER — OFFICE VISIT (OUTPATIENT)
Dept: VASCULAR SURGERY | Age: 78
End: 2023-10-09
Payer: MEDICARE

## 2023-10-09 VITALS
WEIGHT: 202 LBS | OXYGEN SATURATION: 97 % | DIASTOLIC BLOOD PRESSURE: 83 MMHG | BODY MASS INDEX: 28.28 KG/M2 | HEART RATE: 83 BPM | SYSTOLIC BLOOD PRESSURE: 142 MMHG | HEIGHT: 71 IN

## 2023-10-09 DIAGNOSIS — I65.23 BILATERAL CAROTID ARTERY STENOSIS: Primary | ICD-10-CM

## 2023-10-09 PROCEDURE — G8417 CALC BMI ABV UP PARAM F/U: HCPCS | Performed by: STUDENT IN AN ORGANIZED HEALTH CARE EDUCATION/TRAINING PROGRAM

## 2023-10-09 PROCEDURE — 3078F DIAST BP <80 MM HG: CPT | Performed by: STUDENT IN AN ORGANIZED HEALTH CARE EDUCATION/TRAINING PROGRAM

## 2023-10-09 PROCEDURE — G8484 FLU IMMUNIZE NO ADMIN: HCPCS | Performed by: STUDENT IN AN ORGANIZED HEALTH CARE EDUCATION/TRAINING PROGRAM

## 2023-10-09 PROCEDURE — 3075F SYST BP GE 130 - 139MM HG: CPT | Performed by: STUDENT IN AN ORGANIZED HEALTH CARE EDUCATION/TRAINING PROGRAM

## 2023-10-09 PROCEDURE — 1123F ACP DISCUSS/DSCN MKR DOCD: CPT | Performed by: STUDENT IN AN ORGANIZED HEALTH CARE EDUCATION/TRAINING PROGRAM

## 2023-10-09 PROCEDURE — 1036F TOBACCO NON-USER: CPT | Performed by: STUDENT IN AN ORGANIZED HEALTH CARE EDUCATION/TRAINING PROGRAM

## 2023-10-09 PROCEDURE — 99213 OFFICE O/P EST LOW 20 MIN: CPT | Performed by: STUDENT IN AN ORGANIZED HEALTH CARE EDUCATION/TRAINING PROGRAM

## 2023-10-09 PROCEDURE — G8427 DOCREV CUR MEDS BY ELIG CLIN: HCPCS | Performed by: STUDENT IN AN ORGANIZED HEALTH CARE EDUCATION/TRAINING PROGRAM

## 2023-10-09 NOTE — PROGRESS NOTES
VASCULAR SURGERY   [facility] [facility], [facility] [address]to [place]  [phone] FAX: [phone]        Sebastian Garrett  : 1945    Reason for visit: 6mo f/u right TCAR    Chief Complaint: 76 y.o. male presents 6mo f/u after TCAR. Colovesical fistula repair 10/12. Denies slurred speech, unilateral vision loss or paralysis. He has stopped his plavix. Plan:   S/p Right TCAR: Continue ASA and stain. 6mo f/u with carotid DUS. Level 3    Imaging interrupted:   Carotid DUS    No stenosis in the right internal carotid artery. Moderate (50-69%) stenosis in the left internal carotid artery. Heterogeneous plaque in the left internal carotid artery. Normal antegrade flow involving the right vertebral artery. Normal antegrade flow involving the left vertebral artery. Constitutional:   Negative for fevers and unexplained weight loss. Eyes:   Negative for visual disturbance. ENT:   Negative for significant hearing loss and tinnitus. Respiratory:   Negative for hemoptysis. Cardiovascular:   Negative except as noted in HPI. Gastrointestinal:   Negative for melena and abdominal pain. Genitourinary:   Negative for hematuria, renal stones. Integumentary:   Negative for rash or non-healing wounds  Hematologic/Lymphatic:   Negative for excessive bleeding hx or clotting disorder. Musculoskeletal:  Negative for active, unexplained/severe joint pain. Neurological:   Negative for stroke. Behavioral/Psych:   Negative for suicidal ideations. Endocrine:   Negative for uncontrolled diabetic symptoms including polyuria, polydipsia and poor wound healing. Physical Examination:   Height: 5' 11\" (1.803 m), Weight - Scale: 202 lb (91.6 kg), BP: (!) 142/83    General: No acute distress  HENT: AT  CV: Regular rhythm. LUNG: No respiratory distress on RA  Abdominal: No distension.   Extremities: No wounds or edema, motor and sensation grossly intact      Past Medical History:

## 2023-10-10 ENCOUNTER — OFFICE VISIT (OUTPATIENT)
Dept: UROLOGY | Age: 78
End: 2023-10-10
Payer: MEDICARE

## 2023-10-10 DIAGNOSIS — N32.1 COLOVESICAL FISTULA: Primary | ICD-10-CM

## 2023-10-10 PROCEDURE — G8484 FLU IMMUNIZE NO ADMIN: HCPCS | Performed by: UROLOGY

## 2023-10-10 PROCEDURE — 1123F ACP DISCUSS/DSCN MKR DOCD: CPT | Performed by: UROLOGY

## 2023-10-10 PROCEDURE — G8417 CALC BMI ABV UP PARAM F/U: HCPCS | Performed by: UROLOGY

## 2023-10-10 PROCEDURE — 1036F TOBACCO NON-USER: CPT | Performed by: UROLOGY

## 2023-10-10 PROCEDURE — G8428 CUR MEDS NOT DOCUMENT: HCPCS | Performed by: UROLOGY

## 2023-10-10 PROCEDURE — 99213 OFFICE O/P EST LOW 20 MIN: CPT | Performed by: UROLOGY

## 2023-10-10 ASSESSMENT — ENCOUNTER SYMPTOMS
BACK PAIN: 0
NAUSEA: 0

## 2023-10-10 NOTE — PROGRESS NOTES
[facility]  [facility]    [facility], [address] [place] [address]  [phone]          Paula King  : 1945    Chief Complaint   Patient presents with    Follow-up          HPI     Paula King is a 76 y.o. male    The patient has been found to have a [place]-vesicle fistula. CT scan shows air present in the bladder. There is extensive diverticulosis with pericolonic fat stranding along the sigmoid colon. Cystoscopy is consistent with a colovesical fistula.       Past Medical History:   Diagnosis Date    Aortic stenosis, moderate 2021    AVR AND CABG -- followed by dr Pat Wilson    Asthma     childhood    CAD (coronary artery disease)     CABG 2021    GERD (gastroesophageal reflux disease)     with certain foods - no meds     High cholesterol     Hypertension     controlled with med    Prostate cancer ([address])     radiation--    Seasonal allergic rhinitis      Past Surgical History:   Procedure Laterality Date    CAROTID STENT PLACEMENT Right 3/8/2023    RIGHT TCAR - CAROTID STENT PLACEMENT performed by Enedina Carmichael MD at [address] [place] [address]      polyps--due in     CORNEAL TRANSPLANT Bilateral last one     followed by dr Lottie Holt  2021    CYSTOSCOPY N/A 2023    CYSTOSCOPY TRANSURETHRAL RESECTION BLADDER performed by Kayla Barros MD at [place] [address]    ORTHOPEDIC SURGERY Left 2016    [facility] toe surg     Current Outpatient Medications   Medication Sig Dispense Refill    atorvastatin (LIPITOR) 40 MG tablet Take 1 tablet by mouth every evening 90 tablet 3    metoprolol succinate (TOPROL XL) 25 MG extended release tablet Take 1 tablet by mouth daily (Patient taking differently: Take 1 tablet by mouth nightly) 90 tablet 3    valsartan-hydroCHLOROthiazide (DIOVAN-HCT) 160-12.5 MG per tablet Take 1 tablet by mouth daily 90 tablet 3    acetaminophen (TYLENOL) 325 MG

## 2023-10-11 ENCOUNTER — ANESTHESIA EVENT (OUTPATIENT)
Dept: SURGERY | Age: 78
End: 2023-10-11
Payer: MEDICARE

## 2023-10-12 ENCOUNTER — ANESTHESIA (OUTPATIENT)
Dept: SURGERY | Age: 78
End: 2023-10-12
Payer: MEDICARE

## 2023-10-12 ENCOUNTER — HOSPITAL ENCOUNTER (INPATIENT)
Age: 78
LOS: 5 days | Discharge: HOME HEALTH | DRG: 330 | End: 2023-10-17
Attending: SURGERY | Admitting: SURGERY
Payer: MEDICARE

## 2023-10-12 LAB
ABO + RH BLD: NORMAL
BLOOD GROUP ANTIBODIES SERPL: NORMAL
POTASSIUM BLD-SCNC: 3.8 MMOL/L (ref 3.5–5.1)
SPECIMEN EXP DATE BLD: NORMAL

## 2023-10-12 PROCEDURE — 2709999900 HC NON-CHARGEABLE SUPPLY: Performed by: SURGERY

## 2023-10-12 PROCEDURE — 6370000000 HC RX 637 (ALT 250 FOR IP): Performed by: NURSE PRACTITIONER

## 2023-10-12 PROCEDURE — 1100000000 HC RM PRIVATE

## 2023-10-12 PROCEDURE — 4A133B1 MONITORING OF ARTERIAL PRESSURE, PERIPHERAL, PERCUTANEOUS APPROACH: ICD-10-PCS | Performed by: SURGERY

## 2023-10-12 PROCEDURE — 3700000000 HC ANESTHESIA ATTENDED CARE: Performed by: SURGERY

## 2023-10-12 PROCEDURE — 3700000001 HC ADD 15 MINUTES (ANESTHESIA): Performed by: SURGERY

## 2023-10-12 PROCEDURE — 88302 TISSUE EXAM BY PATHOLOGIST: CPT

## 2023-10-12 PROCEDURE — 0DJD8ZZ INSPECTION OF LOWER INTESTINAL TRACT, VIA NATURAL OR ARTIFICIAL OPENING ENDOSCOPIC: ICD-10-PCS | Performed by: SURGERY

## 2023-10-12 PROCEDURE — 4A133J1 MONITORING OF ARTERIAL PULSE, PERIPHERAL, PERCUTANEOUS APPROACH: ICD-10-PCS | Performed by: SURGERY

## 2023-10-12 PROCEDURE — 6360000002 HC RX W HCPCS: Performed by: STUDENT IN AN ORGANIZED HEALTH CARE EDUCATION/TRAINING PROGRAM

## 2023-10-12 PROCEDURE — 2580000003 HC RX 258: Performed by: STUDENT IN AN ORGANIZED HEALTH CARE EDUCATION/TRAINING PROGRAM

## 2023-10-12 PROCEDURE — 2580000003 HC RX 258: Performed by: NURSE ANESTHETIST, CERTIFIED REGISTERED

## 2023-10-12 PROCEDURE — 2500000003 HC RX 250 WO HCPCS: Performed by: NURSE ANESTHETIST, CERTIFIED REGISTERED

## 2023-10-12 PROCEDURE — 86901 BLOOD TYPING SEROLOGIC RH(D): CPT

## 2023-10-12 PROCEDURE — 84132 ASSAY OF SERUM POTASSIUM: CPT

## 2023-10-12 PROCEDURE — 6370000000 HC RX 637 (ALT 250 FOR IP): Performed by: SURGERY

## 2023-10-12 PROCEDURE — 0TQB0ZZ REPAIR BLADDER, OPEN APPROACH: ICD-10-PCS | Performed by: UROLOGY

## 2023-10-12 PROCEDURE — 86900 BLOOD TYPING SEROLOGIC ABO: CPT

## 2023-10-12 PROCEDURE — 7100000000 HC PACU RECOVERY - FIRST 15 MIN: Performed by: SURGERY

## 2023-10-12 PROCEDURE — 6360000002 HC RX W HCPCS: Performed by: SURGERY

## 2023-10-12 PROCEDURE — 6360000002 HC RX W HCPCS: Performed by: NURSE ANESTHETIST, CERTIFIED REGISTERED

## 2023-10-12 PROCEDURE — 3600000004 HC SURGERY LEVEL 4 BASE: Performed by: SURGERY

## 2023-10-12 PROCEDURE — 2720000010 HC SURG SUPPLY STERILE: Performed by: SURGERY

## 2023-10-12 PROCEDURE — 3600000014 HC SURGERY LEVEL 4 ADDTL 15MIN: Performed by: SURGERY

## 2023-10-12 PROCEDURE — 7100000001 HC PACU RECOVERY - ADDTL 15 MIN: Performed by: SURGERY

## 2023-10-12 PROCEDURE — 88304 TISSUE EXAM BY PATHOLOGIST: CPT

## 2023-10-12 PROCEDURE — 0DTJ0ZZ RESECTION OF APPENDIX, OPEN APPROACH: ICD-10-PCS | Performed by: SURGERY

## 2023-10-12 PROCEDURE — 0DTN0ZZ RESECTION OF SIGMOID COLON, OPEN APPROACH: ICD-10-PCS | Performed by: SURGERY

## 2023-10-12 PROCEDURE — 2580000003 HC RX 258: Performed by: SURGERY

## 2023-10-12 PROCEDURE — 51550 PARTIAL REMOVAL OF BLADDER: CPT | Performed by: UROLOGY

## 2023-10-12 PROCEDURE — 0W9G3ZZ DRAINAGE OF PERITONEAL CAVITY, PERCUTANEOUS APPROACH: ICD-10-PCS | Performed by: SURGERY

## 2023-10-12 PROCEDURE — 86850 RBC ANTIBODY SCREEN: CPT

## 2023-10-12 PROCEDURE — 88307 TISSUE EXAM BY PATHOLOGIST: CPT

## 2023-10-12 PROCEDURE — 6370000000 HC RX 637 (ALT 250 FOR IP): Performed by: STUDENT IN AN ORGANIZED HEALTH CARE EDUCATION/TRAINING PROGRAM

## 2023-10-12 PROCEDURE — 44140 PARTIAL REMOVAL OF COLON: CPT | Performed by: SURGERY

## 2023-10-12 DEVICE — SEALANT TISS 4 CC FIBRIN VISTASEAL: Type: IMPLANTABLE DEVICE | Status: FUNCTIONAL

## 2023-10-12 RX ORDER — PROPOFOL 10 MG/ML
INJECTION, EMULSION INTRAVENOUS PRN
Status: DISCONTINUED | OUTPATIENT
Start: 2023-10-12 | End: 2023-10-12 | Stop reason: SDUPTHER

## 2023-10-12 RX ORDER — SODIUM CHLORIDE 0.9 % (FLUSH) 0.9 %
5-40 SYRINGE (ML) INJECTION PRN
Status: DISCONTINUED | OUTPATIENT
Start: 2023-10-12 | End: 2023-10-17 | Stop reason: HOSPADM

## 2023-10-12 RX ORDER — KETAMINE HYDROCHLORIDE 50 MG/ML
INJECTION, SOLUTION, CONCENTRATE INTRAMUSCULAR; INTRAVENOUS PRN
Status: DISCONTINUED | OUTPATIENT
Start: 2023-10-12 | End: 2023-10-12 | Stop reason: SDUPTHER

## 2023-10-12 RX ORDER — PROCHLORPERAZINE EDISYLATE 5 MG/ML
10 INJECTION INTRAMUSCULAR; INTRAVENOUS EVERY 6 HOURS PRN
Status: DISCONTINUED | OUTPATIENT
Start: 2023-10-12 | End: 2023-10-17 | Stop reason: HOSPADM

## 2023-10-12 RX ORDER — FENTANYL CITRATE 50 UG/ML
100 INJECTION, SOLUTION INTRAMUSCULAR; INTRAVENOUS
Status: DISCONTINUED | OUTPATIENT
Start: 2023-10-12 | End: 2023-10-12 | Stop reason: HOSPADM

## 2023-10-12 RX ORDER — GLYCOPYRROLATE 0.2 MG/ML
INJECTION INTRAMUSCULAR; INTRAVENOUS PRN
Status: DISCONTINUED | OUTPATIENT
Start: 2023-10-12 | End: 2023-10-12 | Stop reason: SDUPTHER

## 2023-10-12 RX ORDER — OXYCODONE HYDROCHLORIDE 5 MG/1
10 TABLET ORAL EVERY 4 HOURS PRN
Status: DISCONTINUED | OUTPATIENT
Start: 2023-10-12 | End: 2023-10-17 | Stop reason: HOSPADM

## 2023-10-12 RX ORDER — METOPROLOL SUCCINATE 25 MG/1
25 TABLET, EXTENDED RELEASE ORAL DAILY
Status: DISCONTINUED | OUTPATIENT
Start: 2023-10-12 | End: 2023-10-13

## 2023-10-12 RX ORDER — SODIUM CHLORIDE 9 MG/ML
INJECTION, SOLUTION INTRAVENOUS PRN
Status: DISCONTINUED | OUTPATIENT
Start: 2023-10-12 | End: 2023-10-12 | Stop reason: HOSPADM

## 2023-10-12 RX ORDER — HALOPERIDOL 5 MG/ML
1 INJECTION INTRAMUSCULAR
Status: DISCONTINUED | OUTPATIENT
Start: 2023-10-12 | End: 2023-10-12 | Stop reason: HOSPADM

## 2023-10-12 RX ORDER — HYDRALAZINE HYDROCHLORIDE 20 MG/ML
10 INJECTION INTRAMUSCULAR; INTRAVENOUS
Status: DISCONTINUED | OUTPATIENT
Start: 2023-10-12 | End: 2023-10-12 | Stop reason: HOSPADM

## 2023-10-12 RX ORDER — OXYCODONE HYDROCHLORIDE 5 MG/1
10 TABLET ORAL PRN
Status: DISCONTINUED | OUTPATIENT
Start: 2023-10-12 | End: 2023-10-12 | Stop reason: HOSPADM

## 2023-10-12 RX ORDER — SODIUM CHLORIDE, SODIUM LACTATE, POTASSIUM CHLORIDE, CALCIUM CHLORIDE 600; 310; 30; 20 MG/100ML; MG/100ML; MG/100ML; MG/100ML
INJECTION, SOLUTION INTRAVENOUS CONTINUOUS
Status: DISCONTINUED | OUTPATIENT
Start: 2023-10-12 | End: 2023-10-12 | Stop reason: HOSPADM

## 2023-10-12 RX ORDER — PROCHLORPERAZINE EDISYLATE 5 MG/ML
5 INJECTION INTRAMUSCULAR; INTRAVENOUS
Status: DISCONTINUED | OUTPATIENT
Start: 2023-10-12 | End: 2023-10-12 | Stop reason: HOSPADM

## 2023-10-12 RX ORDER — HYDRALAZINE HYDROCHLORIDE 20 MG/ML
10 INJECTION INTRAMUSCULAR; INTRAVENOUS EVERY 6 HOURS PRN
Status: DISCONTINUED | OUTPATIENT
Start: 2023-10-12 | End: 2023-10-17 | Stop reason: HOSPADM

## 2023-10-12 RX ORDER — HYDROCHLOROTHIAZIDE 25 MG/1
12.5 TABLET ORAL DAILY
Status: DISCONTINUED | OUTPATIENT
Start: 2023-10-12 | End: 2023-10-17 | Stop reason: HOSPADM

## 2023-10-12 RX ORDER — LIDOCAINE HYDROCHLORIDE 10 MG/ML
1 INJECTION, SOLUTION INFILTRATION; PERINEURAL
Status: DISCONTINUED | OUTPATIENT
Start: 2023-10-12 | End: 2023-10-12 | Stop reason: HOSPADM

## 2023-10-12 RX ORDER — FAMOTIDINE 20 MG/1
20 TABLET, FILM COATED ORAL 2 TIMES DAILY
Status: DISCONTINUED | OUTPATIENT
Start: 2023-10-12 | End: 2023-10-17 | Stop reason: HOSPADM

## 2023-10-12 RX ORDER — ROCURONIUM BROMIDE 10 MG/ML
INJECTION, SOLUTION INTRAVENOUS PRN
Status: DISCONTINUED | OUTPATIENT
Start: 2023-10-12 | End: 2023-10-12 | Stop reason: SDUPTHER

## 2023-10-12 RX ORDER — PREDNISOLONE ACETATE 10 MG/ML
1 SUSPENSION/ DROPS OPHTHALMIC EVERY MORNING
Status: DISCONTINUED | OUTPATIENT
Start: 2023-10-12 | End: 2023-10-17 | Stop reason: HOSPADM

## 2023-10-12 RX ORDER — SUCCINYLCHOLINE CHLORIDE 20 MG/ML
INJECTION INTRAMUSCULAR; INTRAVENOUS PRN
Status: DISCONTINUED | OUTPATIENT
Start: 2023-10-12 | End: 2023-10-12 | Stop reason: SDUPTHER

## 2023-10-12 RX ORDER — FENTANYL CITRATE 50 UG/ML
INJECTION, SOLUTION INTRAMUSCULAR; INTRAVENOUS PRN
Status: DISCONTINUED | OUTPATIENT
Start: 2023-10-12 | End: 2023-10-12 | Stop reason: SDUPTHER

## 2023-10-12 RX ORDER — EPHEDRINE SULFATE/0.9% NACL/PF 50 MG/5 ML
SYRINGE (ML) INTRAVENOUS PRN
Status: DISCONTINUED | OUTPATIENT
Start: 2023-10-12 | End: 2023-10-12 | Stop reason: SDUPTHER

## 2023-10-12 RX ORDER — PROCHLORPERAZINE MALEATE 10 MG
10 TABLET ORAL EVERY 6 HOURS PRN
Status: DISCONTINUED | OUTPATIENT
Start: 2023-10-12 | End: 2023-10-17 | Stop reason: HOSPADM

## 2023-10-12 RX ORDER — VALSARTAN 40 MG/1
160 TABLET ORAL DAILY
Status: DISCONTINUED | OUTPATIENT
Start: 2023-10-12 | End: 2023-10-17 | Stop reason: HOSPADM

## 2023-10-12 RX ORDER — LABETALOL HYDROCHLORIDE 5 MG/ML
10 INJECTION, SOLUTION INTRAVENOUS
Status: DISCONTINUED | OUTPATIENT
Start: 2023-10-12 | End: 2023-10-12 | Stop reason: HOSPADM

## 2023-10-12 RX ORDER — VALSARTAN AND HYDROCHLOROTHIAZIDE 160; 12.5 MG/1; MG/1
1 TABLET, FILM COATED ORAL DAILY
Status: DISCONTINUED | OUTPATIENT
Start: 2023-10-12 | End: 2023-10-12

## 2023-10-12 RX ORDER — HYDROMORPHONE HYDROCHLORIDE 2 MG/ML
1 INJECTION, SOLUTION INTRAMUSCULAR; INTRAVENOUS; SUBCUTANEOUS
Status: DISCONTINUED | OUTPATIENT
Start: 2023-10-12 | End: 2023-10-17 | Stop reason: HOSPADM

## 2023-10-12 RX ORDER — SODIUM CHLORIDE 0.9 % (FLUSH) 0.9 %
5-40 SYRINGE (ML) INJECTION PRN
Status: DISCONTINUED | OUTPATIENT
Start: 2023-10-12 | End: 2023-10-12 | Stop reason: HOSPADM

## 2023-10-12 RX ORDER — DIPHENHYDRAMINE HYDROCHLORIDE 50 MG/ML
12.5 INJECTION INTRAMUSCULAR; INTRAVENOUS
Status: DISCONTINUED | OUTPATIENT
Start: 2023-10-12 | End: 2023-10-12 | Stop reason: HOSPADM

## 2023-10-12 RX ORDER — MAGNESIUM SULFATE 1 G/100ML
1000 INJECTION INTRAVENOUS PRN
Status: DISCONTINUED | OUTPATIENT
Start: 2023-10-12 | End: 2023-10-17 | Stop reason: HOSPADM

## 2023-10-12 RX ORDER — OXYCODONE HYDROCHLORIDE 5 MG/1
5 TABLET ORAL EVERY 4 HOURS PRN
Status: DISCONTINUED | OUTPATIENT
Start: 2023-10-12 | End: 2023-10-17 | Stop reason: HOSPADM

## 2023-10-12 RX ORDER — METRONIDAZOLE 500 MG/100ML
500 INJECTION, SOLUTION INTRAVENOUS
Status: COMPLETED | OUTPATIENT
Start: 2023-10-12 | End: 2023-10-12

## 2023-10-12 RX ORDER — DEXAMETHASONE SODIUM PHOSPHATE 4 MG/ML
INJECTION, SOLUTION INTRA-ARTICULAR; INTRALESIONAL; INTRAMUSCULAR; INTRAVENOUS; SOFT TISSUE PRN
Status: DISCONTINUED | OUTPATIENT
Start: 2023-10-12 | End: 2023-10-12 | Stop reason: SDUPTHER

## 2023-10-12 RX ORDER — SODIUM CHLORIDE 0.9 % (FLUSH) 0.9 %
5-40 SYRINGE (ML) INJECTION EVERY 12 HOURS SCHEDULED
Status: DISCONTINUED | OUTPATIENT
Start: 2023-10-12 | End: 2023-10-17 | Stop reason: HOSPADM

## 2023-10-12 RX ORDER — OXYCODONE HYDROCHLORIDE 5 MG/1
5 TABLET ORAL PRN
Status: DISCONTINUED | OUTPATIENT
Start: 2023-10-12 | End: 2023-10-12 | Stop reason: HOSPADM

## 2023-10-12 RX ORDER — MIDAZOLAM HYDROCHLORIDE 2 MG/2ML
2 INJECTION, SOLUTION INTRAMUSCULAR; INTRAVENOUS
Status: DISCONTINUED | OUTPATIENT
Start: 2023-10-12 | End: 2023-10-12 | Stop reason: HOSPADM

## 2023-10-12 RX ORDER — IPRATROPIUM BROMIDE AND ALBUTEROL SULFATE 2.5; .5 MG/3ML; MG/3ML
1 SOLUTION RESPIRATORY (INHALATION)
Status: DISCONTINUED | OUTPATIENT
Start: 2023-10-12 | End: 2023-10-12 | Stop reason: HOSPADM

## 2023-10-12 RX ORDER — SODIUM CHLORIDE 0.9 % (FLUSH) 0.9 %
5-40 SYRINGE (ML) INJECTION EVERY 12 HOURS SCHEDULED
Status: DISCONTINUED | OUTPATIENT
Start: 2023-10-12 | End: 2023-10-12 | Stop reason: HOSPADM

## 2023-10-12 RX ORDER — ENOXAPARIN SODIUM 100 MG/ML
40 INJECTION SUBCUTANEOUS DAILY
Status: DISCONTINUED | OUTPATIENT
Start: 2023-10-13 | End: 2023-10-15

## 2023-10-12 RX ORDER — HYDROMORPHONE HYDROCHLORIDE 2 MG/ML
0.5 INJECTION, SOLUTION INTRAMUSCULAR; INTRAVENOUS; SUBCUTANEOUS EVERY 5 MIN PRN
Status: DISCONTINUED | OUTPATIENT
Start: 2023-10-12 | End: 2023-10-12 | Stop reason: HOSPADM

## 2023-10-12 RX ORDER — ACETAMINOPHEN 500 MG
1000 TABLET ORAL ONCE
Status: COMPLETED | OUTPATIENT
Start: 2023-10-12 | End: 2023-10-12

## 2023-10-12 RX ORDER — SODIUM CHLORIDE 9 MG/ML
INJECTION, SOLUTION INTRAVENOUS PRN
Status: DISCONTINUED | OUTPATIENT
Start: 2023-10-12 | End: 2023-10-17 | Stop reason: HOSPADM

## 2023-10-12 RX ORDER — NEOSTIGMINE METHYLSULFATE 1 MG/ML
INJECTION, SOLUTION INTRAVENOUS PRN
Status: DISCONTINUED | OUTPATIENT
Start: 2023-10-12 | End: 2023-10-12 | Stop reason: SDUPTHER

## 2023-10-12 RX ORDER — ATORVASTATIN CALCIUM 40 MG/1
40 TABLET, FILM COATED ORAL EVERY EVENING
Status: DISCONTINUED | OUTPATIENT
Start: 2023-10-12 | End: 2023-10-17 | Stop reason: HOSPADM

## 2023-10-12 RX ORDER — SODIUM CHLORIDE AND POTASSIUM CHLORIDE 150; 900 MG/100ML; MG/100ML
INJECTION, SOLUTION INTRAVENOUS CONTINUOUS
Status: DISCONTINUED | OUTPATIENT
Start: 2023-10-12 | End: 2023-10-15

## 2023-10-12 RX ORDER — POTASSIUM CHLORIDE 7.45 MG/ML
10 INJECTION INTRAVENOUS PRN
Status: DISCONTINUED | OUTPATIENT
Start: 2023-10-12 | End: 2023-10-17 | Stop reason: HOSPADM

## 2023-10-12 RX ORDER — HYDROMORPHONE HYDROCHLORIDE 2 MG/ML
0.5 INJECTION, SOLUTION INTRAMUSCULAR; INTRAVENOUS; SUBCUTANEOUS
Status: DISCONTINUED | OUTPATIENT
Start: 2023-10-12 | End: 2023-10-17 | Stop reason: HOSPADM

## 2023-10-12 RX ORDER — ONDANSETRON 2 MG/ML
INJECTION INTRAMUSCULAR; INTRAVENOUS PRN
Status: DISCONTINUED | OUTPATIENT
Start: 2023-10-12 | End: 2023-10-12 | Stop reason: SDUPTHER

## 2023-10-12 RX ADMIN — PROPOFOL 150 MG: 10 INJECTION, EMULSION INTRAVENOUS at 08:53

## 2023-10-12 RX ADMIN — Medication 1 SPRAY: at 23:35

## 2023-10-12 RX ADMIN — SODIUM CHLORIDE, PRESERVATIVE FREE 10 ML: 5 INJECTION INTRAVENOUS at 18:36

## 2023-10-12 RX ADMIN — SODIUM CHLORIDE, PRESERVATIVE FREE 10 ML: 5 INJECTION INTRAVENOUS at 22:22

## 2023-10-12 RX ADMIN — Medication 2 MG: at 10:59

## 2023-10-12 RX ADMIN — Medication 2000 MG: at 09:05

## 2023-10-12 RX ADMIN — PHENYLEPHRINE HYDROCHLORIDE 100 MCG: 10 INJECTION INTRAVENOUS at 08:55

## 2023-10-12 RX ADMIN — SODIUM CHLORIDE, SODIUM LACTATE, POTASSIUM CHLORIDE, AND CALCIUM CHLORIDE: 600; 310; 30; 20 INJECTION, SOLUTION INTRAVENOUS at 08:00

## 2023-10-12 RX ADMIN — KETAMINE HYDROCHLORIDE 10 MG: 50 INJECTION, SOLUTION INTRAMUSCULAR; INTRAVENOUS at 10:45

## 2023-10-12 RX ADMIN — METRONIDAZOLE 500 MG: 500 INJECTION, SOLUTION INTRAVENOUS at 08:04

## 2023-10-12 RX ADMIN — GLYCOPYRROLATE 0.4 MG: 0.2 INJECTION INTRAMUSCULAR; INTRAVENOUS at 10:54

## 2023-10-12 RX ADMIN — ROCURONIUM BROMIDE 20 MG: 50 INJECTION, SOLUTION INTRAVENOUS at 10:07

## 2023-10-12 RX ADMIN — HYDROMORPHONE HYDROCHLORIDE 0.5 MG: 2 INJECTION, SOLUTION INTRAMUSCULAR; INTRAVENOUS; SUBCUTANEOUS at 14:25

## 2023-10-12 RX ADMIN — PHENYLEPHRINE HYDROCHLORIDE 200 MCG: 10 INJECTION INTRAVENOUS at 09:04

## 2023-10-12 RX ADMIN — KETAMINE HYDROCHLORIDE 20 MG: 50 INJECTION, SOLUTION INTRAMUSCULAR; INTRAVENOUS at 09:19

## 2023-10-12 RX ADMIN — METOPROLOL SUCCINATE 25 MG: 25 TABLET, FILM COATED, EXTENDED RELEASE ORAL at 18:33

## 2023-10-12 RX ADMIN — PHENYLEPHRINE HYDROCHLORIDE 100 MCG: 10 INJECTION INTRAVENOUS at 08:59

## 2023-10-12 RX ADMIN — ACETAMINOPHEN 1000 MG: 500 TABLET, FILM COATED ORAL at 07:58

## 2023-10-12 RX ADMIN — Medication 20 MG: at 09:04

## 2023-10-12 RX ADMIN — KETAMINE HYDROCHLORIDE 10 MG: 50 INJECTION, SOLUTION INTRAMUSCULAR; INTRAVENOUS at 10:14

## 2023-10-12 RX ADMIN — FENTANYL CITRATE 100 MCG: 50 INJECTION, SOLUTION INTRAMUSCULAR; INTRAVENOUS at 08:53

## 2023-10-12 RX ADMIN — ROCURONIUM BROMIDE 45 MG: 50 INJECTION, SOLUTION INTRAVENOUS at 09:05

## 2023-10-12 RX ADMIN — Medication 140 MG: at 08:53

## 2023-10-12 RX ADMIN — DEXAMETHASONE SODIUM PHOSPHATE 4 MG: 4 INJECTION, SOLUTION INTRAMUSCULAR; INTRAVENOUS at 10:23

## 2023-10-12 RX ADMIN — ATORVASTATIN CALCIUM 40 MG: 40 TABLET, FILM COATED ORAL at 18:33

## 2023-10-12 RX ADMIN — SUGAMMADEX 200 MG: 100 INJECTION, SOLUTION INTRAVENOUS at 11:10

## 2023-10-12 RX ADMIN — OXYCODONE HYDROCHLORIDE 10 MG: 5 TABLET ORAL at 22:23

## 2023-10-12 RX ADMIN — Medication 3 MG: at 10:54

## 2023-10-12 RX ADMIN — ROCURONIUM BROMIDE 5 MG: 50 INJECTION, SOLUTION INTRAVENOUS at 08:53

## 2023-10-12 RX ADMIN — PHENYLEPHRINE HYDROCHLORIDE 100 MCG: 10 INJECTION INTRAVENOUS at 08:57

## 2023-10-12 RX ADMIN — FAMOTIDINE 20 MG: 20 TABLET, FILM COATED ORAL at 22:24

## 2023-10-12 RX ADMIN — POTASSIUM CHLORIDE AND SODIUM CHLORIDE: 900; 150 INJECTION, SOLUTION INTRAVENOUS at 18:35

## 2023-10-12 RX ADMIN — ONDANSETRON 4 MG: 2 INJECTION INTRAMUSCULAR; INTRAVENOUS at 10:23

## 2023-10-12 RX ADMIN — SODIUM CHLORIDE, SODIUM LACTATE, POTASSIUM CHLORIDE, AND CALCIUM CHLORIDE: 600; 310; 30; 20 INJECTION, SOLUTION INTRAVENOUS at 09:41

## 2023-10-12 ASSESSMENT — PAIN SCALES - GENERAL
PAINLEVEL_OUTOF10: 8
PAINLEVEL_OUTOF10: 7

## 2023-10-12 ASSESSMENT — PAIN - FUNCTIONAL ASSESSMENT
PAIN_FUNCTIONAL_ASSESSMENT: 0-10
PAIN_FUNCTIONAL_ASSESSMENT: 0-10
PAIN_FUNCTIONAL_ASSESSMENT: NONE - DENIES PAIN
PAIN_FUNCTIONAL_ASSESSMENT: 0-10

## 2023-10-12 ASSESSMENT — PAIN DESCRIPTION - ORIENTATION
ORIENTATION: ANTERIOR
ORIENTATION: ANTERIOR

## 2023-10-12 ASSESSMENT — PAIN DESCRIPTION - DESCRIPTORS
DESCRIPTORS: ACHING
DESCRIPTORS: ACHING

## 2023-10-12 ASSESSMENT — PAIN DESCRIPTION - LOCATION
LOCATION: ABDOMEN
LOCATION: ABDOMEN

## 2023-10-12 NOTE — OP NOTE
[address] [facility]  OPERATIVE REPORT    Name:  Judd Farah  MR#:  [id number]  :  1945  ACCOUNT #:  [de-identified]  DATE OF SERVICE:  10/12/2023    PREOPERATIVE DIAGNOSIS:  Colovesical fistula. POSTOPERATIVE DIAGNOSIS:  Colovesical fistula. PROCEDURES PERFORMED:  Exploratory laparotomy, sigmoid colon resection, division of colovesical fistula and repair of bladder as well as an appendectomy. SURGEONS:  Gaylord Curling, MD and Jaycee Alvarado MD    ASSISTANT:  Vasiliy Owen was the first assistant throughout the procedure. ANESTHESIA:  General endotracheal anesthesia with Dr. Mikhail Joseph and also he did blocks of the abdominal wall at the end of procedure. COMPLICATIONS:  None. SPECIMENS REMOVED:  1. Sigmoid colon. 2.  Appendix. 3.  Segment of bladder was sent by Dr. Mariajose Lundberg. IMPLANTS:  None. ESTIMATED BLOOD LOSS:  Less than 100 mL. DRAINS:  #10 flat DIANE drain placed in the pelvis. HISTORY:  This is a 64-year-old male who was referred by Dr. Mariajose Lundberg for a colovesical fistula. Dr. Mariajose Lundberg did the workup. I saw the patient before surgery and went over sigmoid colon resection, division of the colovesical fistula and Dr. Mariajose Lundberg will repair the bladder. He was given a bowel prep the day before. He signed the consent form and had no new questions prior to surgery. PROCEDURE:  The patient was seen in the preop area, then brought to room #9 [facility] Operating Room. He was placed on the operating table in the supine position. General endotracheal anesthesia was established by Dr. Mikhail Joseph and Max Valle, the nurse anesthetist.  An arterial line was placed for blood pressure monitoring throughout the procedure. The patient was placed in lithotomy position. The perineum as well as the abdomen were prepped and draped in the usual sterile manner. I did a time-out identifying the patient, surgeon, procedure and his birth date of 1945.   When everyone the future, was removed by taking the mesoappendix and dividing it with the LigaSure Impact device. The base of the appendix was clamped twice with Hemostats and cut between it with the Metzenbaum scissors. I tied off the appendiceal stump with a 2-0 silk tie. The appendix was removed and sent to Pathology for evaluation. Bowel was packed out of the way again. I called Dr. Roman Solis in, he excised the segment of the bladder and then repaired it in two layers with 2-0 chromic. He will dictate his portion of the procedure. After he had done this, Harlan Kim, the first assistant went from below and brought the 29 EEA stapling device up to the staple line on the distal sigmoid colon. The point was brought out, I attached the anvil in the descending colon to this in a end-to-end colocolonic anastomosis, was fashioned with the 29 EEA stapling device. I oversewed this area with three 2-0 silk sutures at the 9, 12 and 3 o'clock positions. I then held pressure proximally and Harlan Kim brought the rigid sigmoidoscope in with me holding pressure in the area under water of saline solution that we put in the pelvis. She inflated air. The anastomosis showed no evidence of leak. There was no bubbling and the area of bowel distended nicely without any bubbling and no evidence of leak. The air was removed. The suction device was used to suction all the fluid out of the pelvis. At this point, the  anastomosis was in good shape. There was no tension on the bowel. I placed Vistaseal over the anastomosis and in the pelvis. We then placed #10 flat DIANE drain through a stab incision in the right lower quadrant to lie just anterior to the colocolonic anastomosis. Bookwalter retractor was dismantled. Lap and instrument counts were found be correct x2 at which point the fascia was closed with two #1 looped PDS sutures. The subcutaneous tissue was irrigated. The skin was closed with surgical seema.   Dr. Kailey Awan

## 2023-10-12 NOTE — INTERVAL H&P NOTE
Update History & Physical    The patient's History and Physical of 10/4/23 was reviewed with the patient and I examined the patient. There was no change. The surgical site was confirmed by the patient and me. Plan: The risks, benefits, expected outcome, and alternative to the recommended procedure have been discussed with the patient. Patient understands and wants to proceed with the procedure.      Electronically signed by Niru Campos MD on 10/12/2023 at 7:00 AM

## 2023-10-12 NOTE — PROGRESS NOTES
TRANSFER - IN REPORT:    Verbal report received from Joo Moore RN on Clabe Levels  being received from ED for routine progression of patient care      Report consisted of patient's Situation, Background, Assessment and   Recommendations(SBAR). Information from the following report(s) Nurse Handoff Report was reviewed with the receiving nurse. Opportunity for questions and clarification was provided. Assessment completed upon patient's arrival to unit and care assumed.

## 2023-10-12 NOTE — OP NOTE
[address] [facility]  OPERATIVE REPORT    Name:  Ridge Davis  MR#:  [id number]  :  1945  ACCOUNT #:  [de-identified]  DATE OF SERVICE:  10/12/2023    PREOPERATIVE DIAGNOSIS:  Colovesical fistula. POSTOPERATIVE DIAGNOSIS:  Colovesical fistula. PROCEDURE PERFORMED:  Excision of bladder fistula. SURGEON:  Irvin De Luna MD    ASSISTANT:  Dr. Daya Mujica:  General.    COMPLICATIONS:  None. SPECIMENS REMOVED:  Fistulous tract for permanent path. IMPLANTS:  None. ESTIMATED BLOOD LOSS:  Minimal.    DRAINS:  16-Sinhala Begum catheter per urethra. PROCEDURE:  The patient had already undergone a bowel resection by Dr. Krista Fritz. He had monitored the area where the colon was adherent to the dome of the bladder. This was just posterior to the dome in the midline. I placed an Allis clamp on this area. I then incised this circumferentially with Bovie. This was taken down until I reached the level of the bladder wall and the fistulous tract was excised. This area was then closed in two layers, deeper layer with running 2-0 chromic and a layer closing the peritoneum over the dome of the bladder which was also running 2-0 chromic. Once this was completed, the case was given back over to Dr. Krista Fritz.       Irvin De Luna MD      [id number]  D:  10/12/2023 11:08  T:  10/12/2023 12:41  JOB #:  [id number]

## 2023-10-12 NOTE — ANESTHESIA POSTPROCEDURE EVALUATION
Department of Anesthesiology  Postprocedure Note    Patient: Sari Hammer  MRN: [id number]  YOB: 1945  Date of evaluation: 10/12/2023      Procedure Summary     Date: 10/12/23 Room / Location: [facility] MAIN OR 09 / [facility] MAIN OR    Anesthesia Start: 0835 Anesthesia Stop: 2303    Procedures:       EXPLORATORY LAPAROTOMY (Abdomen)      SIGMOID COLON RESECTION W/ COLOCOLONIC ANASTOMOSIS AND APPENDECTOMY (Abdomen)      Repair of colovesicle fistula (Bladder) Diagnosis:       Colovesical fistula      (Colovesical fistula [N32.1])    Providers: Uriel Jones MD; Celeste Le MD Responsible Provider: Rc Anderson MD    Anesthesia Type: general ASA Status: 3          Anesthesia Type: No value filed.     Charles Phase I: Charles Score: 10    Charles Phase II:        Anesthesia Post Evaluation    Patient location during evaluation: bedside  Patient participation: complete - patient participated  Level of consciousness: awake and alert  Airway patency: patent  Nausea & Vomiting: no vomiting  Complications: no  Cardiovascular status: hemodynamically stable  Respiratory status: acceptable  Hydration status: euvolemic  Pain management: adequate

## 2023-10-12 NOTE — BRIEF OP NOTE
Brief Postoperative Note      Patient: Cleveland Libman  YOB: 1945  MRN: [id number]    Date of Procedure: 10/12/2023    Pre-Op Diagnosis Codes:     * Colovesical fistula [N32.1]    Post-Op Diagnosis: Same       Procedure(s):  EXPLORATORY LAPAROTOMY  SIGMOID COLON RESECTION W/ COLOCOLONIC ANASTOMOSIS AND APPENDECTOMY  Repair of colovesicle fistula    Surgeon(s): Tilda Battiest, MD Adrain Essex, MD    Assistant:  Andria Londono    Anesthesia: General plus abdominal wall blocks. Estimated Blood Loss (mL): less than 809     Complications: None    Specimens:   ID Type Source Tests Collected by Time Destination   A : Sigmoid Colon Tissue Sigmoid Colon SURGICAL PATHOLOGY Judy Wilder MD 10/12/2023 1002    B : Appendix Tissue Appendix SURGICAL PATHOLOGY Judy Wilder MD 10/12/2023 1015    C : Bladder Fistula Tissue Bladder SURGICAL PATHOLOGY Judy Wilder MD 10/12/2023 1022        Implants:  Implant Name Type Inv.  Item Serial No.  Lot No. LRB No. Used Action   SEALANT TISS 4 CC FIBRIN VISTASEAL - [id number]  SEALANT TISS 4 CC FIBRIN VISTASEAL [id number] Formerly [facility] [employer]- [id number] N/A 1 Implanted         Drains:   Closed/Suction Drain Midline Abdomen Bulb (Active)       Urinary Catheter 10/12/23 Begum (Active)       Findings: See dictated note      Electronically signed by Judy Wilder MD on 10/12/2023 at 10:57 AM

## 2023-10-13 LAB
ANION GAP SERPL CALC-SCNC: 7 MMOL/L (ref 2–11)
BUN SERPL-MCNC: 24 MG/DL (ref 8–23)
CALCIUM SERPL-MCNC: 8.6 MG/DL (ref 8.3–10.4)
CHLORIDE SERPL-SCNC: 109 MMOL/L (ref 101–110)
CO2 SERPL-SCNC: 24 MMOL/L (ref 21–32)
CREAT SERPL-MCNC: 0.9 MG/DL (ref 0.8–1.5)
ERYTHROCYTE [DISTWIDTH] IN BLOOD BY AUTOMATED COUNT: 12.8 % (ref 11.9–14.6)
GLUCOSE SERPL-MCNC: 122 MG/DL (ref 65–100)
HCT VFR BLD AUTO: 36.6 % (ref 41.1–50.3)
HGB BLD-MCNC: 11.7 G/DL (ref 13.6–17.2)
MCH RBC QN AUTO: 30.5 PG (ref 26.1–32.9)
MCHC RBC AUTO-ENTMCNC: 32 G/DL (ref 31.4–35)
MCV RBC AUTO: 95.6 FL (ref 82–102)
NRBC # BLD: 0 K/UL (ref 0–0.2)
PLATELET # BLD AUTO: 106 K/UL (ref 150–450)
PMV BLD AUTO: 12.4 FL (ref 9.4–12.3)
POTASSIUM SERPL-SCNC: 4.5 MMOL/L (ref 3.5–5.1)
RBC # BLD AUTO: 3.83 M/UL (ref 4.23–5.6)
SODIUM SERPL-SCNC: 140 MMOL/L (ref 133–143)
WBC # BLD AUTO: 15.7 K/UL (ref 4.3–11.1)

## 2023-10-13 PROCEDURE — 2500000003 HC RX 250 WO HCPCS: Performed by: SURGERY

## 2023-10-13 PROCEDURE — A4216 STERILE WATER/SALINE, 10 ML: HCPCS | Performed by: SURGERY

## 2023-10-13 PROCEDURE — 2580000003 HC RX 258

## 2023-10-13 PROCEDURE — 1100000000 HC RM PRIVATE

## 2023-10-13 PROCEDURE — 2580000003 HC RX 258: Performed by: SURGERY

## 2023-10-13 PROCEDURE — 97530 THERAPEUTIC ACTIVITIES: CPT

## 2023-10-13 PROCEDURE — 6360000002 HC RX W HCPCS: Performed by: SURGERY

## 2023-10-13 PROCEDURE — 85027 COMPLETE CBC AUTOMATED: CPT

## 2023-10-13 PROCEDURE — 36415 COLL VENOUS BLD VENIPUNCTURE: CPT

## 2023-10-13 PROCEDURE — 6370000000 HC RX 637 (ALT 250 FOR IP): Performed by: SURGERY

## 2023-10-13 PROCEDURE — 80048 BASIC METABOLIC PNL TOTAL CA: CPT

## 2023-10-13 PROCEDURE — 97162 PT EVAL MOD COMPLEX 30 MIN: CPT

## 2023-10-13 RX ORDER — METOCLOPRAMIDE HYDROCHLORIDE 5 MG/ML
5 INJECTION INTRAMUSCULAR; INTRAVENOUS EVERY 6 HOURS
Status: DISCONTINUED | OUTPATIENT
Start: 2023-10-13 | End: 2023-10-16

## 2023-10-13 RX ORDER — SODIUM CHLORIDE, SODIUM LACTATE, POTASSIUM CHLORIDE, AND CALCIUM CHLORIDE .6; .31; .03; .02 G/100ML; G/100ML; G/100ML; G/100ML
500 INJECTION, SOLUTION INTRAVENOUS ONCE
Status: COMPLETED | OUTPATIENT
Start: 2023-10-13 | End: 2023-10-13

## 2023-10-13 RX ORDER — METOPROLOL SUCCINATE 25 MG/1
25 TABLET, EXTENDED RELEASE ORAL DAILY
Status: DISCONTINUED | OUTPATIENT
Start: 2023-10-13 | End: 2023-10-17 | Stop reason: HOSPADM

## 2023-10-13 RX ADMIN — OXYCODONE HYDROCHLORIDE 10 MG: 5 TABLET ORAL at 04:51

## 2023-10-13 RX ADMIN — VALSARTAN 160 MG: 40 TABLET, FILM COATED ORAL at 08:48

## 2023-10-13 RX ADMIN — METOCLOPRAMIDE 5 MG: 5 INJECTION, SOLUTION INTRAMUSCULAR; INTRAVENOUS at 17:09

## 2023-10-13 RX ADMIN — ENOXAPARIN SODIUM 40 MG: 100 INJECTION SUBCUTANEOUS at 08:49

## 2023-10-13 RX ADMIN — OXYCODONE HYDROCHLORIDE 10 MG: 5 TABLET ORAL at 13:31

## 2023-10-13 RX ADMIN — HYDROCHLOROTHIAZIDE 12.5 MG: 25 TABLET ORAL at 08:48

## 2023-10-13 RX ADMIN — OXYCODONE HYDROCHLORIDE 5 MG: 5 TABLET ORAL at 08:57

## 2023-10-13 RX ADMIN — SODIUM CHLORIDE, PRESERVATIVE FREE 10 ML: 5 INJECTION INTRAVENOUS at 09:53

## 2023-10-13 RX ADMIN — FAMOTIDINE 20 MG: 10 INJECTION INTRAVENOUS at 19:35

## 2023-10-13 RX ADMIN — FAMOTIDINE 20 MG: 10 INJECTION INTRAVENOUS at 08:48

## 2023-10-13 RX ADMIN — METOCLOPRAMIDE 5 MG: 5 INJECTION, SOLUTION INTRAMUSCULAR; INTRAVENOUS at 22:40

## 2023-10-13 RX ADMIN — POTASSIUM CHLORIDE AND SODIUM CHLORIDE: 900; 150 INJECTION, SOLUTION INTRAVENOUS at 13:37

## 2023-10-13 RX ADMIN — POTASSIUM CHLORIDE AND SODIUM CHLORIDE: 900; 150 INJECTION, SOLUTION INTRAVENOUS at 04:41

## 2023-10-13 RX ADMIN — SODIUM CHLORIDE, PRESERVATIVE FREE 10 ML: 5 INJECTION INTRAVENOUS at 19:35

## 2023-10-13 RX ADMIN — SODIUM CHLORIDE, POTASSIUM CHLORIDE, SODIUM LACTATE AND CALCIUM CHLORIDE 500 ML: 600; 310; 30; 20 INJECTION, SOLUTION INTRAVENOUS at 17:10

## 2023-10-13 RX ADMIN — METOCLOPRAMIDE 5 MG: 5 INJECTION, SOLUTION INTRAMUSCULAR; INTRAVENOUS at 11:44

## 2023-10-13 RX ADMIN — PREDNISOLONE ACETATE 1 DROP: 10 SUSPENSION/ DROPS OPHTHALMIC at 08:58

## 2023-10-13 ASSESSMENT — PAIN DESCRIPTION - LOCATION
LOCATION: ABDOMEN

## 2023-10-13 ASSESSMENT — PAIN DESCRIPTION - ORIENTATION
ORIENTATION: MID
ORIENTATION: ANTERIOR
ORIENTATION: ANTERIOR

## 2023-10-13 ASSESSMENT — PAIN DESCRIPTION - DESCRIPTORS
DESCRIPTORS: ACHING;SORE
DESCRIPTORS: ACHING
DESCRIPTORS: ACHING;SORE

## 2023-10-13 ASSESSMENT — PAIN SCALES - GENERAL
PAINLEVEL_OUTOF10: 7
PAINLEVEL_OUTOF10: 4
PAINLEVEL_OUTOF10: 4
PAINLEVEL_OUTOF10: 5
PAINLEVEL_OUTOF10: 8

## 2023-10-13 ASSESSMENT — PAIN - FUNCTIONAL ASSESSMENT: PAIN_FUNCTIONAL_ASSESSMENT: PREVENTS OR INTERFERES SOME ACTIVE ACTIVITIES AND ADLS

## 2023-10-13 ASSESSMENT — PAIN DESCRIPTION - PAIN TYPE: TYPE: SURGICAL PAIN

## 2023-10-13 NOTE — CARE COORDINATION
RNCM met with patient in room 216 to discuss discharge planning. Patient lives with spouse in 4 level home with level entry. Patient is independent at baseline and an active . Patient has no current DME or home care services. Patient does not have PCP and refuses referral. Demographics verified. Patient uses [facility] [address]. CM following for discharge needs. 10/13/23 6092   Service Assessment   Patient Orientation Alert and Oriented   Cognition Alert   History Provided By Patient   Primary Caregiver Self   Support Systems Spouse/Significant Other   Patient's Healthcare Decision Maker is: Legal Next of Kin   PCP Verified by CM Yes  (No PCP, refuses referral)   Prior Functional Level Independent in ADLs/IADLs   Current Functional Level Independent in ADLs/IADLs   Can patient return to prior living arrangement Yes   Ability to make needs known: Good   Family able to assist with home care needs: Yes   Would you like for me to discuss the discharge plan with any other family members/significant others, and if so, who? No   Financial Resources Medicare   Community Resources None   Social/Functional History   Lives With Spouse   Type of [address] [place] [address]  Multi-level   Home Access Level entry   Home Equipment None   Receives Help From Family   ADL Assistance Independent   Ambulation Assistance Independent   Transfer Assistance Independent   Active  Yes   Occupation Retired   Discharge Planning   Type of [address] [place] [address] Prior To Admission None   Potential Assistance Needed N/A   DME Ordered?  No   Potential Assistance Purchasing Medications No   Type of Home Care Services None   Patient expects to be discharged to: [facility]

## 2023-10-13 NOTE — ACP (ADVANCE CARE PLANNING)
Advance Care Planning   Healthcare Decision Maker:    Primary Decision Maker: Tammie Haywood - [phone]    Click here to complete Healthcare Decision Makers including selection of the Healthcare Decision Maker Relationship (ie \"Primary\").

## 2023-10-14 LAB
ANION GAP SERPL CALC-SCNC: 6 MMOL/L (ref 2–11)
BASOPHILS # BLD: 0 K/UL (ref 0–0.2)
BASOPHILS NFR BLD: 0 % (ref 0–2)
BUN SERPL-MCNC: 25 MG/DL (ref 8–23)
CALCIUM SERPL-MCNC: 8.6 MG/DL (ref 8.3–10.4)
CHLORIDE SERPL-SCNC: 111 MMOL/L (ref 101–110)
CO2 SERPL-SCNC: 26 MMOL/L (ref 21–32)
CREAT SERPL-MCNC: 1 MG/DL (ref 0.8–1.5)
DIFFERENTIAL METHOD BLD: ABNORMAL
EOSINOPHIL # BLD: 0.1 K/UL (ref 0–0.8)
EOSINOPHIL NFR BLD: 1 % (ref 0.5–7.8)
ERYTHROCYTE [DISTWIDTH] IN BLOOD BY AUTOMATED COUNT: 13.2 % (ref 11.9–14.6)
GLUCOSE SERPL-MCNC: 92 MG/DL (ref 65–100)
HCT VFR BLD AUTO: 33.8 % (ref 41.1–50.3)
HGB BLD-MCNC: 10.7 G/DL (ref 13.6–17.2)
IMM GRANULOCYTES # BLD AUTO: 0.1 K/UL (ref 0–0.5)
IMM GRANULOCYTES NFR BLD AUTO: 1 % (ref 0–5)
LYMPHOCYTES # BLD: 1 K/UL (ref 0.5–4.6)
LYMPHOCYTES NFR BLD: 9 % (ref 13–44)
MCH RBC QN AUTO: 30.9 PG (ref 26.1–32.9)
MCHC RBC AUTO-ENTMCNC: 31.7 G/DL (ref 31.4–35)
MCV RBC AUTO: 97.7 FL (ref 82–102)
MONOCYTES # BLD: 0.9 K/UL (ref 0.1–1.3)
MONOCYTES NFR BLD: 7 % (ref 4–12)
NEUTS SEG # BLD: 10 K/UL (ref 1.7–8.2)
NEUTS SEG NFR BLD: 83 % (ref 43–78)
NRBC # BLD: 0 K/UL (ref 0–0.2)
PLATELET # BLD AUTO: 94 K/UL (ref 150–450)
PMV BLD AUTO: 12.4 FL (ref 9.4–12.3)
POTASSIUM SERPL-SCNC: 4.4 MMOL/L (ref 3.5–5.1)
RBC # BLD AUTO: 3.46 M/UL (ref 4.23–5.6)
SODIUM SERPL-SCNC: 143 MMOL/L (ref 133–143)
WBC # BLD AUTO: 12 K/UL (ref 4.3–11.1)

## 2023-10-14 PROCEDURE — 85025 COMPLETE CBC W/AUTO DIFF WBC: CPT

## 2023-10-14 PROCEDURE — 2580000003 HC RX 258: Performed by: SURGERY

## 2023-10-14 PROCEDURE — 2500000003 HC RX 250 WO HCPCS: Performed by: SURGERY

## 2023-10-14 PROCEDURE — 80048 BASIC METABOLIC PNL TOTAL CA: CPT

## 2023-10-14 PROCEDURE — 36415 COLL VENOUS BLD VENIPUNCTURE: CPT

## 2023-10-14 PROCEDURE — 6360000002 HC RX W HCPCS: Performed by: SURGERY

## 2023-10-14 PROCEDURE — 1100000000 HC RM PRIVATE

## 2023-10-14 PROCEDURE — 6370000000 HC RX 637 (ALT 250 FOR IP): Performed by: NURSE PRACTITIONER

## 2023-10-14 PROCEDURE — A4216 STERILE WATER/SALINE, 10 ML: HCPCS | Performed by: SURGERY

## 2023-10-14 PROCEDURE — 6370000000 HC RX 637 (ALT 250 FOR IP): Performed by: SURGERY

## 2023-10-14 RX ORDER — ACETAMINOPHEN 325 MG/1
650 TABLET ORAL EVERY 4 HOURS PRN
Status: DISCONTINUED | OUTPATIENT
Start: 2023-10-14 | End: 2023-10-17 | Stop reason: HOSPADM

## 2023-10-14 RX ADMIN — METOCLOPRAMIDE 5 MG: 5 INJECTION, SOLUTION INTRAMUSCULAR; INTRAVENOUS at 10:20

## 2023-10-14 RX ADMIN — METOCLOPRAMIDE 5 MG: 5 INJECTION, SOLUTION INTRAMUSCULAR; INTRAVENOUS at 22:29

## 2023-10-14 RX ADMIN — PREDNISOLONE ACETATE 1 DROP: 10 SUSPENSION/ DROPS OPHTHALMIC at 08:32

## 2023-10-14 RX ADMIN — METOPROLOL SUCCINATE 25 MG: 25 TABLET, FILM COATED, EXTENDED RELEASE ORAL at 16:56

## 2023-10-14 RX ADMIN — METOCLOPRAMIDE 5 MG: 5 INJECTION, SOLUTION INTRAMUSCULAR; INTRAVENOUS at 16:29

## 2023-10-14 RX ADMIN — FAMOTIDINE 20 MG: 10 INJECTION INTRAVENOUS at 19:46

## 2023-10-14 RX ADMIN — SODIUM CHLORIDE, PRESERVATIVE FREE 10 ML: 5 INJECTION INTRAVENOUS at 19:46

## 2023-10-14 RX ADMIN — SODIUM CHLORIDE, PRESERVATIVE FREE 10 ML: 5 INJECTION INTRAVENOUS at 08:33

## 2023-10-14 RX ADMIN — ENOXAPARIN SODIUM 40 MG: 100 INJECTION SUBCUTANEOUS at 08:32

## 2023-10-14 RX ADMIN — ACETAMINOPHEN 650 MG: 325 TABLET ORAL at 16:28

## 2023-10-14 RX ADMIN — FAMOTIDINE 20 MG: 20 TABLET, FILM COATED ORAL at 08:32

## 2023-10-14 RX ADMIN — POTASSIUM CHLORIDE AND SODIUM CHLORIDE: 900; 150 INJECTION, SOLUTION INTRAVENOUS at 05:14

## 2023-10-14 RX ADMIN — OXYCODONE HYDROCHLORIDE 5 MG: 5 TABLET ORAL at 05:09

## 2023-10-14 RX ADMIN — HYDROCHLOROTHIAZIDE 12.5 MG: 25 TABLET ORAL at 08:32

## 2023-10-14 RX ADMIN — ACETAMINOPHEN 650 MG: 325 TABLET ORAL at 19:58

## 2023-10-14 RX ADMIN — POTASSIUM CHLORIDE AND SODIUM CHLORIDE: 900; 150 INJECTION, SOLUTION INTRAVENOUS at 10:20

## 2023-10-14 RX ADMIN — METOCLOPRAMIDE 5 MG: 5 INJECTION, SOLUTION INTRAMUSCULAR; INTRAVENOUS at 05:09

## 2023-10-14 RX ADMIN — OXYCODONE HYDROCHLORIDE 5 MG: 5 TABLET ORAL at 11:08

## 2023-10-14 RX ADMIN — ATORVASTATIN CALCIUM 40 MG: 40 TABLET, FILM COATED ORAL at 16:56

## 2023-10-14 RX ADMIN — POTASSIUM CHLORIDE AND SODIUM CHLORIDE: 900; 150 INJECTION, SOLUTION INTRAVENOUS at 19:46

## 2023-10-14 RX ADMIN — VALSARTAN 160 MG: 40 TABLET, FILM COATED ORAL at 08:32

## 2023-10-14 RX ADMIN — OXYCODONE HYDROCHLORIDE 5 MG: 5 TABLET ORAL at 16:28

## 2023-10-14 ASSESSMENT — PAIN - FUNCTIONAL ASSESSMENT
PAIN_FUNCTIONAL_ASSESSMENT: PREVENTS OR INTERFERES SOME ACTIVE ACTIVITIES AND ADLS
PAIN_FUNCTIONAL_ASSESSMENT: PREVENTS OR INTERFERES SOME ACTIVE ACTIVITIES AND ADLS

## 2023-10-14 ASSESSMENT — PAIN DESCRIPTION - ORIENTATION
ORIENTATION: MID

## 2023-10-14 ASSESSMENT — PAIN SCALES - GENERAL
PAINLEVEL_OUTOF10: 3
PAINLEVEL_OUTOF10: 4
PAINLEVEL_OUTOF10: 4
PAINLEVEL_OUTOF10: 2

## 2023-10-14 ASSESSMENT — PAIN DESCRIPTION - DESCRIPTORS
DESCRIPTORS: ACHING
DESCRIPTORS: ACHING
DESCRIPTORS: TIGHTNESS

## 2023-10-14 ASSESSMENT — PAIN DESCRIPTION - LOCATION
LOCATION: ABDOMEN

## 2023-10-14 ASSESSMENT — PAIN DESCRIPTION - FREQUENCY: FREQUENCY: INTERMITTENT

## 2023-10-14 ASSESSMENT — PAIN DESCRIPTION - ONSET: ONSET: PROGRESSIVE

## 2023-10-14 ASSESSMENT — PAIN DESCRIPTION - PAIN TYPE: TYPE: SURGICAL PAIN

## 2023-10-14 NOTE — PROGRESS NOTES
If you need to see a   -  just ask the nurse to call us. We look forward to serving your family!!         Jose De Jesus Abernathy

## 2023-10-15 LAB
ALBUMIN SERPL-MCNC: 2.8 G/DL (ref 3.2–4.6)
ALBUMIN/GLOB SERPL: 0.9 (ref 0.4–1.6)
ALP SERPL-CCNC: 62 U/L (ref 50–136)
ALT SERPL-CCNC: 16 U/L (ref 12–65)
ANION GAP SERPL CALC-SCNC: 6 MMOL/L (ref 2–11)
AST SERPL-CCNC: 23 U/L (ref 15–37)
BASOPHILS # BLD: 0.1 K/UL (ref 0–0.2)
BASOPHILS NFR BLD: 0 % (ref 0–2)
BILIRUB SERPL-MCNC: 0.8 MG/DL (ref 0.2–1.1)
BUN SERPL-MCNC: 21 MG/DL (ref 8–23)
CALCIUM SERPL-MCNC: 8.7 MG/DL (ref 8.3–10.4)
CHLORIDE SERPL-SCNC: 111 MMOL/L (ref 101–110)
CO2 SERPL-SCNC: 23 MMOL/L (ref 21–32)
CREAT SERPL-MCNC: 0.9 MG/DL (ref 0.8–1.5)
DIFFERENTIAL METHOD BLD: ABNORMAL
EOSINOPHIL # BLD: 0.1 K/UL (ref 0–0.8)
EOSINOPHIL NFR BLD: 1 % (ref 0.5–7.8)
ERYTHROCYTE [DISTWIDTH] IN BLOOD BY AUTOMATED COUNT: 13.1 % (ref 11.9–14.6)
GLOBULIN SER CALC-MCNC: 3.2 G/DL (ref 2.8–4.5)
GLUCOSE SERPL-MCNC: 94 MG/DL (ref 65–100)
HCT VFR BLD AUTO: 32.6 % (ref 41.1–50.3)
HGB BLD-MCNC: 10.5 G/DL (ref 13.6–17.2)
IMM GRANULOCYTES # BLD AUTO: 0.1 K/UL (ref 0–0.5)
IMM GRANULOCYTES NFR BLD AUTO: 0 % (ref 0–5)
LYMPHOCYTES # BLD: 1 K/UL (ref 0.5–4.6)
LYMPHOCYTES NFR BLD: 8 % (ref 13–44)
MCH RBC QN AUTO: 31.2 PG (ref 26.1–32.9)
MCHC RBC AUTO-ENTMCNC: 32.2 G/DL (ref 31.4–35)
MCV RBC AUTO: 96.7 FL (ref 82–102)
MONOCYTES # BLD: 0.8 K/UL (ref 0.1–1.3)
MONOCYTES NFR BLD: 7 % (ref 4–12)
NEUTS SEG # BLD: 10.2 K/UL (ref 1.7–8.2)
NEUTS SEG NFR BLD: 84 % (ref 43–78)
NRBC # BLD: 0 K/UL (ref 0–0.2)
PLATELET # BLD AUTO: 94 K/UL (ref 150–450)
PMV BLD AUTO: 12.4 FL (ref 9.4–12.3)
POTASSIUM SERPL-SCNC: 4.2 MMOL/L (ref 3.5–5.1)
PROT SERPL-MCNC: 6 G/DL (ref 6.3–8.2)
RBC # BLD AUTO: 3.37 M/UL (ref 4.23–5.6)
SODIUM SERPL-SCNC: 140 MMOL/L (ref 133–143)
WBC # BLD AUTO: 12.2 K/UL (ref 4.3–11.1)

## 2023-10-15 PROCEDURE — 6360000002 HC RX W HCPCS: Performed by: SURGERY

## 2023-10-15 PROCEDURE — 85025 COMPLETE CBC W/AUTO DIFF WBC: CPT

## 2023-10-15 PROCEDURE — A4216 STERILE WATER/SALINE, 10 ML: HCPCS | Performed by: SURGERY

## 2023-10-15 PROCEDURE — 80053 COMPREHEN METABOLIC PANEL: CPT

## 2023-10-15 PROCEDURE — 1100000000 HC RM PRIVATE

## 2023-10-15 PROCEDURE — 2500000003 HC RX 250 WO HCPCS: Performed by: SURGERY

## 2023-10-15 PROCEDURE — 6370000000 HC RX 637 (ALT 250 FOR IP): Performed by: SURGERY

## 2023-10-15 PROCEDURE — 6370000000 HC RX 637 (ALT 250 FOR IP): Performed by: NURSE PRACTITIONER

## 2023-10-15 PROCEDURE — 36415 COLL VENOUS BLD VENIPUNCTURE: CPT

## 2023-10-15 PROCEDURE — 6360000002 HC RX W HCPCS: Performed by: NURSE PRACTITIONER

## 2023-10-15 PROCEDURE — 2580000003 HC RX 258: Performed by: SURGERY

## 2023-10-15 RX ORDER — SODIUM CHLORIDE AND POTASSIUM CHLORIDE 150; 900 MG/100ML; MG/100ML
INJECTION, SOLUTION INTRAVENOUS CONTINUOUS
Status: DISCONTINUED | OUTPATIENT
Start: 2023-10-15 | End: 2023-10-16

## 2023-10-15 RX ADMIN — VALSARTAN 160 MG: 40 TABLET, FILM COATED ORAL at 08:08

## 2023-10-15 RX ADMIN — FAMOTIDINE 20 MG: 10 INJECTION INTRAVENOUS at 21:25

## 2023-10-15 RX ADMIN — FAMOTIDINE 20 MG: 20 TABLET, FILM COATED ORAL at 08:08

## 2023-10-15 RX ADMIN — HYDROCHLOROTHIAZIDE 12.5 MG: 25 TABLET ORAL at 08:08

## 2023-10-15 RX ADMIN — ACETAMINOPHEN 650 MG: 325 TABLET ORAL at 21:25

## 2023-10-15 RX ADMIN — ACETAMINOPHEN 650 MG: 325 TABLET ORAL at 08:11

## 2023-10-15 RX ADMIN — METOCLOPRAMIDE 5 MG: 5 INJECTION, SOLUTION INTRAMUSCULAR; INTRAVENOUS at 10:13

## 2023-10-15 RX ADMIN — POTASSIUM CHLORIDE AND SODIUM CHLORIDE: 900; 150 INJECTION, SOLUTION INTRAVENOUS at 19:39

## 2023-10-15 RX ADMIN — ENOXAPARIN SODIUM 40 MG: 100 INJECTION SUBCUTANEOUS at 08:08

## 2023-10-15 RX ADMIN — ATORVASTATIN CALCIUM 40 MG: 40 TABLET, FILM COATED ORAL at 17:31

## 2023-10-15 RX ADMIN — PREDNISOLONE ACETATE 1 DROP: 10 SUSPENSION/ DROPS OPHTHALMIC at 08:08

## 2023-10-15 RX ADMIN — POTASSIUM CHLORIDE AND SODIUM CHLORIDE: 900; 150 INJECTION, SOLUTION INTRAVENOUS at 05:20

## 2023-10-15 RX ADMIN — ACETAMINOPHEN 650 MG: 325 TABLET ORAL at 02:03

## 2023-10-15 RX ADMIN — SODIUM CHLORIDE, PRESERVATIVE FREE 10 ML: 5 INJECTION INTRAVENOUS at 08:09

## 2023-10-15 RX ADMIN — METOPROLOL SUCCINATE 25 MG: 25 TABLET, FILM COATED, EXTENDED RELEASE ORAL at 17:31

## 2023-10-15 RX ADMIN — METOCLOPRAMIDE 5 MG: 5 INJECTION, SOLUTION INTRAMUSCULAR; INTRAVENOUS at 23:12

## 2023-10-15 RX ADMIN — METOCLOPRAMIDE 5 MG: 5 INJECTION, SOLUTION INTRAMUSCULAR; INTRAVENOUS at 17:31

## 2023-10-15 RX ADMIN — SODIUM CHLORIDE, PRESERVATIVE FREE 10 ML: 5 INJECTION INTRAVENOUS at 21:26

## 2023-10-15 RX ADMIN — METOCLOPRAMIDE 5 MG: 5 INJECTION, SOLUTION INTRAMUSCULAR; INTRAVENOUS at 05:20

## 2023-10-15 ASSESSMENT — PAIN DESCRIPTION - ONSET: ONSET: PROGRESSIVE

## 2023-10-15 ASSESSMENT — PAIN DESCRIPTION - DESCRIPTORS: DESCRIPTORS: ACHING

## 2023-10-15 ASSESSMENT — PAIN - FUNCTIONAL ASSESSMENT: PAIN_FUNCTIONAL_ASSESSMENT: ACTIVITIES ARE NOT PREVENTED

## 2023-10-15 ASSESSMENT — PAIN DESCRIPTION - FREQUENCY: FREQUENCY: INTERMITTENT

## 2023-10-15 ASSESSMENT — PAIN DESCRIPTION - PAIN TYPE: TYPE: SURGICAL PAIN

## 2023-10-15 ASSESSMENT — PAIN DESCRIPTION - ORIENTATION: ORIENTATION: MID

## 2023-10-15 ASSESSMENT — PAIN SCALES - GENERAL: PAINLEVEL_OUTOF10: 3

## 2023-10-15 ASSESSMENT — PAIN DESCRIPTION - LOCATION: LOCATION: ABDOMEN

## 2023-10-16 LAB
ALBUMIN SERPL-MCNC: 2.5 G/DL (ref 3.2–4.6)
ALBUMIN/GLOB SERPL: 0.8 (ref 0.4–1.6)
ALP SERPL-CCNC: 56 U/L (ref 50–136)
ALT SERPL-CCNC: 15 U/L (ref 12–65)
ANION GAP SERPL CALC-SCNC: 5 MMOL/L (ref 2–11)
AST SERPL-CCNC: 17 U/L (ref 15–37)
BASOPHILS # BLD: 0 K/UL (ref 0–0.2)
BASOPHILS NFR BLD: 0 % (ref 0–2)
BILIRUB SERPL-MCNC: 0.7 MG/DL (ref 0.2–1.1)
BUN SERPL-MCNC: 21 MG/DL (ref 8–23)
CALCIUM SERPL-MCNC: 8.6 MG/DL (ref 8.3–10.4)
CHLORIDE SERPL-SCNC: 112 MMOL/L (ref 101–110)
CO2 SERPL-SCNC: 26 MMOL/L (ref 21–32)
CREAT SERPL-MCNC: 0.9 MG/DL (ref 0.8–1.5)
DIFFERENTIAL METHOD BLD: ABNORMAL
EOSINOPHIL # BLD: 0.1 K/UL (ref 0–0.8)
EOSINOPHIL NFR BLD: 1 % (ref 0.5–7.8)
ERYTHROCYTE [DISTWIDTH] IN BLOOD BY AUTOMATED COUNT: 13.2 % (ref 11.9–14.6)
GLOBULIN SER CALC-MCNC: 3.1 G/DL (ref 2.8–4.5)
GLUCOSE SERPL-MCNC: 120 MG/DL (ref 65–100)
HCT VFR BLD AUTO: 32.4 % (ref 41.1–50.3)
HGB BLD-MCNC: 10.3 G/DL (ref 13.6–17.2)
IMM GRANULOCYTES # BLD AUTO: 0.1 K/UL (ref 0–0.5)
IMM GRANULOCYTES NFR BLD AUTO: 1 % (ref 0–5)
LYMPHOCYTES # BLD: 0.6 K/UL (ref 0.5–4.6)
LYMPHOCYTES NFR BLD: 6 % (ref 13–44)
MCH RBC QN AUTO: 30.9 PG (ref 26.1–32.9)
MCHC RBC AUTO-ENTMCNC: 31.8 G/DL (ref 31.4–35)
MCV RBC AUTO: 97.3 FL (ref 82–102)
MONOCYTES # BLD: 0.8 K/UL (ref 0.1–1.3)
MONOCYTES NFR BLD: 8 % (ref 4–12)
NEUTS SEG # BLD: 9.3 K/UL (ref 1.7–8.2)
NEUTS SEG NFR BLD: 84 % (ref 43–78)
NRBC # BLD: 0 K/UL (ref 0–0.2)
PLATELET # BLD AUTO: 106 K/UL (ref 150–450)
PMV BLD AUTO: 12.7 FL (ref 9.4–12.3)
POTASSIUM SERPL-SCNC: 3.8 MMOL/L (ref 3.5–5.1)
PROT SERPL-MCNC: 5.6 G/DL (ref 6.3–8.2)
RBC # BLD AUTO: 3.33 M/UL (ref 4.23–5.6)
SODIUM SERPL-SCNC: 143 MMOL/L (ref 133–143)
WBC # BLD AUTO: 10.9 K/UL (ref 4.3–11.1)

## 2023-10-16 PROCEDURE — 6370000000 HC RX 637 (ALT 250 FOR IP): Performed by: SURGERY

## 2023-10-16 PROCEDURE — 2580000003 HC RX 258: Performed by: SURGERY

## 2023-10-16 PROCEDURE — 1100000000 HC RM PRIVATE

## 2023-10-16 PROCEDURE — 85025 COMPLETE CBC W/AUTO DIFF WBC: CPT

## 2023-10-16 PROCEDURE — 6360000002 HC RX W HCPCS: Performed by: SURGERY

## 2023-10-16 PROCEDURE — 36415 COLL VENOUS BLD VENIPUNCTURE: CPT

## 2023-10-16 PROCEDURE — 80053 COMPREHEN METABOLIC PANEL: CPT

## 2023-10-16 PROCEDURE — 99231 SBSQ HOSP IP/OBS SF/LOW 25: CPT | Performed by: NURSE PRACTITIONER

## 2023-10-16 RX ADMIN — HYDROCHLOROTHIAZIDE 12.5 MG: 25 TABLET ORAL at 09:18

## 2023-10-16 RX ADMIN — METOCLOPRAMIDE 5 MG: 5 INJECTION, SOLUTION INTRAMUSCULAR; INTRAVENOUS at 05:33

## 2023-10-16 RX ADMIN — ATORVASTATIN CALCIUM 40 MG: 40 TABLET, FILM COATED ORAL at 18:14

## 2023-10-16 RX ADMIN — FAMOTIDINE 20 MG: 20 TABLET, FILM COATED ORAL at 09:19

## 2023-10-16 RX ADMIN — FAMOTIDINE 20 MG: 20 TABLET, FILM COATED ORAL at 20:56

## 2023-10-16 RX ADMIN — METOPROLOL SUCCINATE 25 MG: 25 TABLET, FILM COATED, EXTENDED RELEASE ORAL at 18:14

## 2023-10-16 RX ADMIN — SODIUM CHLORIDE, PRESERVATIVE FREE 10 ML: 5 INJECTION INTRAVENOUS at 20:56

## 2023-10-16 RX ADMIN — VALSARTAN 160 MG: 40 TABLET, FILM COATED ORAL at 09:17

## 2023-10-16 RX ADMIN — PREDNISOLONE ACETATE 1 DROP: 10 SUSPENSION/ DROPS OPHTHALMIC at 09:25

## 2023-10-16 RX ADMIN — SODIUM CHLORIDE, PRESERVATIVE FREE 10 ML: 5 INJECTION INTRAVENOUS at 09:25

## 2023-10-16 NOTE — PLAN OF CARE
Problem: Pain  Goal: Verbalizes/displays adequate comfort level or baseline comfort level  10/16/2023 1128 by Karina Michael RN  Outcome: Progressing  10/15/2023 2231 by Ollie Leblanc RN  Outcome: Progressing     Problem: Safety - Adult  Goal: Free from fall injury  10/16/2023 1128 by Karina Michael RN  Outcome: Progressing  10/15/2023 2231 by Ollie Leblanc RN  Outcome: Progressing     Problem: Discharge Planning  Goal: Discharge to home or other facility with appropriate resources  10/16/2023 1128 by Karina Michael RN  Outcome: Progressing  10/15/2023 2231 by Ollie Leblanc RN  Outcome: Progressing     Problem: Skin/Tissue Integrity  Goal: Absence of new skin breakdown  Description: 1. Monitor for areas of redness and/or skin breakdown  2. Assess vascular access sites hourly  3. Every 4-6 hours minimum:  Change oxygen saturation probe site  4. Every 4-6 hours:  If on nasal continuous positive airway pressure, respiratory therapy assess nares and determine need for appliance change or resting period.   10/16/2023 1128 by Karina Michael RN  Outcome: Progressing  10/15/2023 2231 by Ollie Leblanc RN  Outcome: Progressing

## 2023-10-16 NOTE — CARE COORDINATION
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 Patient s/p ex/lap sigmoid colon resection with colo-colonic anastomosis and appendectomy, repair of colovesical fistula 10/12. Patient will keep urinary catheter in place at discharge. PT recommending HH. Referral made to [facility] per patient request. CM following.

## 2023-10-16 NOTE — PROGRESS NOTES
Physical Therapy Note:    Attempted to see patient this PM for physical therapy treatment  session. Patient refused due to being worried about his DIANE drain. Will follow and re-attempt as schedule permits/patient available.  Thank you,    ANNELIESE Fox, PT     Rehab Caseload Tracker

## 2023-10-17 VITALS
BODY MASS INDEX: 27.16 KG/M2 | HEIGHT: 71 IN | WEIGHT: 194 LBS | DIASTOLIC BLOOD PRESSURE: 75 MMHG | TEMPERATURE: 97.5 F | SYSTOLIC BLOOD PRESSURE: 143 MMHG | OXYGEN SATURATION: 93 % | RESPIRATION RATE: 18 BRPM | HEART RATE: 77 BPM

## 2023-10-17 LAB
ALBUMIN SERPL-MCNC: 2.4 G/DL (ref 3.2–4.6)
ALBUMIN/GLOB SERPL: 0.8 (ref 0.4–1.6)
ALP SERPL-CCNC: 57 U/L (ref 50–136)
ALT SERPL-CCNC: 14 U/L (ref 12–65)
ANION GAP SERPL CALC-SCNC: 9 MMOL/L (ref 2–11)
AST SERPL-CCNC: 19 U/L (ref 15–37)
BASOPHILS # BLD: 0 K/UL (ref 0–0.2)
BASOPHILS NFR BLD: 0 % (ref 0–2)
BILIRUB SERPL-MCNC: 0.8 MG/DL (ref 0.2–1.1)
BUN SERPL-MCNC: 15 MG/DL (ref 8–23)
CALCIUM SERPL-MCNC: 8.7 MG/DL (ref 8.3–10.4)
CHLORIDE SERPL-SCNC: 111 MMOL/L (ref 101–110)
CO2 SERPL-SCNC: 26 MMOL/L (ref 21–32)
CREAT SERPL-MCNC: 0.8 MG/DL (ref 0.8–1.5)
DIFFERENTIAL METHOD BLD: ABNORMAL
EOSINOPHIL # BLD: 0.2 K/UL (ref 0–0.8)
EOSINOPHIL NFR BLD: 2 % (ref 0.5–7.8)
ERYTHROCYTE [DISTWIDTH] IN BLOOD BY AUTOMATED COUNT: 13.2 % (ref 11.9–14.6)
GLOBULIN SER CALC-MCNC: 3.2 G/DL (ref 2.8–4.5)
GLUCOSE SERPL-MCNC: 118 MG/DL (ref 65–100)
HCT VFR BLD AUTO: 31.3 % (ref 41.1–50.3)
HGB BLD-MCNC: 10 G/DL (ref 13.6–17.2)
IMM GRANULOCYTES # BLD AUTO: 0 K/UL (ref 0–0.5)
IMM GRANULOCYTES NFR BLD AUTO: 0 % (ref 0–5)
LYMPHOCYTES # BLD: 0.8 K/UL (ref 0.5–4.6)
LYMPHOCYTES NFR BLD: 8 % (ref 13–44)
MCH RBC QN AUTO: 30.8 PG (ref 26.1–32.9)
MCHC RBC AUTO-ENTMCNC: 31.9 G/DL (ref 31.4–35)
MCV RBC AUTO: 96.3 FL (ref 82–102)
MONOCYTES # BLD: 0.9 K/UL (ref 0.1–1.3)
MONOCYTES NFR BLD: 8 % (ref 4–12)
NEUTS SEG # BLD: 8.2 K/UL (ref 1.7–8.2)
NEUTS SEG NFR BLD: 82 % (ref 43–78)
NRBC # BLD: 0 K/UL (ref 0–0.2)
PLATELET # BLD AUTO: 119 K/UL (ref 150–450)
PMV BLD AUTO: 11.9 FL (ref 9.4–12.3)
POTASSIUM SERPL-SCNC: 3.9 MMOL/L (ref 3.5–5.1)
PROT SERPL-MCNC: 5.6 G/DL (ref 6.3–8.2)
RBC # BLD AUTO: 3.25 M/UL (ref 4.23–5.6)
SODIUM SERPL-SCNC: 146 MMOL/L (ref 133–143)
WBC # BLD AUTO: 10.2 K/UL (ref 4.3–11.1)

## 2023-10-17 PROCEDURE — 6370000000 HC RX 637 (ALT 250 FOR IP): Performed by: SURGERY

## 2023-10-17 PROCEDURE — 85025 COMPLETE CBC W/AUTO DIFF WBC: CPT

## 2023-10-17 PROCEDURE — A4216 STERILE WATER/SALINE, 10 ML: HCPCS | Performed by: SURGERY

## 2023-10-17 PROCEDURE — 2500000003 HC RX 250 WO HCPCS: Performed by: SURGERY

## 2023-10-17 PROCEDURE — 36415 COLL VENOUS BLD VENIPUNCTURE: CPT

## 2023-10-17 PROCEDURE — 80053 COMPREHEN METABOLIC PANEL: CPT

## 2023-10-17 PROCEDURE — 2580000003 HC RX 258: Performed by: SURGERY

## 2023-10-17 RX ADMIN — SODIUM CHLORIDE, PRESERVATIVE FREE 10 ML: 5 INJECTION INTRAVENOUS at 09:22

## 2023-10-17 RX ADMIN — FAMOTIDINE 20 MG: 10 INJECTION INTRAVENOUS at 09:19

## 2023-10-17 RX ADMIN — VALSARTAN 160 MG: 40 TABLET, FILM COATED ORAL at 09:19

## 2023-10-17 RX ADMIN — PREDNISOLONE ACETATE 1 DROP: 10 SUSPENSION/ DROPS OPHTHALMIC at 09:19

## 2023-10-17 RX ADMIN — HYDROCHLOROTHIAZIDE 12.5 MG: 25 TABLET ORAL at 09:20

## 2023-10-17 NOTE — PROGRESS NOTES
poDate: 10/17/2023      Name: James Jaime      MRN: [id number]       : 1945       Age: 76 y.o. Sex: male        None, None       CC:  No chief complaint on file. HPI:  The patient presents for the first post-op visit s/p exploratory laparotomy, sigmoid colon resection, division of colovesical fistula and repair of bladder as well as an appendectomy done on 10/12/23. The pathology showed:    Date Obtained:   10/12/2023   DIAGNOSIS        A:  \" SIGMOID COLON\":         DIVERTICULOSIS WITH FOCAL CHRONIC DIVERTICULITIS          B:  \" APPENDIX\":         APPENDIX HAVING FIBROFATTY OBLITERATION OF TIP          C:  \" BLADDER FISTULA\":         ADIPOSE TISSUE WITH FOCAL FIBROSIS, HEMORRHAGE AND INFLAMMATION   CONSISTENT WITH FISTULA     The patient says he is \"afraid to eat. \" He has some nausea, but no vomiting. He is moving his bowels. He is anxious to get the Begum out, now POD #12. His wife says he is not eating or drinking enough. I encouraged him to eat as much as he felt comfortable and to try to get 64 ounces of fluid per day. Physical Exam:     There were no vitals taken for this visit. General: Alert, oriented, cooperative white male in no acute distress. Neck: Supple, trachea midline, no appreciable thyromegaly  Resp: Breathing is  non-labored. Lungs clear to auscultation without wheezing or rhonchi   CV: RRR. No murmurs, rubs or gallops appreciated. Abd: soft, eccymosis around the midline incision, active BS'S. Incision intact without signs of infection. Assessment/Plan:  James Jaime is a 76 y.o. male who is s/p exploratory laparotomy, sigmoid colon resection, division of colovesical fistula and repair of bladder as well as an appendectomy done on 10/12/23. 1. Remove Beugm, POD #12.    2. Remove half of the staples. 3. I encouraged him to eat as much as he felt comfortable and to try to get 64 ounces of fluid per day. 4. Referral to PCP.  Does not

## 2023-10-17 NOTE — PROGRESS NOTES
Per pt request, replaced the hughes bag. Pt observed how to change out tubing, written instructions given and leg bag sent home with pt.

## 2023-10-17 NOTE — PROGRESS NOTES
Pt's D/C instructions completed. Instructions given. Printed out instructions if leg bag to be used. Gave pt a leg bag to go home and changed out hughes bag per pt request. Verbalized understanding of all instructions including diet, activity, s/sx to alert MD, medications, wound care, and f/u appointment. Wife at [facility].

## 2023-10-17 NOTE — DISCHARGE SUMMARY
stranding seen about the sigmoid  colon. There is no pelvic adenopathy. There is no free fluid or free air present  within the pelvis. Bone window evaluation demonstrates no aggressive osseous lesions. IMPRESSION:        Diverticulosis with pericolonic fat stranding seen about the sigmoid colon  suggesting diverticulitis. Cannot exclude colovesical fistula, as there is air  present nondependent within the bladder. Correlation for any recent  instrumentation of the bladder recommended. 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 The patient had a recent hospitalization with a UTI that led to altered mental status. He feels \"fine\" today          Hospital Course: The patient underwent Procedure(s):  EXPLORATORY LAPAROTOMY  SIGMOID COLON RESECTION W/ COLOCOLONIC ANASTOMOSIS AND APPENDECTOMY  Repair of colovesicle fistula on 10/8/23     Placement of abdominal drain and plan for hughes catheter dwell for 10 days postoperatively. The patient progressed satisfactorily meeting milestones necessary for successful discharge including tolerating a diet, adequate mobility, adequate pain control, and active bowel function. Patient was deemed a good candidate for discharge at the time of morning rounding. They are to follow up as indicated in their provided discharge paperwork. The patient helped develop and voices understanding with the plan of care. They are amenable without reservations at this time to moving forward with discharge.      Condition at Discharge: Good    Discharge Medications:   Discharge Medication List as of 10/17/2023 10:01 AM        CONTINUE these medications which have NOT CHANGED    Details   atorvastatin (LIPITOR) 40 MG tablet Take 1 tablet by mouth every evening, Disp-90 tablet, R-3Normal      metoprolol succinate (TOPROL XL) 25 MG extended release tablet Take 1 tablet by mouth daily, Disp-90 tablet, R-3Normal      valsartan-hydroCHLOROthiazide (DIOVAN-HCT) 160-12.5 MG per tablet Take 1 tablet by mouth daily, Disp-90 tablet, R-3Normal      acetaminophen (TYLENOL) 325 MG tablet Take 2 tablets by mouth every 6 hours as neededHistorical Med      aspirin 81 MG EC tablet Take 1 tablet by mouth every morningHistorical Med      fexofenadine (ALLEGRA) 180 MG tablet Take 1 tablet by mouth as neededHistorical Med      prednisoLONE acetate (PRED FORTE) 1 % ophthalmic suspension Apply 1 drop to eye every morningHistorical Med               Disposition: home with home health    Discharge Instructions/Follow-up Care: Follow-up with Dr. Kate Vizcaino 10/24/23    Incisions  Keep incisions clean and dry, may remain uncovered. Do not apply lotions, creams or ointments to incisions. Showers are allowed - gently wash and pat dry your incisions. No tub baths or soaking/submerging until cleared by follow up provider. the dressing on your old abdominal drain site may stay in place for 48 hours (cover with saran wrap for showers). After 48 hours, remove the dressing and no replacement dressing is needed (gently wash and pat dry)      Diet -   Soft foods diet for 2 days after discharge. If you have tolerated soft foods/easy to chew foods for the 1st two days after discharge, you may advance your diet to your regular diet as tolerated. Activity   No heavy lifting >10 lb for the first week after surgery. Lift nothing heavier than 25 lbs for 4 weeks after surgery. Walking and non-strenuous exercise promotes good oxygenated blood supply to body tissues and will assist in recovery. Walking and non-strenuous exercise also promotes good lung mechanics and reduces chance of developing pneumonia. Medications  No driving while taking narcotics/(prescription strength pain medication). Do not drink alcohol while taking narcotics. Resume other home medications. Narcotic pain medication is known to cause constipation as narcotic pain medication slows gut motility.  Even if you are not taking oral narcotic pain medication, you have likely been given narcotic pain medication intravenously during your surgical procedure. Do not get constipated! No more than 1 day without a bm. If 1 day no bm, then take colace stool softener twice a day. If 24 hours of taking colace stool softener does not produce a bm , then keep taking colace and add miralax laxative twice a day until you have a bm. Once you are having regular bms, you can use colace and/or miralax as needed to ensure you have a regular daily bm.         If problems (fever, signs of infection, uncontrolled bleeding or drainage from surgical incision, uncontrolled pain, uncontrolled nausea and/or vomiting) or questions arise, please call our office at [phone] [address] [facility] Office        Signed:  Carlo Kawasaki, APRN - NP   10/17/2023  12:29 PM

## 2023-10-17 NOTE — PLAN OF CARE
Problem: Pain  Goal: Verbalizes/displays adequate comfort level or baseline comfort level  10/17/2023 0015 by Kia Drummond RN  Outcome: Progressing  10/16/2023 1128 by Danielito Peralta RN  Outcome: Progressing     Problem: Safety - Adult  Goal: Free from fall injury  10/17/2023 0015 by Kia Drummond RN  Outcome: Progressing  10/16/2023 1128 by Danielito Peralta RN  Outcome: Progressing     Problem: Discharge Planning  Goal: Discharge to home or other facility with appropriate resources  10/17/2023 0015 by Kia Drummond RN  Outcome: Progressing  10/16/2023 1128 by Danielito Peralta RN  Outcome: Progressing     Problem: Skin/Tissue Integrity  Goal: Absence of new skin breakdown  Description: 1. Monitor for areas of redness and/or skin breakdown  2. Assess vascular access sites hourly  3. Every 4-6 hours minimum:  Change oxygen saturation probe site  4. Every 4-6 hours:  If on nasal continuous positive airway pressure, respiratory therapy assess nares and determine need for appliance change or resting period.   10/17/2023 0015 by Kia Drummond RN  Outcome: Progressing  10/16/2023 1128 by Danielito Peralta RN  Outcome: Progressing

## 2023-10-17 NOTE — DISCHARGE INSTRUCTIONS
and pat dry)      Diet -   Soft foods diet for 2 days after discharge. If you have tolerated soft foods/easy to chew foods for the 1st two days after discharge, you may advance your diet to your regular diet as tolerated. Activity   No heavy lifting >10 lb for the first week after surgery. Lift nothing heavier than 25 lbs for 4 weeks after surgery. Walking and non-strenuous exercise promotes good oxygenated blood supply to body tissues and will assist in recovery. Walking and non-strenuous exercise also promotes good lung mechanics and reduces chance of developing pneumonia. Medications  No driving while taking narcotics/(prescription strength pain medication). Do not drink alcohol while taking narcotics. Resume other home medications. Narcotic pain medication is known to cause constipation as narcotic pain medication slows gut motility. Even if you are not taking oral narcotic pain medication, you have likely been given narcotic pain medication intravenously during your surgical procedure. Do not get constipated! No more than 1 day without a bm. If 1 day no bm, then take colace stool softener twice a day. If 24 hours of taking colace stool softener does not produce a bm , then keep taking colace and add miralax laxative twice a day until you have a bm. Once you are having regular bms, you can use colace and/or miralax as needed to ensure you have a regular daily bm.         If problems (fever, signs of infection, uncontrolled bleeding or drainage from surgical incision, uncontrolled pain, uncontrolled nausea and/or vomiting) or questions arise, please call our office at [phone] from Nurse    PATIENT INSTRUCTIONS:    After general anesthesia or intravenous sedation, for 24 hours or while taking prescription Narcotics:  Limit your activities  Do not drive and operate hazardous machinery  Do not make important personal or business decisions  Do  not drink alcoholic beverages  If you have not urinated within 8 hours after discharge, please contact your surgeon on call. Report the following to your surgeon:  Excessive pain, swelling, redness or odor of or around the surgical area  Temperature over 100.5  Nausea and vomiting lasting longer than 4 hours or if unable to take medications  Any signs of decreased circulation or nerve impairment to extremity: change in color, persistent  numbness, tingling, coldness or increase pain  Any questions    What to do at Home:    Recommended activity: No heavy lifting. May shower but no tub baths, hot tubs, or swimming. No driving until off pain medications for 24 hours and approved by MD.  After showering, never leave a wet dressing in place. If you experience any of the following symptoms, please call  : Temperature of 100.5 or greater, persistent nausea and vomiting, increased pain, any signs of abnormal bleeding, or increased redness, swelling, or drainage from surgical site     *  Please give a list of your current medications to your Primary Care Provider. *  Please update this list whenever your medications are discontinued, doses are      changed, or new medications (including over-the-counter products) are added. *  Please carry medication information at all times in case of emergency situations. These are general instructions for a healthy lifestyle:    No smoking/ No tobacco products/ Avoid exposure to second hand smoke  Surgeon General's Warning:  Quitting smoking now greatly reduces serious risk to your health.     Obesity, smoking, and sedentary lifestyle greatly increases your risk for illness    A healthy diet, regular physical exercise & weight monitoring are important for maintaining a healthy lifestyle    You may be retaining fluid if you have a history of heart failure or if you experience any of the following symptoms:  Weight gain of 3 pounds or more overnight or 5 pounds in a week, increased swelling in our hands or feet or shortness of breath while lying flat in bed. Please call your doctor as soon as you notice any of these symptoms; do not wait until your next office visit. The discharge information has been reviewed with the patient and spouse. The patient and spouse verbalized understanding. Discharge medications reviewed with the patient and spouse and appropriate educational materials and side effects teaching were provided.   ___________________________________________________________________________________________________________________________________

## 2023-10-17 NOTE — CARE COORDINATION
Patient with discharge order for today. Patient discharging home with [facility] (RN/PT). Patient has met all treatment goals and milestones for discharge. Patient/family in agreement with discharge plan. Family to provide transportation home. CM following until patient is discharged. 10/17/23 0958   Service Assessment   Patient Orientation Alert and [address]  Discharge   Transition of Care Consult (CM Consult) Discharge Planning   Services [address] Provided? No   Mode of Transport at Discharge Self   Confirm Follow Up Transport Self   Condition of Participation: Discharge Planning   The Plan for Transition of Care is related to the following treatment goals: Return to baseline   The Patient and/or Patient Representative was provided with a Choice of Provider? Patient   The Patient and/Or Patient Representative agree with the Discharge Plan? Yes   Freedom of Choice list was provided with basic dialogue that supports the patient's individualized plan of care/goals, treatment preferences, and shares the quality data associated with the providers?   Yes

## 2023-10-18 ENCOUNTER — TELEPHONE (OUTPATIENT)
Dept: SURGERY | Age: 78
End: 2023-10-18

## 2023-10-18 NOTE — TELEPHONE ENCOUNTER
Patient's wife called to state that they were under the impression that Leonel Mcmanus was gong to send him in some prescriptions to the pharmacy, but nothing was sent. Secondly, wife wants to know if patient can add any canned or fresh fruit into his diet. Thirdly, patient has not had a \"potty\" in 12 hours. I planned to call wife back and go over discharge instructions as I see that #2 and 3 were addressed in summary, mostly needing to know about the prescriptions. Thank you.

## 2023-10-19 ENCOUNTER — TELEPHONE (OUTPATIENT)
Dept: SURGERY | Age: 78
End: 2023-10-19

## 2023-10-19 NOTE — TELEPHONE ENCOUNTER
Nurse Mary Vazquez with [facility] called to state that she was seeing patient today for start of care.

## 2023-10-24 ENCOUNTER — OFFICE VISIT (OUTPATIENT)
Dept: SURGERY | Age: 78
End: 2023-10-24

## 2023-10-24 DIAGNOSIS — N32.1 COLOVESICAL FISTULA: Primary | ICD-10-CM

## 2023-10-24 PROCEDURE — 99024 POSTOP FOLLOW-UP VISIT: CPT | Performed by: SURGERY

## 2023-10-25 ENCOUNTER — TELEPHONE (OUTPATIENT)
Dept: SURGERY | Age: 78
End: 2023-10-25

## 2023-10-25 NOTE — TELEPHONE ENCOUNTER
Called Kevin regarding her message. She stated that he has not had a bowel movement in 18 hours. At noon today, she gave him a colace tablet. I suggested kameron lax BID. She voiced understanding.

## 2023-10-25 NOTE — PROGRESS NOTES
Physician Progress Note      PATIENT:               Benji Manning  CSN #:                  [id number]  :                       1945  ADMIT DATE:       10/12/2023 6:43 AM  DISCH DATE:        10/17/2023 11:10 AM  RESPONDING  PROVIDER #:        Natalia Garvin NP          QUERY TEXT:    Pt admitted for surgical repair of a colovesical fistula and noted to have   surgical pathology consistent with \"SIGMOID COLON\": DIVERTICULOSIS WITH FOCAL   CHRONIC DIVERTICULITIS\". If possible, please document in progress notes if   you agree with the pathologist's diagnosis: The medical record reflects the following:  Risk Factors: Colovesical fistula  Clinical Indicators: \"SIGMOID COLON\": DIVERTICULOSIS WITH FOCAL CHRONIC   DIVERTICULITIS. \"  Microscopic examination reveals large intestine having diverticula. Focal   chronic inflammation is noted. Reactive lymph nodes are noted. Malignant tumor is not identified  Treatment: Exploratory laparotomy, sigmoid colon resection, division of   colovesical fistula and repair of bladder as well as an appendectomy. Options provided:  -- Yes, patient had \"SIGMOID COLON\": DIVERTICULOSIS WITH FOCAL CHRONIC   DIVERTICULITIS. \"  -- No  -- Other - I will add my own diagnosis  -- Disagree - Not applicable / Not valid  -- Disagree - Clinically unable to determine / Unknown  -- Refer to Clinical Documentation Reviewer    PROVIDER RESPONSE TEXT:    This patient has \"SIGMOID COLON\": DIVERTICULOSIS WITH FOCAL CHRONIC   DIVERTICULITIS.\" .    Query created by: Ann Sage on 10/24/2023 2:29 PM      Electronically signed by:  Natalia Garvin NP 10/25/2023 11:29 AM

## 2023-10-27 NOTE — PROGRESS NOTES
poDate: 10/27/2023      Name: Keke Diez      MRN: [id number]       : 1945       Age: 76 y.o. Sex: male        None, None       CC:  No chief complaint on file. HPI:  The patient presents for the first post-op visit s/p exploratory laparotomy, sigmoid colon resection, division of colovesical fistula and repair of bladder as well as an appendectomy done on 10/12/23. The pathology showed:    Date Obtained:   10/12/2023   DIAGNOSIS        A:  \" SIGMOID COLON\":         DIVERTICULOSIS WITH FOCAL CHRONIC DIVERTICULITIS          B:  \" APPENDIX\":         APPENDIX HAVING FIBROFATTY OBLITERATION OF TIP          C:  \" BLADDER FISTULA\":         ADIPOSE TISSUE WITH FOCAL FIBROSIS, HEMORRHAGE AND INFLAMMATION   CONSISTENT WITH FISTULA     10/24/23: The patient says he is \"afraid to eat. \" He has some nausea, but no vomiting. He is moving his bowels. He is anxious to get the Begum out, now POD #12. His wife says he is not eating or drinking enough. I encouraged him to eat as much as he felt comfortable and to try to get 64 ounces of fluid per day. 10/31/23: Eating better. Drinking more fluids. Moving his bowels daily. No melena or BRBPR. Has more energy. Physical Exam:     There were no vitals taken for this visit. General: Alert, oriented, cooperative white male in no acute distress. Neck: Supple, trachea midline, no appreciable thyromegaly  Resp: Breathing is  non-labored. Lungs clear to auscultation without wheezing or rhonchi   CV: RRR. No murmurs, rubs or gallops appreciated. Abd: soft, eccymosis around the midline incision, active BS'S. Incision intact without signs of infection. Assessment/Plan:  Keke Diez is a 76 y.o. male who is s/p exploratory laparotomy, sigmoid colon resection, division of colovesical fistula and repair of bladder as well as an appendectomy done on 10/12/23. 1. Remove half of the staples. 2. Referral to PCP.  Does not have one now as

## 2023-10-30 NOTE — TELEPHONE ENCOUNTER
Patient calling to coordinate surgery with you and Dr MAURICIO [facility]. What am I scheduling? Topical Clindamycin Counseling: Patient counseled that this medication may cause skin irritation or allergic reactions.  In the event of skin irritation, the patient was advised to reduce the amount of the drug applied or use it less frequently.   The patient verbalized understanding of the proper use and possible adverse effects of clindamycin.  All of the patient's questions and concerns were addressed.

## 2023-10-31 ENCOUNTER — OFFICE VISIT (OUTPATIENT)
Dept: SURGERY | Age: 78
End: 2023-10-31

## 2023-10-31 DIAGNOSIS — N40.2 PROSTATE NODULE: ICD-10-CM

## 2023-10-31 DIAGNOSIS — I65.21 OCCLUSION OF RIGHT CAROTID ARTERY: ICD-10-CM

## 2023-10-31 DIAGNOSIS — I25.10 CORONARY ARTERY DISEASE INVOLVING NATIVE CORONARY ARTERY OF NATIVE HEART WITHOUT ANGINA PECTORIS: ICD-10-CM

## 2023-10-31 DIAGNOSIS — I65.23 BILATERAL CAROTID ARTERY STENOSIS: ICD-10-CM

## 2023-10-31 DIAGNOSIS — I10 BENIGN ESSENTIAL HYPERTENSION: ICD-10-CM

## 2023-10-31 DIAGNOSIS — N32.1 COLOVESICAL FISTULA: Primary | ICD-10-CM

## 2023-10-31 DIAGNOSIS — I65.23 CAROTID STENOSIS, ASYMPTOMATIC, BILATERAL: ICD-10-CM

## 2023-10-31 PROCEDURE — 99024 POSTOP FOLLOW-UP VISIT: CPT | Performed by: SURGERY

## 2023-11-04 NOTE — PROGRESS NOTES
poDate: 2023      Name: James Jaime      MRN: [id number]       : 1945       Age: 76 y.o. Sex: male        None, None       CC:  No chief complaint on file. HPI:  The patient presents for the third post-op visit s/p exploratory laparotomy, sigmoid colon resection, division of colovesical fistula and repair of bladder as well as an appendectomy done on 10/12/23. The pathology showed:    Date Obtained:   10/12/2023   DIAGNOSIS        A:  \" SIGMOID COLON\":         DIVERTICULOSIS WITH FOCAL CHRONIC DIVERTICULITIS          B:  \" APPENDIX\":         APPENDIX HAVING FIBROFATTY OBLITERATION OF TIP          C:  \" BLADDER FISTULA\":         ADIPOSE TISSUE WITH FOCAL FIBROSIS, HEMORRHAGE AND INFLAMMATION   CONSISTENT WITH FISTULA     10/24/23: The patient says he is \"afraid to eat. \" He has some nausea, but no vomiting. He is moving his bowels. He is anxious to get the Begum out, now POD #12. His wife says he is not eating or drinking enough. I encouraged him to eat as much as he felt comfortable and to try to get 64 ounces of fluid per day. 10/31/23: Eating better. Drinking more fluids. Moving his bowels daily. No melena or BRBPR. Has more energy. 23: No new problems. Has more energy. Physical Exam:     There were no vitals taken for this visit. General: Alert, oriented, cooperative white male in no acute distress. Neck: Supple, trachea midline, no appreciable thyromegaly  Resp: Breathing is  non-labored. Lungs clear to auscultation without wheezing or rhonchi   CV: RRR. No murmurs, rubs or gallops appreciated. Abd: soft, eccymosis around the midline incision, active BS'S. Incision intact without signs of infection. Assessment/Plan:  James Jaime is a 76 y.o. male who is s/p exploratory laparotomy, sigmoid colon resection, division of colovesical fistula and repair of bladder as well as an appendectomy done on 10/12/23.     1. Remove all of the remaining

## 2023-11-07 ENCOUNTER — OFFICE VISIT (OUTPATIENT)
Dept: SURGERY | Age: 78
End: 2023-11-07

## 2023-11-07 DIAGNOSIS — N32.1 COLOVESICAL FISTULA: Primary | ICD-10-CM

## 2023-11-07 PROCEDURE — 99024 POSTOP FOLLOW-UP VISIT: CPT | Performed by: SURGERY

## 2024-01-04 ENCOUNTER — APPOINTMENT (RX ONLY)
Dept: URBAN - METROPOLITAN AREA CLINIC 23 | Facility: CLINIC | Age: 79
Setting detail: DERMATOLOGY
End: 2024-01-04

## 2024-01-04 DIAGNOSIS — D18.0 HEMANGIOMA: ICD-10-CM

## 2024-01-04 DIAGNOSIS — L82.1 OTHER SEBORRHEIC KERATOSIS: ICD-10-CM

## 2024-01-04 DIAGNOSIS — L57.8 OTHER SKIN CHANGES DUE TO CHRONIC EXPOSURE TO NONIONIZING RADIATION: ICD-10-CM

## 2024-01-04 DIAGNOSIS — Z85.828 PERSONAL HISTORY OF OTHER MALIGNANT NEOPLASM OF SKIN: ICD-10-CM

## 2024-01-04 DIAGNOSIS — L57.0 ACTINIC KERATOSIS: ICD-10-CM

## 2024-01-04 PROBLEM — D18.01 HEMANGIOMA OF SKIN AND SUBCUTANEOUS TISSUE: Status: ACTIVE | Noted: 2024-01-04

## 2024-01-04 PROCEDURE — ? LIQUID NITROGEN

## 2024-01-04 PROCEDURE — ? COUNSELING

## 2024-01-04 PROCEDURE — 99213 OFFICE O/P EST LOW 20 MIN: CPT | Mod: 25

## 2024-01-04 PROCEDURE — 17000 DESTRUCT PREMALG LESION: CPT

## 2024-01-04 PROCEDURE — 17003 DESTRUCT PREMALG LES 2-14: CPT

## 2024-01-04 ASSESSMENT — LOCATION DETAILED DESCRIPTION DERM
LOCATION DETAILED: LEFT INFERIOR UPPER BACK
LOCATION DETAILED: SUPERIOR THORACIC SPINE
LOCATION DETAILED: LEFT SUPERIOR MEDIAL FOREHEAD
LOCATION DETAILED: RIGHT FOREHEAD
LOCATION DETAILED: LEFT INFERIOR LATERAL FOREHEAD
LOCATION DETAILED: RIGHT RADIAL DORSAL HAND
LOCATION DETAILED: LEFT FOREHEAD
LOCATION DETAILED: LEFT RADIAL DORSAL HAND
LOCATION DETAILED: RIGHT MID TEMPLE
LOCATION DETAILED: LEFT MEDIAL FOREHEAD
LOCATION DETAILED: EPIGASTRIC SKIN
LOCATION DETAILED: LEFT MEDIAL FRONTAL SCALP
LOCATION DETAILED: RIGHT MEDIAL UPPER BACK
LOCATION DETAILED: LEFT DISTAL DORSAL FOREARM
LOCATION DETAILED: RIGHT DISTAL DORSAL FOREARM

## 2024-01-04 ASSESSMENT — LOCATION SIMPLE DESCRIPTION DERM
LOCATION SIMPLE: RIGHT TEMPLE
LOCATION SIMPLE: RIGHT FOREARM
LOCATION SIMPLE: LEFT SCALP
LOCATION SIMPLE: RIGHT HAND
LOCATION SIMPLE: UPPER BACK
LOCATION SIMPLE: ABDOMEN
LOCATION SIMPLE: RIGHT FOREHEAD
LOCATION SIMPLE: LEFT UPPER BACK
LOCATION SIMPLE: RIGHT UPPER BACK
LOCATION SIMPLE: LEFT FOREARM
LOCATION SIMPLE: LEFT HAND
LOCATION SIMPLE: LEFT FOREHEAD

## 2024-01-04 ASSESSMENT — LOCATION ZONE DERM
LOCATION ZONE: TRUNK
LOCATION ZONE: ARM
LOCATION ZONE: HAND
LOCATION ZONE: SCALP
LOCATION ZONE: FACE

## 2024-02-20 ENCOUNTER — TELEPHONE (OUTPATIENT)
Age: 79
End: 2024-02-20

## 2024-02-20 RX ORDER — VALSARTAN AND HYDROCHLOROTHIAZIDE 160; 12.5 MG/1; MG/1
1 TABLET, FILM COATED ORAL DAILY
Qty: 90 TABLET | Refills: 3 | Status: SHIPPED | OUTPATIENT
Start: 2024-02-20

## 2024-02-20 NOTE — TELEPHONE ENCOUNTER
Prescription sent to [facility] pharmacy//[facility]  Requested Prescriptions     Signed Prescriptions Disp Refills    valsartan-hydroCHLOROthiazide (DIOVAN-HCT) 160-12.5 MG per tablet 90 tablet 3     Sig: Take 1 tablet by mouth daily     Authorizing Provider: CLAIRE TURNER     Ordering User: PHAM CARTER        Statement Selected

## 2024-02-20 NOTE — TELEPHONE ENCOUNTER
MEDICATION REFILL REQUEST      Name of Medication:  Valsartan / HCTZ   Dose:  160/12.5 mg  Frequency:    Quantity:    Days' supply:  90      Pharmacy Name/Location:  [facility]

## 2024-02-28 PROBLEM — I65.21 OCCLUSION OF RIGHT CAROTID ARTERY: Status: RESOLVED | Noted: 2023-02-27 | Resolved: 2024-02-28

## 2024-02-28 ASSESSMENT — ENCOUNTER SYMPTOMS: SHORTNESS OF BREATH: 0

## 2024-02-28 NOTE — PRE-PROCEDURE INSTRUCTIONS
Update History & Physical    The Patient's recent History and Physical was reviewed with the patient and I examined the patient.  There was no change.      Plan:  The risk, benefits, expected outcome, and alternative to the recommended procedure in concordance with CMS guidelines have been discussed with the patient.  Patient understands and wants to proceed with the procedure. Will pre-medicate for mild contrast allergy    Admit to ICU post op  SBP   Cont asa/plavix, statin  D/c home in am if stable      Electronically signed by Donald Gardner DO on 2/28/2024 at 8:04 AM\       PLEASE CONTINUE TAKING ALL PRESCRIPTION MEDICATIONS UP TO THE DAY OF SURGERY UNLESS OTHERWISE DIRECTED BELOW. DISCONTINUE all vitamins and supplements 7 days prior to surgery. DISCONTINUE Non-Steriodal Anti-Inflammatory (NSAIDS) such as Advil and Aleve 5 days prior to surgery. Home Medications to take  the day of surgery    Aspirin 81 mg  Atorvastatin (Lipitor)           Home Medications   to Hold   Valsartan-hydrochlorothiazide (Diovan)        Comments   Take the Clopidogrel (Plavix) as instructed by Dr. Becki Hatchet. On the day before surgery please take Acetaminophen 1000mg in the morning and then again before bed. You may substitute for Tylenol 650 mg. The day before surgery get your blood work done the day before surgery, Omar Cedillo, Outpatient lab. Please do not bring home medications with you on the day of surgery unless otherwise directed by your nurse. If you are instructed to bring home medications, please give them to your nurse as they will be administered by the nursing staff. If you have any questions, please call [address] [place] [address] [phone] or [facility] [phone]. A copy of this note was provided to the patient for reference.

## 2024-02-29 NOTE — PROGRESS NOTES
[facility], [address], [address]  [place] [address]  PHONE: [phone]    Geoff Vargas  1945      SUBJECTIVE:   Geoff Vargas is a 78 y.o. male seen for a follow up visit regarding the following:     Chief Complaint   Patient presents with    Coronary Artery Disease    Hypertension       HPI:    Geoff Vargas presents for routine follow up. Multiple issues addressed.     Coronary Artery Disease:  Patient denies any recent angina.  Notes compliance with medical therapy.  No recent NTG use.  No intolerance to anti-platelet therapy.     AVR: No fever or chills.    Carotid artery disease: Followed by vascular surgery.  Reviewed last vascular ultrasound from October 2023 which showed no stenosis within the right internal carotid artery and moderate 50-69% in the left internal carotid artery    Left bundle branch block: No dizziness, lightheadedness or syncope.    Hypertension:  Ambulatory BP readings have been controlled 125/75.  Patient reports compliance with medical therapy without side effects.      Hyperlipidemia:   Lipid panel from August 30, 2023 shows a total cholesterol 154, HDL 45 and LDL 82.    Exercise:  Plans to restart gym this week.      At last visit he was noted to have UTI concerns of [place] vesicular fistula.  Had cystoscopy and underwent transurethral resection of a bladder mass.  Following the procedure he had confusion and was admitted to the hospital for several days.  There is no evidence of a stroke.  He later underwent exploratory laparotomy, sigmoid colon resection, division of colovesicular fistula and repair of bladder by Dr. Teodoro Persaud on 10/12/2023.  Discharged home on October 17.    Past Medical History, Past Surgical History, Family history, Social History, and Medications were all reviewed with the patient today and updated as necessary.           Current Outpatient Medications:     Vitamin D3 125 MCG (5000 UT) TABS

## 2024-03-01 ENCOUNTER — OFFICE VISIT (OUTPATIENT)
Age: 79
End: 2024-03-01
Payer: MEDICARE

## 2024-03-01 VITALS
DIASTOLIC BLOOD PRESSURE: 80 MMHG | HEIGHT: 71 IN | BODY MASS INDEX: 28 KG/M2 | HEART RATE: 80 BPM | WEIGHT: 200 LBS | SYSTOLIC BLOOD PRESSURE: 134 MMHG

## 2024-03-01 DIAGNOSIS — I25.10 CORONARY ARTERY DISEASE INVOLVING NATIVE CORONARY ARTERY OF NATIVE HEART WITHOUT ANGINA PECTORIS: Primary | ICD-10-CM

## 2024-03-01 DIAGNOSIS — N32.1 COLOVESICAL FISTULA: ICD-10-CM

## 2024-03-01 DIAGNOSIS — Z95.2 S/P AVR: ICD-10-CM

## 2024-03-01 DIAGNOSIS — I10 BENIGN ESSENTIAL HYPERTENSION: ICD-10-CM

## 2024-03-01 DIAGNOSIS — G47.09 OTHER INSOMNIA: ICD-10-CM

## 2024-03-01 DIAGNOSIS — I44.7 LBBB (LEFT BUNDLE BRANCH BLOCK): ICD-10-CM

## 2024-03-01 DIAGNOSIS — I65.23 BILATERAL CAROTID ARTERY STENOSIS: ICD-10-CM

## 2024-03-01 PROCEDURE — 3075F SYST BP GE 130 - 139MM HG: CPT | Performed by: INTERNAL MEDICINE

## 2024-03-01 PROCEDURE — G8417 CALC BMI ABV UP PARAM F/U: HCPCS | Performed by: INTERNAL MEDICINE

## 2024-03-01 PROCEDURE — 1123F ACP DISCUSS/DSCN MKR DOCD: CPT | Performed by: INTERNAL MEDICINE

## 2024-03-01 PROCEDURE — 99214 OFFICE O/P EST MOD 30 MIN: CPT | Performed by: INTERNAL MEDICINE

## 2024-03-01 PROCEDURE — G8484 FLU IMMUNIZE NO ADMIN: HCPCS | Performed by: INTERNAL MEDICINE

## 2024-03-01 PROCEDURE — 1036F TOBACCO NON-USER: CPT | Performed by: INTERNAL MEDICINE

## 2024-03-01 PROCEDURE — 3079F DIAST BP 80-89 MM HG: CPT | Performed by: INTERNAL MEDICINE

## 2024-03-01 PROCEDURE — G8427 DOCREV CUR MEDS BY ELIG CLIN: HCPCS | Performed by: INTERNAL MEDICINE

## 2024-03-29 SDOH — HEALTH STABILITY: PHYSICAL HEALTH: ON AVERAGE, HOW MANY MINUTES DO YOU ENGAGE IN EXERCISE AT THIS LEVEL?: 30 MIN

## 2024-03-29 SDOH — HEALTH STABILITY: PHYSICAL HEALTH: ON AVERAGE, HOW MANY DAYS PER WEEK DO YOU ENGAGE IN MODERATE TO STRENUOUS EXERCISE (LIKE A BRISK WALK)?: 3 DAYS

## 2024-03-31 NOTE — PROGRESS NOTES
Geoff Vargas (:  1945) is a 78 y.o. male,Established patient, here for evaluation of the following chief complaint(s):  New Patient and Coronary Artery Disease         ASSESSMENT/PLAN:  1. Coronary artery disease involving native coronary artery of native heart without angina pectoris  2. Left bundle branch block  3. History of aortic valve replacement  Status post CABG x 3 (LIMA to LAD, reverse SVG to OM, reverse SVG to PDA) on 2021  Status post aortic valve replacement for aortic stenosis with 25 mm Lopez Intuity pericardial valve on 2021  Echo on 8/3/2022 as follows:  -EF of 55 to 60%, mildly increased LV wall thickness  -Mildly dilated LA with well-seated aortic valve  -Moderate MAC with mild MR and MS; mild TR  Continue aspirin, atorvastatin, metoprolol succinate  Follow-up with cardiology    - CBC with Auto Differential; Future  - Comprehensive Metabolic Panel; Future  - Lipid Panel; Future    4. Bilateral carotid artery disease, unspecified type (HCC)  Status post right transcarotid arterial revascularization on 3/8/2023  Carotid artery ultrasound on 10/13/2023 without stenosis of the RCA however showed 50 to 69% stenosis of the left ICA with heterogeneous plaque present, normal antegrade flow of the bilateral vertebral arteries  Continue aspirin and atorvastatin  Follow-up with vascular surgery    5. Essential hypertension  Continue valsartan/HCTZ and metoprolol succinate  Home blood pressure readings typically in the mid 120s to mid 130s systolic and 70s diastolic    - CBC with Auto Differential; Future  - Comprehensive Metabolic Panel; Future  - Lipid Panel; Future  - T4, Free; Future  - TSH; Future  - Urinalysis; Future    6. Dyslipidemia  Check lipid panel  Continue atorvastatin    - CBC with Auto Differential; Future  - Comprehensive Metabolic Panel; Future  - Lipid Panel; Future  - T4, Free; Future  - TSH; Future    7. Prediabetes  A1c 5.7% on 2023, recheck  Continue

## 2024-04-01 ENCOUNTER — OFFICE VISIT (OUTPATIENT)
Dept: INTERNAL MEDICINE CLINIC | Facility: CLINIC | Age: 79
End: 2024-04-01
Payer: MEDICARE

## 2024-04-01 VITALS
SYSTOLIC BLOOD PRESSURE: 130 MMHG | TEMPERATURE: 98.4 F | HEART RATE: 74 BPM | BODY MASS INDEX: 28 KG/M2 | OXYGEN SATURATION: 94 % | WEIGHT: 200 LBS | DIASTOLIC BLOOD PRESSURE: 78 MMHG | HEIGHT: 71 IN

## 2024-04-01 DIAGNOSIS — I77.9 BILATERAL CAROTID ARTERY DISEASE, UNSPECIFIED TYPE (HCC): ICD-10-CM

## 2024-04-01 DIAGNOSIS — Z95.2 HISTORY OF AORTIC VALVE REPLACEMENT: ICD-10-CM

## 2024-04-01 DIAGNOSIS — E78.5 DYSLIPIDEMIA: ICD-10-CM

## 2024-04-01 DIAGNOSIS — I10 ESSENTIAL HYPERTENSION: ICD-10-CM

## 2024-04-01 DIAGNOSIS — R73.03 PREDIABETES: ICD-10-CM

## 2024-04-01 DIAGNOSIS — Z85.46 PERSONAL HISTORY OF PROSTATE CANCER: ICD-10-CM

## 2024-04-01 DIAGNOSIS — I25.10 CORONARY ARTERY DISEASE INVOLVING NATIVE CORONARY ARTERY OF NATIVE HEART WITHOUT ANGINA PECTORIS: Primary | ICD-10-CM

## 2024-04-01 DIAGNOSIS — I44.7 LEFT BUNDLE BRANCH BLOCK: ICD-10-CM

## 2024-04-01 PROBLEM — C61 PROSTATE CANCER (HCC): Status: ACTIVE | Noted: 2020-11-03

## 2024-04-01 PROCEDURE — G8427 DOCREV CUR MEDS BY ELIG CLIN: HCPCS | Performed by: STUDENT IN AN ORGANIZED HEALTH CARE EDUCATION/TRAINING PROGRAM

## 2024-04-01 PROCEDURE — 3075F SYST BP GE 130 - 139MM HG: CPT | Performed by: STUDENT IN AN ORGANIZED HEALTH CARE EDUCATION/TRAINING PROGRAM

## 2024-04-01 PROCEDURE — 99214 OFFICE O/P EST MOD 30 MIN: CPT | Performed by: STUDENT IN AN ORGANIZED HEALTH CARE EDUCATION/TRAINING PROGRAM

## 2024-04-01 PROCEDURE — 3078F DIAST BP <80 MM HG: CPT | Performed by: STUDENT IN AN ORGANIZED HEALTH CARE EDUCATION/TRAINING PROGRAM

## 2024-04-01 PROCEDURE — 1123F ACP DISCUSS/DSCN MKR DOCD: CPT | Performed by: STUDENT IN AN ORGANIZED HEALTH CARE EDUCATION/TRAINING PROGRAM

## 2024-04-01 PROCEDURE — 1036F TOBACCO NON-USER: CPT | Performed by: STUDENT IN AN ORGANIZED HEALTH CARE EDUCATION/TRAINING PROGRAM

## 2024-04-01 PROCEDURE — G8417 CALC BMI ABV UP PARAM F/U: HCPCS | Performed by: STUDENT IN AN ORGANIZED HEALTH CARE EDUCATION/TRAINING PROGRAM

## 2024-04-01 RX ORDER — ALBUTEROL SULFATE 90 UG/1
2 AEROSOL, METERED RESPIRATORY (INHALATION) EVERY 6 HOURS PRN
COMMUNITY

## 2024-04-01 RX ORDER — AMOXICILLIN 500 MG/1
CAPSULE ORAL
COMMUNITY
Start: 2024-01-08

## 2024-04-01 ASSESSMENT — PATIENT HEALTH QUESTIONNAIRE - PHQ9
SUM OF ALL RESPONSES TO PHQ QUESTIONS 1-9: 0
SUM OF ALL RESPONSES TO PHQ QUESTIONS 1-9: 0
1. LITTLE INTEREST OR PLEASURE IN DOING THINGS: NOT AT ALL
SUM OF ALL RESPONSES TO PHQ QUESTIONS 1-9: 0
SUM OF ALL RESPONSES TO PHQ QUESTIONS 1-9: 0
SUM OF ALL RESPONSES TO PHQ9 QUESTIONS 1 & 2: 0
2. FEELING DOWN, DEPRESSED OR HOPELESS: NOT AT ALL

## 2024-04-01 ASSESSMENT — ENCOUNTER SYMPTOMS
ABDOMINAL PAIN: 0
VOMITING: 0
BLOOD IN STOOL: 0
ANAL BLEEDING: 0
BACK PAIN: 1
DIARRHEA: 0
TROUBLE SWALLOWING: 0
NAUSEA: 0
CONSTIPATION: 0

## 2024-04-08 ENCOUNTER — OFFICE VISIT (OUTPATIENT)
Dept: VASCULAR SURGERY | Age: 79
End: 2024-04-08
Payer: MEDICARE

## 2024-04-08 VITALS
BODY MASS INDEX: 28.42 KG/M2 | WEIGHT: 203 LBS | DIASTOLIC BLOOD PRESSURE: 72 MMHG | SYSTOLIC BLOOD PRESSURE: 135 MMHG | HEIGHT: 71 IN | HEART RATE: 85 BPM | OXYGEN SATURATION: 93 %

## 2024-04-08 DIAGNOSIS — Z95.828 PRESENCE OF INTERNAL CAROTID STENT: Primary | ICD-10-CM

## 2024-04-08 PROCEDURE — 99213 OFFICE O/P EST LOW 20 MIN: CPT | Performed by: STUDENT IN AN ORGANIZED HEALTH CARE EDUCATION/TRAINING PROGRAM

## 2024-04-08 PROCEDURE — G8417 CALC BMI ABV UP PARAM F/U: HCPCS | Performed by: STUDENT IN AN ORGANIZED HEALTH CARE EDUCATION/TRAINING PROGRAM

## 2024-04-08 PROCEDURE — 3075F SYST BP GE 130 - 139MM HG: CPT | Performed by: STUDENT IN AN ORGANIZED HEALTH CARE EDUCATION/TRAINING PROGRAM

## 2024-04-08 PROCEDURE — 3078F DIAST BP <80 MM HG: CPT | Performed by: STUDENT IN AN ORGANIZED HEALTH CARE EDUCATION/TRAINING PROGRAM

## 2024-04-08 PROCEDURE — 1123F ACP DISCUSS/DSCN MKR DOCD: CPT | Performed by: STUDENT IN AN ORGANIZED HEALTH CARE EDUCATION/TRAINING PROGRAM

## 2024-04-08 PROCEDURE — 1036F TOBACCO NON-USER: CPT | Performed by: STUDENT IN AN ORGANIZED HEALTH CARE EDUCATION/TRAINING PROGRAM

## 2024-04-08 PROCEDURE — G8427 DOCREV CUR MEDS BY ELIG CLIN: HCPCS | Performed by: STUDENT IN AN ORGANIZED HEALTH CARE EDUCATION/TRAINING PROGRAM

## 2024-04-08 NOTE — PROGRESS NOTES
VASCULAR SURGERY   [address] [place] [address] [address] [address]  [phone] FAX: [phone]        Geoff Vargas  : 1945    Reason for visit: 1yr f/u right TCAR    Chief Complaint: 78 y.o. male presents 1yr f/u after TCAR. Denies slurred speech, unilateral vision loss or paralysis. Compliant with ASA and statin.    Plan:   S/p Right TCAR: Continue ASA and stain. 1yr f/u with carotid DUS.     Level 3    Imaging interrupted:   Carotid DUS    No stenosis in the right internal carotid artery.    Moderate (50-69%) stenosis in the left internal carotid artery.  Heterogeneous plaque in the left internal carotid artery.    Normal antegrade flow involving the right vertebral artery.    Normal antegrade flow involving the left vertebral artery.    Constitutional:   Negative for fevers and unexplained weight loss.  Eyes:   Negative for visual disturbance.  ENT:   Negative for significant hearing loss and tinnitus.  Respiratory:   Negative for hemoptysis.  Cardiovascular:   Negative except as noted in HPI.  Gastrointestinal:   Negative for melena and abdominal pain.  Genitourinary:   Negative for hematuria, renal stones.  Integumentary:   Negative for rash or non-healing wounds  Hematologic/Lymphatic:   Negative for excessive bleeding hx or clotting disorder.  Musculoskeletal:  Negative for active, unexplained/severe joint pain.  Neurological:   Negative for stroke.    Behavioral/Psych:   Negative for suicidal ideations.    Endocrine:   Negative for uncontrolled diabetic symptoms including polyuria, polydipsia and poor wound healing.     Physical Examination:   Height: 1.803 m (5' 11\"), Weight - Scale: 92.1 kg (203 lb), BP: 135/72    General: No acute distress  HENT: AT  CV: Regular rhythm.    LUNG: No respiratory distress on RA  Abdominal: No distension.  Extremities: No wounds or edema, motor and sensation grossly intact    Past Medical History:   Diagnosis Date    Aortic stenosis, moderate

## 2024-08-05 RX ORDER — METOPROLOL SUCCINATE 25 MG/1
25 TABLET, EXTENDED RELEASE ORAL DAILY
Qty: 90 TABLET | Refills: 3 | Status: SHIPPED | OUTPATIENT
Start: 2024-08-05

## 2024-08-05 NOTE — TELEPHONE ENCOUNTER
[facility] to [facility]  Requested Prescriptions     Signed Prescriptions Disp Refills    metoprolol succinate (TOPROL XL) 25 MG extended release tablet 90 tablet 3     Sig: TAKE 1 TABLET BY MOUTH EVERY DAY     Authorizing Provider: CLAIRE TURNER     Ordering User: PHAM CARTER

## 2024-08-06 RX ORDER — ATORVASTATIN CALCIUM 40 MG/1
40 TABLET, FILM COATED ORAL EVERY EVENING
Qty: 90 TABLET | Refills: 3 | Status: SHIPPED | OUTPATIENT
Start: 2024-08-06

## 2024-08-06 NOTE — TELEPHONE ENCOUNTER
Prescription sent to pharmacy[facility]  Requested Prescriptions     Signed Prescriptions Disp Refills    atorvastatin (LIPITOR) 40 MG tablet 90 tablet 3     Sig: TAKE 1 TABLET BY MOUTH EVERY DAY IN THE EVENING     Authorizing Provider: CLAIRE TURNER     Ordering User: PHAM CARTER

## 2024-08-09 ENCOUNTER — TELEPHONE (OUTPATIENT)
Age: 79
End: 2024-08-09

## 2024-08-09 NOTE — TELEPHONE ENCOUNTER
His echo looks good.  He certainly can start an exercise program.   Patient called with results, voiced understanding and thanked me//lorne

## 2024-08-09 NOTE — TELEPHONE ENCOUNTER
Dr. Heath, I had my echo exam on Wednesday. It looked reasonable to me--obviously, I am unqualified. Should I continue my exercise program or wait until  I see you? I have increased it some lately and feel okay.  Geoff Vargas

## 2024-08-27 ENCOUNTER — LAB (OUTPATIENT)
Dept: INTERNAL MEDICINE CLINIC | Facility: CLINIC | Age: 79
End: 2024-08-27

## 2024-08-27 DIAGNOSIS — R73.03 PREDIABETES: ICD-10-CM

## 2024-08-27 DIAGNOSIS — Z85.46 PERSONAL HISTORY OF PROSTATE CANCER: ICD-10-CM

## 2024-08-27 DIAGNOSIS — E78.5 DYSLIPIDEMIA: ICD-10-CM

## 2024-08-27 DIAGNOSIS — I25.10 CORONARY ARTERY DISEASE INVOLVING NATIVE CORONARY ARTERY OF NATIVE HEART WITHOUT ANGINA PECTORIS: ICD-10-CM

## 2024-08-27 DIAGNOSIS — I10 ESSENTIAL HYPERTENSION: ICD-10-CM

## 2024-08-27 LAB
ALBUMIN SERPL-MCNC: 4.1 G/DL (ref 3.2–4.6)
ALBUMIN/GLOB SERPL: 1.3 (ref 1–1.9)
ALP SERPL-CCNC: 83 U/L (ref 40–129)
ALT SERPL-CCNC: 20 U/L (ref 12–65)
ANION GAP SERPL CALC-SCNC: 13 MMOL/L (ref 9–18)
APPEARANCE UR: CLEAR
AST SERPL-CCNC: 30 U/L (ref 15–37)
BASOPHILS # BLD: 0 K/UL (ref 0–0.2)
BASOPHILS NFR BLD: 0 % (ref 0–2)
BILIRUB SERPL-MCNC: 0.8 MG/DL (ref 0–1.2)
BILIRUB UR QL: NEGATIVE
BUN SERPL-MCNC: 24 MG/DL (ref 8–23)
CALCIUM SERPL-MCNC: 10.2 MG/DL (ref 8.8–10.2)
CHLORIDE SERPL-SCNC: 99 MMOL/L (ref 98–107)
CHOLEST SERPL-MCNC: 186 MG/DL (ref 0–200)
CO2 SERPL-SCNC: 28 MMOL/L (ref 20–28)
COLOR UR: ABNORMAL
CREAT SERPL-MCNC: 1.25 MG/DL (ref 0.8–1.3)
DIFFERENTIAL METHOD BLD: ABNORMAL
EOSINOPHIL # BLD: 0.1 K/UL (ref 0–0.8)
EOSINOPHIL NFR BLD: 2 % (ref 0.5–7.8)
ERYTHROCYTE [DISTWIDTH] IN BLOOD BY AUTOMATED COUNT: 12.2 % (ref 11.9–14.6)
EST. AVERAGE GLUCOSE BLD GHB EST-MCNC: 115 MG/DL
GLOBULIN SER CALC-MCNC: 3.2 G/DL (ref 2.3–3.5)
GLUCOSE SERPL-MCNC: 85 MG/DL (ref 70–99)
GLUCOSE UR STRIP.AUTO-MCNC: NEGATIVE MG/DL
HBA1C MFR BLD: 5.6 % (ref 0–5.6)
HCT VFR BLD AUTO: 47.4 % (ref 41.1–50.3)
HDLC SERPL-MCNC: 46 MG/DL (ref 40–60)
HDLC SERPL: 4 (ref 0–5)
HGB BLD-MCNC: 14.9 G/DL (ref 13.6–17.2)
HGB UR QL STRIP: NEGATIVE
IMM GRANULOCYTES # BLD AUTO: 0 K/UL (ref 0–0.5)
IMM GRANULOCYTES NFR BLD AUTO: 0 % (ref 0–5)
KETONES UR QL STRIP.AUTO: 15 MG/DL
LDLC SERPL CALC-MCNC: 97 MG/DL (ref 0–100)
LEUKOCYTE ESTERASE UR QL STRIP.AUTO: NEGATIVE
LYMPHOCYTES # BLD: 1.3 K/UL (ref 0.5–4.6)
LYMPHOCYTES NFR BLD: 19 % (ref 13–44)
MCH RBC QN AUTO: 31 PG (ref 26.1–32.9)
MCHC RBC AUTO-ENTMCNC: 31.4 G/DL (ref 31.4–35)
MCV RBC AUTO: 98.5 FL (ref 82–102)
MONOCYTES # BLD: 0.5 K/UL (ref 0.1–1.3)
MONOCYTES NFR BLD: 7 % (ref 4–12)
NEUTS SEG # BLD: 4.9 K/UL (ref 1.7–8.2)
NEUTS SEG NFR BLD: 71 % (ref 43–78)
NITRITE UR QL STRIP.AUTO: NEGATIVE
NRBC # BLD: 0 K/UL (ref 0–0.2)
PH UR STRIP: 5.5 (ref 5–9)
PLATELET # BLD AUTO: 120 K/UL (ref 150–450)
PMV BLD AUTO: 13.3 FL (ref 9.4–12.3)
POTASSIUM SERPL-SCNC: 4.8 MMOL/L (ref 3.5–5.1)
PROT SERPL-MCNC: 7.3 G/DL (ref 6.3–8.2)
PROT UR STRIP-MCNC: NEGATIVE MG/DL
PSA SERPL-MCNC: <0.1 NG/ML (ref 0–4)
RBC # BLD AUTO: 4.81 M/UL (ref 4.23–5.6)
SODIUM SERPL-SCNC: 141 MMOL/L (ref 136–145)
SP GR UR REFRACTOMETRY: 1.02 (ref 1–1.02)
T4 FREE SERPL-MCNC: 1.5 NG/DL (ref 0.9–1.7)
TRIGL SERPL-MCNC: 212 MG/DL (ref 0–150)
TSH, 3RD GENERATION: 0.88 UIU/ML (ref 0.27–4.2)
UROBILINOGEN UR QL STRIP.AUTO: 0.2 EU/DL (ref 0.2–1)
VLDLC SERPL CALC-MCNC: 42 MG/DL (ref 6–23)
WBC # BLD AUTO: 6.9 K/UL (ref 4.3–11.1)

## 2024-09-02 PROBLEM — C61 PROSTATE CANCER (HCC): Status: RESOLVED | Noted: 2020-11-03 | Resolved: 2024-09-02

## 2024-09-02 NOTE — PROGRESS NOTES
Geoff Vargas (:  1945) is a 78 y.o. male,Established patient, here for evaluation of the following chief complaint(s):  Follow-up (Pt is here for a 5 month follow up and lab review. ), Cholesterol Problem, and Discuss Labs         Assessment & Plan  Coronary artery disease involving native coronary artery of native heart without angina pectoris    Left bundle branch block    History of aortic valve replacement  Status post CABG x 3 (LIMA to LAD, reverse SVG to OM, reverse SVG to PDA) on 2021  Status post aortic valve replacement for aortic stenosis with 25 mm Lopez Intuity pericardial valve on 2021  Echo on 2024 as follows:  -EF of 55 to 60%, mildly increased LV wall thickness with abnormal LV diastolic function  -Mildly dilated bilateral atria  -Mild prosthetic aortic valve with trace paravalvular regurgitation  -Mild MAC, mild TR with RVSP of 32 mmHg  Continue aspirin, atorvastatin, metoprolol succinate  Follow-up with cardiology         Bilateral carotid artery disease, unspecified type (HCC)  Status post right transcarotid arterial revascularization on 3/8/2023   Carotid artery ultrasound on 2024 with 50 to 69% stenosis of left ICA, no stenosis of right ICA, normal antegrade flow of bilateral vertebral arteries  Continue aspirin, atorvastatin  Follow-up per vascular surgery         Essential hypertension  Continue valsartan/HCTZ, metoprolol succinate         Dyslipidemia  Lipid panel from 2024 with elevated triglycerides of 212, LDL above goal at 97  Patient with prior intolerance to higher intensity atorvastatin with nightmares, since improved with dose reduction  Maintain current dose of atorvastatin, recommended adding fish oil to target elevated triglycerides         Personal history of prostate cancer  Status post SBRT [employer] in 2020   Diagnostic PSA less than 0.1 on 2024  Follow-up per urology           No follow-ups on file.       Subjective

## 2024-09-02 NOTE — ASSESSMENT & PLAN NOTE
Status post right transcarotid arterial revascularization on 3/8/2023   Carotid artery ultrasound on 4/8/2024 with 50 to 69% stenosis of left ICA, no stenosis of right ICA, normal antegrade flow of bilateral vertebral arteries  Continue aspirin, atorvastatin  Follow-up per vascular surgery

## 2024-09-03 ENCOUNTER — OFFICE VISIT (OUTPATIENT)
Dept: INTERNAL MEDICINE CLINIC | Facility: CLINIC | Age: 79
End: 2024-09-03
Payer: MEDICARE

## 2024-09-03 VITALS
BODY MASS INDEX: 28.56 KG/M2 | SYSTOLIC BLOOD PRESSURE: 136 MMHG | TEMPERATURE: 98 F | WEIGHT: 204 LBS | OXYGEN SATURATION: 97 % | DIASTOLIC BLOOD PRESSURE: 72 MMHG | HEART RATE: 79 BPM | HEIGHT: 71 IN

## 2024-09-03 DIAGNOSIS — Z95.2 HISTORY OF AORTIC VALVE REPLACEMENT: ICD-10-CM

## 2024-09-03 DIAGNOSIS — I44.7 LEFT BUNDLE BRANCH BLOCK: ICD-10-CM

## 2024-09-03 DIAGNOSIS — I77.9 BILATERAL CAROTID ARTERY DISEASE, UNSPECIFIED TYPE (HCC): ICD-10-CM

## 2024-09-03 DIAGNOSIS — E78.5 DYSLIPIDEMIA: ICD-10-CM

## 2024-09-03 DIAGNOSIS — I25.10 CORONARY ARTERY DISEASE INVOLVING NATIVE CORONARY ARTERY OF NATIVE HEART WITHOUT ANGINA PECTORIS: Primary | ICD-10-CM

## 2024-09-03 DIAGNOSIS — I10 ESSENTIAL HYPERTENSION: ICD-10-CM

## 2024-09-03 DIAGNOSIS — Z85.46 PERSONAL HISTORY OF PROSTATE CANCER: ICD-10-CM

## 2024-09-03 PROBLEM — G47.09 OTHER INSOMNIA: Status: RESOLVED | Noted: 2021-05-14 | Resolved: 2024-09-03

## 2024-09-03 PROCEDURE — 3075F SYST BP GE 130 - 139MM HG: CPT | Performed by: STUDENT IN AN ORGANIZED HEALTH CARE EDUCATION/TRAINING PROGRAM

## 2024-09-03 PROCEDURE — 1123F ACP DISCUSS/DSCN MKR DOCD: CPT | Performed by: STUDENT IN AN ORGANIZED HEALTH CARE EDUCATION/TRAINING PROGRAM

## 2024-09-03 PROCEDURE — G8427 DOCREV CUR MEDS BY ELIG CLIN: HCPCS | Performed by: STUDENT IN AN ORGANIZED HEALTH CARE EDUCATION/TRAINING PROGRAM

## 2024-09-03 PROCEDURE — 99214 OFFICE O/P EST MOD 30 MIN: CPT | Performed by: STUDENT IN AN ORGANIZED HEALTH CARE EDUCATION/TRAINING PROGRAM

## 2024-09-03 PROCEDURE — 3078F DIAST BP <80 MM HG: CPT | Performed by: STUDENT IN AN ORGANIZED HEALTH CARE EDUCATION/TRAINING PROGRAM

## 2024-09-03 PROCEDURE — G8417 CALC BMI ABV UP PARAM F/U: HCPCS | Performed by: STUDENT IN AN ORGANIZED HEALTH CARE EDUCATION/TRAINING PROGRAM

## 2024-09-03 PROCEDURE — 1036F TOBACCO NON-USER: CPT | Performed by: STUDENT IN AN ORGANIZED HEALTH CARE EDUCATION/TRAINING PROGRAM

## 2024-09-03 SDOH — ECONOMIC STABILITY: INCOME INSECURITY: HOW HARD IS IT FOR YOU TO PAY FOR THE VERY BASICS LIKE FOOD, HOUSING, MEDICAL CARE, AND HEATING?: PATIENT DECLINED

## 2024-09-03 SDOH — ECONOMIC STABILITY: FOOD INSECURITY: WITHIN THE PAST 12 MONTHS, THE FOOD YOU BOUGHT JUST DIDN'T LAST AND YOU DIDN'T HAVE MONEY TO GET MORE.: PATIENT DECLINED

## 2024-09-03 SDOH — ECONOMIC STABILITY: FOOD INSECURITY: WITHIN THE PAST 12 MONTHS, YOU WORRIED THAT YOUR FOOD WOULD RUN OUT BEFORE YOU GOT MONEY TO BUY MORE.: PATIENT DECLINED

## 2024-09-03 ASSESSMENT — ENCOUNTER SYMPTOMS
CONSTIPATION: 0
BLOOD IN STOOL: 0
ANAL BLEEDING: 0
SHORTNESS OF BREATH: 0
ABDOMINAL PAIN: 0

## 2024-09-04 ENCOUNTER — OFFICE VISIT (OUTPATIENT)
Age: 79
End: 2024-09-04
Payer: MEDICARE

## 2024-09-04 VITALS
WEIGHT: 205.8 LBS | BODY MASS INDEX: 28.81 KG/M2 | HEIGHT: 71 IN | DIASTOLIC BLOOD PRESSURE: 60 MMHG | SYSTOLIC BLOOD PRESSURE: 132 MMHG | HEART RATE: 72 BPM

## 2024-09-04 DIAGNOSIS — I25.10 CORONARY ARTERY DISEASE INVOLVING NATIVE CORONARY ARTERY OF NATIVE HEART WITHOUT ANGINA PECTORIS: Primary | ICD-10-CM

## 2024-09-04 DIAGNOSIS — I10 BENIGN ESSENTIAL HYPERTENSION: ICD-10-CM

## 2024-09-04 DIAGNOSIS — E78.2 MIXED HYPERLIPIDEMIA: ICD-10-CM

## 2024-09-04 DIAGNOSIS — I44.7 LBBB (LEFT BUNDLE BRANCH BLOCK): ICD-10-CM

## 2024-09-04 DIAGNOSIS — Z95.2 S/P AVR: ICD-10-CM

## 2024-09-04 DIAGNOSIS — I65.23 BILATERAL CAROTID ARTERY STENOSIS: ICD-10-CM

## 2024-09-04 PROCEDURE — 3075F SYST BP GE 130 - 139MM HG: CPT | Performed by: INTERNAL MEDICINE

## 2024-09-04 PROCEDURE — 99214 OFFICE O/P EST MOD 30 MIN: CPT | Performed by: INTERNAL MEDICINE

## 2024-09-04 PROCEDURE — 3078F DIAST BP <80 MM HG: CPT | Performed by: INTERNAL MEDICINE

## 2024-09-04 PROCEDURE — 1036F TOBACCO NON-USER: CPT | Performed by: INTERNAL MEDICINE

## 2024-09-04 PROCEDURE — G8427 DOCREV CUR MEDS BY ELIG CLIN: HCPCS | Performed by: INTERNAL MEDICINE

## 2024-09-04 PROCEDURE — 1123F ACP DISCUSS/DSCN MKR DOCD: CPT | Performed by: INTERNAL MEDICINE

## 2024-09-04 PROCEDURE — G8417 CALC BMI ABV UP PARAM F/U: HCPCS | Performed by: INTERNAL MEDICINE

## 2024-09-04 RX ORDER — EZETIMIBE 10 MG/1
10 TABLET ORAL DAILY
Qty: 90 TABLET | Refills: 3 | Status: SHIPPED | OUTPATIENT
Start: 2024-09-04

## 2024-09-04 ASSESSMENT — ENCOUNTER SYMPTOMS: SHORTNESS OF BREATH: 0

## 2024-09-04 NOTE — PROGRESS NOTES
[address], [address]  [place] [address]  PHONE: [phone]    Geoff Vargas  1945      SUBJECTIVE:   Geoff Vargas is a 78 y.o. male seen for a follow up visit regarding the following:     Chief Complaint   Patient presents with    Results     echo    Coronary Artery Disease    Hypertension       HPI:    Geoff Vargas presents for routine follow up. Multiple issues addressed.     Coronary Artery Disease:  Patient denies any recent angina.  Notes compliance with medical therapy.  No recent NTG use.  No intolerance to anti-platelet therapy.     AVR:  No fever or chills. Recent echo:  Echo (8/7/2024): EF 55-60%.  + DD.  AVR: MG 7.  PG 17.  Mild TR.  Mild DIVINE.    LBBB:  No dizziness or syncope.     Hypertension:  Ambulatory BP readings have been controlled.  Patient reports compliance with medical therapy without side effects.      Hyperlipidemia:      Latest Reference Range & Units 08/27/24 08:56   Cholesterol, Total 0 - 200 MG/   HDL Cholesterol 40 - 60 MG/DL 46   LDL Cholesterol 0 - 100 MG/DL 97   Triglycerides 0 - 150 MG/ (H)   (H): Data is abnormally high    Carotid artery disease:  Follows with Dr. Pollock.  Has appt 4/10/25. No neurologic symptoms.     Exercise:  Does mile walking frequently.   No angina.         Past Medical History, Past Surgical History, Family history, Social History, and Medications were all reviewed with the patient today and updated as necessary.           Current Outpatient Medications:     ezetimibe (ZETIA) 10 MG tablet, Take 1 tablet by mouth daily, Disp: 90 tablet, Rfl: 3    atorvastatin (LIPITOR) 40 MG tablet, TAKE 1 TABLET BY MOUTH EVERY DAY IN THE EVENING, Disp: 90 tablet, Rfl: 3    metoprolol succinate (TOPROL XL) 25 MG extended release tablet, TAKE 1 TABLET BY MOUTH EVERY DAY, Disp: 90 tablet, Rfl: 3    amoxicillin (AMOXIL) 500 MG capsule, Take 1 capsule by mouth Take 4 tablets 2 hours prior to dental procedure, Disp: ,

## 2025-01-08 ENCOUNTER — APPOINTMENT (OUTPATIENT)
Dept: URBAN - METROPOLITAN AREA CLINIC 23 | Facility: CLINIC | Age: 80
Setting detail: DERMATOLOGY
End: 2025-01-08

## 2025-01-08 DIAGNOSIS — Z85.828 PERSONAL HISTORY OF OTHER MALIGNANT NEOPLASM OF SKIN: ICD-10-CM

## 2025-01-08 DIAGNOSIS — L57.8 OTHER SKIN CHANGES DUE TO CHRONIC EXPOSURE TO NONIONIZING RADIATION: ICD-10-CM

## 2025-01-08 DIAGNOSIS — L57.0 ACTINIC KERATOSIS: ICD-10-CM

## 2025-01-08 DIAGNOSIS — L82.1 OTHER SEBORRHEIC KERATOSIS: ICD-10-CM

## 2025-01-08 DIAGNOSIS — D18.0 HEMANGIOMA: ICD-10-CM

## 2025-01-08 PROBLEM — D18.01 HEMANGIOMA OF SKIN AND SUBCUTANEOUS TISSUE: Status: ACTIVE | Noted: 2025-01-08

## 2025-01-08 PROCEDURE — 17003 DESTRUCT PREMALG LES 2-14: CPT

## 2025-01-08 PROCEDURE — 99213 OFFICE O/P EST LOW 20 MIN: CPT | Mod: 25

## 2025-01-08 PROCEDURE — ? COUNSELING

## 2025-01-08 PROCEDURE — ? LIQUID NITROGEN

## 2025-01-08 PROCEDURE — 17000 DESTRUCT PREMALG LESION: CPT

## 2025-01-08 ASSESSMENT — LOCATION DETAILED DESCRIPTION DERM
LOCATION DETAILED: RIGHT INFERIOR LATERAL FOREHEAD
LOCATION DETAILED: RIGHT DORSAL WRIST
LOCATION DETAILED: LEFT INFERIOR UPPER BACK
LOCATION DETAILED: RIGHT PROXIMAL RADIAL DORSAL FOREARM
LOCATION DETAILED: RIGHT SUPERIOR LATERAL MALAR CHEEK
LOCATION DETAILED: SUPERIOR THORACIC SPINE
LOCATION DETAILED: RIGHT RADIAL DORSAL HAND
LOCATION DETAILED: RIGHT ULNAR DORSAL HAND
LOCATION DETAILED: LEFT DISTAL DORSAL FOREARM
LOCATION DETAILED: RIGHT MEDIAL UPPER BACK
LOCATION DETAILED: LEFT RADIAL DORSAL HAND
LOCATION DETAILED: RIGHT DISTAL RADIAL DORSAL FOREARM
LOCATION DETAILED: RIGHT DISTAL DORSAL FOREARM
LOCATION DETAILED: EPIGASTRIC SKIN

## 2025-01-08 ASSESSMENT — LOCATION ZONE DERM
LOCATION ZONE: ARM
LOCATION ZONE: FACE
LOCATION ZONE: HAND
LOCATION ZONE: TRUNK

## 2025-01-08 ASSESSMENT — LOCATION SIMPLE DESCRIPTION DERM
LOCATION SIMPLE: RIGHT CHEEK
LOCATION SIMPLE: RIGHT UPPER BACK
LOCATION SIMPLE: ABDOMEN
LOCATION SIMPLE: LEFT FOREARM
LOCATION SIMPLE: RIGHT HAND
LOCATION SIMPLE: RIGHT FOREARM
LOCATION SIMPLE: RIGHT FOREHEAD
LOCATION SIMPLE: UPPER BACK
LOCATION SIMPLE: LEFT UPPER BACK
LOCATION SIMPLE: LEFT HAND
LOCATION SIMPLE: RIGHT WRIST

## 2025-01-25 ENCOUNTER — TELEPHONE (OUTPATIENT)
Dept: INTERNAL MEDICINE CLINIC | Facility: CLINIC | Age: 80
End: 2025-01-25

## 2025-01-25 DIAGNOSIS — I25.10 CORONARY ARTERY DISEASE INVOLVING NATIVE CORONARY ARTERY OF NATIVE HEART WITHOUT ANGINA PECTORIS: Primary | ICD-10-CM

## 2025-01-25 DIAGNOSIS — E78.5 DYSLIPIDEMIA: ICD-10-CM

## 2025-01-25 DIAGNOSIS — I10 ESSENTIAL HYPERTENSION: ICD-10-CM

## 2025-01-29 ENCOUNTER — LAB (OUTPATIENT)
Dept: INTERNAL MEDICINE CLINIC | Facility: CLINIC | Age: 80
End: 2025-01-29

## 2025-01-29 DIAGNOSIS — E78.5 DYSLIPIDEMIA: ICD-10-CM

## 2025-01-29 DIAGNOSIS — I10 ESSENTIAL HYPERTENSION: ICD-10-CM

## 2025-01-29 DIAGNOSIS — I25.10 CORONARY ARTERY DISEASE INVOLVING NATIVE CORONARY ARTERY OF NATIVE HEART WITHOUT ANGINA PECTORIS: ICD-10-CM

## 2025-01-29 LAB
ALBUMIN SERPL-MCNC: 3.8 G/DL (ref 3.2–4.6)
ALBUMIN/GLOB SERPL: 1.1 (ref 1–1.9)
ALP SERPL-CCNC: 80 U/L (ref 40–129)
ALT SERPL-CCNC: 19 U/L (ref 8–55)
ANION GAP SERPL CALC-SCNC: 11 MMOL/L (ref 7–16)
APPEARANCE UR: CLEAR
AST SERPL-CCNC: 36 U/L (ref 15–37)
BACTERIA URNS QL MICRO: NEGATIVE /HPF
BASOPHILS # BLD: 0.04 K/UL (ref 0–0.2)
BASOPHILS NFR BLD: 0.8 % (ref 0–2)
BILIRUB SERPL-MCNC: 0.6 MG/DL (ref 0–1.2)
BILIRUB UR QL: NEGATIVE
BUN SERPL-MCNC: 21 MG/DL (ref 8–23)
CALCIUM SERPL-MCNC: 9.6 MG/DL (ref 8.8–10.2)
CHLORIDE SERPL-SCNC: 103 MMOL/L (ref 98–107)
CHOLEST SERPL-MCNC: 157 MG/DL (ref 0–200)
CO2 SERPL-SCNC: 29 MMOL/L (ref 20–29)
COLOR UR: ABNORMAL
CREAT SERPL-MCNC: 1.24 MG/DL (ref 0.8–1.3)
DIFFERENTIAL METHOD BLD: ABNORMAL
EOSINOPHIL # BLD: 0.18 K/UL (ref 0–0.8)
EOSINOPHIL NFR BLD: 3.6 % (ref 0.5–7.8)
EPI CELLS #/AREA URNS HPF: ABNORMAL /HPF (ref 0–5)
ERYTHROCYTE [DISTWIDTH] IN BLOOD BY AUTOMATED COUNT: 12.2 % (ref 11.9–14.6)
GLOBULIN SER CALC-MCNC: 3.4 G/DL (ref 2.3–3.5)
GLUCOSE SERPL-MCNC: 109 MG/DL (ref 70–99)
GLUCOSE UR STRIP.AUTO-MCNC: NEGATIVE MG/DL
HCT VFR BLD AUTO: 42.8 % (ref 41.1–50.3)
HDLC SERPL-MCNC: 47 MG/DL (ref 40–60)
HDLC SERPL: 3.3 (ref 0–5)
HGB BLD-MCNC: 14 G/DL (ref 13.6–17.2)
HGB UR QL STRIP: NEGATIVE
HYALINE CASTS URNS QL MICRO: ABNORMAL /LPF
IMM GRANULOCYTES # BLD AUTO: 0.01 K/UL (ref 0–0.5)
IMM GRANULOCYTES NFR BLD AUTO: 0.2 % (ref 0–5)
KETONES UR QL STRIP.AUTO: ABNORMAL MG/DL
LDLC SERPL CALC-MCNC: 78 MG/DL (ref 0–100)
LEUKOCYTE ESTERASE UR QL STRIP.AUTO: NEGATIVE
LYMPHOCYTES # BLD: 1.08 K/UL (ref 0.5–4.6)
LYMPHOCYTES NFR BLD: 21.5 % (ref 13–44)
MCH RBC QN AUTO: 30.7 PG (ref 26.1–32.9)
MCHC RBC AUTO-ENTMCNC: 32.7 G/DL (ref 31.4–35)
MCV RBC AUTO: 93.9 FL (ref 82–102)
MONOCYTES # BLD: 0.42 K/UL (ref 0.1–1.3)
MONOCYTES NFR BLD: 8.4 % (ref 4–12)
NEUTS SEG # BLD: 3.29 K/UL (ref 1.7–8.2)
NEUTS SEG NFR BLD: 65.5 % (ref 43–78)
NITRITE UR QL STRIP.AUTO: NEGATIVE
NRBC # BLD: 0 K/UL (ref 0–0.2)
PH UR STRIP: 6 (ref 5–9)
PLATELET # BLD AUTO: 108 K/UL (ref 150–450)
PMV BLD AUTO: 13.6 FL (ref 9.4–12.3)
POTASSIUM SERPL-SCNC: 4.3 MMOL/L (ref 3.5–5.1)
PROT SERPL-MCNC: 7.2 G/DL (ref 6.3–8.2)
PROT UR STRIP-MCNC: NEGATIVE MG/DL
RBC # BLD AUTO: 4.56 M/UL (ref 4.23–5.6)
RBC #/AREA URNS HPF: ABNORMAL /HPF (ref 0–5)
SODIUM SERPL-SCNC: 142 MMOL/L (ref 136–145)
SP GR UR REFRACTOMETRY: 1.02 (ref 1–1.02)
TRIGL SERPL-MCNC: 162 MG/DL (ref 0–150)
TSH, 3RD GENERATION: 1.33 UIU/ML (ref 0.27–4.2)
UROBILINOGEN UR QL STRIP.AUTO: 1 EU/DL (ref 0.2–1)
VLDLC SERPL CALC-MCNC: 32 MG/DL (ref 6–23)
WBC # BLD AUTO: 5 K/UL (ref 4.3–11.1)
WBC URNS QL MICRO: ABNORMAL /HPF (ref 0–4)

## 2025-01-31 NOTE — PROGRESS NOTES
Geoff Vargas (:  1945) is a 79 y.o. male,Established patient, here for evaluation of the following chief complaint(s):  Medicare AWV (Pt is here for his yearly AWV and lab review. )         Assessment & Plan  Medicare annual wellness visit, subsequent  Medicare wellness responses reviewed in the office today  Fasting labs from 2025 reviewed in the office today  PSA less than 0.1 on 2024  Colonoscopy last performed on 3/1/2016         Coronary artery disease involving native coronary artery of native heart without angina pectoris              Left bundle branch block              History of aortic valve replacement  Status post CABG x 3 (LIMA to LAD, reverse SVG to OM, reverse SVG to PDA) on 2021  Status post aortic valve replacement for aortic stenosis with 25 mm Lopez Intuity pericardial valve on 2021  Echo on 2024 as follows:  -EF of 55 to 60%, mildly increased LV wall thickness with abnormal LV diastolic function  -Mildly dilated bilateral atria  -Bioprosthetic aortic valve with trace paravalvular regurgitation  -Mild MAC, mild TR with RVSP of 32 mmHg  Continue aspirin, atorvastatin, metoprolol succinate  Follow-up with cardiology         Bilateral carotid artery disease, unspecified type (HCC)  Status post right transcarotid arterial revascularization on 3/8/2023   Carotid artery ultrasound on 2024 with 50 to 69% stenosis of left ICA, no stenosis of right ICA, normal antegrade flow of bilateral vertebral arteries  Continue aspirin, atorvastatin  Follow-up per vascular surgery         Essential hypertension  Continue valsartan/HCTZ, metoprolol succinate   Blood pressure slightly elevated in the office today but home readings at goal         Dyslipidemia  Lipid panel from 2025 with elevated triglycerides of 162, LDL technically above goal currently at 78  Prior intolerance to higher intensity statin therapy  Continue current dose of atorvastatin.  Zetia recently

## 2025-02-03 SDOH — ECONOMIC STABILITY: FOOD INSECURITY: WITHIN THE PAST 12 MONTHS, THE FOOD YOU BOUGHT JUST DIDN'T LAST AND YOU DIDN'T HAVE MONEY TO GET MORE.: NEVER TRUE

## 2025-02-03 SDOH — ECONOMIC STABILITY: INCOME INSECURITY: IN THE LAST 12 MONTHS, WAS THERE A TIME WHEN YOU WERE NOT ABLE TO PAY THE MORTGAGE OR RENT ON TIME?: NO

## 2025-02-03 SDOH — ECONOMIC STABILITY: FOOD INSECURITY: WITHIN THE PAST 12 MONTHS, YOU WORRIED THAT YOUR FOOD WOULD RUN OUT BEFORE YOU GOT MONEY TO BUY MORE.: NEVER TRUE

## 2025-02-05 ASSESSMENT — PATIENT HEALTH QUESTIONNAIRE - PHQ9
2. FEELING DOWN, DEPRESSED OR HOPELESS: NOT AT ALL
SUM OF ALL RESPONSES TO PHQ QUESTIONS 1-9: 0
SUM OF ALL RESPONSES TO PHQ QUESTIONS 1-9: 0
1. LITTLE INTEREST OR PLEASURE IN DOING THINGS: NOT AT ALL
SUM OF ALL RESPONSES TO PHQ QUESTIONS 1-9: 0
SUM OF ALL RESPONSES TO PHQ9 QUESTIONS 1 & 2: 0
SUM OF ALL RESPONSES TO PHQ9 QUESTIONS 1 & 2: 0
SUM OF ALL RESPONSES TO PHQ QUESTIONS 1-9: 0
2. FEELING DOWN, DEPRESSED OR HOPELESS: NOT AT ALL
1. LITTLE INTEREST OR PLEASURE IN DOING THINGS: NOT AT ALL

## 2025-02-06 ENCOUNTER — OFFICE VISIT (OUTPATIENT)
Dept: INTERNAL MEDICINE CLINIC | Facility: CLINIC | Age: 80
End: 2025-02-06

## 2025-02-06 VITALS
SYSTOLIC BLOOD PRESSURE: 150 MMHG | BODY MASS INDEX: 29.12 KG/M2 | WEIGHT: 208 LBS | TEMPERATURE: 97.8 F | OXYGEN SATURATION: 97 % | HEART RATE: 72 BPM | HEIGHT: 71 IN | DIASTOLIC BLOOD PRESSURE: 76 MMHG

## 2025-02-06 DIAGNOSIS — Z00.00 MEDICARE ANNUAL WELLNESS VISIT, SUBSEQUENT: Primary | ICD-10-CM

## 2025-02-06 DIAGNOSIS — I25.10 CORONARY ARTERY DISEASE INVOLVING NATIVE CORONARY ARTERY OF NATIVE HEART WITHOUT ANGINA PECTORIS: ICD-10-CM

## 2025-02-06 DIAGNOSIS — D69.6 THROMBOCYTOPENIA (HCC): ICD-10-CM

## 2025-02-06 DIAGNOSIS — Z85.46 PERSONAL HISTORY OF PROSTATE CANCER: ICD-10-CM

## 2025-02-06 DIAGNOSIS — I44.7 LEFT BUNDLE BRANCH BLOCK: ICD-10-CM

## 2025-02-06 DIAGNOSIS — Z95.2 HISTORY OF AORTIC VALVE REPLACEMENT: ICD-10-CM

## 2025-02-06 DIAGNOSIS — I77.9 BILATERAL CAROTID ARTERY DISEASE, UNSPECIFIED TYPE (HCC): ICD-10-CM

## 2025-02-06 DIAGNOSIS — E78.5 DYSLIPIDEMIA: ICD-10-CM

## 2025-02-06 DIAGNOSIS — I10 ESSENTIAL HYPERTENSION: ICD-10-CM

## 2025-02-06 ASSESSMENT — ENCOUNTER SYMPTOMS
NAUSEA: 0
SHORTNESS OF BREATH: 0
WHEEZING: 0
COUGH: 0
VOMITING: 0
ABDOMINAL PAIN: 0

## 2025-02-06 NOTE — PROGRESS NOTES
Medicare Annual Wellness Visit    Geoff Vargas is here for Medicare AWV (Pt is here for his yearly AWV and lab review. )    Assessment & Plan   Medicare annual wellness visit, subsequent  Coronary artery disease involving native coronary artery of native heart without angina pectoris  Assessment & Plan:             Left bundle branch block  History of aortic valve replacement  Bilateral carotid artery disease, unspecified type (HCC)  Assessment & Plan:  Status post right transcarotid arterial revascularization on 3/8/2023   Carotid artery ultrasound on 4/8/2024 with 50 to 69% stenosis of left ICA, no stenosis of right ICA, normal antegrade flow of bilateral vertebral arteries  Continue aspirin, atorvastatin  Follow-up per vascular surgery         Essential hypertension  Dyslipidemia  Personal history of prostate cancer  Thrombocytopenia (HCC)       Return in about 6 months (around 8/6/2025) for EOV .     Subjective       Patient's complete Health Risk Assessment and screening values have been reviewed and are found in Flowsheets. The following problems were reviewed today and where indicated follow up appointments were made and/or referrals ordered.    Positive Risk Factor Screenings with Interventions:     Cognitive:   Clock Drawing Test (CDT): (!) Abnormal  Words recalled: 3 Words Recalled  Total Score: 3  Total Score Interpretation: Normal Mini-Cog  Interventions:  Minute and hour hands were located appropriately but patient did not write numbers on the clock             Poor Eating Habits/Diet:  Do you eat balanced/healthy meals regularly?: (!) No  Interventions:  Address at future visit                        Objective   Vitals:    02/06/25 0748   BP: (!) 150/76   Site: Left Upper Arm   Position: Sitting   Cuff Size: Large Adult   Pulse: 72   Temp: 97.8 °F (36.6 °C)   TempSrc: Temporal   SpO2: 97%   Weight: 94.3 kg (208 lb)   Height: 1.803 m (5' 11\")      Body mass index is 29.01 kg/m².

## 2025-02-17 ENCOUNTER — TELEPHONE (OUTPATIENT)
Age: 80
End: 2025-02-17

## 2025-02-17 RX ORDER — VALSARTAN AND HYDROCHLOROTHIAZIDE 160; 12.5 MG/1; MG/1
1 TABLET, FILM COATED ORAL DAILY
Qty: 90 TABLET | Refills: 3 | Status: SHIPPED | OUTPATIENT
Start: 2025-02-17

## 2025-02-17 NOTE — TELEPHONE ENCOUNTER
MEDICATION REFILL REQUEST      Name of Medication:  Valsartan/HCTZ  Dose:  160-12.5  Frequency:  daily   Quantity:    Days' supply:  90      Pharmacy Name/Location:  [facility]

## 2025-02-17 NOTE — TELEPHONE ENCOUNTER
[facility]  Requested Prescriptions     Signed Prescriptions Disp Refills    valsartan-hydroCHLOROthiazide (DIOVAN-HCT) 160-12.5 MG per tablet 90 tablet 3     Sig: Take 1 tablet by mouth daily     Authorizing Provider: CLAIRE TURENR     Ordering User: PHAM CARTER

## 2025-03-12 ASSESSMENT — ENCOUNTER SYMPTOMS: SHORTNESS OF BREATH: 0

## 2025-03-13 ENCOUNTER — OFFICE VISIT (OUTPATIENT)
Age: 80
End: 2025-03-13
Payer: MEDICARE

## 2025-03-13 VITALS
WEIGHT: 209.8 LBS | HEART RATE: 70 BPM | SYSTOLIC BLOOD PRESSURE: 128 MMHG | BODY MASS INDEX: 29.37 KG/M2 | DIASTOLIC BLOOD PRESSURE: 68 MMHG | HEIGHT: 71 IN

## 2025-03-13 DIAGNOSIS — I44.7 LBBB (LEFT BUNDLE BRANCH BLOCK): ICD-10-CM

## 2025-03-13 DIAGNOSIS — I25.10 CORONARY ARTERY DISEASE INVOLVING NATIVE CORONARY ARTERY OF NATIVE HEART WITHOUT ANGINA PECTORIS: Primary | ICD-10-CM

## 2025-03-13 DIAGNOSIS — E78.2 MIXED HYPERLIPIDEMIA: ICD-10-CM

## 2025-03-13 DIAGNOSIS — I10 BENIGN ESSENTIAL HYPERTENSION: ICD-10-CM

## 2025-03-13 DIAGNOSIS — Z95.2 S/P AVR: ICD-10-CM

## 2025-03-13 PROCEDURE — 3074F SYST BP LT 130 MM HG: CPT | Performed by: INTERNAL MEDICINE

## 2025-03-13 PROCEDURE — 1159F MED LIST DOCD IN RCRD: CPT | Performed by: INTERNAL MEDICINE

## 2025-03-13 PROCEDURE — 1036F TOBACCO NON-USER: CPT | Performed by: INTERNAL MEDICINE

## 2025-03-13 PROCEDURE — 3078F DIAST BP <80 MM HG: CPT | Performed by: INTERNAL MEDICINE

## 2025-03-13 PROCEDURE — 93000 ELECTROCARDIOGRAM COMPLETE: CPT | Performed by: INTERNAL MEDICINE

## 2025-03-13 PROCEDURE — 1123F ACP DISCUSS/DSCN MKR DOCD: CPT | Performed by: INTERNAL MEDICINE

## 2025-03-13 PROCEDURE — G8427 DOCREV CUR MEDS BY ELIG CLIN: HCPCS | Performed by: INTERNAL MEDICINE

## 2025-03-13 PROCEDURE — G8417 CALC BMI ABV UP PARAM F/U: HCPCS | Performed by: INTERNAL MEDICINE

## 2025-03-13 PROCEDURE — 99214 OFFICE O/P EST MOD 30 MIN: CPT | Performed by: INTERNAL MEDICINE

## 2025-03-13 NOTE — PROGRESS NOTES
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  Carotid Duplex (4/21/21):  Bilateral ICA stenosis of 50-69%  2.  Carotid duplex (2/14/2023): Severe BETY stenosis (70-79%).  Moderate LICA stenosis(50-69%)..  3.  Right transcarotid artery revascularization with 10 x 40 mm stent done by Dr. Pollock (3/8/23)      Benign essential hypertension 12/17/2015     Priority: Low    Hyperlipidemia 12/17/2015     Priority: Low     Vivid dreams with Lipitor 80.  Able to tolerate Lipitor 40.  Declines Repatha/Praluent due to injections.  On Zetia.      Prostate nodule      Priority: Low    S/P AVR 05/05/2021     1.  AVR (5/12/21): 25 mm Intuity Yvette Lopez pericardial valve  2.  Echo (7/26/21):  EF 45-50%.  AVR:  MG 9.  PG 16.  Mild MR.    3.  Echo (8/3/2022): EF 55-60%.  Mild MR/MS.  AVR: MG 7  4.  Echo (8/7/2024): EF 55-60%.  + DD.  AVR: MG 7.  PG 17.  Mild TR.  Mild DIVINE.          Social History     Tobacco Use    Smoking status: Never    Smokeless tobacco: Never   Substance Use Topics    Alcohol use: Not Currently       ROS:    Review of Systems   Constitutional: Negative for malaise/fatigue.   Cardiovascular:  Negative for chest pain.   Respiratory:  Negative for shortness of breath.    Musculoskeletal:  Positive for arthritis.   Neurological:  Negative for focal weakness.   Psychiatric/Behavioral:  Negative for depression.            PHYSICAL EXAM:  Wt Readings from Last 3 Encounters:   03/13/25 95.2 kg (209 lb 12.8 oz)   02/06/25 94.3 kg (208 lb)   09/04/24 93.4 kg (205 lb 12.8 oz)

## 2025-04-10 ENCOUNTER — OFFICE VISIT (OUTPATIENT)
Dept: VASCULAR SURGERY | Age: 80
End: 2025-04-10
Payer: MEDICARE

## 2025-04-10 VITALS
HEIGHT: 71 IN | HEART RATE: 66 BPM | SYSTOLIC BLOOD PRESSURE: 145 MMHG | DIASTOLIC BLOOD PRESSURE: 66 MMHG | WEIGHT: 206 LBS | BODY MASS INDEX: 28.84 KG/M2 | OXYGEN SATURATION: 93 %

## 2025-04-10 DIAGNOSIS — I65.22 STENOSIS OF LEFT CAROTID ARTERY: Primary | ICD-10-CM

## 2025-04-10 DIAGNOSIS — Z95.828 PRESENCE OF INTERNAL CAROTID STENT: ICD-10-CM

## 2025-04-10 PROCEDURE — 1159F MED LIST DOCD IN RCRD: CPT | Performed by: STUDENT IN AN ORGANIZED HEALTH CARE EDUCATION/TRAINING PROGRAM

## 2025-04-10 PROCEDURE — G8427 DOCREV CUR MEDS BY ELIG CLIN: HCPCS | Performed by: STUDENT IN AN ORGANIZED HEALTH CARE EDUCATION/TRAINING PROGRAM

## 2025-04-10 PROCEDURE — 1123F ACP DISCUSS/DSCN MKR DOCD: CPT | Performed by: STUDENT IN AN ORGANIZED HEALTH CARE EDUCATION/TRAINING PROGRAM

## 2025-04-10 PROCEDURE — 3077F SYST BP >= 140 MM HG: CPT | Performed by: STUDENT IN AN ORGANIZED HEALTH CARE EDUCATION/TRAINING PROGRAM

## 2025-04-10 PROCEDURE — G8417 CALC BMI ABV UP PARAM F/U: HCPCS | Performed by: STUDENT IN AN ORGANIZED HEALTH CARE EDUCATION/TRAINING PROGRAM

## 2025-04-10 PROCEDURE — 3078F DIAST BP <80 MM HG: CPT | Performed by: STUDENT IN AN ORGANIZED HEALTH CARE EDUCATION/TRAINING PROGRAM

## 2025-04-10 PROCEDURE — 99213 OFFICE O/P EST LOW 20 MIN: CPT | Performed by: STUDENT IN AN ORGANIZED HEALTH CARE EDUCATION/TRAINING PROGRAM

## 2025-04-10 PROCEDURE — 1036F TOBACCO NON-USER: CPT | Performed by: STUDENT IN AN ORGANIZED HEALTH CARE EDUCATION/TRAINING PROGRAM

## 2025-04-10 PROCEDURE — G2211 COMPLEX E/M VISIT ADD ON: HCPCS | Performed by: STUDENT IN AN ORGANIZED HEALTH CARE EDUCATION/TRAINING PROGRAM

## 2025-04-10 NOTE — PROGRESS NOTES
VASCULAR SURGERY   [address] [facility] [facility] [address] [address]  [phone] FAX: [phone]        Geoff Vargas  : 1945    Reason for visit: Yearly f/u right TCAR    Chief Complaint: 79 y.o. male presents yearly f/u after TCAR. Denies slurred speech, unilateral vision loss or paralysis. Compliant with ASA and statin.    Plan:   S/p Right TCAR: Continue ASA and stain. 1yr f/u with carotid DUS.       Imaging interrupted:   Carotid DUS    No stenosis in the right internal carotid artery.    Moderate (50-69%) stenosis in the left internal carotid artery.  Heterogeneous plaque in the left internal carotid artery.    Normal antegrade flow involving the right vertebral artery.    Normal antegrade flow involving the left vertebral artery.      Physical Examination:   Height: 1.803 m (5' 11\"), Weight - Scale: 93.4 kg (206 lb), BP: (!) 145/66    General: No acute distress  HENT: AT  CV: Regular rhythm.    LUNG: No respiratory distress on RA  Abdominal: No distension.  Extremities: No wounds or edema, motor and sensation grossly intact    Past Medical History:   Diagnosis Date    Aortic stenosis, moderate 2021    AVR AND CABG -- followed by dr hi    Asthma     childhood    CAD (coronary artery disease)     CABG 2021    GERD (gastroesophageal reflux disease)     with certain foods - no meds     High cholesterol     Hypertension     controlled with med    Prostate cancer (HCC)     radiation--    Seasonal allergic rhinitis        Current Outpatient Medications   Medication Sig Dispense Refill    MAGNESIUM PO Take by mouth      ZINC PO Take by mouth      valsartan-hydroCHLOROthiazide (DIOVAN-HCT) 160-12.5 MG per tablet Take 1 tablet by mouth daily 90 tablet 3    ezetimibe (ZETIA) 10 MG tablet Take 1 tablet by mouth daily 90 tablet 3    atorvastatin (LIPITOR) 40 MG tablet TAKE 1 TABLET BY MOUTH EVERY DAY IN THE EVENING 90 tablet 3    metoprolol succinate (TOPROL XL) 25 MG extended release

## 2025-08-12 ENCOUNTER — TELEPHONE (OUTPATIENT)
Age: 80
End: 2025-08-12

## 2025-08-12 RX ORDER — METOPROLOL SUCCINATE 25 MG/1
25 TABLET, EXTENDED RELEASE ORAL DAILY
Qty: 90 TABLET | Refills: 3 | Status: SHIPPED | OUTPATIENT
Start: 2025-08-12

## (undated) DEVICE — SUTURE NONABSORBABLE L24IN SZ 7-0 M0-5 BV175-8 EP 24 BLU M8745

## (undated) DEVICE — RELOAD STPL L75MM OPN STPL H4.5MM CLS STPL H2MM WIRE

## (undated) DEVICE — SPONGE LAPAROTOMY W18XL18IN WHITE STRUNG RADIOPAQUE STERILE

## (undated) DEVICE — PENCIL ES L3M BTTN SWCH HOLSTER W/ BLDE ELECTRD EDGE

## (undated) DEVICE — DRAPE SURG W85XL90CM INCIS W85XL60CM TREAT ANTIMIC IOBAN 2

## (undated) DEVICE — 6 FOOT DISPOSABLE EXTENSION CABLE WITH SAFE CONNECT / SCREW-DOWN

## (undated) DEVICE — SUTURE VCRL SZ 2-0 L27IN ABSRB UD L26MM SH 1/2 CIR J417H

## (undated) DEVICE — CATH URETH INTMIT ROB 14FR FUN -- USE ITEM 179521

## (undated) DEVICE — SUT CHRMC 4-0 27IN SH BRN --

## (undated) DEVICE — ELECTRODE,CUTTING,STERILE.24FR: Brand: N.A.

## (undated) DEVICE — AUTOTRANSFUSION KIT 120 MH FAST STRT AT1 FOR CATS +

## (undated) DEVICE — SYRINGE IRRIG 60ML SFT PLIABLE BLB EZ TO GRP 1 HND USE W/

## (undated) DEVICE — CABG-AVR DR WILLIAMS: Brand: MEDLINE INDUSTRIES, INC.

## (undated) DEVICE — COR-KNOT MINI® DEVICE KIT: Brand: COR-KNOT MINI®

## (undated) DEVICE — STAPLER INT L75MM CUT LN L73MM STPL LN L77MM BLU B FRM 8

## (undated) DEVICE — Device

## (undated) DEVICE — TURP TURB: Brand: MEDLINE INDUSTRIES, INC.

## (undated) DEVICE — KIT BX PROST 20ML VI PREFIL W 10ML 10% NEUT BUFF FRMLN LEAK

## (undated) DEVICE — SUTURE MCRYL SZ 4-0 L27IN ABSRB UD L19MM PS-2 1/2 CIR PRIM Y426H

## (undated) DEVICE — 3M™ IOBAN™ 2 ANTIMICROBIAL INCISE DRAPE 6651EZ: Brand: IOBAN™ 2

## (undated) DEVICE — DRAPE TWL SURG 16X26IN BLU ORB04] ALLCARE INC]

## (undated) DEVICE — STERILE PACKED. BORE DIAMETER 1.6 MM; ANGLE OF INSERTION 19° TO THE LONG AXIS OF THE TRANSDUCER: Brand: SINGLE-USE BIPLANE BIOPSY GUIDE

## (undated) DEVICE — DRAPE SLUSH DISC W44XL66IN ST FOR RND BSIN HUSH SLUSH SYS

## (undated) DEVICE — AMD ANTIMICROBIAL GAUZE SPONGES,12 PLY USP TYPE VII, 0.2% POLYHEXAMETHYLENE BIGUANIDE HCI (PHMB): Brand: CURITY

## (undated) DEVICE — CONNECTOR IV 3/8X3/8X3/8 Y

## (undated) DEVICE — SYRINGE,TOOMEY,IRRIGATION,70CC,STERILE: Brand: MEDLINE

## (undated) DEVICE — SPONGE LAP 18X18IN STRL -- 5/PK

## (undated) DEVICE — SOLUTION IRRIGATION STRL H2O 3000 ML 4/CA

## (undated) DEVICE — CANNULA INJ L2.5IN BLNT TIP 3MM CLR BODY W/ 1 W VLV DLP

## (undated) DEVICE — BENTSON WIRE GUIDE 20CM DISTAL FLEXIBILITY WITH SOFTENED TIP: Brand: BENTSON

## (undated) DEVICE — 3M™ IOBAN™ 2 ANTIMICROBIAL INCISE DRAPE 6650EZ: Brand: IOBAN™ 2

## (undated) DEVICE — RELOAD STPL L55MM OPN H3.8MM CLS H1.5MM WIRE DIA0.2MM REG

## (undated) DEVICE — STRIP,CLOSURE,WOUND,MEDI-STRIP,1/2X4: Brand: MEDLINE

## (undated) DEVICE — SCOPE RECT LED W INSUFFLATOR

## (undated) DEVICE — OXYGENATOR 1.5 M2 SURF AREA 0.5 TO 0.5 LPM FLO RATE W/

## (undated) DEVICE — STERILE HOOK LOCK LATEX FREE ELASTIC BANDAGE 6INX5YD: Brand: HOOK LOCK™

## (undated) DEVICE — SUTURE VCRL SZ 3-0 L36IN ABSRB UD L36MM CT-1 1/2 CIR J944H

## (undated) DEVICE — SUREFIT, DUAL DISPERSIVE ELECTRODE, CONTACT QUALITY MONITOR: Brand: SUREFIT

## (undated) DEVICE — SURGIFOAM SPNG SZ 100

## (undated) DEVICE — MAJOR GENERAL: Brand: MEDLINE INDUSTRIES, INC.

## (undated) DEVICE — JELLY LUBRICATING 10GM PREFIL SYR LUBE

## (undated) DEVICE — GARMENT,MEDLINE,DVT,INT,CALF,MED, GEN2: Brand: MEDLINE

## (undated) DEVICE — MAX-CORE® DISPOSABLE CORE BIOPSY INSTRUMENT, 18G X 25CM: Brand: MAX-CORE

## (undated) DEVICE — 3M™ STERI-DRAPE™ INSTRUMENT POUCH 1018: Brand: STERI-DRAPE™

## (undated) DEVICE — REM POLYHESIVE ADULT PATIENT RETURN ELECTRODE: Brand: VALLEYLAB

## (undated) DEVICE — BASIC SINGLE BASIN-LF: Brand: MEDLINE INDUSTRIES, INC.

## (undated) DEVICE — TAPE UMBILICAL W1/16XL30IN COTTON ROUND NONRADIOPAQUE

## (undated) DEVICE — TOTAL TRAY, DB, 100% SILI FOLEY, 16FR 10: Brand: MEDLINE

## (undated) DEVICE — PERCUTANEOUS ENTRY THINWALL NEEDLE  ONE-PART: Brand: COOK

## (undated) DEVICE — CANNULA PERF 15FR L12.5IN RG STPCOCK WIREWOUND BODY

## (undated) DEVICE — GUIDEWIRE VASCULAR L95CM DIA0.014IN ENROUTE

## (undated) DEVICE — CATHETER THOR 32FR L23IN PVC 6 EYELET STR ATRAUM

## (undated) DEVICE — BLADE SAW W10XL54MM FOR PRI REPEAT STRNOTMY

## (undated) DEVICE — CLAMP INSERT: Brand: STEALTH® CLAMP INSERT

## (undated) DEVICE — SEALER ENDOSCP NANO COAT OPN DIV CRV L JAW LIGASURE IMPACT

## (undated) DEVICE — SUTURE PDS II SZ 1 L36IN ABSRB VLT L48MM CTX 1/2 CIR Z371T

## (undated) DEVICE — CATHETER ETER TY TEMP SENS F MBO W URIN M FOLLOWS CDC GUIDELINES TO

## (undated) DEVICE — SUTURE PERMAHAND SZ 2-0 L30IN NONABSORBABLE BLK SILK W/O A305H

## (undated) DEVICE — INTRODUCER SHTH 0.018 IN 21 GAX7 CM TRANSCAROTID ACCS KT

## (undated) DEVICE — DRAPE SURG SPEC PROC 4 PC

## (undated) DEVICE — NEEDLE HYPO 21GA L1.5IN INTRAMUSCULAR S STL LATCH BVL UP

## (undated) DEVICE — SUT PROL 4-0 30IN SH1 DA BLU --

## (undated) DEVICE — PLEDGET VASC W3/16XL3/8IN THK1/16IN PTFE SFT

## (undated) DEVICE — SOLUTION IRRIG 1000ML STRL H2O USP PLAS POUR BTL

## (undated) DEVICE — COR-KNOT MINI® COMBO KITBASE PACKAGE TYPE - KITEACH STERILE PACKAGE KIT CONTAINS (2) SINGLE PATIENT USE COR-KNOT MINI® DEVICES AND (12) COR-KNOT® QUICK LOADS®.: Brand: COR-KNOT MINI®

## (undated) DEVICE — SUTURE ETHBND SZ 2-0 L30IN NONABSORBABLE GRN SH-2 L20MM 1/2 X582H

## (undated) DEVICE — SYRINGE MED 30ML STD CLR PLAS LUERLOCK TIP N CTRL DISP

## (undated) DEVICE — AORTIC PUNCHES ARE USED TO CREATE A UNIFORM OPENING IN BLOOD VESSELS DURING CARDIOVASCULAR SURGERY. THE VESSEL GRAFT IS INSERTED INTO THE CREATED OPENING AND SUTURED TO THE VESSEL WALL. AORTIC LANCETS ARE USED TO MAKE A SMALL UNIFORM CUT IN A BLOOD VESSEL TO FACILITATE INSERTION OF AN AORTIC PUNCH.  PUNCHES COME IN VARIOUS LENGTHS, DIAMETERS AND TIP CONFIGURATIONS.: Brand: CLEANCUT ROTATING AORTIC PUNCH

## (undated) DEVICE — SUTURE CHROMIC GUT SZ 2-0 L27IN ABSRB BRN L26MM UR-6 5/8 N878H

## (undated) DEVICE — SUTURE ETHBND EXCEL SZ 0 L18IN NONABSORBABLE GRN L36MM CT-1 CX21D

## (undated) DEVICE — SUTURE PROL SZ 6-0 L30IN NONABSORBABLE BLU L13MM CC-1 3/8 M8707

## (undated) DEVICE — CATHETER THOR 24FR L22IN PVC 5 EYELET STR ATRAUM

## (undated) DEVICE — 1/4 FORCE SURGICAL SPRING CLIP: Brand: STEALTH® SPRING CLIP

## (undated) DEVICE — SUTURE SILK PERMAHAND PRECUT 6 X 30 IN SZ 1 BLK BRAID A307H

## (undated) DEVICE — 3000CC GUARDIAN II: Brand: GUARDIAN

## (undated) DEVICE — SOLUTION IRRIG 3000ML H2O STRL BAG

## (undated) DEVICE — BAG DRNGE 4000ML CONT IRRIG ROUNDED TEARDROP SHP DISP

## (undated) DEVICE — COR-KNOT® QUICK LOAD® SINGLES: Brand: COR-KNOT® QUICK LOAD®

## (undated) DEVICE — STAPLER INT L55MM CUT LN L53MM STPL LN L57MM BLU B FRM 8

## (undated) DEVICE — DRAIN SURG 3/4 W10MMXL20CM 100% SIL PERF FLAT HUBLESS

## (undated) DEVICE — MEDI-TRACE CADENCE ADULT, DEFIBRILLATION ELECTRODE -RTS  (10 PR/PK) - ZOLL: Brand: MEDI-TRACE CADENCE

## (undated) DEVICE — SUTURE PERMA-HAND SZ 2-0 L30IN NONABSORBABLE BLK L26MM SH K833H

## (undated) DEVICE — GAUZE,SPONGE,4"X4",16PLY,STRL,LF,10/TRAY: Brand: MEDLINE

## (undated) DEVICE — SOLUTION IRRIG 3000ML 0.9% SOD CHL FLX CONT 0797208] ICU MEDICAL INC]

## (undated) DEVICE — APPLICATOR MEDICATED 26 CC SOLUTION HI LT ORNG CHLORAPREP

## (undated) DEVICE — 48" PROBE COVER W/GEL, ULTRASOUND, STERILE: Brand: SITE-RITE

## (undated) DEVICE — BANDAGE,GAUZE,BULKEE II,4.5"X4.1YD,STRL: Brand: MEDLINE

## (undated) DEVICE — CATHETER L HRT VENT SIL 16 IN OVERALL LEN 20FR N VENT CONN

## (undated) DEVICE — BLADE SURG NO20 S STL STR DISP GLASSVAN

## (undated) DEVICE — PERFUSION PACK XCOATING [76320] [TERUMO CARDIOVASCULAR]

## (undated) DEVICE — SUTURE PROL SZ 5-0 L24IN NONABSORBABLE BLU L13MM C-1 3/8 M8725

## (undated) DEVICE — Device: Brand: JELCO

## (undated) DEVICE — SUTURE PDS II SZ 1 L96IN ABSRB VLT TP-1 L65MM 1/2 CIR Z880G

## (undated) DEVICE — SOLUTION IRRIG 3000ML STRL H2O USP UROMATIC PLAS CONT

## (undated) DEVICE — AV FISTULA: Brand: MEDLINE INDUSTRIES, INC.

## (undated) DEVICE — 3M™ TEGADERM™ TRANSPARENT FILM DRESSING FRAME STYLE, 1626W, 4 IN X 4-3/4 IN (10 CM X 12 CM), 50/CT 4CT/CASE: Brand: 3M™ TEGADERM™

## (undated) DEVICE — SUTURE PROL DBL ARMED 8 0 BV130 5 24IN N ABSRB MFIL BLU M8739

## (undated) DEVICE — RESERVOIR,SUCTION,100CC,SILICONE: Brand: MEDLINE

## (undated) DEVICE — PREMIUM WET SKIN PREP TRAY: Brand: MEDLINE INDUSTRIES, INC.

## (undated) DEVICE — BLADE SURG NO15 S STL STR DISP GLASSVAN

## (undated) DEVICE — STAPLER INT CUT LN 40MM STPL 51MM GRN CRV HD B FRM

## (undated) DEVICE — SOLUTION IRRIG 1000ML 0.9% SOD CHL USP POUR PLAS BTL

## (undated) DEVICE — STAPLER EXT 65MM S STL AUTO DISP PURSTRING

## (undated) DEVICE — SUTURE PERMAHAND SZ 2-0 L12X18IN NONABSORBABLE BLK SILK A185H

## (undated) DEVICE — SUTURE PERMAHAND SZ 2-0 L18IN NONABSORBABLE BLK L26MM SH C012D

## (undated) DEVICE — STERILE HOOK LOCK LATEX FREE ELASTIC BANDAGE 4INX5YD: Brand: HOOK LOCK™

## (undated) DEVICE — SUTURE VCRL SZ 4-0 L27IN ABSRB UD L19MM PS-2 3/8 CIR PRIM J426H

## (undated) DEVICE — PACKING GZ W2INXL6FT WVN COT VAG RADPQ

## (undated) DEVICE — PREP SKN CHLRAPRP APL 26ML STR --

## (undated) DEVICE — APPLIER CLP L9.375IN APER 2.1MM CLS L3.8MM 20 SM TI CLP

## (undated) DEVICE — STAPLER INT DIA29MM CLS STPL H1.5-2.2MM OPN LEG L5.2MM 26

## (undated) DEVICE — ANGLED-TIP ARTERIAL SHEATH CONFIGURATION: Brand: ENROUTE TRANSCAROTID NEUROPROTECTION SYSTEM

## (undated) DEVICE — SUT PROL 3-0 36IN SH DA BLU --

## (undated) DEVICE — CANNULA PERF L1.8MM TIP L1MM S STL SHFT BLB SHP TIP FEM

## (undated) DEVICE — SS SUTURE, 3 PER SLEEVE: Brand: MYO/WIRE II

## (undated) DEVICE — KIT INFL DEV 20ML INSRT TOOL 3 W STPCOCK TORQ GATEWY + Y

## (undated) DEVICE — SUTURE ETHBND EXCEL 2-0 L30IN NONABSORBABLE GRN L26MM SH MX563

## (undated) DEVICE — GLOVE SURG SZ 75 CRM LTX FREE POLYISOPRENE POLYMER BEAD ANTI

## (undated) DEVICE — ELECTRODE PT RET AD L9FT HI MOIST COND ADH HYDRGEL CORDED

## (undated) DEVICE — SUTURE S STL SZ 6 L18IN NONABSORBABLE SIL L48MM CCS 1/2 CIR M654G

## (undated) DEVICE — SYS VSL HARV HEMOPRO2 VASOVIEW -- HARV SYS MINIMUM ORDER 5/EA

## (undated) DEVICE — PACK SURGICAL PROCEDURE KIT CYSTOSCOPY TOTE

## (undated) DEVICE — DRAPE,UNDERBUTTOCKS,PCH,STERILE: Brand: MEDLINE

## (undated) DEVICE — SUTURE PERMAHAND SZ 0 L30IN NONABSORBABLE BLK L30MM PSL 3/8 590H

## (undated) DEVICE — PAD,NON-ADHERENT,3X8,STERILE,LF,1/PK: Brand: MEDLINE

## (undated) DEVICE — LEGGINGS, PAIR, 31X48, STERILE: Brand: MEDLINE

## (undated) DEVICE — CATHETER ANGIO KA2 0.038 5 FRX40 CM TAPR TIP IMPRESS

## (undated) DEVICE — BLADE SCALP SURG BARD-PARK 11 --

## (undated) DEVICE — SUTURE PERMAHAND SZ 3-0 L18IN NONABSORBABLE BLK L26MM SH C013D

## (undated) DEVICE — BUTTON SWITCH PENCIL BLADE ELECTRODE, HOLSTER: Brand: EDGE

## (undated) DEVICE — BLADE SCALP SURG BARD-PARK 10 --

## (undated) DEVICE — PAD THRM L UNIV NONSLIP HYDRGEL RECT PROVIDE OPTIMAL ENERGY

## (undated) DEVICE — GLOVE SURG SZ 7 L11.2IN THK9.8MIL STRW LTX POLYMER BEAD CUF